# Patient Record
Sex: FEMALE | Race: WHITE | NOT HISPANIC OR LATINO
[De-identification: names, ages, dates, MRNs, and addresses within clinical notes are randomized per-mention and may not be internally consistent; named-entity substitution may affect disease eponyms.]

---

## 2017-01-04 ENCOUNTER — FORM ENCOUNTER (OUTPATIENT)
Age: 82
End: 2017-01-04

## 2017-01-05 ENCOUNTER — OUTPATIENT (OUTPATIENT)
Dept: OUTPATIENT SERVICES | Facility: HOSPITAL | Age: 82
LOS: 1 days | End: 2017-01-05
Payer: MEDICARE

## 2017-01-05 ENCOUNTER — APPOINTMENT (OUTPATIENT)
Dept: CT IMAGING | Facility: IMAGING CENTER | Age: 82
End: 2017-01-05

## 2017-01-05 DIAGNOSIS — Z00.8 ENCOUNTER FOR OTHER GENERAL EXAMINATION: ICD-10-CM

## 2017-01-05 PROCEDURE — 74177 CT ABD & PELVIS W/CONTRAST: CPT

## 2017-01-05 PROCEDURE — 71260 CT THORAX DX C+: CPT

## 2017-02-08 ENCOUNTER — RX RENEWAL (OUTPATIENT)
Age: 82
End: 2017-02-08

## 2017-03-13 ENCOUNTER — RX RENEWAL (OUTPATIENT)
Age: 82
End: 2017-03-13

## 2017-03-18 ENCOUNTER — RX RENEWAL (OUTPATIENT)
Age: 82
End: 2017-03-18

## 2017-04-10 ENCOUNTER — APPOINTMENT (OUTPATIENT)
Dept: INTERNAL MEDICINE | Facility: CLINIC | Age: 82
End: 2017-04-10

## 2017-04-10 VITALS
DIASTOLIC BLOOD PRESSURE: 74 MMHG | TEMPERATURE: 97.3 F | SYSTOLIC BLOOD PRESSURE: 132 MMHG | HEART RATE: 74 BPM | RESPIRATION RATE: 14 BRPM

## 2017-04-10 VITALS — BODY MASS INDEX: 28.28 KG/M2 | WEIGHT: 157.63 LBS

## 2017-04-11 LAB
25(OH)D3 SERPL-MCNC: 42.5 NG/ML
ALBUMIN SERPL ELPH-MCNC: 4.1 G/DL
ALP BLD-CCNC: 72 U/L
ALT SERPL-CCNC: 7 U/L
ANION GAP SERPL CALC-SCNC: 16 MMOL/L
AST SERPL-CCNC: 20 U/L
BASOPHILS # BLD AUTO: 0.02 K/UL
BASOPHILS NFR BLD AUTO: 0.3 %
BILIRUB SERPL-MCNC: 0.3 MG/DL
BUN SERPL-MCNC: 16 MG/DL
CALCIUM SERPL-MCNC: 9.4 MG/DL
CHLORIDE SERPL-SCNC: 99 MMOL/L
CHOLEST SERPL-MCNC: 195 MG/DL
CHOLEST/HDLC SERPL: 3.2 RATIO
CO2 SERPL-SCNC: 27 MMOL/L
CREAT SERPL-MCNC: 0.73 MG/DL
EOSINOPHIL # BLD AUTO: 0.1 K/UL
EOSINOPHIL NFR BLD AUTO: 1.6 %
FERRITIN SERPL-MCNC: 9 NG/ML
GLUCOSE SERPL-MCNC: 82 MG/DL
HCT VFR BLD CALC: 37.7 %
HDLC SERPL-MCNC: 61 MG/DL
HGB BLD-MCNC: 10.7 G/DL
IMM GRANULOCYTES NFR BLD AUTO: 0.2 %
IRON SATN MFR SERPL: 9 %
IRON SERPL-MCNC: 34 UG/DL
LDLC SERPL CALC-MCNC: 117 MG/DL
LYMPHOCYTES # BLD AUTO: 1.42 K/UL
LYMPHOCYTES NFR BLD AUTO: 22.8 %
MAN DIFF?: NORMAL
MCHC RBC-ENTMCNC: 24.8 PG
MCHC RBC-ENTMCNC: 28.4 GM/DL
MCV RBC AUTO: 87.3 FL
MONOCYTES # BLD AUTO: 0.49 K/UL
MONOCYTES NFR BLD AUTO: 7.9 %
NEUTROPHILS # BLD AUTO: 4.2 K/UL
NEUTROPHILS NFR BLD AUTO: 67.2 %
PLATELET # BLD AUTO: 310 K/UL
POTASSIUM SERPL-SCNC: 3.6 MMOL/L
PROT SERPL-MCNC: 7.2 G/DL
RBC # BLD: 4.32 M/UL
RBC # FLD: 16.1 %
SODIUM SERPL-SCNC: 142 MMOL/L
TIBC SERPL-MCNC: 385 UG/DL
TRIGL SERPL-MCNC: 83 MG/DL
UIBC SERPL-MCNC: 351 UG/DL
WBC # FLD AUTO: 6.24 K/UL

## 2017-04-26 ENCOUNTER — APPOINTMENT (OUTPATIENT)
Dept: INTERNAL MEDICINE | Facility: CLINIC | Age: 82
End: 2017-04-26

## 2017-04-27 ENCOUNTER — RX RENEWAL (OUTPATIENT)
Age: 82
End: 2017-04-27

## 2017-04-28 LAB — BACTERIA UR CULT: NORMAL

## 2017-06-02 ENCOUNTER — RX RENEWAL (OUTPATIENT)
Age: 82
End: 2017-06-02

## 2017-06-27 ENCOUNTER — RX RENEWAL (OUTPATIENT)
Age: 82
End: 2017-06-27

## 2017-07-27 RX ORDER — CLONAZEPAM 0.5 MG/1
0.5 TABLET ORAL
Qty: 90 | Refills: 0 | Status: DISCONTINUED | COMMUNITY
Start: 2016-12-14

## 2017-07-27 RX ORDER — POLYETHYLENE GLYCOL 3350, SODIUM CHLORIDE, SODIUM BICARBONATE AND POTASSIUM CHLORIDE WITH LEMON FLAVOR 420; 11.2; 5.72; 1.48 G/4L; G/4L; G/4L; G/4L
420 POWDER, FOR SOLUTION ORAL
Qty: 4000 | Refills: 0 | Status: ACTIVE | COMMUNITY
Start: 2016-12-02

## 2017-07-27 RX ORDER — LATANOPROST/PF 0.005 %
0.01 DROPS OPHTHALMIC (EYE)
Qty: 2 | Refills: 0 | Status: ACTIVE | COMMUNITY
Start: 2017-03-06 | End: 1900-01-01

## 2017-08-29 ENCOUNTER — RX RENEWAL (OUTPATIENT)
Age: 82
End: 2017-08-29

## 2017-09-05 ENCOUNTER — RX RENEWAL (OUTPATIENT)
Age: 82
End: 2017-09-05

## 2017-09-06 ENCOUNTER — RX RENEWAL (OUTPATIENT)
Age: 82
End: 2017-09-06

## 2017-09-20 ENCOUNTER — NON-APPOINTMENT (OUTPATIENT)
Age: 82
End: 2017-09-20

## 2017-09-20 ENCOUNTER — APPOINTMENT (OUTPATIENT)
Dept: INTERNAL MEDICINE | Facility: CLINIC | Age: 82
End: 2017-09-20
Payer: MEDICARE

## 2017-09-20 VITALS
DIASTOLIC BLOOD PRESSURE: 76 MMHG | TEMPERATURE: 96.3 F | RESPIRATION RATE: 16 BRPM | SYSTOLIC BLOOD PRESSURE: 140 MMHG | HEART RATE: 72 BPM

## 2017-09-20 VITALS — BODY MASS INDEX: 27.99 KG/M2 | WEIGHT: 156 LBS

## 2017-09-20 PROCEDURE — 93000 ELECTROCARDIOGRAM COMPLETE: CPT

## 2017-09-20 PROCEDURE — 99215 OFFICE O/P EST HI 40 MIN: CPT | Mod: 25

## 2017-09-20 PROCEDURE — 90662 IIV NO PRSV INCREASED AG IM: CPT

## 2017-09-20 PROCEDURE — G0008: CPT

## 2017-09-20 PROCEDURE — 36415 COLL VENOUS BLD VENIPUNCTURE: CPT

## 2017-09-22 LAB
25(OH)D3 SERPL-MCNC: 38.7 NG/ML
ALBUMIN SERPL ELPH-MCNC: 4.3 G/DL
ALP BLD-CCNC: 68 U/L
ALT SERPL-CCNC: 11 U/L
ANION GAP SERPL CALC-SCNC: 16 MMOL/L
AST SERPL-CCNC: 18 U/L
BASOPHILS # BLD AUTO: 0.02 K/UL
BASOPHILS NFR BLD AUTO: 0.3 %
BILIRUB SERPL-MCNC: 0.3 MG/DL
BUN SERPL-MCNC: 19 MG/DL
CALCIUM SERPL-MCNC: 9.6 MG/DL
CHLORIDE SERPL-SCNC: 99 MMOL/L
CHOLEST SERPL-MCNC: 202 MG/DL
CHOLEST/HDLC SERPL: 3.6 RATIO
CO2 SERPL-SCNC: 25 MMOL/L
CREAT SERPL-MCNC: 0.99 MG/DL
EOSINOPHIL # BLD AUTO: 0.07 K/UL
EOSINOPHIL NFR BLD AUTO: 1.1 %
FERRITIN SERPL-MCNC: 18 NG/ML
GLUCOSE SERPL-MCNC: 91 MG/DL
HCT VFR BLD CALC: 40.2 %
HDLC SERPL-MCNC: 56 MG/DL
HGB BLD-MCNC: 12.4 G/DL
IMM GRANULOCYTES NFR BLD AUTO: 0.5 %
IRON SATN MFR SERPL: 14 %
IRON SERPL-MCNC: 46 UG/DL
LDLC SERPL CALC-MCNC: 128 MG/DL
LYMPHOCYTES # BLD AUTO: 1.38 K/UL
LYMPHOCYTES NFR BLD AUTO: 22.3 %
MAN DIFF?: NORMAL
MCHC RBC-ENTMCNC: 27.9 PG
MCHC RBC-ENTMCNC: 30.8 GM/DL
MCV RBC AUTO: 90.3 FL
MONOCYTES # BLD AUTO: 0.64 K/UL
MONOCYTES NFR BLD AUTO: 10.4 %
NEUTROPHILS # BLD AUTO: 4.04 K/UL
NEUTROPHILS NFR BLD AUTO: 65.4 %
PLATELET # BLD AUTO: 248 K/UL
POTASSIUM SERPL-SCNC: 3.5 MMOL/L
PROT SERPL-MCNC: 7.1 G/DL
RBC # BLD: 4.45 M/UL
RBC # FLD: 16.3 %
SODIUM SERPL-SCNC: 140 MMOL/L
TIBC SERPL-MCNC: 328 UG/DL
TRANSFERRIN SERPL-MCNC: 296 MG/DL
TRIGL SERPL-MCNC: 91 MG/DL
UIBC SERPL-MCNC: 282 UG/DL
WBC # FLD AUTO: 6.18 K/UL

## 2017-10-03 ENCOUNTER — RX RENEWAL (OUTPATIENT)
Age: 82
End: 2017-10-03

## 2017-10-21 ENCOUNTER — RX RENEWAL (OUTPATIENT)
Age: 82
End: 2017-10-21

## 2017-10-26 ENCOUNTER — RX RENEWAL (OUTPATIENT)
Age: 82
End: 2017-10-26

## 2017-12-13 ENCOUNTER — APPOINTMENT (OUTPATIENT)
Dept: INTERNAL MEDICINE | Facility: CLINIC | Age: 82
End: 2017-12-13
Payer: MEDICARE

## 2017-12-13 VITALS
RESPIRATION RATE: 16 BRPM | TEMPERATURE: 98.3 F | SYSTOLIC BLOOD PRESSURE: 134 MMHG | DIASTOLIC BLOOD PRESSURE: 72 MMHG | HEART RATE: 70 BPM

## 2017-12-13 VITALS — WEIGHT: 154.32 LBS | BODY MASS INDEX: 27.69 KG/M2

## 2017-12-13 DIAGNOSIS — M17.10 UNILATERAL PRIMARY OSTEOARTHRITIS, UNSPECIFIED KNEE: ICD-10-CM

## 2017-12-13 LAB
BILIRUB UR QL STRIP: NEGATIVE
GLUCOSE UR-MCNC: NEGATIVE
HCG UR QL: 0.2 EU/DL
HGB UR QL STRIP.AUTO: NEGATIVE
KETONES UR-MCNC: NEGATIVE
LEUKOCYTE ESTERASE UR QL STRIP: NORMAL
NITRITE UR QL STRIP: NEGATIVE
PH UR STRIP: 6
PROT UR STRIP-MCNC: NEGATIVE
SP GR UR STRIP: 1.01

## 2017-12-13 PROCEDURE — 81003 URINALYSIS AUTO W/O SCOPE: CPT | Mod: QW

## 2017-12-13 PROCEDURE — G0439: CPT

## 2017-12-13 PROCEDURE — 36415 COLL VENOUS BLD VENIPUNCTURE: CPT

## 2017-12-13 PROCEDURE — 99215 OFFICE O/P EST HI 40 MIN: CPT | Mod: 25

## 2017-12-14 LAB
25(OH)D3 SERPL-MCNC: 38.6 NG/ML
ALBUMIN SERPL ELPH-MCNC: 4.2 G/DL
ALP BLD-CCNC: 74 U/L
ALT SERPL-CCNC: 7 U/L
ANION GAP SERPL CALC-SCNC: 19 MMOL/L
AST SERPL-CCNC: 16 U/L
BASOPHILS # BLD AUTO: 0.02 K/UL
BASOPHILS NFR BLD AUTO: 0.3 %
BILIRUB SERPL-MCNC: 0.3 MG/DL
BUN SERPL-MCNC: 13 MG/DL
CALCIUM SERPL-MCNC: 9.6 MG/DL
CHLORIDE SERPL-SCNC: 97 MMOL/L
CHOLEST SERPL-MCNC: 193 MG/DL
CHOLEST/HDLC SERPL: 3.4 RATIO
CO2 SERPL-SCNC: 26 MMOL/L
CREAT SERPL-MCNC: 0.87 MG/DL
EOSINOPHIL # BLD AUTO: 0.08 K/UL
EOSINOPHIL NFR BLD AUTO: 1.1 %
GLUCOSE SERPL-MCNC: 87 MG/DL
HCT VFR BLD CALC: 41.7 %
HDLC SERPL-MCNC: 56 MG/DL
HGB BLD-MCNC: 12.8 G/DL
IMM GRANULOCYTES NFR BLD AUTO: 0.3 %
LDLC SERPL CALC-MCNC: 117 MG/DL
LYMPHOCYTES # BLD AUTO: 1.58 K/UL
LYMPHOCYTES NFR BLD AUTO: 21.9 %
MAN DIFF?: NORMAL
MCHC RBC-ENTMCNC: 28.6 PG
MCHC RBC-ENTMCNC: 30.7 GM/DL
MCV RBC AUTO: 93.3 FL
MONOCYTES # BLD AUTO: 0.7 K/UL
MONOCYTES NFR BLD AUTO: 9.7 %
NEUTROPHILS # BLD AUTO: 4.81 K/UL
NEUTROPHILS NFR BLD AUTO: 66.7 %
PLATELET # BLD AUTO: 267 K/UL
POTASSIUM SERPL-SCNC: 3.5 MMOL/L
PROT SERPL-MCNC: 7.3 G/DL
RBC # BLD: 4.47 M/UL
RBC # FLD: 14.3 %
SODIUM SERPL-SCNC: 142 MMOL/L
TRIGL SERPL-MCNC: 101 MG/DL
WBC # FLD AUTO: 7.21 K/UL

## 2017-12-15 ENCOUNTER — RX RENEWAL (OUTPATIENT)
Age: 82
End: 2017-12-15

## 2018-01-26 ENCOUNTER — APPOINTMENT (OUTPATIENT)
Dept: INTERNAL MEDICINE | Facility: CLINIC | Age: 83
End: 2018-01-26
Payer: MEDICARE

## 2018-01-26 VITALS
SYSTOLIC BLOOD PRESSURE: 120 MMHG | RESPIRATION RATE: 16 BRPM | HEART RATE: 74 BPM | TEMPERATURE: 97.3 F | DIASTOLIC BLOOD PRESSURE: 73 MMHG

## 2018-01-26 VITALS — BODY MASS INDEX: 27.81 KG/M2 | WEIGHT: 155 LBS

## 2018-01-26 DIAGNOSIS — H81.10 BENIGN PAROXYSMAL VERTIGO, UNSPECIFIED EAR: ICD-10-CM

## 2018-01-26 DIAGNOSIS — F51.04 PSYCHOPHYSIOLOGIC INSOMNIA: ICD-10-CM

## 2018-01-26 PROCEDURE — 99214 OFFICE O/P EST MOD 30 MIN: CPT

## 2018-03-12 ENCOUNTER — RX RENEWAL (OUTPATIENT)
Age: 83
End: 2018-03-12

## 2018-04-10 ENCOUNTER — INPATIENT (INPATIENT)
Facility: HOSPITAL | Age: 83
LOS: 8 days | Discharge: HOME | DRG: 308 | End: 2018-04-19
Attending: INTERNAL MEDICINE | Admitting: HOSPITALIST
Payer: MEDICARE

## 2018-04-10 ENCOUNTER — APPOINTMENT (OUTPATIENT)
Dept: INTERNAL MEDICINE | Facility: CLINIC | Age: 83
End: 2018-04-10
Payer: MEDICARE

## 2018-04-10 ENCOUNTER — NON-APPOINTMENT (OUTPATIENT)
Age: 83
End: 2018-04-10

## 2018-04-10 VITALS
SYSTOLIC BLOOD PRESSURE: 122 MMHG | DIASTOLIC BLOOD PRESSURE: 74 MMHG | HEART RATE: 68 BPM | TEMPERATURE: 96.3 F | RESPIRATION RATE: 14 BRPM

## 2018-04-10 VITALS
DIASTOLIC BLOOD PRESSURE: 77 MMHG | RESPIRATION RATE: 20 BRPM | HEART RATE: 134 BPM | OXYGEN SATURATION: 96 % | SYSTOLIC BLOOD PRESSURE: 116 MMHG | WEIGHT: 145.06 LBS | TEMPERATURE: 98 F

## 2018-04-10 VITALS — BODY MASS INDEX: 28.28 KG/M2 | WEIGHT: 157.63 LBS

## 2018-04-10 DIAGNOSIS — R06.02 SHORTNESS OF BREATH: ICD-10-CM

## 2018-04-10 DIAGNOSIS — F41.9 ANXIETY DISORDER, UNSPECIFIED: ICD-10-CM

## 2018-04-10 DIAGNOSIS — E55.9 VITAMIN D DEFICIENCY, UNSPECIFIED: ICD-10-CM

## 2018-04-10 DIAGNOSIS — I50.9 HEART FAILURE, UNSPECIFIED: ICD-10-CM

## 2018-04-10 DIAGNOSIS — H40.9 UNSPECIFIED GLAUCOMA: ICD-10-CM

## 2018-04-10 DIAGNOSIS — R19.8 OTHER SPECIFIED SYMPTOMS AND SIGNS INVOLVING THE DIGESTIVE SYSTEM AND ABDOMEN: Chronic | ICD-10-CM

## 2018-04-10 DIAGNOSIS — E78.5 HYPERLIPIDEMIA, UNSPECIFIED: ICD-10-CM

## 2018-04-10 DIAGNOSIS — I48.91 UNSPECIFIED ATRIAL FIBRILLATION: ICD-10-CM

## 2018-04-10 DIAGNOSIS — R93.3 ABNORMAL FINDINGS ON DIAGNOSTIC IMAGING OF OTHER PARTS OF DIGESTIVE TRACT: Chronic | ICD-10-CM

## 2018-04-10 DIAGNOSIS — Z29.9 ENCOUNTER FOR PROPHYLACTIC MEASURES, UNSPECIFIED: ICD-10-CM

## 2018-04-10 DIAGNOSIS — I10 ESSENTIAL (PRIMARY) HYPERTENSION: ICD-10-CM

## 2018-04-10 DIAGNOSIS — K21.9 GASTRO-ESOPHAGEAL REFLUX DISEASE WITHOUT ESOPHAGITIS: ICD-10-CM

## 2018-04-10 LAB
ALBUMIN SERPL ELPH-MCNC: 4 G/DL — SIGNIFICANT CHANGE UP (ref 3.3–5)
ALP SERPL-CCNC: 68 U/L — SIGNIFICANT CHANGE UP (ref 40–120)
ALT FLD-CCNC: 12 U/L RC — SIGNIFICANT CHANGE UP (ref 10–45)
ANION GAP SERPL CALC-SCNC: 12 MMOL/L — SIGNIFICANT CHANGE UP (ref 5–17)
APTT BLD: 29.2 SEC — SIGNIFICANT CHANGE UP (ref 27.5–37.4)
AST SERPL-CCNC: 14 U/L — SIGNIFICANT CHANGE UP (ref 10–40)
BASOPHILS # BLD AUTO: 0 K/UL — SIGNIFICANT CHANGE UP (ref 0–0.2)
BASOPHILS NFR BLD AUTO: 0.4 % — SIGNIFICANT CHANGE UP (ref 0–2)
BILIRUB SERPL-MCNC: 0.3 MG/DL — SIGNIFICANT CHANGE UP (ref 0.2–1.2)
BUN SERPL-MCNC: 14 MG/DL — SIGNIFICANT CHANGE UP (ref 7–23)
CALCIUM SERPL-MCNC: 9.5 MG/DL — SIGNIFICANT CHANGE UP (ref 8.4–10.5)
CHLORIDE SERPL-SCNC: 101 MMOL/L — SIGNIFICANT CHANGE UP (ref 96–108)
CK MB CFR SERPL CALC: 1.3 NG/ML — SIGNIFICANT CHANGE UP (ref 0–3.8)
CK SERPL-CCNC: 36 U/L — SIGNIFICANT CHANGE UP (ref 25–170)
CO2 SERPL-SCNC: 27 MMOL/L — SIGNIFICANT CHANGE UP (ref 22–31)
CREAT SERPL-MCNC: 0.86 MG/DL — SIGNIFICANT CHANGE UP (ref 0.5–1.3)
EOSINOPHIL # BLD AUTO: 0.1 K/UL — SIGNIFICANT CHANGE UP (ref 0–0.5)
EOSINOPHIL NFR BLD AUTO: 1.1 % — SIGNIFICANT CHANGE UP (ref 0–6)
GLUCOSE SERPL-MCNC: 124 MG/DL — HIGH (ref 70–99)
HCT VFR BLD CALC: 39.2 % — SIGNIFICANT CHANGE UP (ref 34.5–45)
HGB BLD-MCNC: 13 G/DL — SIGNIFICANT CHANGE UP (ref 11.5–15.5)
INR BLD: 1.13 RATIO — SIGNIFICANT CHANGE UP (ref 0.88–1.16)
LYMPHOCYTES # BLD AUTO: 1.3 K/UL — SIGNIFICANT CHANGE UP (ref 1–3.3)
LYMPHOCYTES # BLD AUTO: 18.5 % — SIGNIFICANT CHANGE UP (ref 13–44)
MCHC RBC-ENTMCNC: 30.4 PG — SIGNIFICANT CHANGE UP (ref 27–34)
MCHC RBC-ENTMCNC: 33.2 GM/DL — SIGNIFICANT CHANGE UP (ref 32–36)
MCV RBC AUTO: 91.6 FL — SIGNIFICANT CHANGE UP (ref 80–100)
MONOCYTES # BLD AUTO: 0.8 K/UL — SIGNIFICANT CHANGE UP (ref 0–0.9)
MONOCYTES NFR BLD AUTO: 11.8 % — SIGNIFICANT CHANGE UP (ref 2–14)
NEUTROPHILS # BLD AUTO: 4.7 K/UL — SIGNIFICANT CHANGE UP (ref 1.8–7.4)
NEUTROPHILS NFR BLD AUTO: 68.2 % — SIGNIFICANT CHANGE UP (ref 43–77)
NT-PROBNP SERPL-SCNC: 1150 PG/ML — HIGH (ref 0–300)
PLATELET # BLD AUTO: 216 K/UL — SIGNIFICANT CHANGE UP (ref 150–400)
POTASSIUM SERPL-MCNC: 3.6 MMOL/L — SIGNIFICANT CHANGE UP (ref 3.5–5.3)
POTASSIUM SERPL-SCNC: 3.6 MMOL/L — SIGNIFICANT CHANGE UP (ref 3.5–5.3)
PROT SERPL-MCNC: 7.5 G/DL — SIGNIFICANT CHANGE UP (ref 6–8.3)
PROTHROM AB SERPL-ACNC: 12.4 SEC — SIGNIFICANT CHANGE UP (ref 9.8–12.7)
RBC # BLD: 4.28 M/UL — SIGNIFICANT CHANGE UP (ref 3.8–5.2)
RBC # FLD: 12.4 % — SIGNIFICANT CHANGE UP (ref 10.3–14.5)
SODIUM SERPL-SCNC: 140 MMOL/L — SIGNIFICANT CHANGE UP (ref 135–145)
TROPONIN T SERPL-MCNC: <0.01 NG/ML — SIGNIFICANT CHANGE UP (ref 0–0.06)
TSH SERPL-MCNC: 0.81 UIU/ML — SIGNIFICANT CHANGE UP (ref 0.27–4.2)
WBC # BLD: 6.8 K/UL — SIGNIFICANT CHANGE UP (ref 3.8–10.5)
WBC # FLD AUTO: 6.8 K/UL — SIGNIFICANT CHANGE UP (ref 3.8–10.5)

## 2018-04-10 PROCEDURE — 71045 X-RAY EXAM CHEST 1 VIEW: CPT | Mod: 26

## 2018-04-10 PROCEDURE — 99291 CRITICAL CARE FIRST HOUR: CPT

## 2018-04-10 PROCEDURE — 36415 COLL VENOUS BLD VENIPUNCTURE: CPT

## 2018-04-10 PROCEDURE — 99215 OFFICE O/P EST HI 40 MIN: CPT | Mod: 25

## 2018-04-10 PROCEDURE — 71275 CT ANGIOGRAPHY CHEST: CPT | Mod: 26

## 2018-04-10 PROCEDURE — 99223 1ST HOSP IP/OBS HIGH 75: CPT | Mod: GC

## 2018-04-10 PROCEDURE — 93010 ELECTROCARDIOGRAM REPORT: CPT

## 2018-04-10 PROCEDURE — 93000 ELECTROCARDIOGRAM COMPLETE: CPT

## 2018-04-10 RX ORDER — DORZOLAMIDE HYDROCHLORIDE TIMOLOL MALEATE 20; 5 MG/ML; MG/ML
1 SOLUTION/ DROPS OPHTHALMIC
Qty: 0 | Refills: 0 | Status: DISCONTINUED | OUTPATIENT
Start: 2018-04-10 | End: 2018-04-19

## 2018-04-10 RX ORDER — LATANOPROST 0.05 MG/ML
1 SOLUTION/ DROPS OPHTHALMIC; TOPICAL AT BEDTIME
Qty: 0 | Refills: 0 | Status: DISCONTINUED | OUTPATIENT
Start: 2018-04-10 | End: 2018-04-19

## 2018-04-10 RX ORDER — FUROSEMIDE 40 MG
40 TABLET ORAL ONCE
Qty: 0 | Refills: 0 | Status: COMPLETED | OUTPATIENT
Start: 2018-04-10 | End: 2018-04-10

## 2018-04-10 RX ORDER — ENOXAPARIN SODIUM 100 MG/ML
70 INJECTION SUBCUTANEOUS ONCE
Qty: 0 | Refills: 0 | Status: COMPLETED | OUTPATIENT
Start: 2018-04-10 | End: 2018-04-10

## 2018-04-10 RX ORDER — APIXABAN 2.5 MG/1
5 TABLET, FILM COATED ORAL EVERY 12 HOURS
Qty: 0 | Refills: 0 | Status: DISCONTINUED | OUTPATIENT
Start: 2018-04-11 | End: 2018-04-19

## 2018-04-10 RX ORDER — SODIUM CHLORIDE 9 MG/ML
3 INJECTION INTRAMUSCULAR; INTRAVENOUS; SUBCUTANEOUS ONCE
Qty: 0 | Refills: 0 | Status: COMPLETED | OUTPATIENT
Start: 2018-04-10 | End: 2018-04-10

## 2018-04-10 RX ORDER — FUROSEMIDE 40 MG
40 TABLET ORAL DAILY
Qty: 0 | Refills: 0 | Status: DISCONTINUED | OUTPATIENT
Start: 2018-04-11 | End: 2018-04-16

## 2018-04-10 RX ORDER — ACETAMINOPHEN 500 MG
650 TABLET ORAL EVERY 6 HOURS
Qty: 0 | Refills: 0 | Status: DISCONTINUED | OUTPATIENT
Start: 2018-04-10 | End: 2018-04-19

## 2018-04-10 RX ORDER — ASPIRIN/CALCIUM CARB/MAGNESIUM 324 MG
324 TABLET ORAL ONCE
Qty: 0 | Refills: 0 | Status: COMPLETED | OUTPATIENT
Start: 2018-04-10 | End: 2018-04-10

## 2018-04-10 RX ORDER — PANTOPRAZOLE SODIUM 20 MG/1
40 TABLET, DELAYED RELEASE ORAL
Qty: 0 | Refills: 0 | Status: DISCONTINUED | OUTPATIENT
Start: 2018-04-10 | End: 2018-04-19

## 2018-04-10 RX ADMIN — LATANOPROST 1 DROP(S): 0.05 SOLUTION/ DROPS OPHTHALMIC; TOPICAL at 23:17

## 2018-04-10 RX ADMIN — ENOXAPARIN SODIUM 70 MILLIGRAM(S): 100 INJECTION SUBCUTANEOUS at 14:55

## 2018-04-10 RX ADMIN — Medication 324 MILLIGRAM(S): at 14:56

## 2018-04-10 RX ADMIN — SODIUM CHLORIDE 3 MILLILITER(S): 9 INJECTION INTRAMUSCULAR; INTRAVENOUS; SUBCUTANEOUS at 14:52

## 2018-04-10 RX ADMIN — Medication 40 MILLIGRAM(S): at 16:51

## 2018-04-10 NOTE — H&P ADULT - PROBLEM SELECTOR PLAN 3
Pt denies being on anti-hypertensive medications at this time. She was most recently prescribed triamterine-HCTZ and amlodipine, though pt does not report being on either of these. Contacting pharmacy.  -Pharmacy called to confirm prescribed medications last filled in March or April.  -hold home amlodipine at this time given 's.  -BP well-controlled at this time after diltiazem po and IVP in ED.

## 2018-04-10 NOTE — ED PROVIDER NOTE - OBJECTIVE STATEMENT
85yo F Greenlandic speaking (son at bedside, patient requests son as ) with pmhx of HTN, glacoma, Sciatica, presents to ER c/o of SOB since this morning.  Patient went to her PCP Dr Shelley who did EKG and told her she had an arrythmia and to come to ER. Recent flight to [place], came home a few days ago.  Patient denies fever, chills, cough, abdominal pain, N/V, falls, chest pain, palpitations, hx of A.fib.  Not on anticoags 85yo F Turkmen speaking (son at bedside, patient requests son as ) with pmhx of HTN, glaucoma, Sciatica, presents to ER c/o of SOB since this morning.  Patient went to her PCP Dr Shelley who did EKG and told her she had an arrythmia and to come to ER. Recent flight to [place], came home a few days ago.  Patient denies fever, chills, cough, abdominal pain, N/V, falls, chest pain, palpitations, hx of A.fib.  Not on anticoags

## 2018-04-10 NOTE — ED PROVIDER NOTE - ATTENDING CONTRIBUTION TO CARE
palpitations associated w sob. basilar rales. +jvd. irr irr. tachy. concern for underlying pe vs atrial fibrillation w rvr primary. pt also appears fluid overload. no hx of chf. labs, xr chest vs cta. ekg. cardiac enzymes. will admit for w/u and continued treatment.

## 2018-04-10 NOTE — ED ADULT NURSE NOTE - OBJECTIVE STATEMENT
Pt is an 85 yo F, Luxembourgish speaking, son nacho, who came to the Ed c/o diff breathing this morning. Son states pt lives alone, pt called him this morning stating  she was having difficulty. When son arrived at pt's house, he noted she had labored breathing. Son took pt to see Dr Shelley, who evaluated pt and d/c her home. On the way home pt's son was called by Dr Shelley advising him to take pt to the ED, she may have "an arrythmia".Pt had a  recent flight to [place] and came home a few days ago.  Patient denies fever, chills, cough, abdominal pain, N/V, falls, chest pain, palpitations, no hx of A.fib.  A/O x3, resp reg and unlabored,  lungs lily,r color good, skin warm, dry, +pulses, no edema.

## 2018-04-10 NOTE — H&P ADULT - PMH
Colonic polyp    GERD (gastroesophageal reflux disease)    Glaucoma    Hiatal hernia    Hyperlipidemia    Hypertension    Hypovitaminosis D    Leg pain, bilateral

## 2018-04-10 NOTE — H&P ADULT - ASSESSMENT
87yo F with PMH of HTN, degenerative arthritis of the knee, chronic bilateral lower extremity pain, low Vit D, hypokalemia, urinary frequency, gastritis, anxiety attacks, HLD, dyspnea, BPPV presenting with two days of intermittent shortness of breath, admitted for new atrial fibrillation with RVR.

## 2018-04-10 NOTE — H&P ADULT - NSHPLABSRESULTS_GEN_ALL_CORE
LABS:                        13.0   6.8   )-----------( 216      ( 10 Apr 2018 13:22 )             39.2     10 Apr 2018 13:22    140    |  101    |  14     ----------------------------<  124    3.6     |  27     |  0.86     Ca    9.5        10 Apr 2018 13:22    TPro  7.5    /  Alb  4.0    /  TBili  0.3    /  DBili  x      /  AST  14     /  ALT  12     /  AlkPhos  68     10 Apr 2018 13:22    PT/INR - ( 10 Apr 2018 13:22 )   PT: 12.4 sec;   INR: 1.13 ratio      Serum Pro-Brain Natriuretic Peptide (04.10.18 @ 13:22)    Serum Pro-Brain Natriuretic Peptide: 1150 pg/mL       PTT - ( 10 Apr 2018 13:22 )  PTT:29.2 sec  CAPILLARY BLOOD GLUCOSE        BLOOD CULTURE    RADIOLOGY & ADDITIONAL TESTS:    Imaging Personally Reviewed:  [x ] YES     < from: Xray Chest 1 View AP/PA (04.10.18 @ 14:36) >  INTERPRETATION:  AP chest x-ray at 1433 hours on 10 April.  Clinical information: Shortness of breath, abnormal EKG.  Comparison: Chest CT dated 5 January 2017.  Findings: The central pulmonary arteries are prominent. The thoracic   aorta is calcified. Pulmonary venous congestion is present. No   consolidation is noted. There is bilateral pleural effusions.  Impression: CHF.  < end of copied text >    < from: CT Angio Chest w/ IV Cont (04.10.18 @ 15:19) >  IMPRESSION: No pulmonary embolus.  Mild pulmonary edema with small pleural effusions.  < end of copied text >

## 2018-04-10 NOTE — PATIENT PROFILE ADULT. - LANGUAGE ASSISTANCE NEEDED
No-Patient/Caregiver offered and refused free interpretation services./Son present and prefers to communicate, patient is

## 2018-04-10 NOTE — H&P ADULT - PROBLEM SELECTOR PLAN 4
Pt has not been prescribed statin in [employer]. Will follow up with PMD in AM.  and HDL 56 on late 2017 outpatient labs.  -further management with PMD as outpatient.  -hold on starting statin at this time.

## 2018-04-10 NOTE — H&P ADULT - NSHPSOCIALHISTORY_GEN_ALL_CORE
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

## 2018-04-10 NOTE — H&P ADULT - PROBLEM SELECTOR PLAN 2
Pt endorses 2 days of shortness of breath with single episode about one month ago, intermittent, likely related to new onset afib with RVR and pulmonary edema. However CXR and CT also showing cardiomegaly with LA enlargement. Will obtain TTE to assess for congestive heart failure.  -TTE  -s/p Lasix 40mg IVP x 1.  -breathing comfortably at this time. Will reevaluate need for additional Lasix in AM. Mildly volume overloaded with 1+ LE edema and few basilar crackles.  -wean NC to room air as tolerated. Pt endorses 2 days of shortness of breath with single episode about one month ago, intermittent, likely related to new onset afib with RVR and pulmonary edema. However CXR and CT also showing cardiomegaly with LA enlargement. Will obtain TTE to assess for congestive heart failure.  -TTE  -s/p Lasix 40mg IVP x 1.  -breathing comfortably at this time. Will reevaluate need for additional Lasix in AM. Mildly volume overloaded with 1+ LE edema and few basilar crackles.  -wean NC to room air as tolerated.  -start Lasix 40 IVP daily for now.  -will likely need eventual stress test.

## 2018-04-10 NOTE — H&P ADULT - PROBLEM SELECTOR PLAN 8
DVT ppx: on Lovenox now, will transition to NOAC apixiban.    Cj Sims  Medicine night admit, PGY-2  Pager [phone] / [phone] No recent visual changes.   -resume home Cosopt BID OU and latanoprost qhs OU.

## 2018-04-10 NOTE — H&P ADULT - HISTORY OF PRESENT ILLNESS
Pt is an 85yo F with PMH of HTN, degenerative arthritis of the knee, chronic bilateral lower extremity pain, low Vit D, hypokalemia, urinary frequency, gastritis, anxiety attacks, HLD, dyspnea, BPPV presenting with two days of intermittent shortness of breath. She was evaluated by her PMD Dr. Shelley earlier today with EKG concerning for atrial fibrillation, and she was sent to the ED. The patient reports that the shortness of breath episodes are intermittent with no clear exacerbating or relieving factors, though they seem to occur most often in the mornings. The patient denies ever having had chest, jaw, or shoulder pain associated with shortness of breath. Denies fevers, chills, cough, abdominal pain, change in bowel habits, n/v/d/c, diaphoresis, new weakness, open wounds, sick contacts. She did recently travel to [place] by plane but has not noticed new LE swelling or pain.    At baseline the patient ambulates with a cane, lives alone with full ADL's, no HHA. Her son visits her frequently but lives with his family nearby on . Of note, patient has had colonoscopy and endoscopy in the past for screening and for GERD with a reportedly benign lesion found and removed. Reviewed colonoscopy and endoscopy in [employer]: multiple sessile polyps biopsied (3) in colonoscopy largest 2.5 cm, biopsies negative for malignant lesions per [employer]. Endoscopy also showed erythematous polyp within hiatal hernia which was biopsied. Colonoscopy and endoscopy were in 12/2016.     ED course:  Vitals: Tmax 98.8degF, ->100, /61, RR 18, SpO2 95% on NC 2L, 96% on RA initially.  Labs: WBC 6.8, Hgb 13.0, Plts 216, INR 1.13, Na 140, K 3.6, Cl 101, SCr 0.86, glucose 124, trop <0.01, BNP 1150.  Imaging: CT angio chest: no pulmonary embolus, mild pulmonary edema with small pleural effusions.   CXR: pulmonary vascular congestion with bilateral pleural effusions and no consolidations, impression: CHF.  Medications:  x 1, diltiazem 60 po x 1, diltiazem 10 IVPx2, diltiazem 20 IVP x 1, Lovenox 70mg x 1 subq, furosemide 40 IVP x 1, Tylenol 650 x1. Pt is an 87yo F with PMH of HTN, degenerative arthritis of the knee, chronic bilateral lower extremity pain, low Vit D, hypokalemia, urinary frequency, gastritis, anxiety attacks, HLD, dyspnea, BPPV presenting with two days of intermittent shortness of breath. She was evaluated by her PMD Dr. Shelley earlier today with EKG concerning for atrial fibrillation, and she was sent to the ED. The patient reports that the shortness of breath episodes are intermittent with no clear exacerbating or relieving factors, though they seem to occur most often in the mornings. The patient denies ever having had chest, jaw, or shoulder pain associated with shortness of breath. Denies fevers, chills, cough, abdominal pain, change in bowel habits, n/v/d/c, diaphoresis, new weakness, open wounds, sick contacts. She did recently travel to [place] by plane but has not noticed new LE swelling or pain. She does recall feeling similarly short of breath transiently about one month ago without recurrence until two days ago. She has chronic urinary frequency x 1 year without dysuria.     At baseline the patient ambulates with a cane, lives alone with full ADL's, no HHA. Her son visits her frequently but lives with his family nearby on . Of note, patient has had colonoscopy and endoscopy in the past for screening and for GERD with a reportedly benign lesion found and removed. Reviewed colonoscopy and endoscopy in [facility]: multiple sessile polyps biopsied (3) in colonoscopy largest 2.5 cm, biopsies negative for malignant lesions per [facility]. Endoscopy also showed erythematous polyp within hiatal hernia which was biopsied. Colonoscopy and endoscopy were in 12/2016.     ED course:  Vitals: Tmax 98.8degF, ->100, /61, RR 18, SpO2 95% on NC 2L, 96% on RA initially.  Labs: WBC 6.8, Hgb 13.0, Plts 216, INR 1.13, Na 140, K 3.6, Cl 101, SCr 0.86, glucose 124, trop <0.01, BNP 1150.  Imaging: CT angio chest: no pulmonary embolus, mild pulmonary edema with small pleural effusions.   CXR: pulmonary vascular congestion with bilateral pleural effusions and no consolidations, impression: CHF.  Medications:  x 1, diltiazem 60 po x 1, diltiazem 10 IVPx2, diltiazem 20 IVP x 1, Lovenox 70mg x 1 subq, furosemide 40 IVP x 1, Tylenol 650 x1. Pt is an 87yo F with PMH of HTN, degenerative arthritis of the knee, chronic bilateral lower extremity pain, low Vit D, hypokalemia, urinary frequency, gastritis, anxiety attacks, HLD, dyspnea, BPPV presenting with two days of intermittent shortness of breath. She was evaluated by her PMD Dr. Shelley earlier today with EKG concerning for atrial fibrillation, and she was sent to the ED. The patient reports that the shortness of breath episodes are intermittent with no clear exacerbating or relieving factors, though they seem to occur most often in the mornings. The patient denies ever having had chest, jaw, or shoulder pain associated with shortness of breath. Denies fevers, chills, cough, abdominal pain, change in bowel habits, n/v/d/c, diaphoresis, new weakness, open wounds, sick contacts. She did recently travel to [place] by plane but has not noticed new LE swelling or pain. She does recall feeling similarly short of breath transiently about one month ago without recurrence until two days ago. She has chronic urinary frequency x 1 year without dysuria.     At baseline the patient ambulates with a cane, lives alone with full ADL's, no HHA. Her son visits her frequently but lives with his family nearby on . Of note, patient has had colonoscopy and endoscopy in the past for screening and for GERD with a reportedly benign lesion found and removed. Reviewed colonoscopy and endoscopy in [facility]: multiple sessile polyps biopsied (3) in colonoscopy largest 2.5 cm, biopsies negative for malignant lesions per [facility]. Endoscopy also showed erythematous polyp within hiatal hernia which was biopsied. Colonoscopy and endoscopy were in 12/2016.     ED course:  Vitals: Tmax 98.8degF, ->100, /61, RR 18, SpO2 95% on NC 2L, 96% on RA initially.  Labs: WBC 6.8, Hgb 13.0, Plts 216, INR 1.13, Na 140, K 3.6, Cl 101, SCr 0.86, glucose 124, trop <0.01, BNP 1150.  Imaging: CT angio chest: no pulmonary embolus, mild pulmonary edema with small pleural effusions.   CXR: pulmonary vascular congestion with bilateral pleural effusions and no consolidations, impression: CHF.  Medications:  x 1, diltiazem 60 po x 1, diltiazem 10 IVPx2, diltiazem 20 IVP x 1, Lovenox 70mg x 1 subq, furosemide 40 IVP x 1, Tylenol 650 x1.  EKG: rate 130, afib RVR, GA na, QRS 78, QTc 476

## 2018-04-10 NOTE — H&P ADULT - NSHPREVIEWOFSYSTEMS_GEN_ALL_CORE
CONSTITUTIONAL: No fever, weight loss, or fatigue  EYES: No eye pain, visual disturbances, or discharge  ENMT:  No difficulty hearing, tinnitus, vertigo; No sinus or throat pain  RESPIRATORY: No cough, wheezing, chills or hemoptysis; No shortness of breath  CARDIOVASCULAR: No chest pain, palpitations, dizziness, or leg swelling  GASTROINTESTINAL: No abdominal or epigastric pain. No nausea, vomiting, or hematemesis; No diarrhea or constipation. No melena or hematochezia.  GENITOURINARY: +urinary frequency; o/w no dysuria, frequency, hematuria, or incontinence  NEUROLOGICAL: No headaches, loss of strength, numbness, or tremors  SKIN: No itching, burning, rashes, or lesions   LYMPH NODES: No enlarged glands  ENDOCRINE: No heat or cold intolerance; No polydipsia or polyuria  MUSCULOSKELETAL: +chronic LE pain worse at night; No joint pain or swelling;   PSYCHIATRIC: Denies depression, anxiety  HEME/LYMPH: No easy bruising, or bleeding gums  ALLERGY AND IMMUNOLOGIC: No hives or eczema

## 2018-04-10 NOTE — H&P ADULT - PROBLEM SELECTOR PLAN 5
Pt reports taking Prevacid for heartburn as outpatient. 12/2016 endoscopy showed hiatal hernia with biopsied lesion, reportedly benign.  -c/w Prevacid Pt reports taking Prevacid for heartburn as outpatient. 12/2016 endoscopy showed hiatal hernia with biopsied lesion, reportedly benign.  -start protonix 40 qam.

## 2018-04-10 NOTE — H&P ADULT - PROBLEM SELECTOR PLAN 7
No recent visual changes.   -resume home Cosopt BID OU and latanoprost qhs OU. No active mood symptoms. Will hold home clonazepam 1mg TID prn for now.

## 2018-04-10 NOTE — ED ADULT NURSE NOTE - CHPI ED SYMPTOMS NEG
no vomiting/no tingling/no nausea/no numbness/no chills/no decreased eating/drinking/no pain/no dizziness/no weakness/no fever

## 2018-04-10 NOTE — H&P ADULT - PROBLEM SELECTOR PLAN 6
Pt does not appear to have been prescribed Vit D supplementation in [facility]. Reports taking multivitamin as outpatient.   -c/w MVI.

## 2018-04-10 NOTE — ED PROVIDER NOTE - PHYSICAL EXAMINATION
Gen: well appearing, of stated age, no acute distress; Head: NC, AT; ENT: MMM, no uvular deviation; Neck: supple with full ROM; Chest: basilar rales. no retractions, rate mild tachypnea, appears to breath comfortable; Heart: tachy irr S1S2 +JVD No peripheral edema b/l pulses 2+ in arms and legs; Abd: Soft non-tender, no rebound or guarding, no masses, no saab sign, no mcburney tenderness, no CVAT; Back: No spinal deformity; Ext: Moving all 4 limbs without obvious impairment to ROM, no obvious weakness; Neuro: fluid speech, no focal deficits, oriented to person, place, situation; Psych: No anxiety, depression or pressured speech noted; Skin: no utricaria, no diffuse rash. -ncohen

## 2018-04-10 NOTE — H&P ADULT - PROBLEM SELECTOR PLAN 1
Pt presented with afib RVR on EKG to 150's on telemetry, improved after po and IV diltiazem to 100's, 80's on exam currently. Only infectious symptom endorsed is chronic urinary frequency x 1 year. No history of thyroid problems, heart problems, or arrhythmia. No concern for ACS on labs, EKG, or from history.  -CE's negative x 1   -tele monitoring.  -c/w diltiazem 30mg po q6h for rate control.  -CHADS-Vasc 3, will need long-term a/c. No history of falls. Briefly discussed risks/benefits with patient and son and they agree to proceed with a/c for stroke prevention, understanding risks of brain hemorrhage/easy bleeding in the event of trauma.  -c/w Lovenox 70mg subq daily for a/c.  -cardiology c/s in AM for discharge planning and follow-up care. Pt presented with afib RVR on EKG to 150's on telemetry, improved after po and IV diltiazem to 100's, 80's on exam currently. Only infectious symptom endorsed is chronic urinary frequency x 1 year. No history of thyroid problems, heart problems, or arrhythmia. No concern for ACS on labs, EKG, or from history.  -CE's negative x 1   -tele monitoring.  -c/w diltiazem 30mg po q6h for rate control.  -CHADS-Vasc 3, will need long-term a/c. No history of falls. Briefly discussed risks/benefits with patient and son and they agree to proceed with a/c for stroke prevention, understanding risks of brain hemorrhage/easy bleeding in the event of trauma.  -start NOAC, apixaban 5mg BID for non-valvular afib >60kg.  -cardiology c/s in AM for discharge planning and follow-up care.  -HgA1C in AM.

## 2018-04-10 NOTE — ED ADULT NURSE REASSESSMENT NOTE - NS ED NURSE REASSESS COMMENT FT1
Pt assisted to bathroom via W/C sans problems. Medicated as ordered. Awaiting disposition. Son at bedside.

## 2018-04-10 NOTE — H&P ADULT - NSHPPHYSICALEXAM_GEN_ALL_CORE
Vital Signs Last 24 Hrs  T(C): 37.1 (10 Apr 2018 18:20), Max: 37.1 (10 Apr 2018 18:20)  T(F): 98.8 (10 Apr 2018 18:20), Max: 98.8 (10 Apr 2018 18:20)  HR: 100 (10 Apr 2018 18:20) (100 - 152)  BP: 100/61 (10 Apr 2018 18:20) (100/61 - 118/81)  BP(mean): --  RR: 18 (10 Apr 2018 18:20) (18 - 20)  SpO2: 95% (10 Apr 2018 18:20) (95% - 96%)    PHYSICAL EXAM:  GENERAL: lying in bed speaking in full sentences, breathing comfortably, well-groomed and -developed, NAD, head of bed 20-30 degrees, pleasant  HEAD:  Atraumatic, Normocephalic  EYES: EOMI, PERRLA, conjunctiva and sclera clear  ENMT: MMM no nasal discharge, no gum swelling, good dentition  NECK: Supple, non-enlarged thyroid  NERVOUS SYSTEM: AOX3, moving all extremities, intact sensation light touch bilateral lower extremities  PSYCHIATRIC: Appropriate affect and mood  CHEST/LUNG: few basilar crackles b/l, good air movement throughout;   HEART: irregular rhythm, regular rate, no MRG, +S1/2; 1+ LE edema to mid-calf bilaterally   ABDOMEN: Soft, Nontender, Nondistended; Bowel sounds present  EXTREMITIES:  2+ Peripheral Pulses, No clubbing, cyanosis  SKIN: slightly hyperpigmented skin distal LE's bilaterally, o/w no rash/lesions and warm/dry

## 2018-04-10 NOTE — ED PROVIDER NOTE - CARE PLAN
Principal Discharge DX:	Other congestive heart failure  Secondary Diagnosis:	Atrial fibrillation, unspecified type

## 2018-04-10 NOTE — ED PROVIDER NOTE - CRITICAL CARE PROVIDED
documentation/consult w/ pt's family directly relating to pts condition/additional history taking/direct patient care (not related to procedure)/interpretation of diagnostic studies

## 2018-04-11 LAB
25(OH)D3 SERPL-MCNC: 38.6 NG/ML
ALBUMIN SERPL ELPH-MCNC: 3.8 G/DL
ALP BLD-CCNC: 66 U/L
ALT SERPL-CCNC: 9 U/L
ANION GAP SERPL CALC-SCNC: 15 MMOL/L — SIGNIFICANT CHANGE UP (ref 5–17)
ANION GAP SERPL CALC-SCNC: 16 MMOL/L
APPEARANCE UR: CLEAR — SIGNIFICANT CHANGE UP
AST SERPL-CCNC: 15 U/L
BASOPHILS # BLD AUTO: 0.02 K/UL
BASOPHILS NFR BLD AUTO: 0.3 %
BILIRUB SERPL-MCNC: 0.3 MG/DL
BILIRUB UR-MCNC: NEGATIVE — SIGNIFICANT CHANGE UP
BUN SERPL-MCNC: 12 MG/DL — SIGNIFICANT CHANGE UP (ref 7–23)
BUN SERPL-MCNC: 14 MG/DL
CALCIUM SERPL-MCNC: 10 MG/DL — SIGNIFICANT CHANGE UP (ref 8.4–10.5)
CALCIUM SERPL-MCNC: 9 MG/DL
CHLORIDE SERPL-SCNC: 95 MMOL/L — LOW (ref 96–108)
CHLORIDE SERPL-SCNC: 99 MMOL/L
CHOLEST SERPL-MCNC: 154 MG/DL
CHOLEST/HDLC SERPL: 3.1 RATIO
CO2 SERPL-SCNC: 25 MMOL/L
CO2 SERPL-SCNC: 30 MMOL/L — SIGNIFICANT CHANGE UP (ref 22–31)
COLOR SPEC: YELLOW — SIGNIFICANT CHANGE UP
CREAT SERPL-MCNC: 0.86 MG/DL — SIGNIFICANT CHANGE UP (ref 0.5–1.3)
CREAT SERPL-MCNC: 0.9 MG/DL
DIFF PNL FLD: NEGATIVE — SIGNIFICANT CHANGE UP
EOSINOPHIL # BLD AUTO: 0.07 K/UL
EOSINOPHIL NFR BLD AUTO: 1.1 %
FOLATE SERPL-MCNC: >20 NG/ML
GLUCOSE SERPL-MCNC: 129 MG/DL — HIGH (ref 70–99)
GLUCOSE SERPL-MCNC: 142 MG/DL
GLUCOSE UR QL: NEGATIVE MG/DL — SIGNIFICANT CHANGE UP
HBA1C BLD-MCNC: 5.9 % — HIGH (ref 4–5.6)
HCT VFR BLD CALC: 39.3 %
HCT VFR BLD CALC: 44.3 % — SIGNIFICANT CHANGE UP (ref 34.5–45)
HDLC SERPL-MCNC: 50 MG/DL
HGB BLD-MCNC: 12.4 G/DL
HGB BLD-MCNC: 14.2 G/DL — SIGNIFICANT CHANGE UP (ref 11.5–15.5)
IMM GRANULOCYTES NFR BLD AUTO: 0 %
KETONES UR-MCNC: NEGATIVE — SIGNIFICANT CHANGE UP
LDLC SERPL CALC-MCNC: 82 MG/DL
LEUKOCYTE ESTERASE UR-ACNC: NEGATIVE — SIGNIFICANT CHANGE UP
LYMPHOCYTES # BLD AUTO: 1.12 K/UL
LYMPHOCYTES NFR BLD AUTO: 16.9 %
MAGNESIUM SERPL-MCNC: 1.9 MG/DL — SIGNIFICANT CHANGE UP (ref 1.6–2.6)
MAN DIFF?: NORMAL
MCHC RBC-ENTMCNC: 28.9 PG
MCHC RBC-ENTMCNC: 29.1 PG — SIGNIFICANT CHANGE UP (ref 27–34)
MCHC RBC-ENTMCNC: 31.6 GM/DL
MCHC RBC-ENTMCNC: 32.1 GM/DL — SIGNIFICANT CHANGE UP (ref 32–36)
MCV RBC AUTO: 90.8 FL — SIGNIFICANT CHANGE UP (ref 80–100)
MCV RBC AUTO: 91.6 FL
MONOCYTES # BLD AUTO: 0.71 K/UL
MONOCYTES NFR BLD AUTO: 10.7 %
NEUTROPHILS # BLD AUTO: 4.69 K/UL
NEUTROPHILS NFR BLD AUTO: 71 %
NITRITE UR-MCNC: NEGATIVE — SIGNIFICANT CHANGE UP
PH UR: 6 — SIGNIFICANT CHANGE UP (ref 5–8)
PHOSPHATE SERPL-MCNC: 4 MG/DL — SIGNIFICANT CHANGE UP (ref 2.5–4.5)
PLATELET # BLD AUTO: 252 K/UL — SIGNIFICANT CHANGE UP (ref 150–400)
PLATELET # BLD AUTO: 257 K/UL
POTASSIUM SERPL-MCNC: 2.7 MMOL/L — CRITICAL LOW (ref 3.5–5.3)
POTASSIUM SERPL-SCNC: 2.7 MMOL/L — CRITICAL LOW (ref 3.5–5.3)
POTASSIUM SERPL-SCNC: 3.9 MMOL/L
PROT SERPL-MCNC: 6.8 G/DL
PROT UR-MCNC: NEGATIVE MG/DL — SIGNIFICANT CHANGE UP
RBC # BLD: 4.29 M/UL
RBC # BLD: 4.88 M/UL — SIGNIFICANT CHANGE UP (ref 3.8–5.2)
RBC # FLD: 13.8 % — SIGNIFICANT CHANGE UP (ref 10.3–14.5)
RBC # FLD: 14.5 %
SODIUM SERPL-SCNC: 140 MMOL/L
SODIUM SERPL-SCNC: 140 MMOL/L — SIGNIFICANT CHANGE UP (ref 135–145)
SP GR SPEC: 1.02 — SIGNIFICANT CHANGE UP (ref 1.01–1.02)
T PALLIDUM AB SER QL IA: NEGATIVE
T4 FREE SERPL-MCNC: 1.6 NG/DL
TRIGL SERPL-MCNC: 109 MG/DL
TROPONIN T SERPL-MCNC: <0.01 NG/ML — SIGNIFICANT CHANGE UP (ref 0–0.06)
TSH SERPL-ACNC: 0.91 UIU/ML
UROBILINOGEN FLD QL: NEGATIVE MG/DL — SIGNIFICANT CHANGE UP
VIT B12 SERPL-MCNC: 1165 PG/ML
WBC # BLD: 15.11 K/UL — HIGH (ref 3.8–10.5)
WBC # FLD AUTO: 15.11 K/UL — HIGH (ref 3.8–10.5)
WBC # FLD AUTO: 6.61 K/UL

## 2018-04-11 PROCEDURE — 99233 SBSQ HOSP IP/OBS HIGH 50: CPT | Mod: GC

## 2018-04-11 RX ORDER — SENNA PLUS 8.6 MG/1
2 TABLET ORAL AT BEDTIME
Qty: 0 | Refills: 0 | Status: DISCONTINUED | OUTPATIENT
Start: 2018-04-11 | End: 2018-04-19

## 2018-04-11 RX ORDER — POTASSIUM CHLORIDE 20 MEQ
40 PACKET (EA) ORAL EVERY 4 HOURS
Qty: 0 | Refills: 0 | Status: COMPLETED | OUTPATIENT
Start: 2018-04-11 | End: 2018-04-11

## 2018-04-11 RX ORDER — DILTIAZEM HCL 120 MG
5 CAPSULE, EXT RELEASE 24 HR ORAL
Qty: 125 | Refills: 0 | Status: DISCONTINUED | OUTPATIENT
Start: 2018-04-11 | End: 2018-04-11

## 2018-04-11 RX ORDER — ONDANSETRON 8 MG/1
4 TABLET, FILM COATED ORAL ONCE
Qty: 0 | Refills: 0 | Status: COMPLETED | OUTPATIENT
Start: 2018-04-11 | End: 2018-04-11

## 2018-04-11 RX ORDER — POLYETHYLENE GLYCOL 3350 17 G/17G
17 POWDER, FOR SOLUTION ORAL DAILY
Qty: 0 | Refills: 0 | Status: DISCONTINUED | OUTPATIENT
Start: 2018-04-11 | End: 2018-04-19

## 2018-04-11 RX ORDER — METOPROLOL TARTRATE 50 MG
5 TABLET ORAL EVERY 6 HOURS
Qty: 0 | Refills: 0 | Status: DISCONTINUED | OUTPATIENT
Start: 2018-04-11 | End: 2018-04-12

## 2018-04-11 RX ORDER — DOCUSATE SODIUM 100 MG
100 CAPSULE ORAL THREE TIMES A DAY
Qty: 0 | Refills: 0 | Status: DISCONTINUED | OUTPATIENT
Start: 2018-04-11 | End: 2018-04-19

## 2018-04-11 RX ORDER — DILTIAZEM HCL 120 MG
5 CAPSULE, EXT RELEASE 24 HR ORAL
Qty: 125 | Refills: 0 | Status: DISCONTINUED | OUTPATIENT
Start: 2018-04-11 | End: 2018-04-13

## 2018-04-11 RX ORDER — METOPROLOL TARTRATE 50 MG
5 TABLET ORAL ONCE
Qty: 0 | Refills: 0 | Status: COMPLETED | OUTPATIENT
Start: 2018-04-11 | End: 2018-04-11

## 2018-04-11 RX ADMIN — PANTOPRAZOLE SODIUM 40 MILLIGRAM(S): 20 TABLET, DELAYED RELEASE ORAL at 05:22

## 2018-04-11 RX ADMIN — Medication 650 MILLIGRAM(S): at 16:57

## 2018-04-11 RX ADMIN — Medication 650 MILLIGRAM(S): at 08:53

## 2018-04-11 RX ADMIN — DORZOLAMIDE HYDROCHLORIDE TIMOLOL MALEATE 1 DROP(S): 20; 5 SOLUTION/ DROPS OPHTHALMIC at 05:22

## 2018-04-11 RX ADMIN — DORZOLAMIDE HYDROCHLORIDE TIMOLOL MALEATE 1 DROP(S): 20; 5 SOLUTION/ DROPS OPHTHALMIC at 16:57

## 2018-04-11 RX ADMIN — Medication 5 MILLIGRAM(S): at 17:52

## 2018-04-11 RX ADMIN — Medication 40 MILLIEQUIVALENT(S): at 12:48

## 2018-04-11 RX ADMIN — Medication 40 MILLIGRAM(S): at 05:26

## 2018-04-11 RX ADMIN — LATANOPROST 1 DROP(S): 0.05 SOLUTION/ DROPS OPHTHALMIC; TOPICAL at 23:11

## 2018-04-11 RX ADMIN — Medication 650 MILLIGRAM(S): at 17:30

## 2018-04-11 RX ADMIN — Medication 650 MILLIGRAM(S): at 09:30

## 2018-04-11 RX ADMIN — ONDANSETRON 4 MILLIGRAM(S): 8 TABLET, FILM COATED ORAL at 03:26

## 2018-04-11 RX ADMIN — APIXABAN 5 MILLIGRAM(S): 2.5 TABLET, FILM COATED ORAL at 08:07

## 2018-04-11 RX ADMIN — APIXABAN 5 MILLIGRAM(S): 2.5 TABLET, FILM COATED ORAL at 20:32

## 2018-04-11 RX ADMIN — Medication 1 TABLET(S): at 12:48

## 2018-04-11 RX ADMIN — Medication 5 MILLIGRAM(S): at 23:12

## 2018-04-11 RX ADMIN — Medication 40 MILLIEQUIVALENT(S): at 08:54

## 2018-04-11 NOTE — PROGRESS NOTE ADULT - PROBLEM SELECTOR PLAN 2
Likely related to new onset afib with RVR and pulmonary edema. However CXR and CT also showing cardiomegaly with LA enlargement. Will obtain TTE to assess for congestive heart failure.  -TTE  -Cont w Lasix QD 40 mg for now for congestive symptoms   -Currently on 3L NC, wean as tolerated   -start Lasix 40 IVP daily for now.  -will likely need eventual stress test.

## 2018-04-11 NOTE — PROGRESS NOTE ADULT - ATTENDING COMMENTS
I saw and evaluated the patient along with the medical housestaff at the bedside.  Utilized language line for [place] interpretation and then spoke with the patient later with her son present and translating. Agree with the findings and plan documented by the resident.  Ms. Iglesias lives independently with good exercise tolerance.  Developed acute onset SOB, likely secondary to new onset atrial fibrillation with rapid ventric response. Agree with plan for ECHO, rate control, and anticoagulation.  Check TSH.  Replete potassium.

## 2018-04-11 NOTE — PROGRESS NOTE ADULT - PROBLEM SELECTOR PLAN 1
New onset afib RVR on EKG to 150's on telemetry unclear etiology   Low suspicion for infection. No history of thyroid, pulmonary, previous heart procedure or arrythmia.  -ACS ruled out. TSH wnl  -Cont w telemetry monitoring   -Will evaluate for structural heart dz w ECHO  -HR currently not well controlled started on diltiazem gtt, uptitrated to 10 mg/hr  -CHADS-Vasc 3, will need long-term a/c. No history of falls.   -AC w apixaban 5mg BID for non-valvular afib >60kg.  -cardiology c/s in AM for discharge planning and follow-up care.

## 2018-04-11 NOTE — PROGRESS NOTE ADULT - ASSESSMENT
85yo F with PMH of HTN, degenerative arthritis of the knee, chronic bilateral lower extremity pain, low Vit D, hypokalemia, urinary frequency, gastritis, anxiety attacks, HLD, dyspnea, BPPV presenting with two days of intermittent shortness of breath, admitted for new atrial fibrillation with RVR.

## 2018-04-11 NOTE — CHART NOTE - NSCHARTNOTEFT_GEN_A_CORE
MEDICINE CHART NOTE    Pt seen and examined for rising average HR on telemetry 120's-140's at this time. The patient on examination is complaining of mild nausea which she attributes to cookies she ate for dinner without other food. She denies shortness of breath at this time. Afebrile, /83, , 93% on RA, RR 18 at this time. She received IV diltiazem 10 x 2 and 20 x 1 in ED in addition to po 60 and po 30mg since admission. Will administer diltiazem 15mg IVP x 1 now and reassess. Due for next po diltiazem 30mg at 5am.     Vital Signs Last 24 Hrs  T(C): 37 (11 Apr 2018 03:46), Max: 37.1 (10 Apr 2018 18:20)  T(F): 98.6 (11 Apr 2018 03:46), Max: 98.8 (10 Apr 2018 18:20)  HR: 136 (11 Apr 2018 03:46) (84 - 152)  BP: 122/83 (11 Apr 2018 03:46) (100/61 - 122/83)  BP(mean): --  RR: 18 (11 Apr 2018 03:46) (18 - 20)  SpO2: 93% (11 Apr 2018 03:46) (93% - 98%)    Cj Sims  Medicine night admit, PGY-2  Pager [phone] / [phone] MEDICINE CHART NOTE    Pt seen and examined for rising average HR on telemetry 120's-140's at this time. The patient on examination is complaining of mild nausea which she attributes to cookies she ate for dinner without other food. Had one episode of vomiting w/o blood, nonbilious. Zofran administered. She denies shortness of breath at this time. Afebrile, /83, , 93% on RA, RR 18 at this time. She received IV diltiazem 10 x 2 and 20 x 1 in ED in addition to po 60 and po 30mg since admission. Will administer diltiazem 15mg IVP x 1 now and reassess. Due for next po diltiazem 30mg at 5am.     Plan:  diltiazem 15mg IVP  x 1now.    Vital Signs Last 24 Hrs  T(C): 37 (11 Apr 2018 03:46), Max: 37.1 (10 Apr 2018 18:20)  T(F): 98.6 (11 Apr 2018 03:46), Max: 98.8 (10 Apr 2018 18:20)  HR: 136 (11 Apr 2018 03:46) (84 - 152)  BP: 122/83 (11 Apr 2018 03:46) (100/61 - 122/83)  BP(mean): --  RR: 18 (11 Apr 2018 03:46) (18 - 20)  SpO2: 93% (11 Apr 2018 03:46) (93% - 98%)    Cj Sims  Medicine night admit, PGY-2  Pager [phone] / [phone] MEDICINE CHART NOTE    Pt seen and examined for rising average HR on telemetry 120's-140's at this time. The patient on examination is complaining of mild nausea which she attributes to cookies she ate for dinner without other food. Had one episode of vomiting w/o blood, nonbilious. Zofran administered. She denies chest pain, shortness of breath, jaw/shoulder pain at this time. Afebrile, /83, , 93% on RA, RR 18 at this time. She received IV diltiazem 10 x 2 and 20 x 1 in ED in addition to po 60 and po 30mg since admission. Will administer diltiazem 15mg IVP x 1 now and reassess. Due for next po diltiazem 30mg at 5am.     Plan:  diltiazem 15mg IVP  x 1now.    Vital Signs Last 24 Hrs  T(C): 37 (11 Apr 2018 03:46), Max: 37.1 (10 Apr 2018 18:20)  T(F): 98.6 (11 Apr 2018 03:46), Max: 98.8 (10 Apr 2018 18:20)  HR: 136 (11 Apr 2018 03:46) (84 - 152)  BP: 122/83 (11 Apr 2018 03:46) (100/61 - 122/83)  BP(mean): --  RR: 18 (11 Apr 2018 03:46) (18 - 20)  SpO2: 93% (11 Apr 2018 03:46) (93% - 98%)    Cj Sims  Medicine night admit, PGY-2  Pager [phone] / [phone]

## 2018-04-11 NOTE — PROGRESS NOTE ADULT - SUBJECTIVE AND OBJECTIVE BOX
Patient is a 86y old  Female who presents with a chief complaint of new atrial fibrillation with RVR, shortness of breath (10 Apr 2018 19:39)      SUBJECTIVE / OVERNIGHT EVENTS: Pt received to medicine service overnight. Briefly this is a 87 y/o F pertinent PMH HTN, HLD, thyroid dysfunction s/p surgery? (surgical scar present), presenting w intermittent SOB for approx 4 weeks. Pt found to have Afib on EKG pt PMD , sent to ED at which point she was found to have Afib and chest imaging concerning for new cardiomyopathy. Pt admitted to telemetry, anticoagulated w Eliquis, attempted rate control w Diltiazem, which proved to be refractory now started on Diltiazem drip this AM.    Currently, pt feels well, comfortable, remains on 3 L NC, denies chest discomfort dizziness, HA. On tele, 120-150 afib, 'S.    MEDICATIONS  (STANDING):  apixaban 5 milliGRAM(s) Oral every 12 hours  diltiazem Infusion 5 mG/Hr (5 mL/Hr) IV Continuous <Continuous>  dorzolamide 2%/timolol 0.5% Ophthalmic Solution 1 Drop(s) Both EYES two times a day  furosemide   Injectable 40 milliGRAM(s) IV Push daily  latanoprost 0.005% Ophthalmic Solution 1 Drop(s) Both EYES at bedtime  multivitamin 1 Tablet(s) Oral daily  pantoprazole    Tablet 40 milliGRAM(s) Oral before breakfast  potassium chloride    Tablet ER 40 milliEquivalent(s) Oral every 4 hours    MEDICATIONS  (PRN):  acetaminophen   Tablet. 650 milliGRAM(s) Oral every 6 hours PRN mild and moderate pain      T(C): 37.1 (18 @ 05:54), Max: 37.1 (04-10-18 @ 18:20)  HR: 118 (18 @ 07:52) (84 - 152)  BP: 111/69 (18 @ 07:52) (100/61 - 122/83)  RR: 18 (18 @ 05:54) (18 - 20)  SpO2: 96% (18 @ 05:54) (93% - 98%)  CAPILLARY BLOOD GLUCOSE        I&O's Summary    10 Apr 2018 07:01  -  2018 07:00  --------------------------------------------------------  IN: 340 mL / OUT: 0 mL / NET: 340 mL        PHYSICAL EXAM:  GENERAL: NAD, well-developed elderly woman  HEAD:  Atraumatic, Normocephalic  EYES: EOMI, PERRLA, conjunctiva and sclera clear  NECK: Supple, No JVD  CHEST/LUNG: Clear to auscultation bilaterally; Mild crackles at lung bases L> r  HEART: Irregular rate and rhythm; No murmurs, rubs, or gallops  ABDOMEN: Soft, Nontender, Nondistended; Bowel sounds present  EXTREMITIES:  2+ Peripheral Pulses, No clubbing, cyanosis. Trace edema   PSYCH: AAOx3  NEUROLOGY: non-focal  SKIN: No rashes or lesions    LABS:  ( @ 07:28)                        14.2  15.11 )-----------( 252                 44.3    Neutrophils = -- (--%)  Lymphocytes = -- (--%)  Eosinophils = -- (--%)  Basophils = -- (--%)  Monocytes = -- (--%)  Bands = --%    WBC Trend: 15.11<--, 6.8<--  Hb Trend: 14.2<--, 13.0<--  Plt Trend: 252<--, 216<--  04-11    140  |  95<L>  |  12  ----------------------------<  129<H>  2.7<LL>   |  30  |  0.86    Ca    10.0      2018 06:52  Phos  4.0     -  Mg     1.9         TPro  7.5  /  Alb  4.0  /  TBili  0.3  /  DBili  x   /  AST  14  /  ALT  12  /  AlkPhos  68  04-10    Creatinine Trend: 0.86<--, 0.86<--  ( 10 Apr 2018 13:22 )   PT: 12.4 sec;   INR: 1.13 ratio;       PTT:29.2 sec  CARDIAC MARKERS ( 2018 06:52 )  Trop <0.01 ng/mL / CK x     / CKMB x       CARDIAC MARKERS ( 10 Apr 2018 13:22 )  Trop <0.01 ng/mL / CK 36 U/L / CKMB x           Urinalysis Basic - ( 2018 05:29 )    Color: Yellow / Appearance: Clear / S.023 / pH: x  Gluc: x / Ketone: Negative  / Bili: Negative / Urobili: Negative mg/dL   Blood: x / Protein: Negative mg/dL / Nitrite: Negative   Leuk Esterase: Negative / RBC: x / WBC x   Sq Epi: x / Non Sq Epi: x / Bacteria: x        Microbiology:  Urine Cx:  Blood Cx:    RADIOLOGY & ADDITIONAL TESTS:  X- Ray:  CT:  Ultrasound:  [ ] imaging personally reviewed and interpreted by me    Consultant(s) Notes Reviewed:      Care Discussed with Consultants/Other Providers:

## 2018-04-11 NOTE — PROVIDER CONTACT NOTE (OTHER) - ASSESSMENT
Pt alert and oriented x 4. C/O N&V. Denies CP/ Palpitation/SOB.  Continues on A-Fib with HR ranging form 120's to 130's. Pt alert and oriented x 4. C/O N&V. Denies CP/ Palpitation/SOB.  Continues on A-Fib with HR ranging form 120's to 130's.  /83, , 93% on RA, RR 18.

## 2018-04-12 LAB
ALBUMIN SERPL ELPH-MCNC: 3.7 G/DL — SIGNIFICANT CHANGE UP (ref 3.3–5)
ALP SERPL-CCNC: 64 U/L — SIGNIFICANT CHANGE UP (ref 40–120)
ALT FLD-CCNC: 9 U/L RC — LOW (ref 10–45)
ANION GAP SERPL CALC-SCNC: 14 MMOL/L — SIGNIFICANT CHANGE UP (ref 5–17)
AST SERPL-CCNC: 14 U/L — SIGNIFICANT CHANGE UP (ref 10–40)
BILIRUB SERPL-MCNC: 0.7 MG/DL — SIGNIFICANT CHANGE UP (ref 0.2–1.2)
BUN SERPL-MCNC: 22 MG/DL — SIGNIFICANT CHANGE UP (ref 7–23)
CALCIUM SERPL-MCNC: 9.3 MG/DL — SIGNIFICANT CHANGE UP (ref 8.4–10.5)
CHLORIDE SERPL-SCNC: 93 MMOL/L — LOW (ref 96–108)
CO2 SERPL-SCNC: 28 MMOL/L — SIGNIFICANT CHANGE UP (ref 22–31)
CREAT SERPL-MCNC: 1.12 MG/DL — SIGNIFICANT CHANGE UP (ref 0.5–1.3)
GLUCOSE SERPL-MCNC: 124 MG/DL — HIGH (ref 70–99)
HCT VFR BLD CALC: 40.1 % — SIGNIFICANT CHANGE UP (ref 34.5–45)
HGB BLD-MCNC: 12.6 G/DL — SIGNIFICANT CHANGE UP (ref 11.5–15.5)
MAGNESIUM SERPL-MCNC: 2.7 MG/DL — HIGH (ref 1.6–2.6)
MCHC RBC-ENTMCNC: 28.8 PG — SIGNIFICANT CHANGE UP (ref 27–34)
MCHC RBC-ENTMCNC: 31.4 GM/DL — LOW (ref 32–36)
MCV RBC AUTO: 91.8 FL — SIGNIFICANT CHANGE UP (ref 80–100)
PHOSPHATE SERPL-MCNC: 2.9 MG/DL — SIGNIFICANT CHANGE UP (ref 2.5–4.5)
PLATELET # BLD AUTO: 234 K/UL — SIGNIFICANT CHANGE UP (ref 150–400)
POTASSIUM SERPL-MCNC: 3.2 MMOL/L — LOW (ref 3.5–5.3)
POTASSIUM SERPL-SCNC: 3.2 MMOL/L — LOW (ref 3.5–5.3)
PROT SERPL-MCNC: 7.8 G/DL — SIGNIFICANT CHANGE UP (ref 6–8.3)
RBC # BLD: 4.37 M/UL — SIGNIFICANT CHANGE UP (ref 3.8–5.2)
RBC # FLD: 13.8 % — SIGNIFICANT CHANGE UP (ref 10.3–14.5)
SODIUM SERPL-SCNC: 135 MMOL/L — SIGNIFICANT CHANGE UP (ref 135–145)
WBC # BLD: 14.57 K/UL — HIGH (ref 3.8–10.5)
WBC # FLD AUTO: 14.57 K/UL — HIGH (ref 3.8–10.5)

## 2018-04-12 PROCEDURE — 99233 SBSQ HOSP IP/OBS HIGH 50: CPT | Mod: GC

## 2018-04-12 RX ORDER — METOPROLOL TARTRATE 50 MG
50 TABLET ORAL
Qty: 0 | Refills: 0 | Status: DISCONTINUED | OUTPATIENT
Start: 2018-04-12 | End: 2018-04-14

## 2018-04-12 RX ORDER — MAGNESIUM SULFATE 500 MG/ML
2 VIAL (ML) INJECTION ONCE
Qty: 0 | Refills: 0 | Status: COMPLETED | OUTPATIENT
Start: 2018-04-12 | End: 2018-04-12

## 2018-04-12 RX ORDER — METOPROLOL TARTRATE 50 MG
25 TABLET ORAL
Qty: 0 | Refills: 0 | Status: DISCONTINUED | OUTPATIENT
Start: 2018-04-12 | End: 2018-04-12

## 2018-04-12 RX ORDER — POTASSIUM CHLORIDE 20 MEQ
20 PACKET (EA) ORAL
Qty: 0 | Refills: 0 | Status: COMPLETED | OUTPATIENT
Start: 2018-04-12 | End: 2018-04-12

## 2018-04-12 RX ORDER — METOPROLOL TARTRATE 50 MG
25 TABLET ORAL EVERY 12 HOURS
Qty: 0 | Refills: 0 | Status: DISCONTINUED | OUTPATIENT
Start: 2018-04-12 | End: 2018-04-12

## 2018-04-12 RX ORDER — METOPROLOL TARTRATE 50 MG
5 TABLET ORAL EVERY 6 HOURS
Qty: 0 | Refills: 0 | Status: DISCONTINUED | OUTPATIENT
Start: 2018-04-12 | End: 2018-04-19

## 2018-04-12 RX ADMIN — Medication 1 TABLET(S): at 12:07

## 2018-04-12 RX ADMIN — DORZOLAMIDE HYDROCHLORIDE TIMOLOL MALEATE 1 DROP(S): 20; 5 SOLUTION/ DROPS OPHTHALMIC at 18:43

## 2018-04-12 RX ADMIN — Medication 50 MILLIGRAM(S): at 18:43

## 2018-04-12 RX ADMIN — Medication 20 MILLIEQUIVALENT(S): at 18:42

## 2018-04-12 RX ADMIN — Medication 20 MILLIEQUIVALENT(S): at 12:07

## 2018-04-12 RX ADMIN — LATANOPROST 1 DROP(S): 0.05 SOLUTION/ DROPS OPHTHALMIC; TOPICAL at 19:58

## 2018-04-12 RX ADMIN — PANTOPRAZOLE SODIUM 40 MILLIGRAM(S): 20 TABLET, DELAYED RELEASE ORAL at 06:04

## 2018-04-12 RX ADMIN — SENNA PLUS 2 TABLET(S): 8.6 TABLET ORAL at 19:57

## 2018-04-12 RX ADMIN — POLYETHYLENE GLYCOL 3350 17 GRAM(S): 17 POWDER, FOR SOLUTION ORAL at 06:04

## 2018-04-12 RX ADMIN — Medication 5 MG/HR: at 18:44

## 2018-04-12 RX ADMIN — DORZOLAMIDE HYDROCHLORIDE TIMOLOL MALEATE 1 DROP(S): 20; 5 SOLUTION/ DROPS OPHTHALMIC at 06:08

## 2018-04-12 RX ADMIN — POLYETHYLENE GLYCOL 3350 17 GRAM(S): 17 POWDER, FOR SOLUTION ORAL at 14:40

## 2018-04-12 RX ADMIN — Medication 50 GRAM(S): at 03:52

## 2018-04-12 RX ADMIN — APIXABAN 5 MILLIGRAM(S): 2.5 TABLET, FILM COATED ORAL at 08:54

## 2018-04-12 RX ADMIN — Medication 100 MILLIGRAM(S): at 14:00

## 2018-04-12 RX ADMIN — Medication 25 MILLIGRAM(S): at 12:07

## 2018-04-12 RX ADMIN — Medication 100 MILLIGRAM(S): at 19:57

## 2018-04-12 RX ADMIN — Medication 5 MILLIGRAM(S): at 06:06

## 2018-04-12 RX ADMIN — Medication 20 MILLIEQUIVALENT(S): at 14:40

## 2018-04-12 RX ADMIN — Medication 100 MILLIGRAM(S): at 06:04

## 2018-04-12 RX ADMIN — APIXABAN 5 MILLIGRAM(S): 2.5 TABLET, FILM COATED ORAL at 19:56

## 2018-04-12 RX ADMIN — Medication 40 MILLIGRAM(S): at 06:05

## 2018-04-12 NOTE — PROGRESS NOTE ADULT - ATTENDING COMMENTS
I saw and evaluated the patient along with the medical housestaff at the bedside.  Chart reviewed.  Case discussed in detail.  Ms. Iglesias reports feeling a little better. Had some palpitations overnight and is constipated.  Heart rate fluctuating low 100s.  Mild bibasilar crackles. Has new onset atrial fibrillation, unclear etiology.  Continue rate control, anticoagulation, diuresis.  Out of bed to chair or ambulating.  Consider cathartics/enema.

## 2018-04-12 NOTE — PROGRESS NOTE ADULT - PROBLEM SELECTOR PLAN 2
Likely related to new onset afib with RVR and pulmonary edema. However CXR and CT also showing cardiomegaly with LA enlargement.   - obtain TTE to assess for congestive heart failure.  -TTE  -Currently on 3L NC, wean as tolerated   -c/w Lasix 40 IVP daily due to ongoing fluid overload detected on physical exam.   -will likely need eventual stress test. Likely related to new onset afib with RVR and pulmonary edema. However CXR and CT also showing cardiomegaly with LA enlargement.   - obtain TTE to assess for congestive heart failure.  -Currently on 3L NC, wean as tolerated   -c/w Lasix 40 IVP daily due to ongoing fluid overload detected on physical exam.   -will likely need eventual stress test.

## 2018-04-12 NOTE — PROGRESS NOTE ADULT - PROBLEM SELECTOR PLAN 4
Pt has not been prescribed statin in [employer]. Will follow up with PMD.  and HDL 56 on late 2017 outpatient labs.  -further management with PMD as outpatient.  -hold off on starting statin at this time.

## 2018-04-12 NOTE — PROGRESS NOTE ADULT - PROBLEM SELECTOR PLAN 1
A. fib ongoing, but HR improved to 100's-120's today, on diltiazem drip 10 cc/hr and metoprolol IV 5 mg Q6h.   - metoprolol switched to PO lopressor 25 mg BID, with IV metoprolol PRN for HR>120.   - Plan to wean diltiazem drip after PO metoprolol initiated, plan to switch to PO rate controlling medications today.   Low suspicion for infection. No history of thyroid, pulmonary, previous heart procedure or arrythmia.  -ACS ruled out. TSH wnl  -Cont w telemetry monitoring   -Will evaluate for structural heart dz w ECHO  -continue AC w apixaban 5mg BID for non-valvular afib (CHADS-VASc score 3) A. fib ongoing, but HR improved to 100's-120's today, on diltiazem drip 10 cc/hr and metoprolol IV 5 mg Q6h.   - metoprolol switched to PO lopressor 25 mg BID, with IV metoprolol PRN for HR>120.   - Plan to wean diltiazem drip after PO metoprolol initiated, plan to switch to PO rate controlling medications today.   - Low suspicion for infection. No history of thyroid, pulmonary, previous heart procedure or arrythmia. ACS ruled out. TSH wnl  -Cont w telemetry monitoring   -Will evaluate for structural heart dz w ECHO  -Continue AC w apixaban 5mg BID for non-valvular afib (CHADS-VASc score 3)

## 2018-04-12 NOTE — PROGRESS NOTE ADULT - ASSESSMENT
85yo F with PMH of HTN, degenerative arthritis of the knee, chronic bilateral lower extremity pain, low Vit D, hypokalemia, urinary frequency, gastritis, anxiety attacks, HLD, dyspnea, BPPV presenting with two days of intermittent shortness of breath, admitted for new atrial fibrillation with RVR, currently on diltiazem drip with lopressor.

## 2018-04-12 NOTE — PROGRESS NOTE ADULT - SUBJECTIVE AND OBJECTIVE BOX
Patient is a 86y old  Female who presents with a chief complaint of new atrial fibrillation with RVR, shortness of breath (10 Apr 2018 19:39)       INTERVAL HPI/OVERNIGHT EVENTS: Overnight telemetry showed persistent A. fib with improved HR in 100's-120's. Pt has continued weakness, SOB somewhat improved.     MEDICATIONS  (STANDING):  apixaban 5 milliGRAM(s) Oral every 12 hours  diltiazem Infusion 10 mG/Hr (10 mL/Hr) IV Continuous <Continuous>  docusate sodium 100 milliGRAM(s) Oral three times a day  dorzolamide 2%/timolol 0.5% Ophthalmic Solution 1 Drop(s) Both EYES two times a day  furosemide   Injectable 40 milliGRAM(s) IV Push daily  latanoprost 0.005% Ophthalmic Solution 1 Drop(s) Both EYES at bedtime  metoprolol tartrate Injectable 5 milliGRAM(s) IV Push every 6 hours  multivitamin 1 Tablet(s) Oral daily  pantoprazole    Tablet 40 milliGRAM(s) Oral before breakfast  polyethylene glycol 3350 17 Gram(s) Oral daily  potassium chloride    Tablet ER 20 milliEquivalent(s) Oral every 2 hours  senna 2 Tablet(s) Oral at bedtime    MEDICATIONS  (PRN):  acetaminophen   Tablet. 650 milliGRAM(s) Oral every 6 hours PRN mild and moderate pain      Allergies    No Known Allergies    Intolerances        REVIEW OF SYSTEMS:  CONSTITUTIONAL: No fever, weight loss, or fatigue  EYES: No eye pain, visual disturbances, or discharge  ENMT:  No difficulty hearing, tinnitus, vertigo; No sinus or throat pain  RESPIRATORY: No cough, wheezing, chills or hemoptysis; No shortness of breath  CARDIOVASCULAR: No chest pain, palpitations, dizziness, or leg swelling  GASTROINTESTINAL: No abdominal or epigastric pain. No nausea, vomiting, or hematemesis; No diarrhea or constipation. No melena or hematochezia.  GENITOURINARY: No dysuria, frequency, hematuria, or incontinence  NEUROLOGICAL: No headaches, loss of strength, numbness, or tremors  SKIN: No itching, burning, rashes, or lesions   LYMPH NODES: No enlarged glands  ENDOCRINE: No heat or cold intolerance; No polydipsia or polyuria  MUSCULOSKELETAL: No joint pain or swelling;   PSYCHIATRIC: Denies depression, anxiety  HEME/LYMPH: No easy bruising, or bleeding gums  ALLERGY AND IMMUNOLOGIC: No hives or eczema    Vital Signs Last 24 Hrs  T(C): 36.8 (12 Apr 2018 04:58), Max: 36.9 (11 Apr 2018 11:54)  T(F): 98.3 (12 Apr 2018 04:58), Max: 98.4 (11 Apr 2018 11:54)  HR: 112 (12 Apr 2018 05:59) (96 - 140)  BP: 107/74 (12 Apr 2018 05:59) (86/58 - 107/74)  BP(mean): --  RR: 18 (12 Apr 2018 04:58) (18 - 18)  SpO2: 93% (12 Apr 2018 04:58) (85% - 97%)    PHYSICAL EXAM:  GENERAL: NAD, well-groomed, well-developed  HEAD:  Atraumatic, Normocephalic  EYES: EOMI, PERRLA, conjunctiva and sclera clear  ENMT: No tonsillar erythema, exudates, or enlargement; Moist mucous membranes, Good dentition  NECK: Supple, No JVD  NERVOUS SYSTEM: AOX3, motor and sensation grossly intact in b/l UE and b/l LE  PSYCHIATRIC: Appropriate affect and mood  CHEST/LUNG: Clear to auscultation bilaterally; No rales, rhonchi, wheezing, or rubs  HEART: Regular rate and rhythm; No murmurs, rubs, or gallops. No LE edema  ABDOMEN: Soft, Nontender, Nondistended; Bowel sounds present  EXTREMITIES:  2+ Peripheral Pulses, No clubbing, cyanosis  SKIN: No rashes or lesions    LABS:                        12.6   14.57 )-----------( 234      ( 12 Apr 2018 09:30 )             40.1     12 Apr 2018 06:58    135    |  93     |  22     ----------------------------<  124    3.2     |  28     |  1.12     Ca    9.3        12 Apr 2018 06:58  Phos  2.9       12 Apr 2018 06:58  Mg     2.7       12 Apr 2018 06:58    TPro  7.8    /  Alb  3.7    /  TBili  0.7    /  DBili  x      /  AST  14     /  ALT  9      /  AlkPhos  64     12 Apr 2018 06:58    PT/INR - ( 10 Apr 2018 13:22 )   PT: 12.4 sec;   INR: 1.13 ratio         PTT - ( 10 Apr 2018 13:22 )  PTT:29.2 sec  CAPILLARY BLOOD GLUCOSE        BLOOD CULTURE    RADIOLOGY & ADDITIONAL TESTS:    Imaging Personally Reviewed:  [ ] YES     Consultant(s) Notes Reviewed:      Care Discussed with Consultants/Other Providers: Patient is a 86y old  Female who presents with a chief complaint of new atrial fibrillation with RVR, shortness of breath (10 Apr 2018 19:39)       INTERVAL HPI/OVERNIGHT EVENTS: Overnight telemetry showed persistent A. fib with improved HR in 100's-120's. Pt has continued weakness, SOB somewhat improved.     MEDICATIONS  (STANDING):  apixaban 5 milliGRAM(s) Oral every 12 hours  diltiazem Infusion 10 mG/Hr (10 mL/Hr) IV Continuous <Continuous>  docusate sodium 100 milliGRAM(s) Oral three times a day  dorzolamide 2%/timolol 0.5% Ophthalmic Solution 1 Drop(s) Both EYES two times a day  furosemide   Injectable 40 milliGRAM(s) IV Push daily  latanoprost 0.005% Ophthalmic Solution 1 Drop(s) Both EYES at bedtime  metoprolol tartrate Injectable 5 milliGRAM(s) IV Push every 6 hours  multivitamin 1 Tablet(s) Oral daily  pantoprazole    Tablet 40 milliGRAM(s) Oral before breakfast  polyethylene glycol 3350 17 Gram(s) Oral daily  potassium chloride    Tablet ER 20 milliEquivalent(s) Oral every 2 hours  senna 2 Tablet(s) Oral at bedtime    MEDICATIONS  (PRN):  acetaminophen   Tablet. 650 milliGRAM(s) Oral every 6 hours PRN mild and moderate pain      Allergies    No Known Allergies    Intolerances        REVIEW OF SYSTEMS:  CONSTITUTIONAL: No fever, weight loss, or fatigue  EYES: No eye pain, visual disturbances, or discharge  ENMT:  No difficulty hearing, tinnitus, vertigo; No sinus or throat pain  RESPIRATORY: No cough, wheezing, chills or hemoptysis; No shortness of breath  CARDIOVASCULAR: No chest pain, palpitations, dizziness, or leg swelling  GASTROINTESTINAL: No abdominal or epigastric pain. No nausea, vomiting, or hematemesis; No diarrhea or constipation. No melena or hematochezia.  GENITOURINARY: No dysuria, frequency, hematuria, or incontinence  NEUROLOGICAL: No headaches, loss of strength, numbness, or tremors  SKIN: No itching, burning, rashes, or lesions   LYMPH NODES: No enlarged glands  ENDOCRINE: No heat or cold intolerance; No polydipsia or polyuria  MUSCULOSKELETAL: No joint pain or swelling;   PSYCHIATRIC: Denies depression, anxiety  HEME/LYMPH: No easy bruising, or bleeding gums  ALLERGY AND IMMUNOLOGIC: No hives or eczema    Vital Signs Last 24 Hrs  T(C): 36.8 (12 Apr 2018 04:58), Max: 36.9 (11 Apr 2018 11:54)  T(F): 98.3 (12 Apr 2018 04:58), Max: 98.4 (11 Apr 2018 11:54)  HR: 112 (12 Apr 2018 05:59) (96 - 140)  BP: 107/74 (12 Apr 2018 05:59) (86/58 - 107/74)  BP(mean): --  RR: 18 (12 Apr 2018 04:58) (18 - 18)  SpO2: 93% (12 Apr 2018 04:58) (85% - 97%)    PHYSICAL EXAM:  GENERAL: NAD, well-groomed, well-developed  HEAD:  Atraumatic, Normocephalic  EYES: EOMI, PERRLA, conjunctiva and sclera clear  ENMT: No tonsillar erythema, exudates, or enlargement; Moist mucous membranes  NECK: Supple, No JVD  NERVOUS SYSTEM: AOX3  PSYCHIATRIC: Appropriate affect and mood  CHEST/LUNG: Inspiratory crackles at bilateral lung bases and posteriorly; No wheezing.   HEART: Tachycardic, irregular rhythm; No murmurs, rubs, or gallops. Trace b/l LE edema  ABDOMEN: Soft, Nontender, Nondistended; Bowel sounds present  EXTREMITIES:  2+ Peripheral Pulses, No clubbing, cyanosis  SKIN: No rashes or lesions    LABS:                        12.6   14.57 )-----------( 234      ( 12 Apr 2018 09:30 )             40.1     12 Apr 2018 06:58    135    |  93     |  22     ----------------------------<  124    3.2     |  28     |  1.12     Ca    9.3        12 Apr 2018 06:58  Phos  2.9       12 Apr 2018 06:58  Mg     2.7       12 Apr 2018 06:58    TPro  7.8    /  Alb  3.7    /  TBili  0.7    /  DBili  x      /  AST  14     /  ALT  9      /  AlkPhos  64     12 Apr 2018 06:58    PT/INR - ( 10 Apr 2018 13:22 )   PT: 12.4 sec;   INR: 1.13 ratio         PTT - ( 10 Apr 2018 13:22 )  PTT:29.2 sec  CAPILLARY BLOOD GLUCOSE        BLOOD CULTURE    RADIOLOGY & ADDITIONAL TESTS:    Imaging Personally Reviewed:  [X ] YES     Consultant(s) Notes Reviewed:      Care Discussed with Consultants/Other Providers: Patient is a 86y old  Female who presents with a chief complaint of new atrial fibrillation with RVR, shortness of breath (10 Apr 2018 19:39)       INTERVAL HPI/OVERNIGHT EVENTS: Overnight telemetry showed persistent A. fib with improved HR in 100's-120's. Pt has continued weakness, SOB somewhat improved.     MEDICATIONS  (STANDING):  apixaban 5 milliGRAM(s) Oral every 12 hours  diltiazem Infusion 10 mG/Hr (10 mL/Hr) IV Continuous <Continuous>  docusate sodium 100 milliGRAM(s) Oral three times a day  dorzolamide 2%/timolol 0.5% Ophthalmic Solution 1 Drop(s) Both EYES two times a day  furosemide   Injectable 40 milliGRAM(s) IV Push daily  latanoprost 0.005% Ophthalmic Solution 1 Drop(s) Both EYES at bedtime  metoprolol tartrate Injectable 5 milliGRAM(s) IV Push every 6 hours  multivitamin 1 Tablet(s) Oral daily  pantoprazole    Tablet 40 milliGRAM(s) Oral before breakfast  polyethylene glycol 3350 17 Gram(s) Oral daily  potassium chloride    Tablet ER 20 milliEquivalent(s) Oral every 2 hours  senna 2 Tablet(s) Oral at bedtime    MEDICATIONS  (PRN):  acetaminophen   Tablet. 650 milliGRAM(s) Oral every 6 hours PRN mild and moderate pain      Allergies    No Known Allergies    Intolerances            Vital Signs Last 24 Hrs  T(C): 36.8 (12 Apr 2018 04:58), Max: 36.9 (11 Apr 2018 11:54)  T(F): 98.3 (12 Apr 2018 04:58), Max: 98.4 (11 Apr 2018 11:54)  HR: 112 (12 Apr 2018 05:59) (96 - 140)  BP: 107/74 (12 Apr 2018 05:59) (86/58 - 107/74)  BP(mean): --  RR: 18 (12 Apr 2018 04:58) (18 - 18)  SpO2: 93% (12 Apr 2018 04:58) (85% - 97%)    PHYSICAL EXAM:  GENERAL: NAD, well-groomed, well-developed  HEAD:  Atraumatic, Normocephalic  EYES: EOMI, PERRLA, conjunctiva and sclera clear  ENMT: No tonsillar erythema, exudates, or enlargement; Moist mucous membranes  NECK: Supple, No JVD  NERVOUS SYSTEM: AOX3  PSYCHIATRIC: Appropriate affect and mood  CHEST/LUNG: Inspiratory crackles at bilateral lung bases and posteriorly; No wheezing.   HEART: Tachycardic, irregular rhythm; No murmurs, rubs, or gallops. Trace b/l LE edema  ABDOMEN: Soft, Nontender, Nondistended; Bowel sounds present  EXTREMITIES:  2+ Peripheral Pulses, No clubbing, cyanosis  SKIN: No rashes or lesions    LABS:                        12.6   14.57 )-----------( 234      ( 12 Apr 2018 09:30 )             40.1     12 Apr 2018 06:58    135    |  93     |  22     ----------------------------<  124    3.2     |  28     |  1.12     Ca    9.3        12 Apr 2018 06:58  Phos  2.9       12 Apr 2018 06:58  Mg     2.7       12 Apr 2018 06:58    TPro  7.8    /  Alb  3.7    /  TBili  0.7    /  DBili  x      /  AST  14     /  ALT  9      /  AlkPhos  64     12 Apr 2018 06:58    PT/INR - ( 10 Apr 2018 13:22 )   PT: 12.4 sec;   INR: 1.13 ratio         PTT - ( 10 Apr 2018 13:22 )  PTT:29.2 sec  CAPILLARY BLOOD GLUCOSE        BLOOD CULTURE    RADIOLOGY & ADDITIONAL TESTS:    Imaging Personally Reviewed:  [X ] YES     Consultant(s) Notes Reviewed:      Care Discussed with Consultants/Other Providers:

## 2018-04-13 LAB
ANION GAP SERPL CALC-SCNC: 12 MMOL/L — SIGNIFICANT CHANGE UP (ref 5–17)
BUN SERPL-MCNC: 25 MG/DL — HIGH (ref 7–23)
CALCIUM SERPL-MCNC: 9.4 MG/DL — SIGNIFICANT CHANGE UP (ref 8.4–10.5)
CHLORIDE SERPL-SCNC: 96 MMOL/L — SIGNIFICANT CHANGE UP (ref 96–108)
CO2 SERPL-SCNC: 28 MMOL/L — SIGNIFICANT CHANGE UP (ref 22–31)
CREAT SERPL-MCNC: 1.05 MG/DL — SIGNIFICANT CHANGE UP (ref 0.5–1.3)
GLUCOSE SERPL-MCNC: 120 MG/DL — HIGH (ref 70–99)
HCT VFR BLD CALC: 37.9 % — SIGNIFICANT CHANGE UP (ref 34.5–45)
HGB BLD-MCNC: 12.1 G/DL — SIGNIFICANT CHANGE UP (ref 11.5–15.5)
MAGNESIUM SERPL-MCNC: 2.3 MG/DL — SIGNIFICANT CHANGE UP (ref 1.6–2.6)
MCHC RBC-ENTMCNC: 28.4 PG — SIGNIFICANT CHANGE UP (ref 27–34)
MCHC RBC-ENTMCNC: 31.9 GM/DL — LOW (ref 32–36)
MCV RBC AUTO: 89 FL — SIGNIFICANT CHANGE UP (ref 80–100)
PHOSPHATE SERPL-MCNC: 2.5 MG/DL — SIGNIFICANT CHANGE UP (ref 2.5–4.5)
PLATELET # BLD AUTO: 214 K/UL — SIGNIFICANT CHANGE UP (ref 150–400)
POTASSIUM SERPL-MCNC: 3.4 MMOL/L — LOW (ref 3.5–5.3)
POTASSIUM SERPL-SCNC: 3.4 MMOL/L — LOW (ref 3.5–5.3)
RBC # BLD: 4.26 M/UL — SIGNIFICANT CHANGE UP (ref 3.8–5.2)
RBC # FLD: 14 % — SIGNIFICANT CHANGE UP (ref 10.3–14.5)
SODIUM SERPL-SCNC: 136 MMOL/L — SIGNIFICANT CHANGE UP (ref 135–145)
WBC # BLD: 10.25 K/UL — SIGNIFICANT CHANGE UP (ref 3.8–10.5)
WBC # FLD AUTO: 10.25 K/UL — SIGNIFICANT CHANGE UP (ref 3.8–10.5)

## 2018-04-13 PROCEDURE — 99233 SBSQ HOSP IP/OBS HIGH 50: CPT | Mod: GC

## 2018-04-13 PROCEDURE — 93306 TTE W/DOPPLER COMPLETE: CPT | Mod: 26

## 2018-04-13 PROCEDURE — 93010 ELECTROCARDIOGRAM REPORT: CPT

## 2018-04-13 RX ORDER — POTASSIUM CHLORIDE 20 MEQ
20 PACKET (EA) ORAL
Qty: 0 | Refills: 0 | Status: DISCONTINUED | OUTPATIENT
Start: 2018-04-13 | End: 2018-04-13

## 2018-04-13 RX ORDER — POTASSIUM CHLORIDE 20 MEQ
40 PACKET (EA) ORAL ONCE
Qty: 0 | Refills: 0 | Status: COMPLETED | OUTPATIENT
Start: 2018-04-13 | End: 2018-04-13

## 2018-04-13 RX ORDER — DIGOXIN 250 MCG
0.12 TABLET ORAL DAILY
Qty: 0 | Refills: 0 | Status: DISCONTINUED | OUTPATIENT
Start: 2018-04-14 | End: 2018-04-19

## 2018-04-13 RX ORDER — DIGOXIN 250 MCG
0.25 TABLET ORAL EVERY 8 HOURS
Qty: 0 | Refills: 0 | Status: COMPLETED | OUTPATIENT
Start: 2018-04-13 | End: 2018-04-13

## 2018-04-13 RX ADMIN — Medication 650 MILLIGRAM(S): at 21:20

## 2018-04-13 RX ADMIN — Medication 650 MILLIGRAM(S): at 20:47

## 2018-04-13 RX ADMIN — APIXABAN 5 MILLIGRAM(S): 2.5 TABLET, FILM COATED ORAL at 20:47

## 2018-04-13 RX ADMIN — PANTOPRAZOLE SODIUM 40 MILLIGRAM(S): 20 TABLET, DELAYED RELEASE ORAL at 05:02

## 2018-04-13 RX ADMIN — DORZOLAMIDE HYDROCHLORIDE TIMOLOL MALEATE 1 DROP(S): 20; 5 SOLUTION/ DROPS OPHTHALMIC at 17:07

## 2018-04-13 RX ADMIN — Medication 1 TABLET(S): at 11:28

## 2018-04-13 RX ADMIN — POLYETHYLENE GLYCOL 3350 17 GRAM(S): 17 POWDER, FOR SOLUTION ORAL at 11:27

## 2018-04-13 RX ADMIN — Medication 40 MILLIGRAM(S): at 05:06

## 2018-04-13 RX ADMIN — Medication 50 MILLIGRAM(S): at 05:02

## 2018-04-13 RX ADMIN — Medication 0.25 MILLIGRAM(S): at 22:24

## 2018-04-13 RX ADMIN — Medication 50 MILLIGRAM(S): at 17:05

## 2018-04-13 RX ADMIN — APIXABAN 5 MILLIGRAM(S): 2.5 TABLET, FILM COATED ORAL at 08:14

## 2018-04-13 RX ADMIN — Medication 100 MILLIGRAM(S): at 05:01

## 2018-04-13 RX ADMIN — LATANOPROST 1 DROP(S): 0.05 SOLUTION/ DROPS OPHTHALMIC; TOPICAL at 22:24

## 2018-04-13 RX ADMIN — Medication 0.25 MILLIGRAM(S): at 11:28

## 2018-04-13 RX ADMIN — DORZOLAMIDE HYDROCHLORIDE TIMOLOL MALEATE 1 DROP(S): 20; 5 SOLUTION/ DROPS OPHTHALMIC at 05:02

## 2018-04-13 RX ADMIN — Medication 40 MILLIEQUIVALENT(S): at 08:14

## 2018-04-13 RX ADMIN — Medication 5 MG/HR: at 11:28

## 2018-04-13 NOTE — PROGRESS NOTE ADULT - ASSESSMENT
87yo F with PMH of HTN, degenerative arthritis of the knee, chronic bilateral lower extremity pain, low Vit D, hypokalemia, urinary frequency, gastritis, anxiety attacks, HLD, dyspnea, BPPV presenting with two days of intermittent shortness of breath, admitted for new atrial fibrillation with RVR, currently on diltiazem drip with lopressor.

## 2018-04-13 NOTE — PROGRESS NOTE ADULT - ATTENDING COMMENTS
I saw and evaluated the patient along with the medical housestaff and son Yonathan at the bedside.  Chart reviewed.  Case discussed in detail.  I agree with the findings and plan of Dr. Moreno. Ms. Iglesias reported not sleeping well last night.  Had a decent bowel movement, but still feels a little constipated.  Remains in Afib, HR in low 100s.  Lungs have mild bibasilar crackles.  3/6 KATIE.  ECHO shows dilated left atrium, EF WNL. Agree with plan documented. Taper diltiazem drip.  Continue oral beta blocker or CCM. Continue anticoagulation.  Rest of plan as documented.

## 2018-04-13 NOTE — PROGRESS NOTE ADULT - PROBLEM SELECTOR PLAN 2
Improved. Likely related to new onset afib with RVR and pulmonary edema. However CXR and CT also showing cardiomegaly with LA enlargement.   - obtain TTE to assess for congestive heart failure.  - c/w Lasix 40 IVP daily due to ongoing fluid overload detected on physical exam.   - will likely need eventual stress test.

## 2018-04-13 NOTE — PROGRESS NOTE ADULT - PROBLEM SELECTOR PLAN 1
A. fib ongoing with 's-130's, still requiring diltiazem drip 5 cc/hr and metoprolol IV 5 mg Q6h. Goal is to stop diltiazem as HR allows.   - metoprolol switched to PO lopressor 50 mg BID, with IV metoprolol PRN for HR>120.   - Plan to wean diltiazem drip after PO metoprolol initiated, plan to switch to PO rate controlling medications today.   - Low suspicion for infection. No history of thyroid, pulmonary, previous heart procedure or arrythmia. ACS ruled out. TSH wnl  -Cont w telemetry monitoring   -Will evaluate for structural heart dz w ECHO  -Continue AC w apixaban 5mg BID for non-valvular afib (CHADS-VASc score 3) A. fib ongoing with 's-130's, still requiring diltiazem drip 5 cc/hr and metoprolol IV 5 mg Q6h. Goal is to stop diltiazem as HR allows.   - Will consider adding digoxin for further HR control.   - metoprolol switched to PO lopressor 50 mg BID, with IV metoprolol PRN for HR>120.   - Plan to wean diltiazem drip after PO metoprolol initiated, plan to switch to PO rate controlling medications today.   - Low suspicion for infection. No history of thyroid, pulmonary, previous heart procedure or arrythmia. ACS ruled out. TSH wnl  -Cont w telemetry monitoring   -Will evaluate for structural heart dz w ECHO  -Continue AC w apixaban 5mg BID for non-valvular afib (CHADS-VASc score 3)

## 2018-04-14 LAB
ANION GAP SERPL CALC-SCNC: 14 MMOL/L — SIGNIFICANT CHANGE UP (ref 5–17)
BUN SERPL-MCNC: 21 MG/DL — SIGNIFICANT CHANGE UP (ref 7–23)
CALCIUM SERPL-MCNC: 9.4 MG/DL — SIGNIFICANT CHANGE UP (ref 8.4–10.5)
CHLORIDE SERPL-SCNC: 97 MMOL/L — SIGNIFICANT CHANGE UP (ref 96–108)
CO2 SERPL-SCNC: 27 MMOL/L — SIGNIFICANT CHANGE UP (ref 22–31)
CREAT SERPL-MCNC: 0.81 MG/DL — SIGNIFICANT CHANGE UP (ref 0.5–1.3)
DIGOXIN SERPL-MCNC: 1.7 NG/ML — SIGNIFICANT CHANGE UP (ref 0.8–2)
GLUCOSE SERPL-MCNC: 165 MG/DL — HIGH (ref 70–99)
HCT VFR BLD CALC: 42.6 % — SIGNIFICANT CHANGE UP (ref 34.5–45)
HGB BLD-MCNC: 13.9 G/DL — SIGNIFICANT CHANGE UP (ref 11.5–15.5)
MAGNESIUM SERPL-MCNC: 2.2 MG/DL — SIGNIFICANT CHANGE UP (ref 1.6–2.6)
MCHC RBC-ENTMCNC: 29.1 PG — SIGNIFICANT CHANGE UP (ref 27–34)
MCHC RBC-ENTMCNC: 32.6 GM/DL — SIGNIFICANT CHANGE UP (ref 32–36)
MCV RBC AUTO: 89.1 FL — SIGNIFICANT CHANGE UP (ref 80–100)
PHOSPHATE SERPL-MCNC: 2.5 MG/DL — SIGNIFICANT CHANGE UP (ref 2.5–4.5)
PLATELET # BLD AUTO: 281 K/UL — SIGNIFICANT CHANGE UP (ref 150–400)
POTASSIUM SERPL-MCNC: 3.3 MMOL/L — LOW (ref 3.5–5.3)
POTASSIUM SERPL-SCNC: 3.3 MMOL/L — LOW (ref 3.5–5.3)
RBC # BLD: 4.78 M/UL — SIGNIFICANT CHANGE UP (ref 3.8–5.2)
RBC # FLD: 13.5 % — SIGNIFICANT CHANGE UP (ref 10.3–14.5)
SODIUM SERPL-SCNC: 138 MMOL/L — SIGNIFICANT CHANGE UP (ref 135–145)
WBC # BLD: 8.9 K/UL — SIGNIFICANT CHANGE UP (ref 3.8–10.5)
WBC # FLD AUTO: 8.9 K/UL — SIGNIFICANT CHANGE UP (ref 3.8–10.5)

## 2018-04-14 PROCEDURE — 99233 SBSQ HOSP IP/OBS HIGH 50: CPT | Mod: GC

## 2018-04-14 RX ORDER — METOPROLOL TARTRATE 50 MG
75 TABLET ORAL
Qty: 0 | Refills: 0 | Status: DISCONTINUED | OUTPATIENT
Start: 2018-04-14 | End: 2018-04-15

## 2018-04-14 RX ORDER — POTASSIUM CHLORIDE 20 MEQ
40 PACKET (EA) ORAL EVERY 4 HOURS
Qty: 0 | Refills: 0 | Status: COMPLETED | OUTPATIENT
Start: 2018-04-14 | End: 2018-04-14

## 2018-04-14 RX ORDER — METOPROLOL TARTRATE 50 MG
25 TABLET ORAL ONCE
Qty: 0 | Refills: 0 | Status: COMPLETED | OUTPATIENT
Start: 2018-04-14 | End: 2018-04-14

## 2018-04-14 RX ORDER — METOPROLOL TARTRATE 50 MG
5 TABLET ORAL ONCE
Qty: 0 | Refills: 0 | Status: COMPLETED | OUTPATIENT
Start: 2018-04-14 | End: 2018-04-14

## 2018-04-14 RX ORDER — LANOLIN ALCOHOL/MO/W.PET/CERES
3 CREAM (GRAM) TOPICAL AT BEDTIME
Qty: 0 | Refills: 0 | Status: COMPLETED | OUTPATIENT
Start: 2018-04-14 | End: 2018-04-14

## 2018-04-14 RX ADMIN — Medication 25 MILLIGRAM(S): at 12:44

## 2018-04-14 RX ADMIN — Medication 5 MILLIGRAM(S): at 04:36

## 2018-04-14 RX ADMIN — Medication 50 MILLIGRAM(S): at 05:08

## 2018-04-14 RX ADMIN — Medication 40 MILLIEQUIVALENT(S): at 12:39

## 2018-04-14 RX ADMIN — Medication 40 MILLIGRAM(S): at 05:08

## 2018-04-14 RX ADMIN — PANTOPRAZOLE SODIUM 40 MILLIGRAM(S): 20 TABLET, DELAYED RELEASE ORAL at 05:08

## 2018-04-14 RX ADMIN — Medication 1 TABLET(S): at 12:40

## 2018-04-14 RX ADMIN — APIXABAN 5 MILLIGRAM(S): 2.5 TABLET, FILM COATED ORAL at 21:06

## 2018-04-14 RX ADMIN — Medication 40 MILLIEQUIVALENT(S): at 16:13

## 2018-04-14 RX ADMIN — POLYETHYLENE GLYCOL 3350 17 GRAM(S): 17 POWDER, FOR SOLUTION ORAL at 12:40

## 2018-04-14 RX ADMIN — LATANOPROST 1 DROP(S): 0.05 SOLUTION/ DROPS OPHTHALMIC; TOPICAL at 21:05

## 2018-04-14 RX ADMIN — DORZOLAMIDE HYDROCHLORIDE TIMOLOL MALEATE 1 DROP(S): 20; 5 SOLUTION/ DROPS OPHTHALMIC at 18:39

## 2018-04-14 RX ADMIN — Medication 5 MILLIGRAM(S): at 16:12

## 2018-04-14 RX ADMIN — SENNA PLUS 2 TABLET(S): 8.6 TABLET ORAL at 21:05

## 2018-04-14 RX ADMIN — Medication 5 MILLIGRAM(S): at 06:15

## 2018-04-14 RX ADMIN — APIXABAN 5 MILLIGRAM(S): 2.5 TABLET, FILM COATED ORAL at 08:58

## 2018-04-14 RX ADMIN — Medication 75 MILLIGRAM(S): at 18:39

## 2018-04-14 RX ADMIN — Medication 100 MILLIGRAM(S): at 21:05

## 2018-04-14 RX ADMIN — Medication 0.12 MILLIGRAM(S): at 05:08

## 2018-04-14 RX ADMIN — Medication 3 MILLIGRAM(S): at 21:05

## 2018-04-14 RX ADMIN — DORZOLAMIDE HYDROCHLORIDE TIMOLOL MALEATE 1 DROP(S): 20; 5 SOLUTION/ DROPS OPHTHALMIC at 05:08

## 2018-04-14 NOTE — PHYSICAL THERAPY INITIAL EVALUATION ADULT - RANGE OF MOTION EXAMINATION, REHAB EVAL
Right UE ROM was WFL (within functional limits)/Right shoulder 30% of normal ROM. Elbow and hand WFL./Left LE ROM was WFL (within functional limits)/Right LE ROM was WFL (within functional limits)

## 2018-04-14 NOTE — PROGRESS NOTE ADULT - SUBJECTIVE AND OBJECTIVE BOX
Kati Giraldo MD, R2  Pager: [phone] / [phone]    Patient is a 86y old  Female who presents with a chief complaint of new atrial fibrillation with RVR, shortness of breath (10 Apr 2018 19:39)      SUBJECTIVE / OVERNIGHT EVENTS: Patient noted to be tachycardic to 100s-120s overnight, given Lopressor IVP 5mg x 2 with short response. Patients symptomatic with palpitations during these episodes. Denies chest pain, nausea, vomiting, fever, chills, abdominal pain, dysuria or diarrhea.    MEDICATIONS  (STANDING):  apixaban 5 milliGRAM(s) Oral every 12 hours  digoxin     Tablet 0.125 milliGRAM(s) Oral daily  docusate sodium 100 milliGRAM(s) Oral three times a day  dorzolamide 2%/timolol 0.5% Ophthalmic Solution 1 Drop(s) Both EYES two times a day  furosemide   Injectable 40 milliGRAM(s) IV Push daily  latanoprost 0.005% Ophthalmic Solution 1 Drop(s) Both EYES at bedtime  metoprolol tartrate 50 milliGRAM(s) Oral two times a day  multivitamin 1 Tablet(s) Oral daily  pantoprazole    Tablet 40 milliGRAM(s) Oral before breakfast  polyethylene glycol 3350 17 Gram(s) Oral daily  potassium chloride    Tablet ER 40 milliEquivalent(s) Oral every 4 hours  senna 2 Tablet(s) Oral at bedtime    MEDICATIONS  (PRN):  acetaminophen   Tablet. 650 milliGRAM(s) Oral every 6 hours PRN mild and moderate pain  metoprolol tartrate Injectable 5 milliGRAM(s) IV Push every 6 hours PRN A. fib with RVR with HR>120    I&O's Summary    13 Apr 2018 07:01  -  14 Apr 2018 07:00  --------------------------------------------------------  IN: 660 mL / OUT: 1650 mL / NET: -990 mL    PHYSICAL EXAM:  Vital Signs Last 24 Hrs  T(F): 97.5 (14 Apr 2018 04:13), Max: 100.3 (13 Apr 2018 16:41)  HR: 127 (14 Apr 2018 04:13) (92 - 127)  BP: 134/75 (14 Apr 2018 04:13) (105/70 - 134/75)  RR: 18 (14 Apr 2018 04:13) (17 - 18)  SpO2: 94% (14 Apr 2018 04:13) (94% - 96%)    GENERAL: NAD, well-developed  HEAD:  Atraumatic, Normocephalic  EYES: EOMI, PERRLA, conjunctiva and sclera clear  NECK: Supple, No JVD  CHEST/LUNG: BS diminished at the bases  HEART: Regular rate and rhythm; No murmurs, rubs, or gallops  ABDOMEN: Soft, Nontender, Nondistended; Bowel sounds present  EXTREMITIES: 2+ Peripheral Pulses, No clubbing, cyanosis, or edema  PSYCH: AAOx3  NEUROLOGY: non-focal  SKIN: No rashes or lesions    LABS:                        13.9   8.90  )-----------( 281      ( 14 Apr 2018 08:20 )             42.6     04-14    138  |  97  |  21  ----------------------------<  165<H>  3.3<L>   |  27  |  0.81    Ca    9.4      14 Apr 2018 06:22  Phos  2.5     04-14  Mg     2.2     04-14      RADIOLOGY & ADDITIONAL TESTS:    Imaging Personally Reviewed:    Consultant(s) Notes Reviewed:      Care Discussed with Consultants/Other Providers:

## 2018-04-14 NOTE — PROGRESS NOTE ADULT - ATTENDING COMMENTS
Pt seen with son at bedside. Evaluated by PT today- home PT recommended.    HR still uncontrolled. Lopressor increased- monitor.

## 2018-04-14 NOTE — PROGRESS NOTE ADULT - PROBLEM SELECTOR PLAN 4
Pt has not been prescribed statin in [employer]. Will follow up with PMD.  and HDL 56 on late 2017 outpatient labs.  - further management with PMD as outpatient  -hold off on starting statin at this time. Pt has not been prescribed statin in [employer]. Will follow up with PMD.  and HDL 56 on late 2017 outpatient labs.  - further management with PMD as outpatient  - hold off on starting statin at this time

## 2018-04-14 NOTE — PHYSICAL THERAPY INITIAL EVALUATION ADULT - PERTINENT HX OF CURRENT PROBLEM, REHAB EVAL
Pt is an 86 F with a PMH of HTN, OA of b/l knees, chronic b/l LE pain, hypokalemia, anxiety attacks, dyspnea, BPPV that presented with SOB. CT Chest: (-) PE, mild pulmonary edema with small pleural effusions. Chest Xray: CHF.

## 2018-04-14 NOTE — PROGRESS NOTE ADULT - PROBLEM SELECTOR PLAN 2
Improved. Likely related to new onset afib with RVR and pulmonary edema. However CXR and CT also showing cardiomegaly with LA enlargement.   - TTE as above, EF not estimated  - c/w Lasix 40 IVP daily due to ongoing fluid overload detected on physical exam  - will likely need eventual stress test

## 2018-04-14 NOTE — PHYSICAL THERAPY INITIAL EVALUATION ADULT - GENERAL OBSERVATIONS, REHAB EVAL
Pt received semi supine in bed. Tele. Pt is primarily [place] speaking. Pt refused  phone. Son present in room and provided translation.

## 2018-04-14 NOTE — PROGRESS NOTE ADULT - PROBLEM SELECTOR PLAN 8
No recent visual changes.   -c/w home Cosopt BID OU and latanoprost qhs OU. No recent visual changes   - c/w home Cosopt BID OU and latanoprost qhs OU.

## 2018-04-14 NOTE — PHYSICAL THERAPY INITIAL EVALUATION ADULT - ADDITIONAL COMMENTS
Pt had pt's son provide social and functional history. Pt's son stated that pt lives in a private house alone. 1 step to enter. 1 flight of stairs to bedroom with a stair lift. Pt is independent in all functional activities, ADL's, and ambulation with a cane.

## 2018-04-14 NOTE — PROGRESS NOTE ADULT - PROBLEM SELECTOR PLAN 1
Afib ongoing with 's-120's, requiring PRN metoprolol IV 5 mg Q6h  - Digoxin load initiated yesterday  - metoprolol switched to PO lopressor 50 mg BID yesterday, with IV metoprolol PRN for HR>120  - Low suspicion for infection. No history of thyroid, pulmonary, previous heart procedure or arrythmia. ACS ruled out. TSH wnl  - Cont w telemetry monitoring   - TTE demonstrated with moderate aortic stenosis, severely dilated LA, left-sided pleural effusion and a possible liver cyst  - continue AC w apixaban 5mg BID for non-valvular afib (CHADS-VASc score 3) Afib ongoing with 's-120's, requiring PRN metoprolol IV 5 mg Q6h  - Digoxin load initiated yesterday  - metoprolol switched to PO lopressor 50 mg BID yesterday, with IV metoprolol PRN for HR>120  - given rates still elevated, and BPs stable, will increase Metoprolol to 75 BID  - Low suspicion for infection. No history of thyroid, pulmonary, previous heart procedure or arrythmia. ACS ruled out. TSH wnl  - Cont w telemetry monitoring   - TTE demonstrated with moderate aortic stenosis, severely dilated LA, left-sided pleural effusion and a possible liver cyst  - continue AC w apixaban 5mg BID for non-valvular afib (CHADS-VASc score 3)

## 2018-04-15 LAB
ANION GAP SERPL CALC-SCNC: 14 MMOL/L — SIGNIFICANT CHANGE UP (ref 5–17)
BUN SERPL-MCNC: 24 MG/DL — HIGH (ref 7–23)
CALCIUM SERPL-MCNC: 9.5 MG/DL — SIGNIFICANT CHANGE UP (ref 8.4–10.5)
CHLORIDE SERPL-SCNC: 98 MMOL/L — SIGNIFICANT CHANGE UP (ref 96–108)
CO2 SERPL-SCNC: 23 MMOL/L — SIGNIFICANT CHANGE UP (ref 22–31)
CREAT SERPL-MCNC: 0.8 MG/DL — SIGNIFICANT CHANGE UP (ref 0.5–1.3)
DIGOXIN SERPL-MCNC: 1.1 NG/ML — SIGNIFICANT CHANGE UP (ref 0.8–2)
GLUCOSE SERPL-MCNC: 141 MG/DL — HIGH (ref 70–99)
HCT VFR BLD CALC: 42.1 % — SIGNIFICANT CHANGE UP (ref 34.5–45)
HGB BLD-MCNC: 13.8 G/DL — SIGNIFICANT CHANGE UP (ref 11.5–15.5)
MAGNESIUM SERPL-MCNC: 2.2 MG/DL — SIGNIFICANT CHANGE UP (ref 1.6–2.6)
MCHC RBC-ENTMCNC: 29.1 PG — SIGNIFICANT CHANGE UP (ref 27–34)
MCHC RBC-ENTMCNC: 32.8 GM/DL — SIGNIFICANT CHANGE UP (ref 32–36)
MCV RBC AUTO: 88.6 FL — SIGNIFICANT CHANGE UP (ref 80–100)
PHOSPHATE SERPL-MCNC: 2.6 MG/DL — SIGNIFICANT CHANGE UP (ref 2.5–4.5)
PLATELET # BLD AUTO: 305 K/UL — SIGNIFICANT CHANGE UP (ref 150–400)
POTASSIUM SERPL-MCNC: 4.2 MMOL/L — SIGNIFICANT CHANGE UP (ref 3.5–5.3)
POTASSIUM SERPL-SCNC: 4.2 MMOL/L — SIGNIFICANT CHANGE UP (ref 3.5–5.3)
RBC # BLD: 4.75 M/UL — SIGNIFICANT CHANGE UP (ref 3.8–5.2)
RBC # FLD: 13.5 % — SIGNIFICANT CHANGE UP (ref 10.3–14.5)
SODIUM SERPL-SCNC: 135 MMOL/L — SIGNIFICANT CHANGE UP (ref 135–145)
WBC # BLD: 8.75 K/UL — SIGNIFICANT CHANGE UP (ref 3.8–10.5)
WBC # FLD AUTO: 8.75 K/UL — SIGNIFICANT CHANGE UP (ref 3.8–10.5)

## 2018-04-15 PROCEDURE — 99233 SBSQ HOSP IP/OBS HIGH 50: CPT | Mod: GC

## 2018-04-15 PROCEDURE — 99223 1ST HOSP IP/OBS HIGH 75: CPT | Mod: GC

## 2018-04-15 RX ORDER — METOPROLOL TARTRATE 50 MG
100 TABLET ORAL
Qty: 0 | Refills: 0 | Status: DISCONTINUED | OUTPATIENT
Start: 2018-04-15 | End: 2018-04-19

## 2018-04-15 RX ORDER — ACETAMINOPHEN 500 MG
650 TABLET ORAL EVERY 6 HOURS
Qty: 0 | Refills: 0 | Status: DISCONTINUED | OUTPATIENT
Start: 2018-04-15 | End: 2018-04-19

## 2018-04-15 RX ORDER — LANOLIN ALCOHOL/MO/W.PET/CERES
3 CREAM (GRAM) TOPICAL AT BEDTIME
Qty: 0 | Refills: 0 | Status: DISCONTINUED | OUTPATIENT
Start: 2018-04-15 | End: 2018-04-19

## 2018-04-15 RX ADMIN — SENNA PLUS 2 TABLET(S): 8.6 TABLET ORAL at 21:02

## 2018-04-15 RX ADMIN — LATANOPROST 1 DROP(S): 0.05 SOLUTION/ DROPS OPHTHALMIC; TOPICAL at 21:03

## 2018-04-15 RX ADMIN — DORZOLAMIDE HYDROCHLORIDE TIMOLOL MALEATE 1 DROP(S): 20; 5 SOLUTION/ DROPS OPHTHALMIC at 05:11

## 2018-04-15 RX ADMIN — Medication 100 MILLIGRAM(S): at 17:43

## 2018-04-15 RX ADMIN — Medication 100 MILLIGRAM(S): at 21:03

## 2018-04-15 RX ADMIN — Medication 3 MILLIGRAM(S): at 21:02

## 2018-04-15 RX ADMIN — APIXABAN 5 MILLIGRAM(S): 2.5 TABLET, FILM COATED ORAL at 17:43

## 2018-04-15 RX ADMIN — Medication 40 MILLIGRAM(S): at 05:10

## 2018-04-15 RX ADMIN — Medication 100 MILLIGRAM(S): at 05:10

## 2018-04-15 RX ADMIN — Medication 5 MILLIGRAM(S): at 17:05

## 2018-04-15 RX ADMIN — Medication 0.12 MILLIGRAM(S): at 05:09

## 2018-04-15 RX ADMIN — Medication 100 MILLIGRAM(S): at 11:34

## 2018-04-15 RX ADMIN — DORZOLAMIDE HYDROCHLORIDE TIMOLOL MALEATE 1 DROP(S): 20; 5 SOLUTION/ DROPS OPHTHALMIC at 17:43

## 2018-04-15 RX ADMIN — PANTOPRAZOLE SODIUM 40 MILLIGRAM(S): 20 TABLET, DELAYED RELEASE ORAL at 05:11

## 2018-04-15 RX ADMIN — Medication 5 MILLIGRAM(S): at 04:20

## 2018-04-15 RX ADMIN — Medication 1 TABLET(S): at 11:34

## 2018-04-15 RX ADMIN — Medication 75 MILLIGRAM(S): at 05:09

## 2018-04-15 RX ADMIN — APIXABAN 5 MILLIGRAM(S): 2.5 TABLET, FILM COATED ORAL at 07:30

## 2018-04-15 NOTE — PROGRESS NOTE ADULT - SUBJECTIVE AND OBJECTIVE BOX
Patient is a 86y old  Female who presents with a chief complaint of new atrial fibrillation with RVR, shortness of breath (10 Apr 2018 19:39)       INTERVAL HPI/OVERNIGHT EVENTS: No acute events overnight. Telemetry shows 's-130's overnight, occasionally going up to 140's this AM. Lopressor dose was increased to 75 mg BID yesterday, continued on digoxin also. Pt states she had difficulty sleeping and generally feels "unwell", stating lack of appetite and occasional dyspnea (although appears comfortably breathing on room air). No chest pain. No other pains. She is having good urine output and also had bowel movements in the past day.     MEDICATIONS  (STANDING):  apixaban 5 milliGRAM(s) Oral every 12 hours  digoxin     Tablet 0.125 milliGRAM(s) Oral daily  docusate sodium 100 milliGRAM(s) Oral three times a day  dorzolamide 2%/timolol 0.5% Ophthalmic Solution 1 Drop(s) Both EYES two times a day  furosemide   Injectable 40 milliGRAM(s) IV Push daily  latanoprost 0.005% Ophthalmic Solution 1 Drop(s) Both EYES at bedtime  metoprolol tartrate 75 milliGRAM(s) Oral two times a day  multivitamin 1 Tablet(s) Oral daily  pantoprazole    Tablet 40 milliGRAM(s) Oral before breakfast  polyethylene glycol 3350 17 Gram(s) Oral daily  senna 2 Tablet(s) Oral at bedtime    MEDICATIONS  (PRN):  acetaminophen   Tablet. 650 milliGRAM(s) Oral every 6 hours PRN mild and moderate pain  metoprolol tartrate Injectable 5 milliGRAM(s) IV Push every 6 hours PRN A. fib with RVR with HR>120      Allergies    No Known Allergies    Intolerances            Vital Signs Last 24 Hrs  T(C): 36.8 (15 Apr 2018 04:56), Max: 37.1 (14 Apr 2018 14:08)  T(F): 98.2 (15 Apr 2018 04:56), Max: 98.7 (14 Apr 2018 14:08)  HR: 99 (15 Apr 2018 04:56) (67 - 121)  BP: 120/71 (15 Apr 2018 04:56) (105/69 - 158/96)  BP(mean): --  RR: 18 (15 Apr 2018 04:56) (18 - 19)  SpO2: 97% (15 Apr 2018 04:56) (94% - 97%)    PHYSICAL EXAM:    GENERAL: NAD, well-developed  HEAD:  Atraumatic, Normocephalic  EYES: EOMI, PERRLA, conjunctiva and sclera clear  NECK: Supple, No JVD  CHEST/LUNG: BS diminished at the bases  HEART: Regular rate and rhythm; No murmurs, rubs, or gallops  ABDOMEN: Soft, Nontender, Nondistended; Bowel sounds present  EXTREMITIES: 2+ Peripheral Pulses, No clubbing, cyanosis, or edema  PSYCH: AAOx3  NEUROLOGY: non-focal  SKIN: No rashes or lesions    LABS:                        13.8   8.75  )-----------( 305      ( 15 Apr 2018 08:26 )             42.1     15 Apr 2018 06:30    135    |  98     |  24     ----------------------------<  141    4.2     |  23     |  0.80     Ca    9.5        15 Apr 2018 06:30  Phos  2.6       15 Apr 2018 06:30  Mg     2.2       15 Apr 2018 06:30        CAPILLARY BLOOD GLUCOSE        BLOOD CULTURE    RADIOLOGY & ADDITIONAL TESTS:    Imaging Personally Reviewed:  [X ] YES     Consultant(s) Notes Reviewed:      Care Discussed with Consultants/Other Providers:

## 2018-04-15 NOTE — PROGRESS NOTE ADULT - SUBJECTIVE AND OBJECTIVE BOX
Patient is a 86y old  Female who presents with a chief complaint of new atrial fibrillation with RVR, shortness of breath (10 Apr 2018 19:39)      HPI:  Pt is an 85yo F with PMH of HTN, degenerative arthritis of the knee, chronic bilateral lower extremity pain, low Vit D, hypokalemia, urinary frequency, gastritis, anxiety attacks, HLD, dyspnea, BPPV presenting with two days of intermittent shortness of breath. She was evaluated by her PMD Dr. Shelley earlier today with EKG concerning for atrial fibrillation, and she was sent to the ED. The patient reports that the shortness of breath episodes are intermittent with no clear exacerbating or relieving factors, though they seem to occur most often in the mornings. The patient denies ever having had chest, jaw, or shoulder pain associated with shortness of breath. Denies fevers, chills, cough, abdominal pain, change in bowel habits, n/v/d/c, diaphoresis, new weakness, open wounds, sick contacts. She did recently travel to [place] by plane but has not noticed new LE swelling or pain. She does recall feeling similarly short of breath transiently about one month ago without recurrence until two days ago. She has chronic urinary frequency x 1 year without dysuria.     At baseline the patient ambulates with a cane, lives alone with full ADL's, no HHA. Her son visits her frequently but lives with his family nearby on . Of note, patient has had colonoscopy and endoscopy in the past for screening and for GERD with a reportedly benign lesion found and removed. Reviewed colonoscopy and endoscopy in [employer]: multiple sessile polyps biopsied (3) in colonoscopy largest 2.5 cm, biopsies negative for malignant lesions per [employer]. Endoscopy also showed erythematous polyp within hiatal hernia which was biopsied. Colonoscopy and endoscopy were in 12/2016.     ED course:  Vitals: Tmax 98.8degF, ->100, /61, RR 18, SpO2 95% on NC 2L, 96% on RA initially.  Labs: WBC 6.8, Hgb 13.0, Plts 216, INR 1.13, Na 140, K 3.6, Cl 101, SCr 0.86, glucose 124, trop <0.01, BNP 1150.  Imaging: CT angio chest: no pulmonary embolus, mild pulmonary edema with small pleural effusions.   CXR: pulmonary vascular congestion with bilateral pleural effusions and no consolidations, impression: CHF.  Medications:  x 1, diltiazem 60 po x 1, diltiazem 10 IVPx2, diltiazem 20 IVP x 1, Lovenox 70mg x 1 subq, furosemide 40 IVP x 1, Tylenol 650 x1.  EKG: rate 130, afib RVR, [place] na, QRS 78, QTc 476 (10 Apr 2018 19:39)      PAST MEDICAL & SURGICAL HISTORY:  GERD (gastroesophageal reflux disease)  Hiatal hernia  Colonic polyp  Hypovitaminosis D  Hyperlipidemia  Leg pain, bilateral  Glaucoma  Hypertension  Abnormal endoscopy finding  Abnormal colonoscopy      PREVIOUS DIAGNOSTIC TESTING:      ECHO  FINDINGS:< from: Transthoracic Echocardiogram (04.13.18 @ 13:18) >  Conclusions:  1. Calcified trileaflet aortic valve with decreased  opening. Peak transaortic valve gradient equals 20 mm Hg,  mean transaortic valve gradient equals 11 mm Hg, estimated  aortic valve area equals 1.1 sqcm (by continuity equation),  aortic valve velocity time integral equals 34 cm,  consistent with moderate aortic stenosis.  2. Severely dilated left atrium.  LA volume index = 56  cc/m2.  3. Left pleural effusion.  4. Echo-free space is noted in the liver consisitent with  cyst, however, dedicated imaging recommended for further  evaluation if clinically indicated.  *** No previous Echo exam.  Echocardiogram performed in the setting of atrial  fibrillation with rapid ventricular response.    < end of copied text >        MEDICATIONS  (STANDING):  apixaban 5 milliGRAM(s) Oral every 12 hours  digoxin     Tablet 0.125 milliGRAM(s) Oral daily  docusate sodium 100 milliGRAM(s) Oral three times a day  dorzolamide 2%/timolol 0.5% Ophthalmic Solution 1 Drop(s) Both EYES two times a day  furosemide   Injectable 40 milliGRAM(s) IV Push daily  latanoprost 0.005% Ophthalmic Solution 1 Drop(s) Both EYES at bedtime  melatonin 3 milliGRAM(s) Oral at bedtime  metoprolol tartrate 100 milliGRAM(s) Oral two times a day  multivitamin 1 Tablet(s) Oral daily  pantoprazole    Tablet 40 milliGRAM(s) Oral before breakfast  polyethylene glycol 3350 17 Gram(s) Oral daily  senna 2 Tablet(s) Oral at bedtime    MEDICATIONS  (PRN):  acetaminophen   Tablet. 650 milliGRAM(s) Oral every 6 hours PRN mild and moderate pain  metoprolol tartrate Injectable 5 milliGRAM(s) IV Push every 6 hours PRN A. fib with RVR with HR>120      FAMILY HISTORY:  Family history of diabetes mellitus (Child)      SOCIAL HISTORY:    CIGARETTES:    ALCOHOL:    REVIEW OF SYSTEMS:  CONSTITUTIONAL: No weakness, fevers or chills  Eyes/ENT: No visual changes: No vertigo or throat pain  NECK: No pain or stiffness  Resp: No cough, wheezing, hemoptysis; No shortness of breath  Cardiovascular: No chest pain or palpitations  GI: No abdominal or epigastric pain. NO nausea, vomiting, or hematemesis: No diarrhea or constipation. No melena or hematochezia.    : No dysuria, frequent or hematuria.  Neuro: No numbness or weakness  Skin: No itching or rashes	          PHYSICAL EXAM:  Vital Signs Last 24 Hrs  T(C): 36.8 (15 Apr 2018 04:56), Max: 37.1 (14 Apr 2018 14:08)  T(F): 98.2 (15 Apr 2018 04:56), Max: 98.7 (14 Apr 2018 14:08)  HR: 99 (15 Apr 2018 04:56) (67 - 121)  BP: 120/71 (15 Apr 2018 04:56) (105/69 - 158/96)  BP(mean): --  RR: 18 (15 Apr 2018 04:56) (18 - 19)  SpO2: 97% (15 Apr 2018 04:56) (94% - 97%)  I&O's Summary    14 Apr 2018 07:01  -  15 Apr 2018 07:00  --------------------------------------------------------  IN: 720 mL / OUT: 1100 mL / NET: -380 mL        Appearance: Normal	  HEENT:   Normal oral mucosa, PERRL, EOMI	  Lymphatic: No lymphadenopathy  Cardiovascular: Normal S1 S2, No JVD, No murmurs, No edema  Respiratory: Lungs clear to auscultation	  Psychiatry: A & O x 3, Mood & affect appropriate  Gastrointestinal:  Soft, Non-tender, + BS	  Skin: No rashes, No ecchymoses, No cyanosis	  Neurologic: Non-focal  Extremities: Normal range of motion, No clubbing, cyanosis or edema  Vascular: Peripheral pulses palpable 2+ bilaterally      	  Aubrey Alejandra  Contact #	              INTERPRETATION OF TELEMETRY:    ECG:    I&O's Detail    14 Apr 2018 07:01  -  15 Apr 2018 07:00  --------------------------------------------------------  IN:    Oral Fluid: 720 mL  Total IN: 720 mL    OUT:    Voided: 1100 mL  Total OUT: 1100 mL    Total NET: -380 mL          LABS:                        13.8   8.75  )-----------( 305      ( 15 Apr 2018 08:26 )             42.1     04-15    135  |  98  |  24<H>  ----------------------------<  141<H>  4.2   |  23  |  0.80    Ca    9.5      15 Apr 2018 06:30  Phos  2.6     04-15  Mg     2.2     04-15              I&O's Summary    14 Apr 2018 07:01  -  15 Apr 2018 07:00  --------------------------------------------------------  IN: 720 mL / OUT: 1100 mL / NET: -380 mL      BNP  RADIOLOGY & ADDITIONAL STUDIES: Patient is a 86y old  Female who presents with a chief complaint of new atrial fibrillation with RVR, shortness of breath (10 Apr 2018 19:39)      HPI:  Pt is an 87yo F with PMH of HTN, degenerative arthritis of the knee, chronic bilateral lower extremity pain, low Vit D, hypokalemia, urinary frequency, gastritis, anxiety attacks, HLD, dyspnea, BPPV presenting with two days of intermittent shortness of breath. She was evaluated by her PMD Dr. Shelley earlier today with EKG concerning for atrial fibrillation, and she was sent to the ED. The patient reports that the shortness of breath episodes are intermittent with no clear exacerbating or relieving factors, though they seem to occur most often in the mornings. The patient denies ever having had chest, jaw, or shoulder pain associated with shortness of breath. Denies fevers, chills, cough, abdominal pain, change in bowel habits, n/v/d/c, diaphoresis, new weakness, open wounds, sick contacts. She did recently travel to [place] by plane but has not noticed new LE swelling or pain. She does recall feeling similarly short of breath transiently about one month ago without recurrence until two days ago. She has chronic urinary frequency x 1 year without dysuria.     At baseline the patient ambulates with a cane, lives alone with full ADL's, no HHA. Her son visits her frequently but lives with his family nearby on . Of note, patient has had colonoscopy and endoscopy in the past for screening and for GERD with a reportedly benign lesion found and removed. Reviewed colonoscopy and endoscopy in [employer]: multiple sessile polyps biopsied (3) in colonoscopy largest 2.5 cm, biopsies negative for malignant lesions per [employer]. Endoscopy also showed erythematous polyp within hiatal hernia which was biopsied. Colonoscopy and endoscopy were in 12/2016.     ED course:  Vitals: Tmax 98.8degF, ->100, /61, RR 18, SpO2 95% on NC 2L, 96% on RA initially.  Labs: WBC 6.8, Hgb 13.0, Plts 216, INR 1.13, Na 140, K 3.6, Cl 101, SCr 0.86, glucose 124, trop <0.01, BNP 1150.  Imaging: CT angio chest: no pulmonary embolus, mild pulmonary edema with small pleural effusions.   CXR: pulmonary vascular congestion with bilateral pleural effusions and no consolidations, impression: CHF.  Medications:  x 1, diltiazem 60 po x 1, diltiazem 10 IVPx2, diltiazem 20 IVP x 1, Lovenox 70mg x 1 subq, furosemide 40 IVP x 1, Tylenol 650 x1.  EKG: rate 130, afib RVR, [place] na, QRS 78, QTc 476 (10 Apr 2018 19:39)      PAST MEDICAL & SURGICAL HISTORY:  GERD (gastroesophageal reflux disease)  Hiatal hernia  Colonic polyp  Hypovitaminosis D  Hyperlipidemia  Leg pain, bilateral  Glaucoma  Hypertension  Abnormal endoscopy finding  Abnormal colonoscopy      PREVIOUS DIAGNOSTIC TESTING:      ECHO  FINDINGS:< from: Transthoracic Echocardiogram (04.13.18 @ 13:18) >  Conclusions:  1. Calcified trileaflet aortic valve with decreased  opening. Peak transaortic valve gradient equals 20 mm Hg,  mean transaortic valve gradient equals 11 mm Hg, estimated  aortic valve area equals 1.1 sqcm (by continuity equation),  aortic valve velocity time integral equals 34 cm,  consistent with moderate aortic stenosis.  2. Severely dilated left atrium.  LA volume index = 56  cc/m2.  3. Left pleural effusion.  4. Echo-free space is noted in the liver consisitent with  cyst, however, dedicated imaging recommended for further  evaluation if clinically indicated.  *** No previous Echo exam.  Echocardiogram performed in the setting of atrial  fibrillation with rapid ventricular response.    < end of copied text >        MEDICATIONS  (STANDING):  apixaban 5 milliGRAM(s) Oral every 12 hours  digoxin     Tablet 0.125 milliGRAM(s) Oral daily  docusate sodium 100 milliGRAM(s) Oral three times a day  dorzolamide 2%/timolol 0.5% Ophthalmic Solution 1 Drop(s) Both EYES two times a day  furosemide   Injectable 40 milliGRAM(s) IV Push daily  latanoprost 0.005% Ophthalmic Solution 1 Drop(s) Both EYES at bedtime  melatonin 3 milliGRAM(s) Oral at bedtime  metoprolol tartrate 100 milliGRAM(s) Oral two times a day  multivitamin 1 Tablet(s) Oral daily  pantoprazole    Tablet 40 milliGRAM(s) Oral before breakfast  polyethylene glycol 3350 17 Gram(s) Oral daily  senna 2 Tablet(s) Oral at bedtime    MEDICATIONS  (PRN):  acetaminophen   Tablet. 650 milliGRAM(s) Oral every 6 hours PRN mild and moderate pain  metoprolol tartrate Injectable 5 milliGRAM(s) IV Push every 6 hours PRN A. fib with RVR with HR>120      FAMILY HISTORY:  Family history of diabetes mellitus (Child)      SOCIAL HISTORY:    CIGARETTES:    ALCOHOL:    REVIEW OF SYSTEMS:  CONSTITUTIONAL: No weakness, fevers or chills  Eyes/ENT: No visual changes: No vertigo or throat pain  NECK: No pain or stiffness  Resp: No cough, wheezing, hemoptysis; No shortness of breath  Cardiovascular: No chest pain or palpitations  GI: No abdominal or epigastric pain. NO nausea, vomiting, or hematemesis: No diarrhea or constipation. No melena or hematochezia.    : No dysuria, frequent or hematuria.  Neuro: No numbness or weakness  Skin: No itching or rashes	          PHYSICAL EXAM:  Vital Signs Last 24 Hrs  T(C): 36.8 (15 Apr 2018 04:56), Max: 37.1 (14 Apr 2018 14:08)  T(F): 98.2 (15 Apr 2018 04:56), Max: 98.7 (14 Apr 2018 14:08)  HR: 99 (15 Apr 2018 04:56) (67 - 121)  BP: 120/71 (15 Apr 2018 04:56) (105/69 - 158/96)  BP(mean): --  RR: 18 (15 Apr 2018 04:56) (18 - 19)  SpO2: 97% (15 Apr 2018 04:56) (94% - 97%)  I&O's Summary    14 Apr 2018 07:01  -  15 Apr 2018 07:00  --------------------------------------------------------  IN: 720 mL / OUT: 1100 mL / NET: -380 mL        Appearance: Normal	  HEENT:   Normal oral mucosa, PERRL, EOMI	  Lymphatic: No lymphadenopathy  Cardiovascular: Normal S1 S2, No JVD, II/IV systolic murmur, No edema  Respiratory: Lungs clear to auscultation	  Psychiatry: A & O x 3, Mood & affect appropriate  Gastrointestinal:  Soft, Non-tender, + BS	  Skin: No rashes, No ecchymoses, No cyanosis	  Neurologic: Non-focal  Extremities: Normal range of motion, No clubbing, cyanosis or edema  Vascular: Peripheral pulses palpable 2+ bilaterally      	  Aubrey Alejandra  Contact #	              INTERPRETATION OF TELEMETRY: Afib     ECG: AFib    I&O's Detail    14 Apr 2018 07:01  -  15 Apr 2018 07:00  --------------------------------------------------------  IN:    Oral Fluid: 720 mL  Total IN: 720 mL    OUT:    Voided: 1100 mL  Total OUT: 1100 mL    Total NET: -380 mL          LABS:                        13.8   8.75  )-----------( 305      ( 15 Apr 2018 08:26 )             42.1     04-15    135  |  98  |  24<H>  ----------------------------<  141<H>  4.2   |  23  |  0.80    Ca    9.5      15 Apr 2018 06:30  Phos  2.6     04-15  Mg     2.2     04-15              I&O's Summary    14 Apr 2018 07:01  -  15 Apr 2018 07:00  --------------------------------------------------------  IN: 720 mL / OUT: 1100 mL / NET: -380 mL      BNP  RADIOLOGY & ADDITIONAL STUDIES:

## 2018-04-15 NOTE — PROGRESS NOTE ADULT - PROBLEM SELECTOR PLAN 1
Afib ongoing with 's-140's, requiring PRN metoprolol IV 5 mg Q6h  - c/w digoxin daily  - c/w lopressor BID, given HR still elevated, will increase to 100 mg BID today, with IV metoprolol PRN for HR>120  - Low suspicion for infection. No history of thyroid, pulmonary, previous heart procedure or arrythmia. ACS ruled out. TSH wnl  - Cont w telemetry monitoring   - TTE demonstrated with moderate aortic stenosis, severely dilated LA, left-sided pleural effusion and a possible liver cyst  - continue AC w apixaban 5mg BID for non-valvular afib (CHADS-VASc score 3) Afib ongoing with 's-140's, requiring PRN metoprolol IV 5 mg Q6h  - c/w digoxin daily  - c/w lopressor BID, given HR still elevated, will increase to 100 mg BID today, with IV metoprolol PRN for HR>120  - Low suspicion for infection. No history of thyroid, pulmonary, previous heart procedure or arrythmia. ACS ruled out. TSH wnl  - Cont w telemetry monitoring   - TTE demonstrated with moderate aortic stenosis, severely dilated LA, left-sided pleural effusion and a possible liver cyst  - continue AC w apixaban 5mg BID for non-valvular afib (CHADS-VASc score 3)  - cardiology consult today.

## 2018-04-15 NOTE — PROGRESS NOTE ADULT - ASSESSMENT
87yo F with PMH of HTN, degenerative arthritis of the knee, chronic bilateral lower extremity pain, low Vit D, hypokalemia, urinary frequency, gastritis, anxiety attacks, HLD, dyspnea, BPPV presenting with two days of intermittent shortness of breath, admitted for new atrial fibrillation with RVR, previously on diltiazem gtt, now on lopressor BID with digoxin for HR control.

## 2018-04-15 NOTE — PROGRESS NOTE ADULT - ASSESSMENT
Pt is an 85yo F with PMH of HTN, degenerative arthritis of the knee, chronic bilateral lower extremity pain, low Vit D, hypokalemia, urinary frequency, gastritis, anxiety attacks, HLD, dyspnea, BPPV presenting with two days of intermittent shortness of breath. She was evaluated by her PMD Dr. Shelley earlier today with EKG concerning for atrial fibrillation, and she was sent to the ED. The patient reports that the shortness of breath episodes are intermittent with no clear exacerbating or relieving factors, though they seem to occur most often in the mornings. The patient denies ever having had chest, jaw, or shoulder pain associated with shortness of breath. Denies fevers, chills, cough, abdominal pain, change in bowel habits, n/v/d/c, diaphoresis, new weakness, open wounds, sick contacts. She did recently travel to [place] by plane but has not noticed new LE swelling or pain. She does recall feeling similarly short of breath transiently about one month ago without recurrence until two days ago. She has chronic urinary frequency x 1 year without dysuria.  Now Pt is an 87yo F with PMH of HTN, degenerative arthritis of the knee, chronic bilateral lower extremity pain, low Vit D, hypokalemia, urinary frequency, gastritis, anxiety attacks, HLD, dyspnea, BPPV presenting with two days of intermittent shortness of breath. she was sent to the ED by PMD for AFIB. The patient reports that the sob episodes are intermittent with no clear exacerbating or relieving factors, though they seem to occur most often in the mornings. The patient denies ever having had chest, jaw, or shoulder pain associated with shortness of breath. She does recall feeling similarly short of breath transiently about one month ago without recurrence until two days ago. Now s/p cardizem gtt to PO metoprolol on AC, found to have moderate AS on echo      1) AFib w/ RVR  while being examined Pt  with activity at bedside, while at rest HR 90  continue uptitration of metoprolol Pt is an 87yo F with PMH of HTN, degenerative arthritis of the knee, chronic bilateral lower extremity pain, low Vit D, hypokalemia, urinary frequency, gastritis, anxiety attacks, HLD, dyspnea, BPPV presenting with two days of intermittent shortness of breath. she was sent to the ED by PMD for AFIB. The patient reports that the sob episodes are intermittent with no clear exacerbating or relieving factors, though they seem to occur most often in the mornings. The patient denies ever having had chest, jaw, or shoulder pain associated with shortness of breath. She does recall feeling similarly short of breath transiently about one month ago without recurrence until two days ago. Now s/p cardizem gtt to PO metoprolol on AC, found to have moderate AS on echo      1) AFib w/ RVR  while being examined Pt  with activity at bedside, while at rest HR 90  continue uptitration of metoprolol  continue apixaban

## 2018-04-15 NOTE — PROGRESS NOTE ADULT - PROBLEM SELECTOR PLAN 4
Pt has not been prescribed statin in [employer]. Will follow up with PMD.  and HDL 56 on late 2017 outpatient labs.  - further management with PMD as outpatient  - hold off on starting statin at this time

## 2018-04-15 NOTE — PROGRESS NOTE ADULT - PROBLEM SELECTOR PLAN 2
Improved. Likely related to new onset afib with RVR and pulmonary edema. However CXR and CT also showing cardiomegaly with LA enlargement.   - TTE as above, EF not estimated  - c/w Lasix 40 IVP daily   - will likely need eventual stress test

## 2018-04-15 NOTE — PROGRESS NOTE ADULT - ATTENDING COMMENTS
86 year old woman with history of hypertension, otherwise no cardiac symptoms or findings recently complain of feeling short of breath. Saw internist and recognized new onset atrial fibrillation, referred to ER. Atrial fibrillation is persistent, likely for few days. Need rate control. Should start anticoagulation with Heparin. Obtain cardiac echo. 86 year old woman with history of hypertension, otherwise no cardiac symptoms or findings recently complain of feeling short of breath. Saw internist and recognized new onset atrial fibrillation, referred to ER. Atrial fibrillation is persistent, likely for few days prior to admission and remains difficult to control rate. Just increased metoprolol dose. Already started on anticoagulation with DOAC. Cardiac echo with moderate aortic stenosis which at this time does not affect management.    Observe on increased dose of metoprolol along with digoxin. Will consider merits of transesophageal guided cardioversion.

## 2018-04-16 LAB
ANION GAP SERPL CALC-SCNC: 14 MMOL/L — SIGNIFICANT CHANGE UP (ref 5–17)
BUN SERPL-MCNC: 22 MG/DL — SIGNIFICANT CHANGE UP (ref 7–23)
CALCIUM SERPL-MCNC: 9.4 MG/DL — SIGNIFICANT CHANGE UP (ref 8.4–10.5)
CHLORIDE SERPL-SCNC: 97 MMOL/L — SIGNIFICANT CHANGE UP (ref 96–108)
CO2 SERPL-SCNC: 26 MMOL/L — SIGNIFICANT CHANGE UP (ref 22–31)
CREAT SERPL-MCNC: 0.79 MG/DL — SIGNIFICANT CHANGE UP (ref 0.5–1.3)
GLUCOSE SERPL-MCNC: 136 MG/DL — HIGH (ref 70–99)
HCT VFR BLD CALC: 42.3 % — SIGNIFICANT CHANGE UP (ref 34.5–45)
HGB BLD-MCNC: 14.1 G/DL — SIGNIFICANT CHANGE UP (ref 11.5–15.5)
MAGNESIUM SERPL-MCNC: 2.1 MG/DL — SIGNIFICANT CHANGE UP (ref 1.6–2.6)
MCHC RBC-ENTMCNC: 29 PG — SIGNIFICANT CHANGE UP (ref 27–34)
MCHC RBC-ENTMCNC: 33.3 GM/DL — SIGNIFICANT CHANGE UP (ref 32–36)
MCV RBC AUTO: 86.9 FL — SIGNIFICANT CHANGE UP (ref 80–100)
PHOSPHATE SERPL-MCNC: 3.4 MG/DL — SIGNIFICANT CHANGE UP (ref 2.5–4.5)
PLATELET # BLD AUTO: 344 K/UL — SIGNIFICANT CHANGE UP (ref 150–400)
POTASSIUM SERPL-MCNC: 3.4 MMOL/L — LOW (ref 3.5–5.3)
POTASSIUM SERPL-SCNC: 3.4 MMOL/L — LOW (ref 3.5–5.3)
RBC # BLD: 4.87 M/UL — SIGNIFICANT CHANGE UP (ref 3.8–5.2)
RBC # FLD: 13.6 % — SIGNIFICANT CHANGE UP (ref 10.3–14.5)
SODIUM SERPL-SCNC: 137 MMOL/L — SIGNIFICANT CHANGE UP (ref 135–145)
WBC # BLD: 7.84 K/UL — SIGNIFICANT CHANGE UP (ref 3.8–10.5)
WBC # FLD AUTO: 7.84 K/UL — SIGNIFICANT CHANGE UP (ref 3.8–10.5)

## 2018-04-16 PROCEDURE — 99233 SBSQ HOSP IP/OBS HIGH 50: CPT | Mod: GC

## 2018-04-16 RX ORDER — FUROSEMIDE 40 MG
40 TABLET ORAL DAILY
Qty: 0 | Refills: 0 | Status: DISCONTINUED | OUTPATIENT
Start: 2018-04-16 | End: 2018-04-19

## 2018-04-16 RX ORDER — POTASSIUM CHLORIDE 20 MEQ
20 PACKET (EA) ORAL
Qty: 0 | Refills: 0 | Status: COMPLETED | OUTPATIENT
Start: 2018-04-16 | End: 2018-04-16

## 2018-04-16 RX ADMIN — Medication 0.12 MILLIGRAM(S): at 05:09

## 2018-04-16 RX ADMIN — DORZOLAMIDE HYDROCHLORIDE TIMOLOL MALEATE 1 DROP(S): 20; 5 SOLUTION/ DROPS OPHTHALMIC at 16:54

## 2018-04-16 RX ADMIN — DORZOLAMIDE HYDROCHLORIDE TIMOLOL MALEATE 1 DROP(S): 20; 5 SOLUTION/ DROPS OPHTHALMIC at 05:10

## 2018-04-16 RX ADMIN — SENNA PLUS 2 TABLET(S): 8.6 TABLET ORAL at 21:12

## 2018-04-16 RX ADMIN — Medication 5 MILLIGRAM(S): at 05:09

## 2018-04-16 RX ADMIN — Medication 20 MILLIEQUIVALENT(S): at 07:20

## 2018-04-16 RX ADMIN — Medication 100 MILLIGRAM(S): at 05:09

## 2018-04-16 RX ADMIN — Medication 100 MILLIGRAM(S): at 21:11

## 2018-04-16 RX ADMIN — Medication 20 MILLIEQUIVALENT(S): at 12:32

## 2018-04-16 RX ADMIN — Medication 1 TABLET(S): at 12:32

## 2018-04-16 RX ADMIN — APIXABAN 5 MILLIGRAM(S): 2.5 TABLET, FILM COATED ORAL at 05:09

## 2018-04-16 RX ADMIN — Medication 100 MILLIGRAM(S): at 16:54

## 2018-04-16 RX ADMIN — Medication 40 MILLIGRAM(S): at 05:09

## 2018-04-16 RX ADMIN — LATANOPROST 1 DROP(S): 0.05 SOLUTION/ DROPS OPHTHALMIC; TOPICAL at 21:11

## 2018-04-16 RX ADMIN — PANTOPRAZOLE SODIUM 40 MILLIGRAM(S): 20 TABLET, DELAYED RELEASE ORAL at 05:10

## 2018-04-16 RX ADMIN — APIXABAN 5 MILLIGRAM(S): 2.5 TABLET, FILM COATED ORAL at 16:54

## 2018-04-16 RX ADMIN — Medication 100 MILLIGRAM(S): at 12:32

## 2018-04-16 RX ADMIN — Medication 3 MILLIGRAM(S): at 21:12

## 2018-04-16 NOTE — DIETITIAN INITIAL EVALUATION ADULT. - ENERGY NEEDS
Ht: 62“, Wt: 147.7lbs-standing wt 4/12, BMI: 27.0kg/m2, IBW: 110 lbs (+/-10%)  Pertinent Information: Pt presents with SOB, found to be in Afib with RVR. plan to consider SERENA/DCCV as early as tomorrow, will be NPO after midnight.   1+ real. leg edema, no pressure injury

## 2018-04-16 NOTE — PROGRESS NOTE ADULT - ATTENDING COMMENTS
Patient seen and examined, labs, vitals reviewed.   Patient with dyspnea from A.fib with rvr and acute pulmonary edema, continue with Digoxin, Metoprolol, Lasix- monitor HR on telemetry. May need TTE and cardioversion if remains tachycardia. Continue AC with Apixaban. Cardiol note appreciated.

## 2018-04-16 NOTE — DIETITIAN INITIAL EVALUATION ADULT. - PROBLEM SELECTOR PLAN 1
Pt presented with afib RVR on EKG to 150's on telemetry, improved after po and IV diltiazem to 100's, 80's on exam currently. Only infectious symptom endorsed is chronic urinary frequency x 1 year. No history of thyroid problems, heart problems, or arrhythmia. No concern for ACS on labs, EKG, or from history.  -CE's negative x 1   -tele monitoring.  -c/w diltiazem 30mg po q6h for rate control.  -CHADS-Vasc 3, will need long-term a/c. No history of falls. Briefly discussed risks/benefits with patient and son and they agree to proceed with a/c for stroke prevention, understanding risks of brain hemorrhage/easy bleeding in the event of trauma.  -start NOAC, apixaban 5mg BID for non-valvular afib >60kg.  -cardiology c/s in AM for discharge planning and follow-up care.  -HgA1C in AM.

## 2018-04-16 NOTE — DIETITIAN INITIAL EVALUATION ADULT. - ORAL INTAKE PTA
good/Typical intake: cereal (cheerios/cornflake) with milk or eggs for breakfast; tuna sandwich with coffee and something sweet such as coffee cake for lunch; chicken, potatoes and boiled vegetables such as broccoli or chicory for dinner. Pt reports taking Centrum silver.

## 2018-04-16 NOTE — PROGRESS NOTE ADULT - PROBLEM SELECTOR PLAN 2
Resolved. Likely related to new onset afib with RVR and pulmonary edema. However CXR and CT also showing cardiomegaly with LA enlargement.   - TTE as above, EF not estimated  - can d/c lasix today given no heart failure, not clinically fluid overloaded  - will likely need eventual stress test Resolved. Likely related to new onset afib with RVR and pulmonary edema. However CXR and CT also showing cardiomegaly with LA enlargement.   - TTE as above, EF not estimated  - can change lasix to PO today given no heart failure, not clinically fluid overloaded  - will likely need eventual stress test

## 2018-04-16 NOTE — DIETITIAN INITIAL EVALUATION ADULT. - NS AS NUTRI INTERV MEALS SNACK
Continue DASH/TLC diet. Will provide health shakes with meals. Monitor po intake, GI tolerance, weight and lab values. RD to remain available for further nutritional interventions as indicated.

## 2018-04-16 NOTE — DIETITIAN INITIAL EVALUATION ADULT. - PROBLEM SELECTOR PLAN 5
Pt reports taking Prevacid for heartburn as outpatient. 12/2016 endoscopy showed hiatal hernia with biopsied lesion, reportedly benign.  -start protonix 40 qam.

## 2018-04-16 NOTE — DIETITIAN INITIAL EVALUATION ADULT. - SOURCE
patient/Pt's preferred language is Pashto, pt declined translation services and requested that her son translate, Comprehensive review of medical records/family/significant other

## 2018-04-16 NOTE — PROGRESS NOTE ADULT - PROBLEM SELECTOR PLAN 1
Afib ongoing with 's-140's, requiring PRN metoprolol IV 5 mg Q6h  - c/w digoxin daily  - c/w lopressor 100 mg BID, given HR still elevated, with IV metoprolol PRN for HR>120  - as HR refractory to rate controlling medications, plan to consult EP today to evaluate for other management options such as ablation.   - Low suspicion for infection. No history of thyroid, pulmonary, previous heart procedure or arrythmia. ACS ruled out. TSH wnl  - Cont w telemetry monitoring   - TTE demonstrated with moderate aortic stenosis, severely dilated LA, left-sided pleural effusion and a possible liver cyst  - continue AC w apixaban 5mg BID for non-valvular afib (CHADS-VASc score 3) Afib ongoing with 's-140's, requiring PRN metoprolol IV 5 mg Q6h  - c/w digoxin daily  - c/w lopressor 100 mg BID, given HR still elevated, with IV metoprolol PRN for HR>120  - as HR refractory to rate controlling medications, plan to consult EP today to evaluate for other management options such as ablation.   - Low suspicion for infection. No history of thyroid, pulmonary, previous heart procedure or arrythmia. ACS ruled out. TSH wnl  - Cont w telemetry monitoring   - TTE demonstrated with moderate aortic stenosis, severely dilated LA, left-sided pleural effusion and a possible liver cyst  - continue AC w apixaban 5mg BID for non-valvular afib (CHADS-VASc score 3)  - cardiology reccs appreciated, provided pt afebrile and continues to have uncontrolled A fib with RVR, tentative plan for SERENA with cardioversion as early as tomorrow. Will keep NPO after midnight Afib ongoing with 's-150's, requiring PRN metoprolol IV 5 mg Q6h  - c/w digoxin daily  - c/w lopressor 100 mg BID, given HR still elevated, with IV metoprolol PRN for HR>120  - Low suspicion for infection. No history of thyroid, pulmonary, previous heart procedure or arrythmia. ACS ruled out. TSH wnl  - TTE demonstrated with moderate aortic stenosis, severely dilated LA, left-sided pleural effusion and a possible liver cyst  - continue AC w apixaban 5mg BID for non-valvular afib (CHADS-VASc score 3)  - cardiology reccs appreciated, provided pt afebrile and continues to have uncontrolled A fib with RVR, tentative plan for SERENA with cardioversion as early as tomorrow. Will keep NPO after midnight

## 2018-04-16 NOTE — DIETITIAN INITIAL EVALUATION ADULT. - ADHERENCE
Pt consumes a pretty healthy diet, prepares all meals and only uses a little salt. Pt's son reports pt consumes a pretty healthy diet, prepares all meals and only uses a little salt.

## 2018-04-16 NOTE — PROGRESS NOTE ADULT - ATTENDING COMMENTS
86 year old woman with history of hypertension, otherwise no cardiac symptoms or findings recently complain of feeling short of breath. Saw internist and recognized new onset atrial fibrillation, referred to ER. Atrial fibrillation is persistent, likely for few days prior to admission and remains difficult to control rate. Just increased metoprolol dose. Already started on anticoagulation with DOAC. Cardiac echo with moderate aortic stenosis which at this time does not affect management.    Observing on increased dose of metoprolol along with digoxin. Yesterday febrile and question if fever and its cause driving rapid heart rates and reason for difficult rate control. Once this issue resolved will consider merits of transesophageal guided cardioversion.

## 2018-04-16 NOTE — DIETITIAN INITIAL EVALUATION ADULT. - PROBLEM SELECTOR PLAN 2
Pt endorses 2 days of shortness of breath with single episode about one month ago, intermittent, likely related to new onset afib with RVR and pulmonary edema. However CXR and CT also showing cardiomegaly with LA enlargement. Will obtain TTE to assess for congestive heart failure.  -TTE  -s/p Lasix 40mg IVP x 1.  -breathing comfortably at this time. Will reevaluate need for additional Lasix in AM. Mildly volume overloaded with 1+ LE edema and few basilar crackles.  -wean NC to room air as tolerated.  -start Lasix 40 IVP daily for now.  -will likely need eventual stress test.

## 2018-04-16 NOTE — DIETITIAN INITIAL EVALUATION ADULT. - OTHER INFO
Nutrition assessment for length of stay. Pt's son reports pt's weight has been stable around 154 pounds PTA, noted admission wt of 154 pounds and pt is receiving Lasix with lowest standing wt of 147.7 pounds, fluid shifts likely involved. Pt's son reports fair po intake, noted % meal consumption per flow sheets. Pt's son reports pt consumes food in house and food from outside of hospital. Pt willing to try health shakes for meals when po intake is suboptimal. Pt's son reports making menu selections for pt. RD reviewed alternate menu options and menu ordering procedure. No N+V at this time. Last BM was today. Nutrition assessment for length of stay. Pt's son reports pt's weight has been stable around 154 pounds PTA, noted admission wt of 154 pounds and pt is receiving Lasix with lowest standing wt of 147.7 pounds, fluid shifts likely involved. Pt's son reports pt's po intake is fair-good, noted % meal consumption per flow sheets. Pt's son reports pt consumes food in house and food from outside of hospital. Pt willing to try health shakes for meals when po intake is suboptimal. Pt's son reports making menu selections for pt. RD reviewed alternate menu options and menu ordering procedure. No N+V at this time. Last BM was today. Pt with poor dentition, as per son- tolerating current diet texture and deny the need for softer texture as they select softer foods and make sure foods are cut up well. No swallowing difficulty reported. No known food allergies.

## 2018-04-16 NOTE — PROGRESS NOTE ADULT - ASSESSMENT
86F HTN, HLD, OA, gastritis, anxiety, BPPV, moderate AS with several days of SOB and found to be in Afib with RVR. Her rates remains significantly elevated despite being on metoprolol and digoxin. She had an elevated temperature of unclear etiology.    -cont apixaban 5mg BID  -cont metoprolol tartrate 100mg BID  -cont digoxin for now  -if her HR remains significantly elevated, we will consider SERENA/DCCV as early as tomorrow. tentatively keep her NPO at midnight. monitor fever curve

## 2018-04-16 NOTE — DIETITIAN INITIAL EVALUATION ADULT. - PROBLEM SELECTOR PLAN 9
DVT ppx: on Lovenox now, will transition to NOAC apixiban.    Cj Sims  Medicine night admit, PGY-2  Pager [phone] / [phone]

## 2018-04-16 NOTE — PROGRESS NOTE ADULT - SUBJECTIVE AND OBJECTIVE BOX
Patient is a 86y old  Female who presents with a chief complaint of new atrial fibrillation with RVR, shortness of breath (10 Apr 2018 19:39)       INTERVAL HPI/OVERNIGHT EVENTS: Patient remains in A. fib with RVR with HR ranging from 100's-150's overnight, with occasional bursts to 170s. Pt asymptomatic- no chest pain, no shortness of breath, no palpitations. Pt just complaining of not getting enough sleep. She is making urine frequently and had a BM overnight.     MEDICATIONS  (STANDING):  apixaban 5 milliGRAM(s) Oral every 12 hours  digoxin     Tablet 0.125 milliGRAM(s) Oral daily  docusate sodium 100 milliGRAM(s) Oral three times a day  dorzolamide 2%/timolol 0.5% Ophthalmic Solution 1 Drop(s) Both EYES two times a day  furosemide   Injectable 40 milliGRAM(s) IV Push daily  latanoprost 0.005% Ophthalmic Solution 1 Drop(s) Both EYES at bedtime  melatonin 3 milliGRAM(s) Oral at bedtime  metoprolol tartrate 100 milliGRAM(s) Oral two times a day  multivitamin 1 Tablet(s) Oral daily  pantoprazole    Tablet 40 milliGRAM(s) Oral before breakfast  polyethylene glycol 3350 17 Gram(s) Oral daily  potassium chloride    Tablet ER 20 milliEquivalent(s) Oral every 2 hours  senna 2 Tablet(s) Oral at bedtime    MEDICATIONS  (PRN):  acetaminophen   Tablet 650 milliGRAM(s) Oral every 6 hours PRN For Temp greater than 38 C (100.4 F)  acetaminophen   Tablet. 650 milliGRAM(s) Oral every 6 hours PRN mild and moderate pain  metoprolol tartrate Injectable 5 milliGRAM(s) IV Push every 6 hours PRN A. fib with RVR with HR>120      Allergies    No Known Allergies    Intolerances            Vital Signs Last 24 Hrs  T(C): 36.8 (16 Apr 2018 04:48), Max: 37.9 (15 Apr 2018 17:04)  T(F): 98.2 (16 Apr 2018 04:48), Max: 100.3 (15 Apr 2018 17:04)  HR: 95 (16 Apr 2018 04:48) (74 - 157)  BP: 144/85 (16 Apr 2018 04:48) (106/68 - 144/85)  BP(mean): --  RR: 18 (16 Apr 2018 04:48) (18 - 18)  SpO2: 99% (16 Apr 2018 04:48) (95% - 99%)    PHYSICAL EXAM:  GENERAL: NAD, well-developed  HEAD:  Atraumatic, Normocephalic  EYES: EOMI, PERRLA, conjunctiva and sclera clear  NECK: Supple, No JVD  CHEST/LUNG: faint crackles at lung bases.   HEART: Regular rate and rhythm; No murmurs, rubs, or gallops  ABDOMEN: Soft, Nontender, Nondistended; Bowel sounds present  EXTREMITIES: 2+ Peripheral Pulses, No clubbing, cyanosis, or edema  PSYCH: AAOx3  NEUROLOGY: non-focal  SKIN: No rashes or lesions    LABS:                        14.1   7.84  )-----------( 344      ( 16 Apr 2018 07:33 )             42.3     16 Apr 2018 06:25    137    |  97     |  22     ----------------------------<  136    3.4     |  26     |  0.79     Ca    9.4        16 Apr 2018 06:25  Phos  3.4       16 Apr 2018 06:25  Mg     2.1       16 Apr 2018 06:25        CAPILLARY BLOOD GLUCOSE        BLOOD CULTURE    RADIOLOGY & ADDITIONAL TESTS:    Imaging Personally Reviewed:  [X ] YES     Consultant(s) Notes Reviewed:  Cardiology    Care Discussed with Consultants/Other Providers:

## 2018-04-16 NOTE — PROGRESS NOTE ADULT - SUBJECTIVE AND OBJECTIVE BOX
Cardiology Progress Note  my pager [phone]    Chief Complaint:  Afib with RVR    Interval Events:  HR remains elevated.  elevated temperature to 100.3 yesterday afternoon  No palpitations, chest pain, or SOB at this time. No orthopnea.    MEDICATIONS:  apixaban 5 milliGRAM(s) Oral every 12 hours  digoxin     Tablet 0.125 milliGRAM(s) Oral daily  furosemide   Injectable 40 milliGRAM(s) IV Push daily  metoprolol tartrate 100 milliGRAM(s) Oral two times a day  metoprolol tartrate Injectable 5 milliGRAM(s) IV Push every 6 hours PRN  acetaminophen   Tablet 650 milliGRAM(s) Oral every 6 hours PRN  acetaminophen   Tablet. 650 milliGRAM(s) Oral every 6 hours PRN  melatonin 3 milliGRAM(s) Oral at bedtime  docusate sodium 100 milliGRAM(s) Oral three times a day  pantoprazole    Tablet 40 milliGRAM(s) Oral before breakfast  polyethylene glycol 3350 17 Gram(s) Oral daily  senna 2 Tablet(s) Oral at bedtime    dorzolamide 2%/timolol 0.5% Ophthalmic Solution 1 Drop(s) Both EYES two times a day  latanoprost 0.005% Ophthalmic Solution 1 Drop(s) Both EYES at bedtime  multivitamin 1 Tablet(s) Oral daily  potassium chloride    Tablet ER 20 milliEquivalent(s) Oral every 2 hours    PHYSICAL EXAM:  T(C): 36.8 (04-16-18 @ 04:48), Max: 37.9 (04-15-18 @ 17:04)  HR: 95 (04-16-18 @ 04:48) (74 - 157)  BP: 144/85 (04-16-18 @ 04:48) (106/68 - 144/85)  RR: 18 (04-16-18 @ 04:48) (18 - 18)  SpO2: 99% (04-16-18 @ 04:48) (95% - 99%)  Wt(kg): --  I&O's Summary    15 Apr 2018 07:01  -  16 Apr 2018 07:00  --------------------------------------------------------  IN: 570 mL / OUT: 1000 mL / NET: -430 mL  Daily     Appearance: No distress	  HEENT:   mmm	  Cardiovascular: Normal S1 S2, irr irr, No JVD,No edema  Respiratory: Lungs clear to auscultation  Psychiatry: A & O x 3, Mood & affect appropriate  Gastrointestinal:  soft nt nd  Skin:  No cyanosis	  Neurologic: grossly nonfocal  Extremities:  No clubbing, cyanosis  Vascular: Peripheral pulses palpable bilaterally    LABS:	 	  CBC Full  -  ( 16 Apr 2018 07:33 )  WBC Count : 7.84 K/uL  Hemoglobin : 14.1 g/dL  Hematocrit : 42.3 %  Platelet Count - Automated : 344 K/uL    04-16  137  |  97  |  22  ----------------------------<  136<H>  3.4<L>   |  26  |  0.79    04-15  135  |  98  |  24<H>  ----------------------------<  141<H>  4.2   |  23  |  0.80    Ca    9.4      16 Apr 2018 06:25  Ca    9.5      15 Apr 2018 06:30  Phos  3.4     04-16  Phos  2.6     04-15  Mg     2.1     04-16  Mg     2.2     04-15    HgA1c: Hemoglobin A1C, Whole Blood: 5.9 % (04-11 @ 07:28)    TELEMETRY: 	afib 90-150s, typically in the 110s-120s

## 2018-04-17 LAB
ANION GAP SERPL CALC-SCNC: 17 MMOL/L — SIGNIFICANT CHANGE UP (ref 5–17)
APTT BLD: 31.1 SEC — SIGNIFICANT CHANGE UP (ref 27.5–37.4)
BUN SERPL-MCNC: 24 MG/DL — HIGH (ref 7–23)
CALCIUM SERPL-MCNC: 9.3 MG/DL — SIGNIFICANT CHANGE UP (ref 8.4–10.5)
CHLORIDE SERPL-SCNC: 96 MMOL/L — SIGNIFICANT CHANGE UP (ref 96–108)
CO2 SERPL-SCNC: 24 MMOL/L — SIGNIFICANT CHANGE UP (ref 22–31)
CREAT SERPL-MCNC: 0.84 MG/DL — SIGNIFICANT CHANGE UP (ref 0.5–1.3)
GLUCOSE SERPL-MCNC: 119 MG/DL — HIGH (ref 70–99)
HCT VFR BLD CALC: 43.1 % — SIGNIFICANT CHANGE UP (ref 34.5–45)
HGB BLD-MCNC: 13.7 G/DL — SIGNIFICANT CHANGE UP (ref 11.5–15.5)
INR BLD: 1.29 RATIO — HIGH (ref 0.88–1.16)
MAGNESIUM SERPL-MCNC: 2.2 MG/DL — SIGNIFICANT CHANGE UP (ref 1.6–2.6)
MCHC RBC-ENTMCNC: 28.8 PG — SIGNIFICANT CHANGE UP (ref 27–34)
MCHC RBC-ENTMCNC: 31.8 GM/DL — LOW (ref 32–36)
MCV RBC AUTO: 90.7 FL — SIGNIFICANT CHANGE UP (ref 80–100)
PHOSPHATE SERPL-MCNC: 3.2 MG/DL — SIGNIFICANT CHANGE UP (ref 2.5–4.5)
PLATELET # BLD AUTO: 315 K/UL — SIGNIFICANT CHANGE UP (ref 150–400)
POTASSIUM SERPL-MCNC: 3.6 MMOL/L — SIGNIFICANT CHANGE UP (ref 3.5–5.3)
POTASSIUM SERPL-SCNC: 3.6 MMOL/L — SIGNIFICANT CHANGE UP (ref 3.5–5.3)
PROTHROM AB SERPL-ACNC: 14.7 SEC — HIGH (ref 10–13.1)
RBC # BLD: 4.75 M/UL — SIGNIFICANT CHANGE UP (ref 3.8–5.2)
RBC # FLD: 13.4 % — SIGNIFICANT CHANGE UP (ref 10.3–14.5)
SODIUM SERPL-SCNC: 137 MMOL/L — SIGNIFICANT CHANGE UP (ref 135–145)
WBC # BLD: 8.07 K/UL — SIGNIFICANT CHANGE UP (ref 3.8–10.5)
WBC # FLD AUTO: 8.07 K/UL — SIGNIFICANT CHANGE UP (ref 3.8–10.5)

## 2018-04-17 PROCEDURE — 93010 ELECTROCARDIOGRAM REPORT: CPT

## 2018-04-17 PROCEDURE — 93325 DOPPLER ECHO COLOR FLOW MAPG: CPT | Mod: 26

## 2018-04-17 PROCEDURE — 93320 DOPPLER ECHO COMPLETE: CPT | Mod: 26

## 2018-04-17 PROCEDURE — 92960 CARDIOVERSION ELECTRIC EXT: CPT

## 2018-04-17 PROCEDURE — 99233 SBSQ HOSP IP/OBS HIGH 50: CPT | Mod: GC

## 2018-04-17 PROCEDURE — 76376 3D RENDER W/INTRP POSTPROCES: CPT | Mod: 26

## 2018-04-17 PROCEDURE — 93312 ECHO TRANSESOPHAGEAL: CPT | Mod: 26

## 2018-04-17 RX ADMIN — Medication 0.12 MILLIGRAM(S): at 05:45

## 2018-04-17 RX ADMIN — APIXABAN 5 MILLIGRAM(S): 2.5 TABLET, FILM COATED ORAL at 17:24

## 2018-04-17 RX ADMIN — Medication 100 MILLIGRAM(S): at 05:45

## 2018-04-17 RX ADMIN — Medication 40 MILLIGRAM(S): at 05:45

## 2018-04-17 RX ADMIN — Medication 100 MILLIGRAM(S): at 18:38

## 2018-04-17 RX ADMIN — DORZOLAMIDE HYDROCHLORIDE TIMOLOL MALEATE 1 DROP(S): 20; 5 SOLUTION/ DROPS OPHTHALMIC at 17:24

## 2018-04-17 RX ADMIN — Medication 650 MILLIGRAM(S): at 16:28

## 2018-04-17 RX ADMIN — Medication 650 MILLIGRAM(S): at 17:28

## 2018-04-17 RX ADMIN — Medication 100 MILLIGRAM(S): at 21:16

## 2018-04-17 RX ADMIN — PANTOPRAZOLE SODIUM 40 MILLIGRAM(S): 20 TABLET, DELAYED RELEASE ORAL at 05:45

## 2018-04-17 RX ADMIN — Medication 3 MILLIGRAM(S): at 21:15

## 2018-04-17 RX ADMIN — APIXABAN 5 MILLIGRAM(S): 2.5 TABLET, FILM COATED ORAL at 05:45

## 2018-04-17 RX ADMIN — LATANOPROST 1 DROP(S): 0.05 SOLUTION/ DROPS OPHTHALMIC; TOPICAL at 21:16

## 2018-04-17 RX ADMIN — SENNA PLUS 2 TABLET(S): 8.6 TABLET ORAL at 21:16

## 2018-04-17 RX ADMIN — DORZOLAMIDE HYDROCHLORIDE TIMOLOL MALEATE 1 DROP(S): 20; 5 SOLUTION/ DROPS OPHTHALMIC at 05:45

## 2018-04-17 NOTE — PROGRESS NOTE ADULT - PROBLEM SELECTOR PLAN 1
Afib ongoing but improved overnight with HR 80's-120's, requiring PRN metoprolol IV 5 mg Q6h  - c/w digoxin daily  - c/w lopressor 100 mg BID, given HR still elevated, with IV metoprolol PRN for HR>120  - Low suspicion for infection. No history of thyroid, pulmonary, previous heart procedure or arrythmia. ACS ruled out. TSH wnl  - TTE demonstrated with moderate aortic stenosis, severely dilated LA, left-sided pleural effusion and a possible liver cyst  - continue AC w apixaban 5mg BID for non-valvular afib (CHADS-VASc score 3)  - cardiology reccs appreciated, has been NPO for tentative plan for SERENA with cardioversion. Will follow up.

## 2018-04-17 NOTE — PROGRESS NOTE ADULT - PROBLEM SELECTOR PLAN 2
Resolved. Likely related to new onset afib with RVR and pulmonary edema.  - TTE as above, EF not estimated  - c/w PO lasix  - will likely need eventual stress test

## 2018-04-17 NOTE — PROVIDER CONTACT NOTE (OTHER) - ACTION/TREATMENT ORDERED:
MD Beauchamp aware, will continue to monitor pt on cardiac monitor.
MD Moreno aware, pt at SERENA. Will continue to monitor pt ans maintain safety.
Orders received to administer Zofran 4mg IVP x 1 dose.  Administer Dilitiazem 15mg IVP x 1 dose and continue PO Dilitazem (may be given 30min prior to the scheduled time). Orders followed as given.

## 2018-04-17 NOTE — PROVIDER CONTACT NOTE (OTHER) - ASSESSMENT
Pt is A&Ox4 denies chest pain, palpitations, dizziness, and SOB. -130s. HR briefly went up to 150s did not sustain pt asymptomatic.

## 2018-04-17 NOTE — PROVIDER CONTACT NOTE (OTHER) - SITUATION
A-fib with HR in 120's-160's
Pt has 4 beats of WCT before heading to SERENA. Pt asymptomatic VSS
pt had run of -170 and MEWS score of 7
Pt is s/p cardioversion when pt admitted to unit s/p cardioversion pt was in normal sinus rhythm. Pt has now converted back to a fib -130s

## 2018-04-17 NOTE — PROGRESS NOTE ADULT - ATTENDING COMMENTS
Patient seen and examined with the resident and team at bedside. , labs and  vitals reviewed. tele monitor strip personally reviewed.   Patient with dyspnea from A.fib with rvr and pulmonary edema, - HR is slightly better control   continue with Digoxin, Metoprolol - continue to monitor HR on telemetry.   Plan for TTE and cardioversion today, patient is NPO   Continue AC with Apixaban. Cardiology  note appreciated.

## 2018-04-17 NOTE — PROVIDER CONTACT NOTE (OTHER) - REASON
4 beats WCT
A-fib with HR in 120's-160's
pt had run of -170 and MEWS score of 7
Pt converted back to a fib s/p SREENA with cardioversion

## 2018-04-17 NOTE — PROGRESS NOTE ADULT - ASSESSMENT
85yo F with PMH of HTN, degenerative arthritis of the knee, chronic bilateral lower extremity pain, low Vit D, hypokalemia, urinary frequency, gastritis, anxiety attacks, HLD, dyspnea, BPPV presenting with two days of intermittent shortness of breath, admitted for new atrial fibrillation with RVR, previously on diltiazem gtt, now on lopressor BID with digoxin for HR control.

## 2018-04-17 NOTE — PROGRESS NOTE ADULT - SUBJECTIVE AND OBJECTIVE BOX
Patient is a 86y old  Female who presents with a chief complaint of new atrial fibrillation with RVR, shortness of breath (10 Apr 2018 19:39)       INTERVAL HPI/OVERNIGHT EVENTS: Overnight patient's HR was 80's-120's on telemetry, A. fib. Pt this morning states she has not gotten good sleep but otherwise denies chest pain, palpitations, or shortness of breath. She has some GERD symptoms but no nausea, vomiting, and she has had regular bowel movements.     MEDICATIONS  (STANDING):  apixaban 5 milliGRAM(s) Oral every 12 hours  digoxin     Tablet 0.125 milliGRAM(s) Oral daily  docusate sodium 100 milliGRAM(s) Oral three times a day  dorzolamide 2%/timolol 0.5% Ophthalmic Solution 1 Drop(s) Both EYES two times a day  furosemide    Tablet 40 milliGRAM(s) Oral daily  latanoprost 0.005% Ophthalmic Solution 1 Drop(s) Both EYES at bedtime  melatonin 3 milliGRAM(s) Oral at bedtime  metoprolol tartrate 100 milliGRAM(s) Oral two times a day  multivitamin 1 Tablet(s) Oral daily  pantoprazole    Tablet 40 milliGRAM(s) Oral before breakfast  polyethylene glycol 3350 17 Gram(s) Oral daily  senna 2 Tablet(s) Oral at bedtime    MEDICATIONS  (PRN):  acetaminophen   Tablet 650 milliGRAM(s) Oral every 6 hours PRN For Temp greater than 38 C (100.4 F)  acetaminophen   Tablet. 650 milliGRAM(s) Oral every 6 hours PRN mild and moderate pain  metoprolol tartrate Injectable 5 milliGRAM(s) IV Push every 6 hours PRN A. fib with RVR with HR>120      Allergies    No Known Allergies    Intolerances        Vital Signs Last 24 Hrs  T(C): 36.8 (17 Apr 2018 04:37), Max: 37.3 (16 Apr 2018 20:23)  T(F): 98.2 (17 Apr 2018 04:37), Max: 99.1 (16 Apr 2018 20:23)  HR: 106 (17 Apr 2018 04:37) (78 - 111)  BP: 135/68 (17 Apr 2018 04:37) (96/66 - 135/68)  BP(mean): --  RR: 18 (17 Apr 2018 04:37) (18 - 18)  SpO2: 97% (17 Apr 2018 04:37) (97% - 98%)    PHYSICAL EXAM:  GENERAL: NAD, well-developed  HEAD:  Atraumatic, Normocephalic  EYES: EOMI, PERRLA, conjunctiva and sclera clear  NECK: Supple, No JVD  CHEST/LUNG: clear to auscultation bilaterally   HEART: Regular rate and rhythm; No murmurs, rubs, or gallops  ABDOMEN: Soft, Nontender, Nondistended; Bowel sounds present  EXTREMITIES: 2+ Peripheral Pulses, No clubbing, cyanosis, or edema  PSYCH: AAOx3  NEUROLOGY: non-focal  SKIN: No rashes or lesions    LABS:                        13.7   8.07  )-----------( 315      ( 17 Apr 2018 07:42 )             43.1     17 Apr 2018 06:14    137    |  96     |  24     ----------------------------<  119    3.6     |  24     |  0.84     Ca    9.3        17 Apr 2018 06:14  Phos  3.2       17 Apr 2018 06:14  Mg     2.2       17 Apr 2018 06:14      PT/INR - ( 17 Apr 2018 07:41 )   PT: 14.7 sec;   INR: 1.29 ratio         PTT - ( 17 Apr 2018 07:41 )  PTT:31.1 sec  CAPILLARY BLOOD GLUCOSE        BLOOD CULTURE    RADIOLOGY & ADDITIONAL TESTS:    Imaging Personally Reviewed:  [ ] YES     Consultant(s) Notes Reviewed:  Cardiology 4/16    Care Discussed with Consultants/Other Providers:

## 2018-04-17 NOTE — PROGRESS NOTE ADULT - ASSESSMENT
86F HTN, HLD, OA, gastritis, anxiety, BPPV, moderate AS with several days of SOB and found to be in Afib with RVR. Her rates remains significantly elevated despite being on metoprolol and digoxin. She had an elevated temperature of unclear etiology, which has since resolved and there are no focal findings to suggest an infection.  -cont apixaban 5mg BID  -cont metoprolol tartrate 100mg BID  -cont digoxin for now  -plan for SERENA/DCCV today. maintain NPO status

## 2018-04-17 NOTE — PROGRESS NOTE ADULT - SUBJECTIVE AND OBJECTIVE BOX
Cardiology Progress Note  my pager [phone]    Chief Complaint:  ongoing atrial fibrillation    Interval Events:  c/o trouble sleeping and restlessness  otherwise no CP, SOB. not ambulating much    MEDICATIONS:  apixaban 5 milliGRAM(s) Oral every 12 hours  digoxin     Tablet 0.125 milliGRAM(s) Oral daily  furosemide    Tablet 40 milliGRAM(s) Oral daily  metoprolol tartrate 100 milliGRAM(s) Oral two times a day  metoprolol tartrate Injectable 5 milliGRAM(s) IV Push every 6 hours PRN  acetaminophen   Tablet 650 milliGRAM(s) Oral every 6 hours PRN  acetaminophen   Tablet. 650 milliGRAM(s) Oral every 6 hours PRN  melatonin 3 milliGRAM(s) Oral at bedtime  docusate sodium 100 milliGRAM(s) Oral three times a day  pantoprazole    Tablet 40 milliGRAM(s) Oral before breakfast  polyethylene glycol 3350 17 Gram(s) Oral daily  senna 2 Tablet(s) Oral at bedtime  dorzolamide 2%/timolol 0.5% Ophthalmic Solution 1 Drop(s) Both EYES two times a day  latanoprost 0.005% Ophthalmic Solution 1 Drop(s) Both EYES at bedtime  multivitamin 1 Tablet(s) Oral daily    PHYSICAL EXAM:  T(C): 36.8 (18 @ 04:37), Max: 37.3 (18 @ 20:23)  HR: 106 (18 @ 04:37) (78 - 111)  BP: 135/68 (18 @ 04:37) (96/66 - 135/68)  RR: 18 (18 @ 04:37) (18 - 18)  SpO2: 97% (18 @ 04:37) (97% - 98%)  Wt(kg): --  I&O's Summary    2018 07:01  -  2018 07:00  --------------------------------------------------------  IN: 820 mL / OUT: 1250 mL / NET: -430 mL    Daily Weight in k.9 (2018 15:28)    Appearance: No distress	  HEENT:   mmm	  Cardiovascular: Normal S1 S2, irr irr, 3/6 systolic murmur at the RUSB, No JVD, No edema  Respiratory: Lungs clear to auscultation  Psychiatry: A & O x 3, Mood & affect appropriate  Gastrointestinal:  soft nt nd  Skin:  No cyanosis	  Neurologic: grossly nonfocal  Extremities:  No clubbing, cyanosis  Vascular: Peripheral pulses palpable bilaterally    LABS:	 	  CBC Full  -  ( 2018 07:42 )  WBC Count : 8.07 K/uL  Hemoglobin : 13.7 g/dL  Hematocrit : 43.1 %  Platelet Count - Automated : 315 K/uL      137  |  96  |  24<H>  ----------------------------<  119<H>  3.6   |  24  |  0.84    -16  137  |  97  |  22  ----------------------------<  136<H>  3.4<L>   |  26  |  0.79    Ca    9.3      2018 06:14  Ca    9.4      2018 06:25  Phos  3.2     04-17  Phos  3.4     04-16  Mg     2.2     04-17  Mg     2.1     04-16    proBNP:   Lipid Profile:   HgA1c: Hemoglobin A1C, Whole Blood: 5.9 % ( @ 07:28)    TSH:     CARDIAC MARKERS:    TELEMETRY: 	afib 80-130s  	    PREVIOUS DIAGNOSTIC TESTING:    [ ] Echocardiogram:  [ ] Catheterization:  [ ] Stress Test: Cardiology Progress Note  my pager [phone]    Chief Complaint:  ongoing atrial fibrillation    Interval Events:  c/o trouble sleeping and restlessness  otherwise no CP, SOB. not ambulating much    ROS:  CONSTITUTIONAL: No weakness, fevers or chills  Eyes/ENT: No visual changes: No vertigo or throat pain  NECK: No pain or stiffness  Resp: No cough, wheezing, hemoptysis; No shortness of breath  Cardiovascular: No chest pain or palpitations  GI: No abdominal or epigastric pain. NO nausea, vomiting, or hematemesis: No diarrhea or constipation. No melena or hematochezia.    : No dysuria, frequent or hematuria.  Neuro: No numbness or weakness  Skin: No itching or rashes	    MEDICATIONS:  apixaban 5 milliGRAM(s) Oral every 12 hours  digoxin     Tablet 0.125 milliGRAM(s) Oral daily  furosemide    Tablet 40 milliGRAM(s) Oral daily  metoprolol tartrate 100 milliGRAM(s) Oral two times a day  metoprolol tartrate Injectable 5 milliGRAM(s) IV Push every 6 hours PRN  acetaminophen   Tablet 650 milliGRAM(s) Oral every 6 hours PRN  acetaminophen   Tablet. 650 milliGRAM(s) Oral every 6 hours PRN  melatonin 3 milliGRAM(s) Oral at bedtime  docusate sodium 100 milliGRAM(s) Oral three times a day  pantoprazole    Tablet 40 milliGRAM(s) Oral before breakfast  polyethylene glycol 3350 17 Gram(s) Oral daily  senna 2 Tablet(s) Oral at bedtime  dorzolamide 2%/timolol 0.5% Ophthalmic Solution 1 Drop(s) Both EYES two times a day  latanoprost 0.005% Ophthalmic Solution 1 Drop(s) Both EYES at bedtime  multivitamin 1 Tablet(s) Oral daily    PHYSICAL EXAM:  T(C): 36.8 (18 @ 04:37), Max: 37.3 (18 @ 20:23)  HR: 106 (18 @ 04:37) (78 - 111)  BP: 135/68 (18 @ 04:37) (96/66 - 135/68)  RR: 18 (18 @ 04:37) (18 - 18)  SpO2: 97% (18 @ 04:37) (97% - 98%)  Wt(kg): --  I&O's Summary    2018 07:01  -  2018 07:00  --------------------------------------------------------  IN: 820 mL / OUT: 1250 mL / NET: -430 mL    Daily Weight in k.9 (2018 15:28)    Appearance: No distress	  HEENT:   mmm	  Cardiovascular: Normal S1 S2, irr irr, 3/6 systolic murmur at the RUSB, No JVD, No edema  Respiratory: Lungs clear to auscultation  Psychiatry: A & O x 3, Mood & affect appropriate  Gastrointestinal:  soft nt nd  Skin:  No cyanosis	  Neurologic: grossly nonfocal  Extremities:  No clubbing, cyanosis  Vascular: Peripheral pulses palpable bilaterally    LABS:	 	  CBC Full  -  ( 2018 07:42 )  WBC Count : 8.07 K/uL  Hemoglobin : 13.7 g/dL  Hematocrit : 43.1 %  Platelet Count - Automated : 315 K/uL      137  |  96  |  24<H>  ----------------------------<  119<H>  3.6   |  24  |  0.84      137  |  97  |  22  ----------------------------<  136<H>  3.4<L>   |  26  |  0.79    Ca    9.3      2018 06:14  Ca    9.4      2018 06:25  Phos  3.2     04-17  Phos  3.4     04-16  Mg     2.2     04-17  Mg     2.1     04-16    proBNP:   Lipid Profile:   HgA1c: Hemoglobin A1C, Whole Blood: 5.9 % ( @ 07:28)    TSH:     CARDIAC MARKERS:    TELEMETRY: 	afib 80-130s  	    PREVIOUS DIAGNOSTIC TESTING:    [ ] Echocardiogram:  [ ] Catheterization:  [ ] Stress Test:

## 2018-04-17 NOTE — PROVIDER CONTACT NOTE (OTHER) - BACKGROUND
Admitting dx: A-Fib on eliquis, CHF- IV lasix.
Pt admitted for heart failure and new a fib.
dx: a fib, chf
Pt admitted for heart failure and new a fib.

## 2018-04-17 NOTE — PROGRESS NOTE ADULT - ATTENDING COMMENTS
86 year old woman with history of hypertension, otherwise no cardiac symptoms or findings recently complain of feeling short of breath. Saw internist and recognized new onset atrial fibrillation, referred to ER. Atrial fibrillation is persistent, likely for few days prior to admission and remains difficult to control rate. Just increased metoprolol dose. started on anticoagulation with DOAC. Cardiac echo with moderate aortic stenosis which at this time does not affect management.    Observing on increased dose of metoprolol along with digoxin and rates improved. Plan for transesophageal guided cardioversion today.

## 2018-04-18 ENCOUNTER — TRANSCRIPTION ENCOUNTER (OUTPATIENT)
Age: 83
End: 2018-04-18

## 2018-04-18 LAB
ANION GAP SERPL CALC-SCNC: 16 MMOL/L — SIGNIFICANT CHANGE UP (ref 5–17)
BUN SERPL-MCNC: 24 MG/DL — HIGH (ref 7–23)
CALCIUM SERPL-MCNC: 9.8 MG/DL — SIGNIFICANT CHANGE UP (ref 8.4–10.5)
CHLORIDE SERPL-SCNC: 96 MMOL/L — SIGNIFICANT CHANGE UP (ref 96–108)
CO2 SERPL-SCNC: 27 MMOL/L — SIGNIFICANT CHANGE UP (ref 22–31)
CREAT SERPL-MCNC: 0.87 MG/DL — SIGNIFICANT CHANGE UP (ref 0.5–1.3)
GLUCOSE SERPL-MCNC: 119 MG/DL — HIGH (ref 70–99)
MAGNESIUM SERPL-MCNC: 2 MG/DL — SIGNIFICANT CHANGE UP (ref 1.6–2.6)
POTASSIUM SERPL-MCNC: 3.2 MMOL/L — LOW (ref 3.5–5.3)
POTASSIUM SERPL-SCNC: 3.2 MMOL/L — LOW (ref 3.5–5.3)
SODIUM SERPL-SCNC: 139 MMOL/L — SIGNIFICANT CHANGE UP (ref 135–145)

## 2018-04-18 PROCEDURE — 99233 SBSQ HOSP IP/OBS HIGH 50: CPT | Mod: GC

## 2018-04-18 RX ORDER — POTASSIUM CHLORIDE 20 MEQ
1 PACKET (EA) ORAL
Qty: 0 | Refills: 0 | COMMUNITY

## 2018-04-18 RX ORDER — APIXABAN 2.5 MG/1
1 TABLET, FILM COATED ORAL
Qty: 60 | Refills: 0 | OUTPATIENT
Start: 2018-04-18 | End: 2018-05-17

## 2018-04-18 RX ORDER — DIGOXIN 250 MCG
1 TABLET ORAL
Qty: 30 | Refills: 0 | OUTPATIENT
Start: 2018-04-18 | End: 2018-05-17

## 2018-04-18 RX ORDER — DOCUSATE SODIUM 100 MG
1 CAPSULE ORAL
Qty: 90 | Refills: 0 | OUTPATIENT
Start: 2018-04-18 | End: 2018-05-17

## 2018-04-18 RX ORDER — POLYETHYLENE GLYCOL 3350 17 G/17G
17 POWDER, FOR SOLUTION ORAL
Qty: 119 | Refills: 0 | OUTPATIENT
Start: 2018-04-18 | End: 2018-04-24

## 2018-04-18 RX ORDER — AMIODARONE HYDROCHLORIDE 400 MG/1
400 TABLET ORAL
Qty: 0 | Refills: 0 | Status: DISCONTINUED | OUTPATIENT
Start: 2018-04-18 | End: 2018-04-19

## 2018-04-18 RX ORDER — CLONAZEPAM 1 MG
1 TABLET ORAL AT BEDTIME
Qty: 0 | Refills: 0 | Status: DISCONTINUED | OUTPATIENT
Start: 2018-04-18 | End: 2018-04-19

## 2018-04-18 RX ORDER — AMLODIPINE BESYLATE 2.5 MG/1
1 TABLET ORAL
Qty: 0 | Refills: 0 | COMMUNITY

## 2018-04-18 RX ORDER — SENNA PLUS 8.6 MG/1
2 TABLET ORAL
Qty: 60 | Refills: 0 | OUTPATIENT
Start: 2018-04-18 | End: 2018-05-17

## 2018-04-18 RX ORDER — PANTOPRAZOLE SODIUM 20 MG/1
1 TABLET, DELAYED RELEASE ORAL
Qty: 30 | Refills: 0 | OUTPATIENT
Start: 2018-04-18 | End: 2018-05-17

## 2018-04-18 RX ORDER — LANSOPRAZOLE 15 MG/1
1 CAPSULE, DELAYED RELEASE ORAL
Qty: 0 | Refills: 0 | COMMUNITY

## 2018-04-18 RX ORDER — POTASSIUM CHLORIDE 20 MEQ
40 PACKET (EA) ORAL EVERY 4 HOURS
Qty: 0 | Refills: 0 | Status: COMPLETED | OUTPATIENT
Start: 2018-04-18 | End: 2018-04-18

## 2018-04-18 RX ADMIN — DORZOLAMIDE HYDROCHLORIDE TIMOLOL MALEATE 1 DROP(S): 20; 5 SOLUTION/ DROPS OPHTHALMIC at 17:06

## 2018-04-18 RX ADMIN — AMIODARONE HYDROCHLORIDE 400 MILLIGRAM(S): 400 TABLET ORAL at 12:17

## 2018-04-18 RX ADMIN — Medication 0.12 MILLIGRAM(S): at 05:33

## 2018-04-18 RX ADMIN — Medication 100 MILLIGRAM(S): at 21:36

## 2018-04-18 RX ADMIN — APIXABAN 5 MILLIGRAM(S): 2.5 TABLET, FILM COATED ORAL at 17:06

## 2018-04-18 RX ADMIN — Medication 40 MILLIGRAM(S): at 05:33

## 2018-04-18 RX ADMIN — DORZOLAMIDE HYDROCHLORIDE TIMOLOL MALEATE 1 DROP(S): 20; 5 SOLUTION/ DROPS OPHTHALMIC at 05:33

## 2018-04-18 RX ADMIN — Medication 1 TABLET(S): at 12:17

## 2018-04-18 RX ADMIN — PANTOPRAZOLE SODIUM 40 MILLIGRAM(S): 20 TABLET, DELAYED RELEASE ORAL at 05:33

## 2018-04-18 RX ADMIN — Medication 100 MILLIGRAM(S): at 05:33

## 2018-04-18 RX ADMIN — Medication 40 MILLIEQUIVALENT(S): at 07:49

## 2018-04-18 RX ADMIN — AMIODARONE HYDROCHLORIDE 400 MILLIGRAM(S): 400 TABLET ORAL at 17:06

## 2018-04-18 RX ADMIN — Medication 40 MILLIEQUIVALENT(S): at 12:17

## 2018-04-18 RX ADMIN — LATANOPROST 1 DROP(S): 0.05 SOLUTION/ DROPS OPHTHALMIC; TOPICAL at 21:36

## 2018-04-18 RX ADMIN — Medication 100 MILLIGRAM(S): at 17:06

## 2018-04-18 RX ADMIN — APIXABAN 5 MILLIGRAM(S): 2.5 TABLET, FILM COATED ORAL at 05:33

## 2018-04-18 NOTE — DISCHARGE NOTE ADULT - ADDITIONAL INSTRUCTIONS
1) Please follow up with cardiology 1-2 weeks after discharge. Call [phone] to make an appointment with Dr. Pinedo or you can see another outpatient cardiologist. It is important that you see a heart doctor to review your Atrial fibrillation medications and discuss the next steps, in case another cardioversion is needed.  2) Please follow up with your outpatient PMD 1 week after discharge. Call to make an appointment. 1) Please follow up with cardiology 1 week after discharge (Dr. Willie Shook- [phone]. The cardiology office will call you to set up an appointment. It is important that you see a heart doctor to review your Atrial fibrillation medications and discuss the next steps, in case another cardioversion is needed.  2) Please follow up with your outpatient PMD 1 week after discharge. Call to make an appointment.

## 2018-04-18 NOTE — PROGRESS NOTE ADULT - SUBJECTIVE AND OBJECTIVE BOX
Patient is a 86y old  Female who presents with a chief complaint of new atrial fibrillation with RVR, shortness of breath (10 Apr 2018 19:39)       INTERVAL HPI/OVERNIGHT EVENTS: Patient had SERENA cardioversion yesterday afternoon. No acute events overnight. Telemetry shows ongoing A. fib rhythm with HR ranging fro 80's-120's overnight, occasionally spiking to 160's (around 4 am). Patient states she does not feel well. States she had some dyspnea last night and ongoing GERD symptoms. No vomiting.     MEDICATIONS  (STANDING):  apixaban 5 milliGRAM(s) Oral every 12 hours  digoxin     Tablet 0.125 milliGRAM(s) Oral daily  docusate sodium 100 milliGRAM(s) Oral three times a day  dorzolamide 2%/timolol 0.5% Ophthalmic Solution 1 Drop(s) Both EYES two times a day  furosemide    Tablet 40 milliGRAM(s) Oral daily  latanoprost 0.005% Ophthalmic Solution 1 Drop(s) Both EYES at bedtime  melatonin 3 milliGRAM(s) Oral at bedtime  metoprolol tartrate 100 milliGRAM(s) Oral two times a day  multivitamin 1 Tablet(s) Oral daily  pantoprazole    Tablet 40 milliGRAM(s) Oral before breakfast  polyethylene glycol 3350 17 Gram(s) Oral daily  potassium chloride    Tablet ER 40 milliEquivalent(s) Oral every 4 hours  senna 2 Tablet(s) Oral at bedtime    MEDICATIONS  (PRN):  acetaminophen   Tablet 650 milliGRAM(s) Oral every 6 hours PRN For Temp greater than 38 C (100.4 F)  acetaminophen   Tablet. 650 milliGRAM(s) Oral every 6 hours PRN mild and moderate pain  metoprolol tartrate Injectable 5 milliGRAM(s) IV Push every 6 hours PRN A. fib with RVR with HR>120      Allergies    No Known Allergies    Intolerances          Vital Signs Last 24 Hrs  T(C): 37 (18 Apr 2018 08:09), Max: 37 (17 Apr 2018 11:52)  T(F): 98.6 (18 Apr 2018 08:09), Max: 98.6 (17 Apr 2018 11:52)  HR: 74 (18 Apr 2018 08:09) (63 - 110)  BP: 110/68 (18 Apr 2018 08:09) (93/69 - 120/70)  BP(mean): --  RR: 18 (18 Apr 2018 08:09) (17 - 18)  SpO2: 96% (18 Apr 2018 08:09) (94% - 98%)    PHYSICAL EXAM:  GENERAL: NAD, well-developed  HEAD:  Atraumatic, Normocephalic  EYES: EOMI, PERRLA, conjunctiva and sclera clear  NECK: Supple, No JVD  CHEST/LUNG: clear to auscultation bilaterally   HEART: Regular rate and rhythm; No murmurs, rubs, or gallops  ABDOMEN: Soft, Nontender, Nondistended; Bowel sounds present  EXTREMITIES: 2+ Peripheral Pulses, No clubbing, cyanosis, or edema  PSYCH: AAOx3  NEUROLOGY: non-focal  SKIN: No rashes or lesions      LABS:    18 Apr 2018 06:40    139    |  96     |  24     ----------------------------<  119    3.2     |  27     |  0.87     Ca    9.8        18 Apr 2018 06:40  Mg     2.0       18 Apr 2018 06:40      PT/INR - ( 17 Apr 2018 07:41 )   PT: 14.7 sec;   INR: 1.29 ratio         PTT - ( 17 Apr 2018 07:41 )  PTT:31.1 sec  CAPILLARY BLOOD GLUCOSE        BLOOD CULTURE    RADIOLOGY & ADDITIONAL TESTS:    Imaging Personally Reviewed:  [X ] YES     Consultant(s) Notes Reviewed:  Cardiology 4/17    Care Discussed with Consultants/Other Providers:

## 2018-04-18 NOTE — PROGRESS NOTE ADULT - PROBLEM SELECTOR PLAN 1
- s/p SERENA with cardioversion on 4/16  - Afib ongoing with HR 80's-120's with occasional spike to 160's this AM  - c/w digoxin daily  - c/w lopressor 100 mg BID, given HR still elevated, with IV metoprolol PRN for HR>120  - Low suspicion for infection. No history of thyroid, pulmonary, previous heart procedure or arrythmia. ACS ruled out. TSH wnl  - TTE demonstrated with moderate aortic stenosis, severely dilated LA, left-sided pleural effusion and a possible liver cyst  - continue AC w apixaban 5mg BID for non-valvular afib (CHADS-VASc score 3)  - cardiology reccs appreciated, will follow up.

## 2018-04-18 NOTE — PROGRESS NOTE ADULT - ATTENDING COMMENTS
Patient seen and examined with the resident and team at bedside. , labs and  vitals reviewed. tele monitor strip personally reviewed, back in afib.   s/p SERENA and cardioversion--sinus then afib , plan to start with  amiodarone  load and monitor on tele.   continue with Digoxin, Metoprolol - continue to monitor HR on telemetry.   Continue AC with Apixaban.   Hypokalemia- supplement K    Cardiology  note appreciated.  d/w patient's son at bedside

## 2018-04-18 NOTE — DISCHARGE NOTE ADULT - PLAN OF CARE
Control heart rate and rhythm with metoprolol, digoxin, and amiodarone. You came to the hospital due to worsening shortness of breath, and you were found to have Atrial Fibrillation by your primary medical doctor. Atrial fibrillation is when your heart rhythm is irregular and your heart rate is very fast.  You had a SERENA (transesophageal echocardiogram, or an imaging study, to look at your heart and perform a cardioversion to restore normal rate and rhythm of your heart. Your heart is still in Atrial fibrillation and is beating fast, so you will need to continue all of your current medications that were started in the hospital. You are also being started on a new rhythm controlling medication called amiodarone.    Please take: metoprolol 100 mg twice daily. Take digoxin 0.125 mg daily. Take amiodarone 400 mg twice daily for the next 5 days. After that you will take amiodarone 200 mg ONCE daily. Continue to take pantoprazole tablet once daily at home. This is a proton pump inhibitor which protects your stomach lining. Follow up with your primary doctor if your gastric reflux/abdominal symptoms persist. You came to the hospital with shortness of breath, and your lungs were fluid overloaded when you initially came in. This was likely provoked by your Atrial fibrillation (your abnormal fast heart rate caused fluid buildup). You were treated with IV lasix (furosemide) which eliminates water through the kidneys and urine.     You should continue to take lasix 40 mg daily after discharge. We did not give your home blood pressure medication amlodipine 10 mg while you were in the hospital, because your blood pressure has been well controlled on its own.    Do not take amlodipine until you follow up with your primary care doctor for a repeat blood pressure check. Continue to take your eye drops for glaucoma, no changes have been made to these medications. You should take clonazepam, your home medication, only AS NEEDED at bedtime. This is a benzodiazepine medication which has risks in elderly patients, including dependence on this medication, as well as falls and altered mental status. You came to the hospital due to worsening shortness of breath, and you were found to have Atrial Fibrillation by your primary medical doctor. Atrial fibrillation is when your heart rhythm is irregular and your heart rate is very fast.  You had a SERENA (transesophageal echocardiogram, or an imaging study, to look at your heart and perform a cardioversion to restore normal rate and rhythm of your heart. Your heart is still in Atrial fibrillation and is beating fast, so you will need to continue all of your current medications that were started in the hospital. You are also being started on a new rhythm controlling medication called amiodarone.    Please take: metoprolol 100 mg twice daily. Take digoxin 0.125 mg daily. Take amiodarone 400 mg twice daily for the next 5 days. After that you will take amiodarone 200 mg ONCE daily.    Please see Dr. Willie Shook (cardiologist) for followup. His office will call you to set up a date and time for appointment. You should see a cardiologist within 1 week of discharge.

## 2018-04-18 NOTE — DISCHARGE NOTE ADULT - CARE PROVIDER_API CALL
Sonido Pinedo (MD), Cardiovascular Disease; Internal Medicine; Nuclear Cardiology  [address] [address]  Phone: [phone]  Fax: [phone] Willie Shook), Cardiovascular Disease; Nuclear Cardiology  [id number] [place] [address]  [address]  [place] [address]  Phone: [phone]  Fax: [phone]

## 2018-04-18 NOTE — DISCHARGE NOTE ADULT - PATIENT PORTAL LINK FT
You can access the [facility] Patient Portal, offered by [facility], by registering with the following website: [email or web address]

## 2018-04-18 NOTE — DISCHARGE NOTE ADULT - HOSPITAL COURSE
Pt is an 85yo F with PMH of HTN, degenerative arthritis of the knee, chronic bilateral lower extremity pain, low Vit D, hypokalemia, urinary frequency, gastritis, anxiety attacks, HLD, dyspnea, BPPV presenting with two days of intermittent shortness of breath. She was sent to the ED by PMD for new onset AFIB. Atrial fibrillation with RVR initially required cardizem drip, which was then weaned off, and pt was on lopressor BID, dose increased to 100 mg. As that was insufficient to control heart rate, digoxin load was given and maintenance dose added. Patient had moderate AS and severe dilation of the left atrium seen on echocardiogram, with EF not estimated. She had SERENA with cardioversion on 4/17 by cardiology, which treated rhythm only briefly, but patient went back into A. fib with tachycardia. On 4/18, amiodarone was initiated for better rhythm control.     Patient and son advised to follow up with cardiology, and her PMD, after discharge. Pt is an 87yo F with PMH of HTN, degenerative arthritis of the knee, chronic bilateral lower extremity pain, low Vit D, hypokalemia, urinary frequency, gastritis, anxiety attacks, HLD, dyspnea, BPPV presenting with two days of intermittent shortness of breath. She was sent to the ED by PMD for new onset AFIB. Atrial fibrillation with RVR initially required cardizem drip, which was then weaned off, and pt was on lopressor BID, dose increased to 100 mg. As that was insufficient to control heart rate, digoxin load was given and maintenance dose added. Patient had moderate AS and severe dilation of the left atrium seen on echocardiogram, with EF not estimated. She had SERENA with cardioversion on 4/17 by cardiology, which treated rhythm only briefly, but patient went back into A. fib with tachycardia. On 4/18, amiodarone load 400 mg BID (for goal total load of 4-5g was initiated for better rhythm control.     Patient and son advised to follow up with cardiology, and her PMD, after discharge.

## 2018-04-18 NOTE — DISCHARGE NOTE ADULT - CARE PLAN
Principal Discharge DX:	Atrial fibrillation with RVR  Goal:	Control heart rate and rhythm with metoprolol, digoxin, and amiodarone.  Assessment and plan of treatment:	You came to the hospital due to worsening shortness of breath, and you were found to have Atrial Fibrillation by your primary medical doctor. Atrial fibrillation is when your heart rhythm is irregular and your heart rate is very fast.  You had a SERENA (transesophageal echocardiogram, or an imaging study, to look at your heart and perform a cardioversion to restore normal rate and rhythm of your heart. Your heart is still in Atrial fibrillation and is beating fast, so you will need to continue all of your current medications that were started in the hospital. You are also being started on a new rhythm controlling medication called amiodarone.    Please take: metoprolol 100 mg twice daily. Take digoxin 0.125 mg daily. Take amiodarone 400 mg twice daily for the next 5 days. After that you will take amiodarone 200 mg ONCE daily.  Secondary Diagnosis:	GERD (gastroesophageal reflux disease)  Assessment and plan of treatment:	Continue to take pantoprazole tablet once daily at home. This is a proton pump inhibitor which protects your stomach lining. Follow up with your primary doctor if your gastric reflux/abdominal symptoms persist.  Secondary Diagnosis:	Shortness of breath  Assessment and plan of treatment:	You came to the hospital with shortness of breath, and your lungs were fluid overloaded when you initially came in. This was likely provoked by your Atrial fibrillation (your abnormal fast heart rate caused fluid buildup). You were treated with IV lasix (furosemide) which eliminates water through the kidneys and urine.     You should continue to take lasix 40 mg daily after discharge.  Secondary Diagnosis:	Hypertension  Assessment and plan of treatment:	We did not give your home blood pressure medication amlodipine 10 mg while you were in the hospital, because your blood pressure has been well controlled on its own.    Do not take amlodipine until you follow up with your primary care doctor for a repeat blood pressure check.  Secondary Diagnosis:	Glaucoma  Assessment and plan of treatment:	Continue to take your eye drops for glaucoma, no changes have been made to these medications.  Secondary Diagnosis:	Anxiety  Assessment and plan of treatment:	You should take clonazepam, your home medication, only AS NEEDED at bedtime. This is a benzodiazepine medication which has risks in elderly patients, including dependence on this medication, as well as falls and altered mental status. Principal Discharge DX:	Atrial fibrillation with RVR  Goal:	Control heart rate and rhythm with metoprolol, digoxin, and amiodarone.  Assessment and plan of treatment:	You came to the hospital due to worsening shortness of breath, and you were found to have Atrial Fibrillation by your primary medical doctor. Atrial fibrillation is when your heart rhythm is irregular and your heart rate is very fast.  You had a SERENA (transesophageal echocardiogram, or an imaging study, to look at your heart and perform a cardioversion to restore normal rate and rhythm of your heart. Your heart is still in Atrial fibrillation and is beating fast, so you will need to continue all of your current medications that were started in the hospital. You are also being started on a new rhythm controlling medication called amiodarone.    Please take: metoprolol 100 mg twice daily. Take digoxin 0.125 mg daily. Take amiodarone 400 mg twice daily for the next 5 days. After that you will take amiodarone 200 mg ONCE daily.    Please see Dr. Willie Shook (cardiologist) for followup. His office will call you to set up a date and time for appointment. You should see a cardiologist within 1 week of discharge.  Secondary Diagnosis:	GERD (gastroesophageal reflux disease)  Assessment and plan of treatment:	Continue to take pantoprazole tablet once daily at home. This is a proton pump inhibitor which protects your stomach lining. Follow up with your primary doctor if your gastric reflux/abdominal symptoms persist.  Secondary Diagnosis:	Shortness of breath  Assessment and plan of treatment:	You came to the hospital with shortness of breath, and your lungs were fluid overloaded when you initially came in. This was likely provoked by your Atrial fibrillation (your abnormal fast heart rate caused fluid buildup). You were treated with IV lasix (furosemide) which eliminates water through the kidneys and urine.     You should continue to take lasix 40 mg daily after discharge.  Secondary Diagnosis:	Hypertension  Assessment and plan of treatment:	We did not give your home blood pressure medication amlodipine 10 mg while you were in the hospital, because your blood pressure has been well controlled on its own.    Do not take amlodipine until you follow up with your primary care doctor for a repeat blood pressure check.  Secondary Diagnosis:	Glaucoma  Assessment and plan of treatment:	Continue to take your eye drops for glaucoma, no changes have been made to these medications.  Secondary Diagnosis:	Anxiety  Assessment and plan of treatment:	You should take clonazepam, your home medication, only AS NEEDED at bedtime. This is a benzodiazepine medication which has risks in elderly patients, including dependence on this medication, as well as falls and altered mental status.

## 2018-04-18 NOTE — DISCHARGE NOTE ADULT - MEDICATION SUMMARY - MEDICATIONS TO TAKE
I will START or STAY ON the medications listed below when I get home from the hospital:    digoxin 125 mcg (0.125 mg) oral tablet  -- 1 tab(s) by mouth once a day  -- Indication: For Atrial fibrillation with RVR    amiodarone 200 mg oral tablet  -- 2 tab(s) by mouth 2 times a day   -- Indication: For Atrial fibrillation with RVR (take until 4/24)    amiodarone 200 mg oral tablet  -- 1 tab(s) by mouth once a day   -- Avoid grapefruit and grapefruit juice while taking this medication.  Avoid prolonged or excessive exposure to direct and/or artificial sunlight while taking this medication.  Do not take this drug if you are pregnant.  It is very important that you take or use this exactly as directed.  Do not skip doses or discontinue unless directed by your doctor.  May cause drowsiness or dizziness.    -- Indication: For Atrial fibrillation with RVR (take after 4/24)    apixaban 5 mg oral tablet  -- 1 tab(s) by mouth every 12 hours  -- Indication: For Atrial fibrillation with RVR    clonazePAM 1 mg oral tablet  -- 1 tab(s) by mouth 3 times a day, As Needed  -- Indication: For Anxiety (only as needed)    metoprolol tartrate 100 mg oral tablet  -- 1 tab(s) by mouth 2 times a day  -- Indication: For Atrial fibrillation with RVR    furosemide 40 mg oral tablet  -- 1 tab(s) by mouth once a day  -- Indication: For Heart failure    senna oral tablet  -- 2 tab(s) by mouth once a day (at bedtime)  -- Indication: For Constipation    docusate sodium 100 mg oral capsule  -- 1 cap(s) by mouth 3 times a day  -- Indication: For Constipation    polyethylene glycol 3350 oral powder for reconstitution  -- 17 gram(s) by mouth once a day  -- Indication: For Constipation    Cosopt 2.23%-0.68% ophthalmic solution  -- 1 drop(s) to each affected eye 2 times a day  -- Indication: For Glaucoma eye drops    latanoprost 0.005% ophthalmic solution  -- 1 drop(s) to each affected eye once a day (in the evening)  -- Indication: For Glaucoma eye drops     pantoprazole 40 mg oral delayed release tablet  -- 1 tab(s) by mouth once a day (before a meal)  -- Indication: For GERD (gastroesophageal reflux disease)    Multiple Vitamins oral tablet  -- 1 tab(s) by mouth once a day  -- Indication: For vitamin supplement

## 2018-04-18 NOTE — PROGRESS NOTE ADULT - ASSESSMENT
86F w/ PMHx of HTN, HLD, OA, moderate AS who was admitted on 4/10 w/ SOB 2/2 AF w/ RVR. She had a SERENA/DCCV on 4/17 - converted back to AF overnight. Rates 80s -120s. The patient's symptoms have improved.     Plan:    1. new onset AF  -Given CHADS2-VASC:4, recent DCCV and SEC on SERENA needs to c/w a/c - make sure that patient's insurance covers Eliquis 5 bid (age 86, weight:69kg, Cr:0.87)  -Start Amiodarone 400 bid (load to 4-5g) - I discussed s/e with patient and son - will need Ophtho f/u, TFT, PFT f/u as outpatient  -c/w Metoprolol tartrate 100mg BID  -c/w Digoxin  -Monitor on Tele    General Cardiology will follow    Juan C (p): [phone]

## 2018-04-18 NOTE — DISCHARGE NOTE ADULT - CARE PROVIDERS DIRECT ADDRESSES
[email or web address]

## 2018-04-18 NOTE — PROGRESS NOTE ADULT - SUBJECTIVE AND OBJECTIVE BOX
Patient seen and examined at bedside.    Overnight Events:     REVIEW OF SYSTEMS:    CONSTITUTIONAL: No weakness, fevers or chills  EYES/ENT: No visual changes;  No dysphagia  NECK: No pain or stiffness  RESPIRATORY: No cough, wheezing, hemoptysis; No shortness of breath  CARDIOVASCULAR: No chest pain or palpitations; No lower extremity edema  GASTROINTESTINAL: No abdominal or epigastric pain. No nausea, vomiting, or hematemesis; No diarrhea or constipation. No melena or hematochezia.  BACK: No back pain  GENITOURINARY: No dysuria, frequency or hematuria  NEUROLOGICAL: No numbness or weakness  SKIN: No itching, burning, rashes, or lesions   All other review of systems is negative unless indicated above.            Current Meds:  acetaminophen   Tablet 650 milliGRAM(s) Oral every 6 hours PRN  acetaminophen   Tablet. 650 milliGRAM(s) Oral every 6 hours PRN  apixaban 5 milliGRAM(s) Oral every 12 hours  digoxin     Tablet 0.125 milliGRAM(s) Oral daily  docusate sodium 100 milliGRAM(s) Oral three times a day  dorzolamide 2%/timolol 0.5% Ophthalmic Solution 1 Drop(s) Both EYES two times a day  furosemide    Tablet 40 milliGRAM(s) Oral daily  latanoprost 0.005% Ophthalmic Solution 1 Drop(s) Both EYES at bedtime  melatonin 3 milliGRAM(s) Oral at bedtime  metoprolol tartrate 100 milliGRAM(s) Oral two times a day  metoprolol tartrate Injectable 5 milliGRAM(s) IV Push every 6 hours PRN  multivitamin 1 Tablet(s) Oral daily  pantoprazole    Tablet 40 milliGRAM(s) Oral before breakfast  polyethylene glycol 3350 17 Gram(s) Oral daily  potassium chloride    Tablet ER 40 milliEquivalent(s) Oral every 4 hours  senna 2 Tablet(s) Oral at bedtime      Vitals:  T(F): 98.6 (04-18), Max: 98.6 (04-17)  HR: 74 (04-18) (63 - 110)  BP: 110/68 (04-18) (93/69 - 120/70)  RR: 18 (04-18)  SpO2: 96% (04-18)  I&O's Summary    17 Apr 2018 07:01  -  18 Apr 2018 07:00  --------------------------------------------------------  IN: 320 mL / OUT: 900 mL / NET: -580 mL    18 Apr 2018 07:01  -  18 Apr 2018 10:46  --------------------------------------------------------  IN: 0 mL / OUT: 300 mL / NET: -300 mL        Physical Exam:  Appearance: No acute distress; well appearing  Eyes: PERRL, EOMI, pink conjunctiva  HENT: Normal oral mucosa  Cardiovascular: RRR, S1, S2, no murmurs, rubs, or gallops; no edema; no JVD  Respiratory: Clear to auscultation bilaterally  Gastrointestinal: soft, non-tender, non-distended with normal bowel sounds  Musculoskeletal: No clubbing; no joint deformity   Neurologic: Non-focal  Lymphatic: No lymphadenopathy  Psychiatry: AAOx3, mood & affect appropriate  Skin: No rashes, ecchymoses, or cyanosis                          13.7   8.07  )-----------( 315      ( 17 Apr 2018 07:42 )             43.1     04-18    139  |  96  |  24<H>  ----------------------------<  119<H>  3.2<L>   |  27  |  0.87    Ca    9.8      18 Apr 2018 06:40  Phos  3.2     04-17  Mg     2.0     04-18      PT/INR - ( 17 Apr 2018 07:41 )   PT: 14.7 sec;   INR: 1.29 ratio         PTT - ( 17 Apr 2018 07:41 )  PTT:31.1 sec              New ECG(s): Personally reviewed    Echo:    Stress Testing:     Cath:    New Imaging:    Interpretation of Telemetry: Patient seen and examined at bedside on 6 [place] with [place]divian-speaking son at bedside translating per patient request    Overnight Events: The patient went back into AF    REVIEW OF SYSTEMS:  CONSTITUTIONAL: No weakness, fevers or chills, she reports that she is not sleeping well and that she feels tired  EYES/ENT: No visual changes;  No dysphagia  NECK: No pain or stiffness  RESPIRATORY: No cough, wheezing, hemoptysis; No shortness of breath  CARDIOVASCULAR: No chest pain or palpitations; No lower extremity edema  GASTROINTESTINAL: No abdominal or epigastric pain. No nausea, vomiting, or hematemesis; No diarrhea or constipation. No melena or hematochezia.  BACK: No back pain  GENITOURINARY: No dysuria, frequency or hematuria  NEUROLOGICAL: No numbness or weakness  SKIN: No itching, burning, rashes, or lesions   All other review of systems is negative unless indicated above.            Current Meds:  acetaminophen   Tablet 650 milliGRAM(s) Oral every 6 hours PRN  acetaminophen   Tablet. 650 milliGRAM(s) Oral every 6 hours PRN  apixaban 5 milliGRAM(s) Oral every 12 hours  digoxin     Tablet 0.125 milliGRAM(s) Oral daily  docusate sodium 100 milliGRAM(s) Oral three times a day  dorzolamide 2%/timolol 0.5% Ophthalmic Solution 1 Drop(s) Both EYES two times a day  furosemide    Tablet 40 milliGRAM(s) Oral daily  latanoprost 0.005% Ophthalmic Solution 1 Drop(s) Both EYES at bedtime  melatonin 3 milliGRAM(s) Oral at bedtime  metoprolol tartrate 100 milliGRAM(s) Oral two times a day  metoprolol tartrate Injectable 5 milliGRAM(s) IV Push every 6 hours PRN  multivitamin 1 Tablet(s) Oral daily  pantoprazole    Tablet 40 milliGRAM(s) Oral before breakfast  polyethylene glycol 3350 17 Gram(s) Oral daily  potassium chloride    Tablet ER 40 milliEquivalent(s) Oral every 4 hours  senna 2 Tablet(s) Oral at bedtime    Vitals:  T(F): 98.6 (04-18), Max: 98.6 (04-17)  HR: 74 (04-18) (63 - 110)  BP: 110/68 (04-18) (93/69 - 120/70)  RR: 18 (04-18)  SpO2: 96% on RA    I&O's Summary    17 Apr 2018 07:01  -  18 Apr 2018 07:00  --------------------------------------------------------  IN: 320 mL / OUT: 900 mL / NET: -580 mL    18 Apr 2018 07:01  -  18 Apr 2018 10:46  --------------------------------------------------------  IN: 0 mL / OUT: 300 mL / NET: -300 mL    Physical Exam:  Appearance: No acute distress; well appearing, sitting up, breathing comfortably on RA  Eyes: PERRL, EOMI, pink conjunctiva  HENT: Normal oral mucosa  Cardiovascular: ireg, ireg, low grade tachy, 2/6 AS murmur, no rubs, or gallops; no edema; no JVD  Respiratory: Clear to auscultation bilaterally  Gastrointestinal: soft, non-tender, non-distended with normal bowel sounds  Musculoskeletal: No clubbing; no joint deformity   Neurologic: Non-focal  Lymphatic: No lymphadenopathy  Psychiatry: AAOx3, mood & affect appropriate  Skin: No rashes, ecchymoses, or cyanosis      04-18    139  |  96  |  24<H>  ----------------------------<  119<H>  3.2<L>   |  27  |  0.87 - Cr stable    Ca    9.8      18 Apr 2018 06:40  Phos  3.2     04-17  Mg     2.0     04-18     Thyroid Stimulating Hormone, Serum: 0.81 uIU/mL    Echo:  < from: Transthoracic Echocardiogram (04.13.18 @ 13:18) > Left Ventricle: Normal left ventricular systolic function. Normal left ventricular internal dimensions and wall thicknesses. Normal diastolic function Right Heart: Normal right atrium. Normal right ventricular size and function. Normal tricuspid valve. Mild tricuspid regurgitation. Normal pulmonic valve.  Conclusions: 1. Calcified trileaflet aortic valve with decreased opening. Peak transaortic valve gradient equals 20 mm Hg, mean transaortic valve gradient equals 11 mm Hg, estimated aortic valve area equals 1.1 sqcm (by continuity equation), aortic valve velocity time integral equals 34 cm, consistent with moderate aortic stenosis. 2. Severely dilated left atrium.  LA volume index = 56 cc/m2. 3. Left pleural effusion. 4. Echo-free space is noted in the liver consistent with cyst, however, dedicated imaging recommended for further evaluation if clinically indicated. *** No previous Echo exam. Echocardiogram performed in the setting of atrial fibrillation with rapid ventricular response.  < end of copied text >    < from: SERENA w/o TTE (w/3D Echo) (04.17.18 @ 16:05) > Conclusions: 1. Severe left atrial enlargement.  Spontaneous echo contrast seen with decreased YUDITH velocities (velocity= (34) cm/s).  No left atrial or left atrial appendage thrombus. Lipomatous hypertrophy of interatrial septum. 2. Agitated saline injection and colorflow Doppler demonstrates no evidence of a patent foramen ovale.  < end of copied text >    Interpretation of Telemetry: AF 80s - 120s

## 2018-04-18 NOTE — PROGRESS NOTE ADULT - ATTENDING COMMENTS
86 year old woman with history of hypertension, otherwise no cardiac symptoms or findings recently complain of feeling short of breath. Saw internist and recognized new onset atrial fibrillation, referred to ER. Atrial fibrillation is persistent, likely for few days prior to admission. Started on anticoagulation with DOAC. Cardiac echo with moderate aortic stenosis which at this time does not affect management.    Observing on increased dose of metoprolol along with digoxin and rates improved. Had transesophageal guided cardioversion yesterday and restored sinus rhythm, but few hours later reverted to atrial fibrillation. Thus will now load amiodarone. If rates are controlled can probably be discharged tomorrow for outpatient cardiology follow up and consideration for repeat cardioversion after completion of amiodarone load.

## 2018-04-18 NOTE — PROGRESS NOTE ADULT - ASSESSMENT
85yo F with PMH of HTN, degenerative arthritis of the knee, chronic bilateral lower extremity pain, low Vit D, hypokalemia, urinary frequency, gastritis, anxiety attacks, HLD, dyspnea, BPPV presenting with two days of intermittent shortness of breath, admitted for new atrial fibrillation with RVR, on lopressor BID with digoxin for HR control, s/p SERENA with cardioversion on 4/17, with ongoing tachycardia.

## 2018-04-18 NOTE — PROGRESS NOTE ADULT - PROBLEM SELECTOR PLAN 2
Likely related to new onset afib with RVR.  - TTE as above, EF not estimated  - c/w PO lasix  - will likely need eventual stress test

## 2018-04-19 VITALS
DIASTOLIC BLOOD PRESSURE: 62 MMHG | TEMPERATURE: 99 F | OXYGEN SATURATION: 97 % | SYSTOLIC BLOOD PRESSURE: 101 MMHG | HEART RATE: 87 BPM | RESPIRATION RATE: 18 BRPM

## 2018-04-19 PROCEDURE — 99239 HOSP IP/OBS DSCHRG MGMT >30: CPT

## 2018-04-19 PROCEDURE — 93005 ELECTROCARDIOGRAM TRACING: CPT

## 2018-04-19 PROCEDURE — 96374 THER/PROPH/DIAG INJ IV PUSH: CPT

## 2018-04-19 PROCEDURE — 97116 GAIT TRAINING THERAPY: CPT

## 2018-04-19 PROCEDURE — 83735 ASSAY OF MAGNESIUM: CPT

## 2018-04-19 PROCEDURE — 99291 CRITICAL CARE FIRST HOUR: CPT | Mod: 25

## 2018-04-19 PROCEDURE — 71275 CT ANGIOGRAPHY CHEST: CPT

## 2018-04-19 PROCEDURE — 93306 TTE W/DOPPLER COMPLETE: CPT

## 2018-04-19 PROCEDURE — 81003 URINALYSIS AUTO W/O SCOPE: CPT

## 2018-04-19 PROCEDURE — 76376 3D RENDER W/INTRP POSTPROCES: CPT

## 2018-04-19 PROCEDURE — 84443 ASSAY THYROID STIM HORMONE: CPT

## 2018-04-19 PROCEDURE — 84100 ASSAY OF PHOSPHORUS: CPT

## 2018-04-19 PROCEDURE — 93320 DOPPLER ECHO COMPLETE: CPT

## 2018-04-19 PROCEDURE — 99233 SBSQ HOSP IP/OBS HIGH 50: CPT | Mod: GC

## 2018-04-19 PROCEDURE — 97161 PT EVAL LOW COMPLEX 20 MIN: CPT

## 2018-04-19 PROCEDURE — 85730 THROMBOPLASTIN TIME PARTIAL: CPT

## 2018-04-19 PROCEDURE — 96372 THER/PROPH/DIAG INJ SC/IM: CPT | Mod: XU

## 2018-04-19 PROCEDURE — 80053 COMPREHEN METABOLIC PANEL: CPT

## 2018-04-19 PROCEDURE — 96376 TX/PRO/DX INJ SAME DRUG ADON: CPT

## 2018-04-19 PROCEDURE — 84484 ASSAY OF TROPONIN QUANT: CPT

## 2018-04-19 PROCEDURE — 82550 ASSAY OF CK (CPK): CPT

## 2018-04-19 PROCEDURE — 71045 X-RAY EXAM CHEST 1 VIEW: CPT

## 2018-04-19 PROCEDURE — 82553 CREATINE MB FRACTION: CPT

## 2018-04-19 PROCEDURE — 97530 THERAPEUTIC ACTIVITIES: CPT

## 2018-04-19 PROCEDURE — 85610 PROTHROMBIN TIME: CPT

## 2018-04-19 PROCEDURE — 96375 TX/PRO/DX INJ NEW DRUG ADDON: CPT

## 2018-04-19 PROCEDURE — 80162 ASSAY OF DIGOXIN TOTAL: CPT

## 2018-04-19 PROCEDURE — 80048 BASIC METABOLIC PNL TOTAL CA: CPT

## 2018-04-19 PROCEDURE — 83036 HEMOGLOBIN GLYCOSYLATED A1C: CPT

## 2018-04-19 PROCEDURE — 85027 COMPLETE CBC AUTOMATED: CPT

## 2018-04-19 PROCEDURE — 93312 ECHO TRANSESOPHAGEAL: CPT

## 2018-04-19 PROCEDURE — 93325 DOPPLER ECHO COLOR FLOW MAPG: CPT

## 2018-04-19 PROCEDURE — 83880 ASSAY OF NATRIURETIC PEPTIDE: CPT

## 2018-04-19 RX ORDER — AMIODARONE HYDROCHLORIDE 400 MG/1
2 TABLET ORAL
Qty: 20 | Refills: 0 | OUTPATIENT
Start: 2018-04-19 | End: 2018-04-23

## 2018-04-19 RX ORDER — FUROSEMIDE 40 MG
1 TABLET ORAL
Qty: 30 | Refills: 0 | OUTPATIENT
Start: 2018-04-19 | End: 2018-05-18

## 2018-04-19 RX ORDER — AMIODARONE HYDROCHLORIDE 400 MG/1
1 TABLET ORAL
Qty: 14 | Refills: 0 | OUTPATIENT
Start: 2018-04-19 | End: 2018-05-02

## 2018-04-19 RX ORDER — METOPROLOL TARTRATE 50 MG
1 TABLET ORAL
Qty: 60 | Refills: 0 | OUTPATIENT
Start: 2018-04-19 | End: 2018-05-18

## 2018-04-19 RX ADMIN — POLYETHYLENE GLYCOL 3350 17 GRAM(S): 17 POWDER, FOR SOLUTION ORAL at 12:42

## 2018-04-19 RX ADMIN — Medication 100 MILLIGRAM(S): at 12:42

## 2018-04-19 RX ADMIN — Medication 100 MILLIGRAM(S): at 05:34

## 2018-04-19 RX ADMIN — Medication 0.12 MILLIGRAM(S): at 05:34

## 2018-04-19 RX ADMIN — Medication 1 TABLET(S): at 12:42

## 2018-04-19 RX ADMIN — PANTOPRAZOLE SODIUM 40 MILLIGRAM(S): 20 TABLET, DELAYED RELEASE ORAL at 05:34

## 2018-04-19 RX ADMIN — Medication 100 MILLIGRAM(S): at 17:09

## 2018-04-19 RX ADMIN — APIXABAN 5 MILLIGRAM(S): 2.5 TABLET, FILM COATED ORAL at 05:34

## 2018-04-19 RX ADMIN — AMIODARONE HYDROCHLORIDE 400 MILLIGRAM(S): 400 TABLET ORAL at 05:34

## 2018-04-19 RX ADMIN — DORZOLAMIDE HYDROCHLORIDE TIMOLOL MALEATE 1 DROP(S): 20; 5 SOLUTION/ DROPS OPHTHALMIC at 17:09

## 2018-04-19 RX ADMIN — Medication 40 MILLIGRAM(S): at 05:34

## 2018-04-19 RX ADMIN — DORZOLAMIDE HYDROCHLORIDE TIMOLOL MALEATE 1 DROP(S): 20; 5 SOLUTION/ DROPS OPHTHALMIC at 05:34

## 2018-04-19 RX ADMIN — APIXABAN 5 MILLIGRAM(S): 2.5 TABLET, FILM COATED ORAL at 17:09

## 2018-04-19 RX ADMIN — AMIODARONE HYDROCHLORIDE 400 MILLIGRAM(S): 400 TABLET ORAL at 17:09

## 2018-04-19 NOTE — PROGRESS NOTE ADULT - PROBLEM SELECTOR PROBLEM 2
Shortness of breath

## 2018-04-19 NOTE — PROGRESS NOTE ADULT - ATTENDING COMMENTS
Patient seen and examined by me withe resident and team. Agree with the resident findings assessment and plan as documented above.   She denied any chest pain or sob. lungs are clear. tele with afib. acute diastolic heart failure appears to be compensated. She is getting amiodarone load. After she finishes the course as an out patient she would follow up with card. continue Apixaban.   d/c planning.  discharge time 34 minutes.

## 2018-04-19 NOTE — PROGRESS NOTE ADULT - PROVIDER SPECIALTY LIST ADULT
Cardiology
Internal Medicine
Cardiology
Cardiology
Internal Medicine

## 2018-04-19 NOTE — PROGRESS NOTE ADULT - PROBLEM SELECTOR PLAN 5
Symptoms improved. Cont w Protonix QD  Continue with bowel regimen which may improve symptoms of abdominal discomfort as well, although pt reports regular BMs
Pt with belching and epigastric discomfort, cont w Protonix QD  Continue with bowel regimen which may improve symptoms of abdominal discomfort as well, although pt reports regular BMs
Pt with belching this AM, cont w Protonix QD  Continue with bowel regimen which may improve symptoms of abdominal discomfort as well.
Symptoms improved. Cont w Protonix QD  Continue with bowel regimen which may improve symptoms of abdominal discomfort as well, although pt reports regular BMs
No symptoms currently, cont w Protonix QD
Pt with belching this AM, cont w Protonix QD  Continue with bowel regimen which may improve symptoms of abdominal discomfort as well
No symptoms currently, cont w Protonix QD

## 2018-04-19 NOTE — PROGRESS NOTE ADULT - PROBLEM SELECTOR PROBLEM 6
Hypovitaminosis D

## 2018-04-19 NOTE — PROGRESS NOTE ADULT - PROBLEM SELECTOR PLAN 2
Resolved. Likely related to new onset afib with RVR.  - TTE as above, EF not estimated  - c/w PO lasix  - will likely need eventual stress test

## 2018-04-19 NOTE — PROGRESS NOTE ADULT - PROBLEM SELECTOR PROBLEM 3
Hypertension

## 2018-04-19 NOTE — PROGRESS NOTE ADULT - PROBLEM SELECTOR PLAN 7
No active mood symptoms. Will hold home clonazepam 1mg TID prn for now.
No active mood symptoms. Will hold home clonazepam 1mg TID prn for now.
No active mood symptoms. Due to pt difficulty sleeping for 1 week, clonazepam at bedtime PRN added.
No active mood symptoms. Will hold home clonazepam 1mg TID prn for now.

## 2018-04-19 NOTE — PROGRESS NOTE ADULT - SUBJECTIVE AND OBJECTIVE BOX
Patient is a 86y old  Female who presents with a chief complaint of new atrial fibrillation with RVR, shortness of breath (18 Apr 2018 14:58)       INTERVAL HPI/OVERNIGHT EVENTS: Telemetry overnight showed HR mostly 80's-100's, with brief spikes to 120's max HR. Patient feeling better overall, although continues to complain of poor sleep. Denies chest pain, palpitations, or shortness of breath. Started on amiodarone load 400 mg BID yesterday.     MEDICATIONS  (STANDING):  amiodarone    Tablet 400 milliGRAM(s) Oral two times a day  apixaban 5 milliGRAM(s) Oral every 12 hours  digoxin     Tablet 0.125 milliGRAM(s) Oral daily  docusate sodium 100 milliGRAM(s) Oral three times a day  dorzolamide 2%/timolol 0.5% Ophthalmic Solution 1 Drop(s) Both EYES two times a day  furosemide    Tablet 40 milliGRAM(s) Oral daily  latanoprost 0.005% Ophthalmic Solution 1 Drop(s) Both EYES at bedtime  melatonin 3 milliGRAM(s) Oral at bedtime  metoprolol tartrate 100 milliGRAM(s) Oral two times a day  multivitamin 1 Tablet(s) Oral daily  pantoprazole    Tablet 40 milliGRAM(s) Oral before breakfast  polyethylene glycol 3350 17 Gram(s) Oral daily  senna 2 Tablet(s) Oral at bedtime    MEDICATIONS  (PRN):  acetaminophen   Tablet 650 milliGRAM(s) Oral every 6 hours PRN For Temp greater than 38 C (100.4 F)  acetaminophen   Tablet. 650 milliGRAM(s) Oral every 6 hours PRN mild and moderate pain  clonazePAM Tablet 1 milliGRAM(s) Oral at bedtime PRN anxiety  metoprolol tartrate Injectable 5 milliGRAM(s) IV Push every 6 hours PRN A. fib with RVR with HR>120      Allergies    No Known Allergies    Intolerances          Vital Signs Last 24 Hrs  T(C): 36.7 (19 Apr 2018 04:32), Max: 37 (18 Apr 2018 08:09)  T(F): 98.1 (19 Apr 2018 04:32), Max: 98.6 (18 Apr 2018 08:09)  HR: 96 (19 Apr 2018 04:32) (63 - 103)  BP: 123/83 (19 Apr 2018 04:32) (104/70 - 123/83)  BP(mean): --  RR: 18 (19 Apr 2018 04:32) (18 - 18)  SpO2: 95% (19 Apr 2018 04:32) (95% - 96%)    PHYSICAL EXAM:  GENERAL: NAD, well-developed  HEAD:  Atraumatic, Normocephalic  EYES: EOMI, PERRLA, conjunctiva and sclera clear  NECK: Supple, No JVD  CHEST/LUNG: clear to auscultation bilaterally; no crackles.   HEART: Regular rate and rhythm; No murmurs, rubs, or gallops  ABDOMEN: Soft, Nontender, Nondistended; Bowel sounds present  EXTREMITIES: 2+ Peripheral Pulses, No clubbing, cyanosis, or edema  PSYCH: AAOx3  NEUROLOGY: non-focal  SKIN: No rashes or lesions    LABS:      Ca    9.8        18 Apr 2018 06:40      PT/INR - ( 17 Apr 2018 07:41 )   PT: 14.7 sec;   INR: 1.29 ratio         PTT - ( 17 Apr 2018 07:41 )  PTT:31.1 sec  CAPILLARY BLOOD GLUCOSE        BLOOD CULTURE    RADIOLOGY & ADDITIONAL TESTS:    Imaging Personally Reviewed:  [X ] YES     Consultant(s) Notes Reviewed:  Cardiology 4/18    Care Discussed with Consultants/Other Providers: Cardiology 4/18

## 2018-04-19 NOTE — PROGRESS NOTE ADULT - PROBLEM SELECTOR PLAN 6
Pt does not appear to have been prescribed Vit D supplementation in [employer]. Reports taking multivitamin as outpatient.   -c/w MVI.
Pt does not appear to have been prescribed Vit D supplementation in [employer]. Reports taking multivitamin as outpatient.   - c/w MVI
Pt does not appear to have been prescribed Vit D supplementation in [employer]. Reports taking multivitamin as outpatient.   -c/w MVI.
Pt does not appear to have been prescribed Vit D supplementation in [employer]. Reports taking multivitamin as outpatient.   -c/w MVI.
Pt does not appear to have been prescribed Vit D supplementation in [employer]. Reports taking multivitamin as outpatient.   - c/w MVI

## 2018-04-19 NOTE — PROGRESS NOTE ADULT - SUBJECTIVE AND OBJECTIVE BOX
Patient seen and examined at bedside on 6 [place], no family at bedside    Overnight Events: No events overnight    REVIEW OF SYSTEMS:    CONSTITUTIONAL: No weakness, fevers or chills  EYES/ENT: No visual changes;  No dysphagia  NECK: No pain or stiffness  RESPIRATORY: No cough, wheezing, hemoptysis; No shortness of breath  CARDIOVASCULAR: No chest pain or palpitations; No lower extremity edema  GASTROINTESTINAL: + constipation  BACK: No back pain  GENITOURINARY: No dysuria, frequency or hematuria  NEUROLOGICAL: No numbness or weakness  SKIN: No itching, burning, rashes, or lesions   All other review of systems is negative unless indicated above.            Current Meds:  acetaminophen   Tablet 650 milliGRAM(s) Oral every 6 hours PRN  acetaminophen   Tablet. 650 milliGRAM(s) Oral every 6 hours PRN  amiodarone    Tablet 400 milliGRAM(s) Oral two times a day  apixaban 5 milliGRAM(s) Oral every 12 hours  clonazePAM Tablet 1 milliGRAM(s) Oral at bedtime PRN  digoxin     Tablet 0.125 milliGRAM(s) Oral daily  docusate sodium 100 milliGRAM(s) Oral three times a day  dorzolamide 2%/timolol 0.5% Ophthalmic Solution 1 Drop(s) Both EYES two times a day  furosemide    Tablet 40 milliGRAM(s) Oral daily  latanoprost 0.005% Ophthalmic Solution 1 Drop(s) Both EYES at bedtime  melatonin 3 milliGRAM(s) Oral at bedtime  metoprolol tartrate 100 milliGRAM(s) Oral two times a day  metoprolol tartrate Injectable 5 milliGRAM(s) IV Push every 6 hours PRN  multivitamin 1 Tablet(s) Oral daily  pantoprazole    Tablet 40 milliGRAM(s) Oral before breakfast  polyethylene glycol 3350 17 Gram(s) Oral daily  senna 2 Tablet(s) Oral at bedtime    Vitals:  T(F): 98.1 (04-19), Max: 98.6 (04-18)  HR: 96 (04-19) (63 - 103)  BP: 123/83 (04-19) (104/70 - 123/83)  RR: 18 (04-19)  SpO2: 95% on RA    I&O's Summary    18 Apr 2018 07:01  -  19 Apr 2018 07:00  --------------------------------------------------------  IN: 840 mL / OUT: 700 mL / NET: 140 mL    Physical Exam:  Appearance: No acute distress; well appearing, able to stand with one person assist to walk to the bathroom (with cane)  Eyes: PERRL, EOMI, pink conjunctiva  HENT: Normal oral mucosa  Cardiovascular: ireg, ireg, not tachy on my exam, S1, S2, 2/6 AS murmur, no rubs, or gallops; no edema; no JVD  Respiratory: Clear to auscultation bilaterally  Gastrointestinal: soft, non-tender, non-distended with normal bowel sounds  Musculoskeletal: No clubbing; no joint deformity   Neurologic: Non-focal  Lymphatic: No lymphadenopathy  Psychiatry: AAOx3, mood & affect appropriate  Skin: No rashes, ecchymoses, or cyanosis    NO AM LABS ORDERED OR PENDING    Thyroid Stimulating Hormone, Serum: 0.81 uIU/mL    Echo: Echo:  < from: Transthoracic Echocardiogram (04.13.18 @ 13:18) > Left Ventricle: Normal left ventricular systolic function. Normal left ventricular internal dimensions and wall thicknesses. Normal diastolic function Right Heart: Normal right atrium. Normal right ventricular size and function. Normal tricuspid valve. Mild tricuspid regurgitation. Normal pulmonic valve.  Conclusions: 1. Calcified trileaflet aortic valve with decreased opening. Peak transaortic valve gradient equals 20 mm Hg, mean transaortic valve gradient equals 11 mm Hg, estimated aortic valve area equals 1.1 sqcm (by continuity equation), aortic valve velocity time integral equals 34 cm, consistent with moderate aortic stenosis. 2. Severely dilated left atrium.  LA volume index = 56 cc/m2. 3. Left pleural effusion. 4. Echo-free space is noted in the liver consistent with cyst, however, dedicated imaging recommended for further evaluation if clinically indicated. *** No previous Echo exam. Echocardiogram performed in the setting of atrial fibrillation with rapid ventricular response.  < end of copied text >    Interpretation of Telemetry: AF 80s - 120s (with the 120s being the outlier)

## 2018-04-19 NOTE — PROGRESS NOTE ADULT - NSHPATTENDINGPLANDISCUSS_GEN_ALL_CORE
Janie Can and Alessio
To reach Cardiology Attending call during weekdays Spectra [phone] or Fellow [phone].
To reach Cardiology Attending call during weekdays Spectra [phone] or Fellow [phone].
To reach Cardiology Attending call during weekdays Spectra [phone] or Fellow [phone].
To reach Cardiology Attending call during weekdays Spectra [phone] or Fellow [phone].
To reach Cardiology Attending call during weekdays Spectra [phone] or Fellow [phone].
Janie Moreon and Alessio.
Janie Moreno and Alessio

## 2018-04-19 NOTE — PROGRESS NOTE ADULT - ASSESSMENT
87yo F with PMH of HTN, degenerative arthritis of the knee, chronic bilateral lower extremity pain, low Vit D, hypokalemia, urinary frequency, gastritis, anxiety attacks, HLD, dyspnea, BPPV presenting with two days of intermittent shortness of breath, admitted for new atrial fibrillation with RVR, on lopressor BID with digoxin for HR control, s/p SERENA with cardioversion on 4/17, with ongoing tachycardia.

## 2018-04-19 NOTE — PROGRESS NOTE ADULT - PROBLEM SELECTOR PROBLEM 1
Atrial fibrillation with RVR

## 2018-04-19 NOTE — PROGRESS NOTE ADULT - PROBLEM SELECTOR PLAN 3
- continue to hold home amlodipine  - BP well-controlled at this time
-hold home amlodipine at this time given 's.  -BP well-controlled at this time w diltiazem gtt
-hold home amlodipine at this time given 's.  -BP well-controlled at this time w diltiazem gtt
- hold home amlodipine at this time given 's  - BP well-controlled at this time
-hold home amlodipine at this time given 's.  -BP well-controlled at this time w diltiazem gtt

## 2018-04-19 NOTE — PROGRESS NOTE ADULT - PROBLEM SELECTOR PLAN 1
- s/p SERENA with cardioversion on 4/16 and initiation of amiodarone load 4/18  - Afib with RVR improved, HR 80's-100's mostly overnight with some spikes to 120's (brief)  - c/w digoxin daily  - c/w lopressor 100 mg BID  - continue amiodarone load  - Low suspicion for infection. No history of thyroid, pulmonary, previous heart procedure or arrythmia. ACS ruled out. TSH wnl  - TTE demonstrated with moderate aortic stenosis, severely dilated LA, left-sided pleural effusion and a possible liver cyst  - continue AC w apixaban 5mg BID for non-valvular afib (CHADS-VASc score 3)  - cardiology reccs appreciated. - s/p SERENA with cardioversion on 4/16 and initiation of amiodarone load 4/18  - Afib with RVR improved, HR 80's-100's mostly overnight with some spikes to 120's (brief)  - c/w digoxin daily  - c/w lopressor 100 mg BID  - continue amiodarone load  - pt can follow up with cardiology as an outpatient.   - continue AC w apixaban 5mg BID for non-valvular afib (CHADS-VASc score 3)  - cardiology reccs appreciated.

## 2018-04-19 NOTE — PROGRESS NOTE ADULT - ASSESSMENT
86F w/ PMHx of HTN, HLD, OA, moderate AS who was admitted on 4/10 w/ SOB 2/2 AF w/ RVR. She had a SERENA/DCCV on 4/17 - converted back to AF on 4/18. Rates 80s -120s overnight - the majority of the time she had rates < 110. The patient's symptoms have improved.     Plan:    1. new onset AF  -Given CHADS2-VASC:4, recent DCCV and SEC on SERENA needs to c/w a/c - make sure that patient's insurance covers Eliquis 5 bid (age 86, weight:69kg, Cr:0.87)  -c/w Amiodarone 400 bid (, has received 3 doses thus far, load to 4-5g) - I discussed s/e with patient and son - will need Ophtho f/u, TFT, PFT f/u as outpatient - the patient can continue on the current dose until she follows up as an outpatient with Dr. Willie Shook  -c/w Metoprolol tartrate 100mg BID  -c/w Digoxin  -Monitor on Tele, given that rates are improved not further Cardiac w/u as an inpatient    General Cardiology will follow    Juan C (p): [phone]

## 2018-04-19 NOTE — PROGRESS NOTE ADULT - ATTENDING COMMENTS
86 year old woman with history of hypertension, otherwise no cardiac symptoms or findings recently complained of feeling short of breath. Saw internist and recognized new onset atrial fibrillation, referred to ER. Atrial fibrillation proved persistent, likely for few days prior to admission. Started on anticoagulation with DOAC. Cardiac echo with moderate aortic stenosis which at this time does not affect management.    Observed on increased dose of metoprolol along with digoxin and rates improved. Had transesophageal guided cardioversion and restored sinus rhythm, but few hours later reverted to atrial fibrillation. Thus began amiodarone load. Rates are controlled and can be discharged for outpatient cardiology follow up and consideration for repeat cardioversion after completion of amiodarone load.

## 2018-04-23 ENCOUNTER — RX RENEWAL (OUTPATIENT)
Age: 83
End: 2018-04-23

## 2018-04-24 PROBLEM — I10 ESSENTIAL (PRIMARY) HYPERTENSION: Chronic | Status: ACTIVE | Noted: 2018-04-10

## 2018-04-25 ENCOUNTER — APPOINTMENT (OUTPATIENT)
Dept: CARDIOLOGY | Facility: CLINIC | Age: 83
End: 2018-04-25
Payer: MEDICARE

## 2018-04-25 ENCOUNTER — NON-APPOINTMENT (OUTPATIENT)
Age: 83
End: 2018-04-25

## 2018-04-25 ENCOUNTER — APPOINTMENT (OUTPATIENT)
Dept: INTERNAL MEDICINE | Facility: CLINIC | Age: 83
End: 2018-04-25

## 2018-04-25 VITALS
TEMPERATURE: 98.4 F | RESPIRATION RATE: 12 BRPM | SYSTOLIC BLOOD PRESSURE: 113 MMHG | HEIGHT: 62 IN | WEIGHT: 150 LBS | HEART RATE: 84 BPM | BODY MASS INDEX: 27.6 KG/M2 | OXYGEN SATURATION: 98 % | DIASTOLIC BLOOD PRESSURE: 74 MMHG

## 2018-04-25 PROCEDURE — 93000 ELECTROCARDIOGRAM COMPLETE: CPT

## 2018-04-25 PROCEDURE — 99215 OFFICE O/P EST HI 40 MIN: CPT | Mod: 25

## 2018-04-25 RX ORDER — TRIAMTERENE AND HYDROCHLOROTHIAZIDE 25; 37.5 MG/1; MG/1
37.5-25 TABLET ORAL
Qty: 90 | Refills: 3 | Status: DISCONTINUED | COMMUNITY
Start: 2017-10-03 | End: 2018-04-25

## 2018-05-04 ENCOUNTER — OUTPATIENT (OUTPATIENT)
Dept: OUTPATIENT SERVICES | Facility: HOSPITAL | Age: 83
LOS: 1 days | End: 2018-05-04
Payer: MEDICARE

## 2018-05-04 VITALS
HEIGHT: 62 IN | DIASTOLIC BLOOD PRESSURE: 67 MMHG | WEIGHT: 149.91 LBS | SYSTOLIC BLOOD PRESSURE: 145 MMHG | TEMPERATURE: 98 F | OXYGEN SATURATION: 94 % | RESPIRATION RATE: 16 BRPM | HEART RATE: 79 BPM

## 2018-05-04 DIAGNOSIS — Z98.890 OTHER SPECIFIED POSTPROCEDURAL STATES: Chronic | ICD-10-CM

## 2018-05-04 DIAGNOSIS — R93.3 ABNORMAL FINDINGS ON DIAGNOSTIC IMAGING OF OTHER PARTS OF DIGESTIVE TRACT: Chronic | ICD-10-CM

## 2018-05-04 DIAGNOSIS — R19.8 OTHER SPECIFIED SYMPTOMS AND SIGNS INVOLVING THE DIGESTIVE SYSTEM AND ABDOMEN: Chronic | ICD-10-CM

## 2018-05-04 DIAGNOSIS — I48.91 UNSPECIFIED ATRIAL FIBRILLATION: ICD-10-CM

## 2018-05-04 LAB
ALBUMIN SERPL ELPH-MCNC: 3.8 G/DL — SIGNIFICANT CHANGE UP (ref 3.3–5)
ALP SERPL-CCNC: 74 U/L — SIGNIFICANT CHANGE UP (ref 40–120)
ALT FLD-CCNC: 21 U/L — SIGNIFICANT CHANGE UP (ref 10–45)
ANION GAP SERPL CALC-SCNC: 12 MMOL/L — SIGNIFICANT CHANGE UP (ref 5–17)
APTT BLD: 30.2 SEC — SIGNIFICANT CHANGE UP (ref 27.5–37.4)
AST SERPL-CCNC: 36 U/L — SIGNIFICANT CHANGE UP (ref 10–40)
BILIRUB SERPL-MCNC: 0.4 MG/DL — SIGNIFICANT CHANGE UP (ref 0.2–1.2)
BUN SERPL-MCNC: 10 MG/DL — SIGNIFICANT CHANGE UP (ref 7–23)
CALCIUM SERPL-MCNC: 9.5 MG/DL — SIGNIFICANT CHANGE UP (ref 8.4–10.5)
CHLORIDE SERPL-SCNC: 102 MMOL/L — SIGNIFICANT CHANGE UP (ref 96–108)
CO2 SERPL-SCNC: 28 MMOL/L — SIGNIFICANT CHANGE UP (ref 22–31)
CREAT SERPL-MCNC: 0.91 MG/DL — SIGNIFICANT CHANGE UP (ref 0.5–1.3)
GLUCOSE SERPL-MCNC: 90 MG/DL — SIGNIFICANT CHANGE UP (ref 70–99)
HCT VFR BLD CALC: 36 % — SIGNIFICANT CHANGE UP (ref 34.5–45)
HGB BLD-MCNC: 11.4 G/DL — LOW (ref 11.5–15.5)
INR BLD: 1.46 RATIO — HIGH (ref 0.88–1.16)
MCHC RBC-ENTMCNC: 29.4 PG — SIGNIFICANT CHANGE UP (ref 27–34)
MCHC RBC-ENTMCNC: 31.7 GM/DL — LOW (ref 32–36)
MCV RBC AUTO: 92.6 FL — SIGNIFICANT CHANGE UP (ref 80–100)
PLATELET # BLD AUTO: 216 K/UL — SIGNIFICANT CHANGE UP (ref 150–400)
POTASSIUM SERPL-MCNC: 4.3 MMOL/L — SIGNIFICANT CHANGE UP (ref 3.5–5.3)
POTASSIUM SERPL-SCNC: 4.3 MMOL/L — SIGNIFICANT CHANGE UP (ref 3.5–5.3)
PROT SERPL-MCNC: 7 G/DL — SIGNIFICANT CHANGE UP (ref 6–8.3)
PROTHROM AB SERPL-ACNC: 16 SEC — HIGH (ref 9.8–12.7)
RBC # BLD: 3.89 M/UL — SIGNIFICANT CHANGE UP (ref 3.8–5.2)
RBC # FLD: 13.3 % — SIGNIFICANT CHANGE UP (ref 10.3–14.5)
SODIUM SERPL-SCNC: 142 MMOL/L — SIGNIFICANT CHANGE UP (ref 135–145)
WBC # BLD: 7.3 K/UL — SIGNIFICANT CHANGE UP (ref 3.8–10.5)
WBC # FLD AUTO: 7.3 K/UL — SIGNIFICANT CHANGE UP (ref 3.8–10.5)

## 2018-05-04 PROCEDURE — 92960 CARDIOVERSION ELECTRIC EXT: CPT

## 2018-05-04 PROCEDURE — 93010 ELECTROCARDIOGRAM REPORT: CPT

## 2018-05-04 PROCEDURE — 93005 ELECTROCARDIOGRAM TRACING: CPT

## 2018-05-04 PROCEDURE — 85730 THROMBOPLASTIN TIME PARTIAL: CPT

## 2018-05-04 PROCEDURE — 85610 PROTHROMBIN TIME: CPT

## 2018-05-04 PROCEDURE — 85027 COMPLETE CBC AUTOMATED: CPT

## 2018-05-04 PROCEDURE — 80053 COMPREHEN METABOLIC PANEL: CPT

## 2018-05-04 NOTE — H&P CARDIOLOGY - PSH
Abnormal colonoscopy    Abnormal endoscopy finding Abnormal colonoscopy    Abnormal endoscopy finding    History of cardioversion

## 2018-05-04 NOTE — H&P CARDIOLOGY - HISTORY OF PRESENT ILLNESS
85 y/o [place] female PMH of HTN, degenerative arthritis of the knee, chronic bilateral lower extremity pain, low Vit D, hypokalemia, urinary frequency, gastritis, anxiety attacks, HLD, admitted 4/10/18 for Afib with RVR, 87 y/o [place] female PMH of HTN, GERD, anxiety attacks, HLD, c/o dyspnea and admitted 4/10/18 for Afib with RVR (CXR revealing PVC consistent with CHF), s/p SERENA/CV with recurrent Afib during that hospitalization, discharged home on Eliquis, Amiodarone, Digoxin, Furosemide, presents today for cardioversion.    < from: Transthoracic Echocardiogram (04.13.18 @ 13:18) >  Conclusions:  1. Calcified trileaflet aortic valve with decreased  opening. Peak transaortic valve gradient equals 20 mm Hg,  mean transaortic valve gradient equals 11 mm Hg, estimated  aortic valve area equals 1.1 sqcm (by continuity equation),  aortic valve velocity time integral equals 34 cm,  consistent with moderate aortic stenosis.  2. Severely dilated left atrium.  LA volume index = 56  cc/m2.  3. Left pleural effusion.  4. Echo-free space is noted in the liver consisitent with  cyst, however, dedicated imaging recommended for further  evaluation if clinically indicated.    < end of copied text >    SERENA Conclusions (4/17/18):  1. Severe left atrial enlargement.  Spontaneous echo  contrast seen with decreased YUDITH velocities (velocity= (34)  cm/s).  No left atrial or left atrial appendage thrombus.  Lipomatous hypertrophy of interatrial septum.  2. Agitated saline injection and color flow Doppler  demonstrates no evidence of a patent foramen ovale. 85 y/o Yi female PMH of HTN, GERD, anxiety attacks, HLD, AS, c/o dyspnea and admitted 4/10/18 for Afib with RVR (CXR revealing PVC consistent with CHF), s/p SERENA/CV with recurrent Afib during that hospitalization, discharged home on Eliquis, Amiodarone, Digoxin, Furosemide, presents today for cardioversion.    < from: Transthoracic Echocardiogram (04.13.18 @ 13:18) >  Conclusions:  1. Calcified trileaflet aortic valve with decreased  opening. Peak transaortic valve gradient equals 20 mm Hg,  mean transaortic valve gradient equals 11 mm Hg, estimated  aortic valve area equals 1.1 sqcm (by continuity equation),  aortic valve velocity time integral equals 34 cm,  consistent with moderate aortic stenosis.  2. Severely dilated left atrium.  LA volume index = 56  cc/m2.  3. Left pleural effusion.  4. Echo-free space is noted in the liver consisitent with  cyst, however, dedicated imaging recommended for further  evaluation if clinically indicated.    < end of copied text >    SERENA Conclusions (4/17/18):  1. Severe left atrial enlargement.  Spontaneous echo  contrast seen with decreased YUDITH velocities (velocity= (34)  cm/s).  No left atrial or left atrial appendage thrombus.  Lipomatous hypertrophy of interatrial septum.  2. Agitated saline injection and color flow Doppler  demonstrates no evidence of a patent foramen ovale.

## 2018-05-04 NOTE — H&P CARDIOLOGY - PMH
Colonic polyp    GERD (gastroesophageal reflux disease)    Glaucoma    Hiatal hernia    Hyperlipidemia    Hypertension    Hypovitaminosis D    Leg pain, bilateral Atrial fibrillation, unspecified type    Colonic polyp    GERD (gastroesophageal reflux disease)    Glaucoma    Hiatal hernia    Hyperlipidemia    Hypertension    Hypovitaminosis D    Leg pain, bilateral Aortic valve stenosis, etiology of cardiac valve disease unspecified    Atrial fibrillation, unspecified type    Colonic polyp    GERD (gastroesophageal reflux disease)    Glaucoma    Hiatal hernia    Hyperlipidemia    Hypertension    Hypovitaminosis D    Leg pain, bilateral

## 2018-05-05 ENCOUNTER — INPATIENT (INPATIENT)
Facility: HOSPITAL | Age: 83
LOS: 3 days | Discharge: HOME | DRG: 291 | End: 2018-05-09
Attending: HOSPITALIST | Admitting: HOSPITALIST
Payer: MEDICARE

## 2018-05-05 VITALS
HEIGHT: 62 IN | SYSTOLIC BLOOD PRESSURE: 145 MMHG | OXYGEN SATURATION: 100 % | DIASTOLIC BLOOD PRESSURE: 87 MMHG | TEMPERATURE: 98 F | HEART RATE: 74 BPM | RESPIRATION RATE: 27 BRPM | WEIGHT: 149.91 LBS

## 2018-05-05 DIAGNOSIS — I80.00 PHLEBITIS AND THROMBOPHLEBITIS OF SUPERFICIAL VESSELS OF UNSPECIFIED LOWER EXTREMITY: ICD-10-CM

## 2018-05-05 DIAGNOSIS — R06.02 SHORTNESS OF BREATH: ICD-10-CM

## 2018-05-05 DIAGNOSIS — Z29.9 ENCOUNTER FOR PROPHYLACTIC MEASURES, UNSPECIFIED: ICD-10-CM

## 2018-05-05 DIAGNOSIS — I48.91 UNSPECIFIED ATRIAL FIBRILLATION: ICD-10-CM

## 2018-05-05 DIAGNOSIS — F41.9 ANXIETY DISORDER, UNSPECIFIED: ICD-10-CM

## 2018-05-05 DIAGNOSIS — R19.8 OTHER SPECIFIED SYMPTOMS AND SIGNS INVOLVING THE DIGESTIVE SYSTEM AND ABDOMEN: Chronic | ICD-10-CM

## 2018-05-05 DIAGNOSIS — I50.9 HEART FAILURE, UNSPECIFIED: ICD-10-CM

## 2018-05-05 DIAGNOSIS — R74.8 ABNORMAL LEVELS OF OTHER SERUM ENZYMES: ICD-10-CM

## 2018-05-05 DIAGNOSIS — D72.829 ELEVATED WHITE BLOOD CELL COUNT, UNSPECIFIED: ICD-10-CM

## 2018-05-05 DIAGNOSIS — M25.521 PAIN IN RIGHT ELBOW: ICD-10-CM

## 2018-05-05 DIAGNOSIS — E78.5 HYPERLIPIDEMIA, UNSPECIFIED: ICD-10-CM

## 2018-05-05 DIAGNOSIS — R14.0 ABDOMINAL DISTENSION (GASEOUS): ICD-10-CM

## 2018-05-05 DIAGNOSIS — J96.00 ACUTE RESPIRATORY FAILURE, UNSPECIFIED WHETHER WITH HYPOXIA OR HYPERCAPNIA: ICD-10-CM

## 2018-05-05 DIAGNOSIS — I10 ESSENTIAL (PRIMARY) HYPERTENSION: ICD-10-CM

## 2018-05-05 DIAGNOSIS — R93.3 ABNORMAL FINDINGS ON DIAGNOSTIC IMAGING OF OTHER PARTS OF DIGESTIVE TRACT: Chronic | ICD-10-CM

## 2018-05-05 DIAGNOSIS — Z98.890 OTHER SPECIFIED POSTPROCEDURAL STATES: Chronic | ICD-10-CM

## 2018-05-05 LAB
ALBUMIN SERPL ELPH-MCNC: 3.3 G/DL — SIGNIFICANT CHANGE UP (ref 3.3–5)
ALP SERPL-CCNC: 89 U/L — SIGNIFICANT CHANGE UP (ref 40–120)
ALT FLD-CCNC: 33 U/L — SIGNIFICANT CHANGE UP (ref 12–78)
ANION GAP SERPL CALC-SCNC: 6 MMOL/L — SIGNIFICANT CHANGE UP (ref 5–17)
AST SERPL-CCNC: 39 U/L — HIGH (ref 15–37)
BASE EXCESS BLDA CALC-SCNC: 0.7 MMOL/L — SIGNIFICANT CHANGE UP (ref -2–2)
BILIRUB SERPL-MCNC: 0.5 MG/DL — SIGNIFICANT CHANGE UP (ref 0.2–1.2)
BLOOD GAS COMMENTS ARTERIAL: SIGNIFICANT CHANGE UP
BLOOD GAS COMMENTS ARTERIAL: SIGNIFICANT CHANGE UP
BUN SERPL-MCNC: 14 MG/DL — SIGNIFICANT CHANGE UP (ref 7–23)
CALCIUM SERPL-MCNC: 8.7 MG/DL — SIGNIFICANT CHANGE UP (ref 8.5–10.1)
CHLORIDE SERPL-SCNC: 108 MMOL/L — SIGNIFICANT CHANGE UP (ref 96–108)
CK SERPL-CCNC: 33 U/L — SIGNIFICANT CHANGE UP (ref 26–192)
CK SERPL-CCNC: 49 U/L — SIGNIFICANT CHANGE UP (ref 26–192)
CO2 SERPL-SCNC: 27 MMOL/L — SIGNIFICANT CHANGE UP (ref 22–31)
CREAT SERPL-MCNC: 0.91 MG/DL — SIGNIFICANT CHANGE UP (ref 0.5–1.3)
GLUCOSE SERPL-MCNC: 161 MG/DL — HIGH (ref 70–99)
HCO3 BLDA-SCNC: 25 MMOL/L — SIGNIFICANT CHANGE UP (ref 23–27)
HCT VFR BLD CALC: 34.6 % — SIGNIFICANT CHANGE UP (ref 34.5–45)
HGB BLD-MCNC: 11.2 G/DL — LOW (ref 11.5–15.5)
HOROWITZ INDEX BLDA+IHG-RTO: 50 — SIGNIFICANT CHANGE UP
INR BLD: 1.39 RATIO — HIGH (ref 0.88–1.16)
MCHC RBC-ENTMCNC: 28.8 PG — SIGNIFICANT CHANGE UP (ref 27–34)
MCHC RBC-ENTMCNC: 32.2 GM/DL — SIGNIFICANT CHANGE UP (ref 32–36)
MCV RBC AUTO: 89.3 FL — SIGNIFICANT CHANGE UP (ref 80–100)
NT-PROBNP SERPL-SCNC: 1755 PG/ML — HIGH (ref 0–450)
PCO2 BLDA: 42 MMHG — SIGNIFICANT CHANGE UP (ref 32–46)
PH BLDA: 7.4 — SIGNIFICANT CHANGE UP (ref 7.35–7.45)
PLATELET # BLD AUTO: 209 K/UL — SIGNIFICANT CHANGE UP (ref 150–400)
PO2 BLDA: 140 MMHG — HIGH (ref 74–108)
POTASSIUM SERPL-MCNC: 4.5 MMOL/L — SIGNIFICANT CHANGE UP (ref 3.5–5.3)
POTASSIUM SERPL-SCNC: 4.5 MMOL/L — SIGNIFICANT CHANGE UP (ref 3.5–5.3)
PROT SERPL-MCNC: 7.5 G/DL — SIGNIFICANT CHANGE UP (ref 6–8.3)
PROTHROM AB SERPL-ACNC: 15.3 SEC — HIGH (ref 9.8–12.7)
RBC # BLD: 3.87 M/UL — SIGNIFICANT CHANGE UP (ref 3.8–5.2)
RBC # FLD: 13.5 % — SIGNIFICANT CHANGE UP (ref 10.3–14.5)
SAO2 % BLDA: 100 % — HIGH (ref 92–96)
SODIUM SERPL-SCNC: 141 MMOL/L — SIGNIFICANT CHANGE UP (ref 135–145)
TROPONIN I SERPL-MCNC: 0.06 NG/ML — HIGH (ref 0.01–0.04)
TROPONIN I SERPL-MCNC: 0.31 NG/ML — HIGH (ref 0.01–0.04)
TROPONIN I SERPL-MCNC: 0.57 NG/ML — HIGH (ref 0.01–0.04)
TROPONIN I SERPL-MCNC: 0.68 NG/ML — HIGH (ref 0.01–0.04)
WBC # BLD: 14 K/UL — HIGH (ref 3.8–10.5)
WBC # FLD AUTO: 14 K/UL — HIGH (ref 3.8–10.5)

## 2018-05-05 PROCEDURE — 74019 RADEX ABDOMEN 2 VIEWS: CPT | Mod: 26

## 2018-05-05 PROCEDURE — 99223 1ST HOSP IP/OBS HIGH 75: CPT | Mod: AI,GC

## 2018-05-05 PROCEDURE — 73070 X-RAY EXAM OF ELBOW: CPT | Mod: 26,RT

## 2018-05-05 PROCEDURE — 93970 EXTREMITY STUDY: CPT | Mod: 26

## 2018-05-05 PROCEDURE — 93010 ELECTROCARDIOGRAM REPORT: CPT

## 2018-05-05 PROCEDURE — 71045 X-RAY EXAM CHEST 1 VIEW: CPT | Mod: 26

## 2018-05-05 PROCEDURE — 73030 X-RAY EXAM OF SHOULDER: CPT | Mod: 26,RT

## 2018-05-05 PROCEDURE — 99223 1ST HOSP IP/OBS HIGH 75: CPT

## 2018-05-05 PROCEDURE — 99285 EMERGENCY DEPT VISIT HI MDM: CPT

## 2018-05-05 RX ORDER — CLONAZEPAM 1 MG
1 TABLET ORAL
Qty: 0 | Refills: 0 | Status: DISCONTINUED | OUTPATIENT
Start: 2018-05-05 | End: 2018-05-07

## 2018-05-05 RX ORDER — SENNA PLUS 8.6 MG/1
2 TABLET ORAL AT BEDTIME
Qty: 0 | Refills: 0 | Status: DISCONTINUED | OUTPATIENT
Start: 2018-05-05 | End: 2018-05-09

## 2018-05-05 RX ORDER — GABAPENTIN 400 MG/1
100 CAPSULE ORAL AT BEDTIME
Qty: 0 | Refills: 0 | Status: DISCONTINUED | OUTPATIENT
Start: 2018-05-05 | End: 2018-05-09

## 2018-05-05 RX ORDER — DORZOLAMIDE HYDROCHLORIDE TIMOLOL MALEATE 20; 5 MG/ML; MG/ML
1 SOLUTION/ DROPS OPHTHALMIC
Qty: 0 | Refills: 0 | Status: DISCONTINUED | OUTPATIENT
Start: 2018-05-05 | End: 2018-05-09

## 2018-05-05 RX ORDER — FUROSEMIDE 40 MG
40 TABLET ORAL DAILY
Qty: 0 | Refills: 0 | Status: DISCONTINUED | OUTPATIENT
Start: 2018-05-06 | End: 2018-05-08

## 2018-05-05 RX ORDER — POTASSIUM CHLORIDE 20 MEQ
20 PACKET (EA) ORAL DAILY
Qty: 0 | Refills: 0 | Status: DISCONTINUED | OUTPATIENT
Start: 2018-05-05 | End: 2018-05-06

## 2018-05-05 RX ORDER — PANTOPRAZOLE SODIUM 20 MG/1
40 TABLET, DELAYED RELEASE ORAL
Qty: 0 | Refills: 0 | Status: DISCONTINUED | OUTPATIENT
Start: 2018-05-05 | End: 2018-05-09

## 2018-05-05 RX ORDER — FUROSEMIDE 40 MG
40 TABLET ORAL ONCE
Qty: 0 | Refills: 0 | Status: COMPLETED | OUTPATIENT
Start: 2018-05-05 | End: 2018-05-05

## 2018-05-05 RX ORDER — METOPROLOL TARTRATE 50 MG
100 TABLET ORAL
Qty: 0 | Refills: 0 | Status: DISCONTINUED | OUTPATIENT
Start: 2018-05-05 | End: 2018-05-09

## 2018-05-05 RX ORDER — DOCUSATE SODIUM 100 MG
100 CAPSULE ORAL THREE TIMES A DAY
Qty: 0 | Refills: 0 | Status: DISCONTINUED | OUTPATIENT
Start: 2018-05-05 | End: 2018-05-09

## 2018-05-05 RX ORDER — AMIODARONE HYDROCHLORIDE 400 MG/1
200 TABLET ORAL DAILY
Qty: 0 | Refills: 0 | Status: DISCONTINUED | OUTPATIENT
Start: 2018-05-05 | End: 2018-05-09

## 2018-05-05 RX ORDER — CLONAZEPAM 1 MG
1 TABLET ORAL
Qty: 0 | Refills: 0 | Status: DISCONTINUED | OUTPATIENT
Start: 2018-05-05 | End: 2018-05-05

## 2018-05-05 RX ORDER — APIXABAN 2.5 MG/1
5 TABLET, FILM COATED ORAL EVERY 12 HOURS
Qty: 0 | Refills: 0 | Status: DISCONTINUED | OUTPATIENT
Start: 2018-05-05 | End: 2018-05-09

## 2018-05-05 RX ORDER — ASPIRIN/CALCIUM CARB/MAGNESIUM 324 MG
325 TABLET ORAL ONCE
Qty: 0 | Refills: 0 | Status: COMPLETED | OUTPATIENT
Start: 2018-05-05 | End: 2018-05-05

## 2018-05-05 RX ORDER — LATANOPROST 0.05 MG/ML
1 SOLUTION/ DROPS OPHTHALMIC; TOPICAL AT BEDTIME
Qty: 0 | Refills: 0 | Status: DISCONTINUED | OUTPATIENT
Start: 2018-05-05 | End: 2018-05-09

## 2018-05-05 RX ORDER — DIGOXIN 250 MCG
0.12 TABLET ORAL DAILY
Qty: 0 | Refills: 0 | Status: DISCONTINUED | OUTPATIENT
Start: 2018-05-05 | End: 2018-05-05

## 2018-05-05 RX ADMIN — Medication 40 MILLIGRAM(S): at 05:18

## 2018-05-05 RX ADMIN — Medication 100 MILLIGRAM(S): at 17:12

## 2018-05-05 RX ADMIN — Medication 100 MILLIGRAM(S): at 21:11

## 2018-05-05 RX ADMIN — Medication 20 MILLIEQUIVALENT(S): at 12:10

## 2018-05-05 RX ADMIN — Medication 100 MILLIGRAM(S): at 12:10

## 2018-05-05 RX ADMIN — DORZOLAMIDE HYDROCHLORIDE TIMOLOL MALEATE 1 DROP(S): 20; 5 SOLUTION/ DROPS OPHTHALMIC at 17:12

## 2018-05-05 RX ADMIN — Medication 325 MILLIGRAM(S): at 06:27

## 2018-05-05 RX ADMIN — SENNA PLUS 2 TABLET(S): 8.6 TABLET ORAL at 21:10

## 2018-05-05 RX ADMIN — APIXABAN 5 MILLIGRAM(S): 2.5 TABLET, FILM COATED ORAL at 17:12

## 2018-05-05 RX ADMIN — LATANOPROST 1 DROP(S): 0.05 SOLUTION/ DROPS OPHTHALMIC; TOPICAL at 21:15

## 2018-05-05 RX ADMIN — GABAPENTIN 100 MILLIGRAM(S): 400 CAPSULE ORAL at 21:10

## 2018-05-05 RX ADMIN — Medication 1 MILLIGRAM(S): at 17:12

## 2018-05-05 NOTE — ED PROVIDER NOTE - CARE PLAN
Principal Discharge DX:	SOB (shortness of breath) Principal Discharge DX:	SOB (shortness of breath)  Secondary Diagnosis:	CHF (congestive heart failure)

## 2018-05-05 NOTE — H&P ADULT - NSHPREVIEWOFSYSTEMS_GEN_ALL_CORE
Constitutional: denies fever, chills, diaphoresis   HEENT: denies blurry vision, difficulty hearing  Respiratory: denies SOB, PRASAD, cough, sputum production, wheezing, hemoptysis  Cardiovascular: denies CP, palpitations, edema  Gastrointestinal: denies nausea, vomiting, diarrhea, constipation, abdominal pain, melena, hematochezia   Genitourinary: denies dysuria, frequency, urgency, hematuria   Skin/Breast: denies rash, itching  Musculoskeletal: denies myalgias, joint swelling, muscle weakness  Neurologic: denies headache, weakness, dizziness, paresthesias, numbness/tingling  Psychiatric: denies feeling anxious, depressed, suicidal, homicidal thoughts  Hematology/Oncology: denies bruising, tender or enlarged lymph nodes   ROS negative except as noted above Constitutional: admits to diaphoresis, denies fever, chills,   HEENT: denies blurry vision, difficulty hearing  Respiratory: admits to SOB, PRASAD, denies cough, sputum production, wheezing, hemoptysis  Cardiovascular: admits to b/l LE edema, denies CP, palpitations  Gastrointestinal: admits to abdominal distention, denies abdominal pain, denies nausea, vomiting, diarrhea, constipation, melena, hematochezia   Genitourinary: denies dysuria, frequency, urgency, hematuria   Skin/Breast: denies rash, itching  Musculoskeletal: denies myalgias, joint swelling, muscle weakness  Neurologic: denies headache, weakness, dizziness, paresthesias, numbness/tingling  Psychiatric: admits to feeling anxious, denies feeling depressed, suicidal, homicidal thoughts  Hematology/Oncology: denies bruising, tender or enlarged lymph nodes   ROS negative except as noted above

## 2018-05-05 NOTE — H&P ADULT - ASSESSMENT
85 y/o F pmh a fib, HTN, AS, HLD, anxiety, glaucoma, C/O SOB since 4am, diaphoretic  and abdominal bloating, increased leg swelling . 87 y/o F pmh a fib on eliquis, HTN,  glaucoma, C/O SOB since 4am, diaphoretic  and abdominal bloating, increased leg swelling. Admit to tele for acute respiratrory failure 2/2 to CHF exacerbation, elevated troponin abdominal distention and b/l LE edema. 85 y/o F pmh a fib on eliquis, AS, GERD, HTN,  glaucoma, C/O SOB since 4am, diaphoretic  and abdominal bloating, increased leg swelling. Admit to tele for acute respiratrory failure 2/2 to CHF exacerbation, elevated troponin abdominal distention and b/l LE edema. 85 y/o F pmh a fib on eliquis, AS, GERD, CHF, HTN,  glaucoma, C/O SOB since 4am, diaphoretic  and abdominal bloating, increased leg swelling. Admit to tele for acute respiratrory failure 2/2 to CHF exacerbation, elevated troponin abdominal distention and b/l LE edema. 85 y/o F pmh a fib on eliquis, AS, GERD, HTN,  glaucoma, neuropathy presents with C/O SOB diaphoretic  and abdominal bloating, increased leg swelling since morning of admission. Admit to tele for acute respiratory failure 2/2 to CHF exacerbation, elevated troponin abdominal distention and b/l LE edema. 85 y/o F pmh a fib on eliquis, AS, GERD, HTN,  glaucoma, neuropathy presents with C/O SOB diaphoretic  and abdominal bloating, increased leg swelling since morning of admission. Admit to tele for acute respiratory failure 2/2 to acute CHF exacerbation, elevated troponin , ? abdominal distention

## 2018-05-05 NOTE — H&P ADULT - PROBLEM SELECTOR PLAN 5
Likely reactive process.  Patient is afebrile. Continue to monitor CBC Likely reactive process.  F/u procal  Patient is afebrile. Continue to monitor CBC Likely reactive process.  F/u procalcitonin  Patient is afebrile. Continue to monitor CBC

## 2018-05-05 NOTE — ED ADULT NURSE REASSESSMENT NOTE - NS ED NURSE REASSESS COMMENT FT1
pt aox3, " sob since last night" denies chest pain, mo n/v, lungs diminish breath sounds on bases, BIPOP on place, tolerating well, pulse 100%, pedal edema, abd soft, + sounds, C M RSR, awaiting for TEL Bed

## 2018-05-05 NOTE — CONSULT NOTE ADULT - SUBJECTIVE AND OBJECTIVE BOX
[facility] Consultation    CHIEF COMPLAINT: Patient is a 86y old  Female who presents with a chief complaint of shortness of breath, abdominal bloating, increased leg swelling (05 May 2018 07:05)      HPI:  85 y/o F pmh a fib on eliquis, AS, GERD, HTN,  glaucoma, neuropathy presents with C/O SOB diaphoretic  and abdominal bloating, increased leg swelling since 4am morning of admission.  She was cardioverted successfully from AF to NSR yesterday at [facility]. Son is at bedside and provides history. States that patient has been living at home with him, ambulating with cane, no home O2 use, due to patient being in hospital two times this year. States that she was cardioverted once in April 2018 and once yesterday. States that patient was doing well after cardioversion however complaining of right elbow and shoulder pain. At 4am morning of admission, patient was suddenly SOB, diaphoretic and appeared anxious and ambulance was called.  Paramedics arrived and the patient was assisted with Cpap. Sat was 100Percent and she was downgraded BiPap in the ED. Reports b/l LE swelling that has been increasing for the past 3 days. No Chest Pain, No nausea, no vomiting, no fever, no chills.     In the ED, VS temp 98.2, HR 74, /87, RR 27, SpO2 100 on supplemental O2. WBC 14.0, Hg 11.2, Hct 34.6, platelet 209, PT 15.3, INR 1.39, D-dimer 222, Na 141, K 4.5, Cl 108, Co2 27, BUN 14, Cr 0.91, glucose 161, creatinine kinase 49, troponin 1 .0.59, proBNP 1755. ABG pH 7.40, pCo2 42, po2 140, hco3 25, oxygen sat 100, bipap 12/6.50,     Received  mg, Lasix 40m g IV x 1    Imaging:  Doppler b/l LE 1. No evidence of bilateral lower extremity deep venous thrombosis. 2. Superficial thrombophlebitis in the right upper calf.  CXR: Lungs are clear. No pleural effusions. Mild to moderate central pulmonary vascular congestion. Cardiomediastinal silhouette is indeterminate in   size due to technique. Bones are unremarkable  EKG: NSR at 68 bpm, inverted p waves notes (05 May 2018 07:05)      PAST MEDICAL & SURGICAL HISTORY:  Neuropathy  GERD (gastroesophageal reflux disease)  Anxiety  Glaucoma  Hypertension  Atrial fibrillation  No significant past surgical history      SOCIAL HISTORY: no tob/etoh    FAMILY HISTORY: no CAD  No pertinent family history in first degree relatives      MEDICATIONS  (STANDING):  amiodarone    Tablet 200 milliGRAM(s) Oral daily  apixaban 5 milliGRAM(s) Oral every 12 hours  clonazePAM Tablet 1 milliGRAM(s) Oral two times a day  digoxin     Tablet 0.125 milliGRAM(s) Oral daily  docusate sodium 100 milliGRAM(s) Oral three times a day  dorzolamide 2%/timolol 0.5% Ophthalmic Solution 1 Drop(s) Both EYES two times a day  gabapentin 100 milliGRAM(s) Oral at bedtime  latanoprost 0.005% Ophthalmic Solution 1 Drop(s) Both EYES at bedtime  metoprolol tartrate 100 milliGRAM(s) Oral two times a day  pantoprazole    Tablet 40 milliGRAM(s) Oral before breakfast  potassium chloride    Tablet ER 20 milliEquivalent(s) Oral daily  senna 2 Tablet(s) Oral at bedtime      Allergies    No Known Allergies    REVIEW OF SYSTEMS:    CONSTITUTIONAL: No weakness, fevers or chills  EYES: No visual changes, No diplopia  ENMT: No throat pain , No exudate  NECK: No pain or stiffness  RESPIRATORY: No cough, wheezing, hemoptysis; pos shortness of breath  CARDIOVASCULAR: No chest pain or palpitations  GASTROINTESTINAL: No abdominal pain. No nausea, vomiting, or hematemesis; No diarrhea or constipation. No melena or hematochezia.  GENITOURINARY: No dysuria, frequency or hematuria  NEUROLOGICAL: No numbness or weakness  SKIN: No itching or rash  All other review of systems is negative unless indicated above    VITAL SIGNS:   Vital Signs Last 24 Hrs  T(C): 36.9 (05 May 2018 12:14), Max: 36.9 (05 May 2018 09:47)  T(F): 98.5 (05 May 2018 12:14), Max: 98.5 (05 May 2018 12:14)  HR: 70 (05 May 2018 12:14) (70 - 74)  BP: 130/78 (05 May 2018 12:14) (106/58 - 145/87)  BP(mean): --  RR: 17 (05 May 2018 12:14) (17 - 27)  SpO2: 98% (05 May 2018 12:14) (98% - 100%)    PHYSICAL EXAM:    Constitutional: NAD, awake and alert, well-developed  Eyes:  Pupils round, no lesions  ENMT: no exudate or erythema  Pulmonary: scattered rhonchi  Cardiovascular: PMI not palpable  Regular S1 and S2, no murmurs, rubs, gallops or clicks  Gastrointestinal: Bowel Sounds present, soft, nontender.   Lymph: No peripheral edema. No cervical lymphadenopathy.  Neurological: Alert, no focal deficits  Skin: No rashes. Changes of chronic venous stasis. No cyanosis.  Psych:  Mood & affect appropriate    LABS: All Labs Reviewed:                        11.2   14.0  )-----------( 209      ( 05 May 2018 05:03 )             34.6     05 May 2018 05:03    141    |  108    |  14     ----------------------------<  161    4.5     |  27     |  0.91     Ca    8.7        05 May 2018 05:03    TPro  7.5    /  Alb  3.3    /  TBili  0.5    /  DBili  x      /  AST  39     /  ALT  33     /  AlkPhos  89     05 May 2018 05:03    PT/INR - ( 05 May 2018 05:03 )   PT: 15.3 sec;   INR: 1.39 ratio           CARDIAC MARKERS ( 05 May 2018 11:15 )  .678 ng/mL / x     / 33 U/L / x     / x      CARDIAC MARKERS ( 05 May 2018 05:03 )  .059 ng/mL / x     / 49 U/L / x     / x            05-05 @ 05:03  Pro Bnp 1755    EKG reveals sinus rhythm with nonspecific T-wave abnormalities      < from: Xray Chest 1 View-PORTABLE IMMEDIATE (05.05.18 @ 05:05) >    EXAM:  XR CHEST PORTABLE IMMED 1V                            PROCEDURE DATE:  05/05/2018          INTERPRETATION:  Clinical Indication: Shortness of breath    Technique: Single Frontal view of the chest    Comparison: There are no prior studies available for comparison.      Findings/  IMPRESSION:      Lungs are clear. No pleural effusions. Mild to moderate central pulmonary   vascular congestion. Cardiomediastinal silhouette is indeterminate in   size due to technique. Bones are unremarkable.                    JONA MAYFIELD M.D., ATTENDING RADIOLOGIST  This document has been electronically signed. May  5 2018  9:21AM                < end of copied text >

## 2018-05-05 NOTE — H&P ADULT - PROBLEM SELECTOR PLAN 10
DVT ppx: continue eliquis     IMPROVE VTE Individual Risk Assessment          RISK                                                          Points    [  ] Previous VTE                                                3  [  ] Thrombophilia                                             2  [  ] Lower limb paralysis                                   2        (unable to hold up >15 seconds)    [  ] Current Cancer                                            2         (within 6 months)  [  ] Immobilization > 24 hrs                              1  [  ] ICU/CCU stay > 24 hours                            1  [ x ] Age > 60                                                    1    IMPROVE VTE Score ____1_____ DVT ppx: continue eliquis     IMPROVE VTE Individual Risk Assessment          RISK                                                          Points    [  ] Previous VTE                                                3  [  ] Thrombophilia                                             2  [  ] Lower limb paralysis                                   2        (unable to hold up >15 seconds)    [  ] Current Cancer                                            2         (within 6 months)  [  ] Immobilization > 24 hrs                              1  [  ] ICU/CCU stay > 24 hours                            1  [ x ] Age > 60                                                    1    IMPROVE VTE Score ____1_____    Problem 11: GERD- continue protonix 40 mg daily DVT ppx: continue eliquis     IMPROVE VTE Individual Risk Assessment          RISK                                                          Points    [  ] Previous VTE                                                3  [  ] Thrombophilia                                             2  [  ] Lower limb paralysis                                   2        (unable to hold up >15 seconds)    [  ] Current Cancer                                            2         (within 6 months)  [  ] Immobilization > 24 hrs                              1  [  ] ICU/CCU stay > 24 hours                            1  [ x ] Age > 60                                                    1    IMPROVE VTE Score ____1_____  Problem 11: GERD- continue protonix 40 mg daily  Problem 12: Neuropathy- continue gabapentin 100 mg daily   Problem 13: Glaucoma- continue home dose eye drops DVT ppx: continue eliquis   IMPROVE VTE Individual Risk Assessment          RISK                                                          Points    [  ] Previous VTE                                                3  [  ] Thrombophilia                                             2  [  ] Lower limb paralysis                                   2        (unable to hold up >15 seconds)    [  ] Current Cancer                                            2         (within 6 months)  [  ] Immobilization > 24 hrs                              1  [  ] ICU/CCU stay > 24 hours                            1  [ x ] Age > 60                                                    1    IMPROVE VTE Score ____1_____  Problem 11: GERD- continue protonix 40 mg daily  Problem 12: Neuropathy- continue gabapentin 100 mg daily   Problem 13: Glaucoma- continue home dose eye drops  Problem 14: Superficial thrombophlebitis in the right upper calf- No intervention at this time. Continue eliquis.

## 2018-05-05 NOTE — ED PROVIDER NOTE - SIGNIFICANT NEGATIVE FINDINGS
no headache, no chest pain, no Syncope , no palpitations, no abdominal pain,  no n/v,  no neuro changes.

## 2018-05-05 NOTE — H&P ADULT - PROBLEM SELECTOR PLAN 3
Patient was cardioverted outpatient in [place] yesterday 5/4/18 Likely demand ischemia, however inverted p waves notes on EKG  F/u CE x 2   Cardio Dr. Yang consulted. F/u recs

## 2018-05-05 NOTE — ED ADULT NURSE NOTE - CHIEF COMPLAINT QUOTE
c/o shortness of breath- had cardioversion done yesterday at [facility]. patient on cpap- had oxygen saturation of 89% as per ems

## 2018-05-05 NOTE — H&P ADULT - NSHPPHYSICALEXAM_GEN_ALL_CORE
PHYSICAL EXAM:    GENERAL: NAD, well-groomed, well-developed  HEAD:  Atraumatic, Normocephalic  EYES: EOMI, PERRLA, conjunctiva and sclera clear  ENMT: No tonsillar erythema, exudates, or enlargement; Moist mucous membranes, Good dentition, No lesions  NECK: Supple, No JVD, Normal thyroid  NERVOUS SYSTEM:  Alert & Oriented X3, Good concentration; Motor Strength 5/5 B/L upper and lower extremities; DTRs 2+ intact and symmetric  CHEST/LUNG: Clear to percussion bilaterally; No rales, rhonchi, wheezing, or rubs  HEART: Regular rate and rhythm; No murmurs, rubs, or gallops  ABDOMEN: Soft, Nontender, Nondistended; Bowel sounds present  EXTREMITIES:  2+ Peripheral Pulses, No clubbing, cyanosis, or edema  LYMPH: No lymphadenopathy noted  SKIN: No rashes or lesions Vital Signs Last 24 Hrs  T(C): 36.8 (05 May 2018 07:24), Max: 36.8 (05 May 2018 04:45)  T(F): 98.2 (05 May 2018 07:24), Max: 98.2 (05 May 2018 04:45)  HR: 70 (05 May 2018 07:24) (70 - 74)  BP: 130/68 (05 May 2018 07:24) (130/68 - 145/87)  BP(mean): --  RR: 20 (05 May 2018 07:24) (20 - 27)  SpO2: 100% (05 May 2018 07:24) (99% - 100%)    PHYSICAL EXAM:    GENERAL: NAD, on bipap  HEAD:  Atraumatic, Normocephalic  EYES: EOMI, PERRLA, conjunctiva and sclera clear  ENMT: No tonsillar erythema, exudates, or enlargement; Moist mucous membranes  NECK: Supple, No JVD, Normal thyroid  NERVOUS SYSTEM:  Alert & Oriented X3, decreased ROM in right sided UE, full ROM on b/l LE  CHEST/LUNG: crackles heard in b/l lung fields, No rales, rhonchi, wheezing, or rubs  HEART: +murmur, Regular rate and rhythm; No murmurs, rubs, or gallops  ABDOMEN: distended, hypoactive bowel sounds, Soft, Nontender  EXTREMITIES:  trace b/l nonpitting edema, 2+ Peripheral Pulses, No clubbing, cyanosis  SKIN: No rashes or lesions

## 2018-05-05 NOTE — H&P ADULT - PROBLEM SELECTOR PLAN 4
Hypoactive bowel sounds on exam. Possible 2/2 to gas  F/u abdominal xray flat and upright Had normal bowel movement yesterday per son. Denies abdominal pain, nausea or vomiting. Hypoactive bowel sounds on exam.   F/u abdominal xray flat and upright

## 2018-05-05 NOTE — H&P ADULT - ATTENDING COMMENTS
Plan as above. D/W PGY1 at length. Also d/w son at bedside. F/U Cardio Dr. Yang's recommendations. ABG looks fine, will d/c BIPAP, and monitor on 3L NC for now. Prognosis is guarded. Plan as above. D/W PGY1 at length. Also d/w son at bedside. F/U Cardio Dr. Yang's recommendations. ABG looks fine, will d/c BIPAP, and monitor on 3L NC for now. Prognosis is guarded. Monitor counts. Chest X ray is negative for pneumonia. Afebrile. Doubt infection. F/U procalcitonin. If spikes fever or develops any symptoms concerning for infection consider antibiotics. But for now monitor off antibiotics.

## 2018-05-05 NOTE — H&P ADULT - NSHPSOCIALHISTORY_GEN_ALL_CORE
Lives with son at home.   Ambulates with cane. ADLs with assitance  Occupation: Retired seamstress  Tobacco hx: denies  ETOH hx: denies  Colonoscopy and endoscopy performed 2017 with normal results

## 2018-05-05 NOTE — H&P ADULT - PROBLEM SELECTOR PLAN 1
Likely 2/2 to CHF exacerbation, proBNP elevated  Admit to tele   S/p lasix 40 mg iv x 1  Continue Acute respiratory failure Likely 2/2 to CHF exacerbation  Admit to tele   S/p lasix 40 mg iv x 1. Continue lasix 40 mg Iv daily   Tele monitor and continuos pulse ox  Patient on bipap now. Titrate down as tolerated  F/u procal  F/u repeat probNP tmr am  Cardio Dr. Yang. F/u recs  Pulm Dr. Godfrey consulted. F/u recs Acute respiratory failure Likely 2/2 to CHF exacerbation  Admit to tele   S/p lasix 40 mg iv x 1. Continue lasix 40 mg Iv daily   Tele monitor and continuos pulse ox  Patient on bipap now. Titrate down as tolerated  F/u procal  F/u repeat probNP tmr am  Cardio Dr. Yang. F/u recs  Pulm Dr. Crystal. consulted. F/u recs

## 2018-05-05 NOTE — H&P ADULT - PMH
Atrial fibrillation    Glaucoma    Hypertension Anxiety    Atrial fibrillation    Glaucoma    Hypertension Anxiety    Atrial fibrillation    GERD (gastroesophageal reflux disease)    Glaucoma    Hypertension    Neuropathy

## 2018-05-05 NOTE — CONSULT NOTE ADULT - ASSESSMENT
The etiology of patient's decompensation is unclear but she remains in sinus rhythm with no clear evidence for an acute coronary syndrome or significant dysrhythmia. Her pro BNP is mildly elevated and her elevated troponin is of unclear significance at present.    Recommend    Admit to telemetry  Cardiac enzymes  Echocardiography  Venous Doppler exam  Continue uptakes abandoned  Continue aspirin for now  Cont with intravenous diuretics for now  Continue amiodarone  discontinue digoxin with no clear benefit  We'll obtain old records  Further management can be dependent on her clinical course per

## 2018-05-05 NOTE — ED ADULT TRIAGE NOTE - CHIEF COMPLAINT QUOTE
c/o shortness of breath- had cardioversion done yesterday at [facility]. patient on cpap- had oxygen saturation of 89% as per ems

## 2018-05-05 NOTE — H&P ADULT - PROBLEM SELECTOR PLAN 2
Plan as above Plan as above  ProBNP elevated. F/u repeat tmr am  Trace b/l LE edema   Continue lasix 40 mg IV daily  Cardio Dr. Yang. F/u recs  Pulm Dr. Godfrey consulted. F/u recs Plan as above  ProBNP elevated. F/u repeat tmr am  Trace b/l LE edema   SERENA from April 2018: severe atrial enlargement, AS, normal left ventricular systolic function  Continue lasix 40 mg IV daily  Cardio Dr. Yang. F/u recs  Pulm Dr. Godfrey consulted. F/u recs Plan as above  ProBNP elevated. F/u repeat tomorrow  am  TTE from April 2018: severe atrial enlargement, AS, normal left ventricular systolic function  Continue lasix 40 mg IV daily  Cardio Dr. Yang. F/u recs  Pulm Dr. Godfrey consulted. F/u recs Plan as above  ProBNP elevated. F/u repeat tomorrow  am  TTE from April 2018: severe atrial enlargement, AS, normal left ventricular systolic function  Continue lasix 40 mg IV daily  Cardio Dr. Yang. F/u recs  Pulm Dr. Crystal consulted. F/u recs

## 2018-05-05 NOTE — H&P ADULT - PROBLEM SELECTOR PLAN 6
Patient was cardioverted outpatient in [place] yesterday 5/4/18  Continue home dose amiodarone and digoxin with hold parameters  Continue home dose [place]  Cardio Dr. Yang Patient was cardioverted outpatient in [place] yesterday 5/4/18  Subtherapeutic INR. Continue home dose eliquis  Continue home dose amiodarone and digoxin with hold parameters  Cardio Dr. Yang

## 2018-05-05 NOTE — H&P ADULT - PROBLEM SELECTOR PLAN 7
As per son, patient developed right elbow and shoulder pain s/p cardioversion yesterday. Likely musculoskeletal kenan   F/u right xray elbow, shoulder

## 2018-05-05 NOTE — H&P ADULT - HISTORY OF PRESENT ILLNESS
87 y/o F pmh a fib, HTN,  glaucoma, C/O SOB since 4am, diaphoretic  and abdominal bloating, increased leg swelling . No Chest Pain, No nausea, no vomiting, no fever, no chills. She was cardio[facility] Paramedics arrived and the patient was assisted with Cpap. Sat was 100Percent, lungs were clear . She was down graded BiPap.       In the ED, VS temp 98.2, HR 74, /87, RR 27, SpO2 100 on supplemental O2. WBC 14.0, Hg 11.2, Hct 34.6, platelet 209, PT 15.3, INR 1.39, D-dimer 222, Na 141, K 4.5, Cl 108, Co2 27, BUN 14, Cr 0.91, glucose 161, creatinine kinase 49, troponin 1 .0.59, proBNP 1755. ABG pH 7.40, pCo2 42, po2 140, hco3 25, oxygen sat 100, bipap 12/6.50,     Received  mg, Lasix 40m g IV x 1    Imaging:  Doppler b/l LE 1. No evidence of bilateral lower extremity deep venous thrombosis.  2. Superficial thrombophlebitis in the right upper calf.  CXR:  EKG: 87 y/o F pmh a fib on eliquis, HTN,  glaucoma, C/O SOB since 4am, diaphoretic  and abdominal bloating, increased leg swelling. She was cardioverted successfully from AF to NSR yesterday at [facility]. Son is at bedside and provides history. States that patient has been living at home with him, ambulating with cane, no home O2 use, due to patient being in hospital two times this year. States that she was cardioverted once in April 2018 and once yesterday. States that patient was doing well after cardioversion however complaining of right elbow and shoulder pain. At 4am morning of admission, patient was suddenly SOB, diaphoretic and appeared anxious and ambulance was called.  Paramedics arrived and the patient was assisted with Cpap. Sat was 100Percent and she was downgraded BiPap in the ED. Reports b/l LE swelling that has been increasing for the past 3 days. No Chest Pain, No nausea, no vomiting, no fever, no chills.     In the ED, VS temp 98.2, HR 74, /87, RR 27, SpO2 100 on supplemental O2. WBC 14.0, Hg 11.2, Hct 34.6, platelet 209, PT 15.3, INR 1.39, D-dimer 222, Na 141, K 4.5, Cl 108, Co2 27, BUN 14, Cr 0.91, glucose 161, creatinine kinase 49, troponin 1 .0.59, proBNP 1755. ABG pH 7.40, pCo2 42, po2 140, hco3 25, oxygen sat 100, bipap 12/6.50,     Received  mg, Lasix 40m g IV x 1    Imaging:  Doppler b/l LE 1. No evidence of bilateral lower extremity deep venous thrombosis.  2. Superficial thrombophlebitis in the right upper calf.  CXR: unofficial read: b/l pleural effusions, L>R  EKG: NSR at 68 bpm, inverted p waves notes 85 y/o F pmh a fib on eliquis, AS, GERD, HTN,  glaucoma, C/O SOB since 4am, diaphoretic  and abdominal bloating, increased leg swelling. She was cardioverted successfully from AF to NSR yesterday at [facility]. Son is at bedside and provides history. States that patient has been living at home with him, ambulating with cane, no home O2 use, due to patient being in hospital two times this year. States that she was cardioverted once in April 2018 and once yesterday. States that patient was doing well after cardioversion however complaining of right elbow and shoulder pain. At 4am morning of admission, patient was suddenly SOB, diaphoretic and appeared anxious and ambulance was called.  Paramedics arrived and the patient was assisted with Cpap. Sat was 100Percent and she was downgraded BiPap in the ED. Reports b/l LE swelling that has been increasing for the past 3 days. No Chest Pain, No nausea, no vomiting, no fever, no chills.     In the ED, VS temp 98.2, HR 74, /87, RR 27, SpO2 100 on supplemental O2. WBC 14.0, Hg 11.2, Hct 34.6, platelet 209, PT 15.3, INR 1.39, D-dimer 222, Na 141, K 4.5, Cl 108, Co2 27, BUN 14, Cr 0.91, glucose 161, creatinine kinase 49, troponin 1 .0.59, proBNP 1755. ABG pH 7.40, pCo2 42, po2 140, hco3 25, oxygen sat 100, bipap 12/6.50,     Received  mg, Lasix 40m g IV x 1    Imaging:  Doppler b/l LE 1. No evidence of bilateral lower extremity deep venous thrombosis.  2. Superficial thrombophlebitis in the right upper calf.  CXR: unofficial read: b/l pleural effusions, L>R  EKG: NSR at 68 bpm, inverted p waves notes 87 y/o F pmh a fib on eliquis, AS, GERD, HTN, CHF, glaucoma, C/O SOB since 4am, diaphoretic  and abdominal bloating, increased leg swelling. She was cardioverted successfully from AF to NSR yesterday at [facility]. Son is at bedside and provides history. States that patient has been living at home with him, ambulating with cane, no home O2 use, due to patient being in hospital two times this year. States that she was cardioverted once in April 2018 and once yesterday. States that patient was doing well after cardioversion however complaining of right elbow and shoulder pain. At 4am morning of admission, patient was suddenly SOB, diaphoretic and appeared anxious and ambulance was called.  Paramedics arrived and the patient was assisted with Cpap. Sat was 100Percent and she was downgraded BiPap in the ED. Reports b/l LE swelling that has been increasing for the past 3 days. No Chest Pain, No nausea, no vomiting, no fever, no chills.     In the ED, VS temp 98.2, HR 74, /87, RR 27, SpO2 100 on supplemental O2. WBC 14.0, Hg 11.2, Hct 34.6, platelet 209, PT 15.3, INR 1.39, D-dimer 222, Na 141, K 4.5, Cl 108, Co2 27, BUN 14, Cr 0.91, glucose 161, creatinine kinase 49, troponin 1 .0.59, proBNP 1755. ABG pH 7.40, pCo2 42, po2 140, hco3 25, oxygen sat 100, bipap 12/6.50,     Received  mg, Lasix 40m g IV x 1    Imaging:  Doppler b/l LE 1. No evidence of bilateral lower extremity deep venous thrombosis.  2. Superficial thrombophlebitis in the right upper calf.  CXR: unofficial read: b/l pleural effusions, L>R  EKG: NSR at 68 bpm, inverted p waves notes 85 y/o F pmh a fib on eliquis, AS, GERD, HTN,  glaucoma, neuropathy presents with C/O SOB diaphoretic  and abdominal bloating, increased leg swelling since 4am morning of admission.  She was cardioverted successfully from AF to NSR yesterday at [facility]. Son is at bedside and provides history. States that patient has been living at home with him, ambulating with cane, no home O2 use, due to patient being in hospital two times this year. States that she was cardioverted once in April 2018 and once yesterday. States that patient was doing well after cardioversion however complaining of right elbow and shoulder pain. At 4am morning of admission, patient was suddenly SOB, diaphoretic and appeared anxious and ambulance was called.  Paramedics arrived and the patient was assisted with Cpap. Sat was 100Percent and she was downgraded BiPap in the ED. Reports b/l LE swelling that has been increasing for the past 3 days. No Chest Pain, No nausea, no vomiting, no fever, no chills.     In the ED, VS temp 98.2, HR 74, /87, RR 27, SpO2 100 on supplemental O2. WBC 14.0, Hg 11.2, Hct 34.6, platelet 209, PT 15.3, INR 1.39, D-dimer 222, Na 141, K 4.5, Cl 108, Co2 27, BUN 14, Cr 0.91, glucose 161, creatinine kinase 49, troponin 1 .0.59, proBNP 1755. ABG pH 7.40, pCo2 42, po2 140, hco3 25, oxygen sat 100, bipap 12/6.50,     Received  mg, Lasix 40m g IV x 1    Imaging:  Doppler b/l LE 1. No evidence of bilateral lower extremity deep venous thrombosis.  2. Superficial thrombophlebitis in the right upper calf.  CXR: unofficial read: b/l pleural effusions, L>R  EKG: NSR at 68 bpm, inverted p waves notes 87 y/o F pmh a fib on eliquis, AS, GERD, HTN,  glaucoma, neuropathy presents with C/O SOB diaphoretic  and abdominal bloating, increased leg swelling since 4am morning of admission.  She was cardioverted successfully from AF to NSR yesterday at [facility]. Son is at bedside and provides history. States that patient has been living at home with him, ambulating with cane, no home O2 use, due to patient being in hospital two times this year. States that she was cardioverted once in April 2018 and once yesterday. States that patient was doing well after cardioversion however complaining of right elbow and shoulder pain. At 4am morning of admission, patient was suddenly SOB, diaphoretic and appeared anxious and ambulance was called.  Paramedics arrived and the patient was assisted with Cpap. Sat was 100Percent and she was downgraded BiPap in the ED. Reports b/l LE swelling that has been increasing for the past 3 days. No Chest Pain, No nausea, no vomiting, no fever, no chills.     In the ED, VS temp 98.2, HR 74, /87, RR 27, SpO2 100 on supplemental O2. WBC 14.0, Hg 11.2, Hct 34.6, platelet 209, PT 15.3, INR 1.39, D-dimer 222, Na 141, K 4.5, Cl 108, Co2 27, BUN 14, Cr 0.91, glucose 161, creatinine kinase 49, troponin 1 .0.59, proBNP 1755. ABG pH 7.40, pCo2 42, po2 140, hco3 25, oxygen sat 100, bipap 12/6.50,     Received  mg, Lasix 40m g IV x 1    Imaging:  Doppler b/l LE 1. No evidence of bilateral lower extremity deep venous thrombosis. 2. Superficial thrombophlebitis in the right upper calf.  CXR: Lungs are clear. No pleural effusions. Mild to moderate central pulmonary vascular congestion. Cardiomediastinal silhouette is indeterminate in   size due to technique. Bones are unremarkable  EKG: NSR at 68 bpm, inverted p waves notes

## 2018-05-05 NOTE — ED ADULT NURSE NOTE - OBJECTIVE STATEMENT
patient a/o x 4 with an anxious affect having difficulty breathing starting tonight as per family.  patient brought by EMS on CPAP, placed immediately on BiPap in the ED.  patient breath sounds normal, pending initial lab work results and EKG

## 2018-05-05 NOTE — ED PROVIDER NOTE - OBJECTIVE STATEMENT
85 y/o WF HTN,  AF, C/O SOB since 4am, diaphoretic  and abdominal bloating. No Chest Pain, No nausea, no vomiting, no fever, no chills. She was cardioverted successfully from AF to NSR yesterday at [place]. 85 y/o WF HTN,  AF, C/O SOB since 4am, diaphoretic  and abdominal bloating. No Chest Pain, No nausea, no vomiting, no fever, no chills. She was cardioverted successfully from AF to NSR yesterday at [place]. Paramedics arrived and the patient was assisted with Cpap. Sat was 100Percent, lungs were clear . She was down graded BiPap. ABG and CXR pending 85 y/o WF HTN,  AF, C/O SOB since 4am, diaphoretic  and abdominal bloating, increased leg swelling . No Chest Pain, No nausea, no vomiting, no fever, no chills. She was cardioverted successfully from AF to NSR yesterday at [place]. Paramedics arrived and the patient was assisted with Cpap. Sat was 100Percent, lungs were clear . She was down graded BiPap. ABG and CXR pending

## 2018-05-05 NOTE — CONSULT NOTE ADULT - SUBJECTIVE AND OBJECTIVE BOX
PULMONARY/CRITICAL CARE        Patient is a 86y old  Female who presents with a chief complaint of shortness of breath, abdominal bloating, increased leg swelling (05 May 2018 07:05)  Diuresed with marked improvement. Denies sob now. No cp.    BRIEF HOSPITAL COURSE: ***87 y/o F pmh a fib on eliquis, AS, GERD, HTN,  glaucoma, neuropathy presents with C/O SOB diaphoretic  and abdominal bloating, increased leg swelling since 4am morning of admission.  She was cardioverted successfully from AF to NSR yesterday at [facility]. Son is at bedside and provides history. States that patient has been living at home with him, ambulating with cane, no home O2 use, due to patient being in hospital two times this year. States that she was cardioverted once in April 2018 and once yesterday. States that patient was doing well after cardioversion however complaining of right elbow and shoulder pain. At 4am morning of admission, patient was suddenly SOB, diaphoretic and appeared anxious and ambulance was called.  Paramedics arrived and the patient was assisted with Cpap. Sat was 100Percent and she was downgraded BiPap in the ED. Reports b/l LE swelling that has been increasing for the past 3 days. No Chest Pain, No nausea, no vomiting, no fever, no chills.     In the ED, VS temp 98.2, HR 74, /87, RR 27, SpO2 100 on supplemental O2. WBC 14.0, Hg 11.2, Hct 34.6, platelet 209, PT 15.3, INR 1.39, D-dimer 222, Na 141, K 4.5, Cl 108, Co2 27, BUN 14, Cr 0.91, glucose 161, creatinine kinase 49, troponin 1 .0.59, proBNP 1755. ABG pH 7.40, pCo2 42, po2 140, hco3 25, oxygen sat 100, bipap 12/6.50,     Received  mg, Lasix 40m g IV x 1    Imaging:  Doppler b/l LE 1. No evidence of bilateral lower extremity deep venous thrombosis. 2. Superficial thrombophlebitis in the right upper calf.  CXR: Lungs are clear. No pleural effusions. Mild to moderate central pulmonary vascular congestion. Cardiomediastinal silhouette is indeterminate in   size due to technique. Bones are unremarkable  EKG: NSR at 68 bpm, inverted p waves notes    Events last 24 hours: ***    PAST MEDICAL & SURGICAL HISTORY:  Neuropathy  GERD (gastroesophageal reflux disease)  Anxiety  Glaucoma  Hypertension  Atrial fibrillation  No significant past surgical history    Allergies    No Known Allergies    Intolerances      FAMILY HISTORY:  No pertinent family history in first degree relatives      Review of Systems:  CONSTITUTIONAL: No fever, chills, or fatigue  EYES: No eye pain, visual disturbances, or discharge  ENMT:  No difficulty hearing, tinnitus, vertigo; No sinus or throat pain  NECK: No pain or stiffness  RESPIRATORY: No cough, wheezing, chills or hemoptysis; had shortness of breath  CARDIOVASCULAR: No chest pain, palpitations, dizziness, or leg swelling  GASTROINTESTINAL: No abdominal or epigastric pain. No nausea, vomiting, or hematemesis; No diarrhea or constipation. No melena or hematochezia.  GENITOURINARY: No dysuria, frequency, hematuria, or incontinence  NEUROLOGICAL: No headaches, memory loss, loss of strength, numbness, or tremors  SKIN: No itching, burning, rashes, or lesions   MUSCULOSKELETAL: No joint pain Had leg swelling; No muscle, back, or extremity pain  PSYCHIATRIC: No depression, anxiety, mood swings, or difficulty sleeping      Medications:    amiodarone    Tablet 200 milliGRAM(s) Oral daily  digoxin     Tablet 0.125 milliGRAM(s) Oral daily  metoprolol tartrate 100 milliGRAM(s) Oral two times a day      clonazePAM Tablet 1 milliGRAM(s) Oral two times a day  gabapentin 100 milliGRAM(s) Oral at bedtime      apixaban 5 milliGRAM(s) Oral every 12 hours    docusate sodium 100 milliGRAM(s) Oral three times a day  pantoprazole    Tablet 40 milliGRAM(s) Oral before breakfast  senna 2 Tablet(s) Oral at bedtime        potassium chloride    Tablet ER 20 milliEquivalent(s) Oral daily      dorzolamide 2%/timolol 0.5% Ophthalmic Solution 1 Drop(s) Both EYES two times a day  latanoprost 0.005% Ophthalmic Solution 1 Drop(s) Both EYES at bedtime            ICU Vital Signs Last 24 Hrs  T(C): 36.9 (05 May 2018 09:47), Max: 36.9 (05 May 2018 09:47)  T(F): 98.4 (05 May 2018 09:47), Max: 98.4 (05 May 2018 09:47)  HR: 72 (05 May 2018 10:12) (70 - 74)  BP: 110/60 (05 May 2018 10:12) (106/58 - 145/87)  BP(mean): --  ABP: --  ABP(mean): --  RR: 18 (05 May 2018 10:12) (18 - 27)  SpO2: 99% (05 May 2018 10:12) (99% - 100%)    Vital Signs Last 24 Hrs  T(C): 36.9 (05 May 2018 09:47), Max: 36.9 (05 May 2018 09:47)  T(F): 98.4 (05 May 2018 09:47), Max: 98.4 (05 May 2018 09:47)  HR: 72 (05 May 2018 10:12) (70 - 74)  BP: 110/60 (05 May 2018 10:12) (106/58 - 145/87)  BP(mean): --  RR: 18 (05 May 2018 10:12) (18 - 27)  SpO2: 99% (05 May 2018 10:12) (99% - 100%)    ABG - ( 05 May 2018 05:12 )  pH, Arterial: 7.40  pH, Blood: x     /  pCO2: 42    /  pO2: 140   / HCO3: 25    / Base Excess: 0.7   /  SaO2: 100                 I&O's Detail        LABS:                        11.2   14.0  )-----------( 209      ( 05 May 2018 05:03 )             34.6     05-05    141  |  108  |  14  ----------------------------<  161<H>  4.5   |  27  |  0.91    Ca    8.7      05 May 2018 05:03    TPro  7.5  /  Alb  3.3  /  TBili  0.5  /  DBili  x   /  AST  39<H>  /  ALT  33  /  AlkPhos  89  05-05      CARDIAC MARKERS ( 05 May 2018 11:15 )  .678 ng/mL / x     / x     / x     / x      CARDIAC MARKERS ( 05 May 2018 05:03 )  .059 ng/mL / x     / 49 U/L / x     / x          CAPILLARY BLOOD GLUCOSE        PT/INR - ( 05 May 2018 05:03 )   PT: 15.3 sec;   INR: 1.39 ratio             CULTURES:      Physical Examination:    General: No acute distress.      HEENT: Pupils equal, reactive to light.  Symmetric.    PULM: few bas crackles. Good excursion.    CVS: Regular rate and rhythm, no murmurs, rubs, or gallops    ABD: Soft, nondistended, nontender, normoactive bowel sounds, no masses    EXT: 2 plus pedal edema, nontender    SKIN: Warm and well perfused, no rashes noted.    NEURO: Alert, oriented, interactive, nonfocal    RADIOLOGY: ***  < from: US Duplex Venous Lower Ext Complete, Bilateral (05.05.18 @ 06:28) >  EXAM:  US DPLX LWR EXT VEINS COMPL BI                            PROCEDURE DATE:  05/05/2018          INTERPRETATION:  CLINICAL INFORMATION: Leg swelling for 4 days.    COMPARISON: None available.    TECHNIQUE: Duplex sonography of BILATERAL LOWER extremities with color   and spectral Doppler, with and without compression.      FINDINGS:    There is normal compressibility of bilateral common femoral, femoral and   popliteal veins. No calf vein thrombosis is detected.    Doppler examination shows normal spontaneous and phasic flow.    A thrombosed superficial vein in the upper right calf region is seen   compatible with a superficial thrombophlebitis.    IMPRESSION:     1. No evidence of bilateral lower extremity deep venous thrombosis.  2. Superficial thrombophlebitis in the right upper calf.                ARTI MAHMOOD M.D., ATTENDING RADIOLOGIST  This document has been electronically signed. May  5 2018  6:36AM    < end of copied text >  < from: Xray Chest 1 View-PORTABLE IMMEDIATE (05.05.18 @ 05:05) >  EXAM:  XR CHEST PORTABLE IMMED 1V                            PROCEDURE DATE:  05/05/2018          INTERPRETATION:  Clinical Indication: Shortness of breath    Technique: Single Frontal view of the chest    Comparison: There are no prior studies available for comparison.      Findings/  IMPRESSION:      Lungs are clear. No pleural effusions. Mild to moderate central pulmonary   vascular congestion. Cardiomediastinal silhouette is indeterminate in   size due to technique. Bones are unremarkable.                    JONA MAYFIELD M.D., ATTENDING RADIOLOGIST  This document has been electronically signed. May  5 2018  9:21AM        < end of copied text >    CRITICAL CARE TIME SPENT: ***

## 2018-05-05 NOTE — CONSULT NOTE ADULT - ASSESSMENT
Pt admitted with Acute respiratory failure, chf, which resolved with diuresis.  Superficial phlebitis  PAF

## 2018-05-06 LAB
ANION GAP SERPL CALC-SCNC: 6 MMOL/L — SIGNIFICANT CHANGE UP (ref 5–17)
BUN SERPL-MCNC: 14 MG/DL — SIGNIFICANT CHANGE UP (ref 7–23)
CALCIUM SERPL-MCNC: 8.4 MG/DL — LOW (ref 8.5–10.1)
CHLORIDE SERPL-SCNC: 107 MMOL/L — SIGNIFICANT CHANGE UP (ref 96–108)
CO2 SERPL-SCNC: 31 MMOL/L — SIGNIFICANT CHANGE UP (ref 22–31)
CREAT SERPL-MCNC: 0.75 MG/DL — SIGNIFICANT CHANGE UP (ref 0.5–1.3)
GLUCOSE SERPL-MCNC: 91 MG/DL — SIGNIFICANT CHANGE UP (ref 70–99)
HCT VFR BLD CALC: 30.7 % — LOW (ref 34.5–45)
HGB BLD-MCNC: 9.9 G/DL — LOW (ref 11.5–15.5)
MCHC RBC-ENTMCNC: 28.7 PG — SIGNIFICANT CHANGE UP (ref 27–34)
MCHC RBC-ENTMCNC: 32.2 GM/DL — SIGNIFICANT CHANGE UP (ref 32–36)
MCV RBC AUTO: 89.2 FL — SIGNIFICANT CHANGE UP (ref 80–100)
NT-PROBNP SERPL-SCNC: 5008 PG/ML — HIGH (ref 0–450)
PLATELET # BLD AUTO: 163 K/UL — SIGNIFICANT CHANGE UP (ref 150–400)
POTASSIUM SERPL-MCNC: 3.2 MMOL/L — LOW (ref 3.5–5.3)
POTASSIUM SERPL-SCNC: 3.2 MMOL/L — LOW (ref 3.5–5.3)
RBC # BLD: 3.44 M/UL — LOW (ref 3.8–5.2)
RBC # FLD: 13.8 % — SIGNIFICANT CHANGE UP (ref 10.3–14.5)
SODIUM SERPL-SCNC: 144 MMOL/L — SIGNIFICANT CHANGE UP (ref 135–145)
WBC # BLD: 6.7 K/UL — SIGNIFICANT CHANGE UP (ref 3.8–10.5)
WBC # FLD AUTO: 6.7 K/UL — SIGNIFICANT CHANGE UP (ref 3.8–10.5)

## 2018-05-06 PROCEDURE — 99233 SBSQ HOSP IP/OBS HIGH 50: CPT

## 2018-05-06 PROCEDURE — 93306 TTE W/DOPPLER COMPLETE: CPT | Mod: 26

## 2018-05-06 RX ORDER — CLONAZEPAM 1 MG
1 TABLET ORAL
Qty: 0 | Refills: 0 | COMMUNITY

## 2018-05-06 RX ORDER — FUROSEMIDE 40 MG
1 TABLET ORAL
Qty: 0 | Refills: 0 | COMMUNITY

## 2018-05-06 RX ORDER — POTASSIUM CHLORIDE 20 MEQ
40 PACKET (EA) ORAL
Qty: 0 | Refills: 0 | Status: DISCONTINUED | OUTPATIENT
Start: 2018-05-06 | End: 2018-05-09

## 2018-05-06 RX ADMIN — APIXABAN 5 MILLIGRAM(S): 2.5 TABLET, FILM COATED ORAL at 17:46

## 2018-05-06 RX ADMIN — Medication 100 MILLIGRAM(S): at 17:46

## 2018-05-06 RX ADMIN — SENNA PLUS 2 TABLET(S): 8.6 TABLET ORAL at 21:04

## 2018-05-06 RX ADMIN — Medication 100 MILLIGRAM(S): at 21:04

## 2018-05-06 RX ADMIN — GABAPENTIN 100 MILLIGRAM(S): 400 CAPSULE ORAL at 21:08

## 2018-05-06 RX ADMIN — Medication 100 MILLIGRAM(S): at 11:25

## 2018-05-06 RX ADMIN — LATANOPROST 1 DROP(S): 0.05 SOLUTION/ DROPS OPHTHALMIC; TOPICAL at 21:06

## 2018-05-06 RX ADMIN — DORZOLAMIDE HYDROCHLORIDE TIMOLOL MALEATE 1 DROP(S): 20; 5 SOLUTION/ DROPS OPHTHALMIC at 05:35

## 2018-05-06 RX ADMIN — APIXABAN 5 MILLIGRAM(S): 2.5 TABLET, FILM COATED ORAL at 05:25

## 2018-05-06 RX ADMIN — Medication 40 MILLIEQUIVALENT(S): at 17:46

## 2018-05-06 RX ADMIN — Medication 1 MILLIGRAM(S): at 21:04

## 2018-05-06 RX ADMIN — AMIODARONE HYDROCHLORIDE 200 MILLIGRAM(S): 400 TABLET ORAL at 05:25

## 2018-05-06 RX ADMIN — Medication 100 MILLIGRAM(S): at 05:25

## 2018-05-06 RX ADMIN — PANTOPRAZOLE SODIUM 40 MILLIGRAM(S): 20 TABLET, DELAYED RELEASE ORAL at 05:25

## 2018-05-06 RX ADMIN — Medication 1 MILLIGRAM(S): at 05:25

## 2018-05-06 RX ADMIN — DORZOLAMIDE HYDROCHLORIDE TIMOLOL MALEATE 1 DROP(S): 20; 5 SOLUTION/ DROPS OPHTHALMIC at 17:46

## 2018-05-06 NOTE — PROGRESS NOTE ADULT - ASSESSMENT
87 y/o F pmh a fib on eliquis, AS, GERD, HTN,  glaucoma, neuropathy presents with C/O SOB diaphoretic  and abdominal bloating, increased leg swelling since morning of admission. Admit to tele for acute respiratory failure 2/2 to acute CHF exacerbation, elevated troponin , ? abdominal distention

## 2018-05-06 NOTE — PROGRESS NOTE ADULT - SUBJECTIVE AND OBJECTIVE BOX
Patient is a 86y old  Female who presents with a chief complaint of shortness of breath, abdominal bloating, increased leg swelling (05 May 2018 07:05)      INTERVAL HPI/OVERNIGHT EVENTS: 85 y/o F pmh a fib on eliquis, AS, GERD, HTN,  glaucoma, neuropathy presents with C/O SOB diaphoretic  and abdominal bloating, increased leg swelling since morning of admission. Admit to [facility] for acute respiratory failure 2/2 to acute CHF exacerbation, elevated troponin , ? abdominal distention     MEDICATIONS  (STANDING):  amiodarone    Tablet 200 milliGRAM(s) Oral daily  apixaban 5 milliGRAM(s) Oral every 12 hours  clonazePAM Tablet 1 milliGRAM(s) Oral two times a day  docusate sodium 100 milliGRAM(s) Oral three times a day  dorzolamide 2%/timolol 0.5% Ophthalmic Solution 1 Drop(s) Both EYES two times a day  furosemide   Injectable 40 milliGRAM(s) IV Push daily  gabapentin 100 milliGRAM(s) Oral at bedtime  latanoprost 0.005% Ophthalmic Solution 1 Drop(s) Both EYES at bedtime  metoprolol tartrate 100 milliGRAM(s) Oral two times a day  pantoprazole    Tablet 40 milliGRAM(s) Oral before breakfast  potassium chloride    Tablet ER 40 milliEquivalent(s) Oral two times a day  senna 2 Tablet(s) Oral at bedtime    MEDICATIONS  (PRN):      Allergies    No Known Allergies    Intolerances        REVIEW OF SYSTEMS:  CONSTITUTIONAL: No fever, weight loss, or fatigue  EYES: No eye pain, visual disturbances, or discharge  ENMT:  No difficulty hearing, tinnitus, vertigo; No sinus or throat pain  NECK: No pain or stiffness  BREASTS: No pain, masses, or nipple discharge  RESPIRATORY: No cough, wheezing, chills or hemoptysis; No shortness of breath  CARDIOVASCULAR: No chest pain, palpitations, dizziness, or leg swelling  GASTROINTESTINAL: No abdominal or epigastric pain. No nausea, vomiting, or hematemesis; No diarrhea or constipation. No melena or hematochezia.  GENITOURINARY: No dysuria, frequency, hematuria, or incontinence  NEUROLOGICAL: No headaches, memory loss, loss of strength, numbness, or tremors  SKIN: No itching, burning, rashes, or lesions   LYMPH NODES: No enlarged glands  ENDOCRINE: No heat or cold intolerance; No hair loss; No polydipsia or polyuria  MUSCULOSKELETAL: No joint pain or swelling; No muscle, back, or extremity pain  PSYCHIATRIC: No depression, anxiety, mood swings, or difficulty sleeping  HEME/LYMPH: No easy bruising, or bleeding gums  ALLERGY AND IMMUNOLOGIC: No hives or eczema    Vital Signs Last 24 Hrs  T(C): 37.2 (06 May 2018 12:12), Max: 37.2 (05 May 2018 23:23)  T(F): 98.9 (06 May 2018 12:12), Max: 99 (05 May 2018 23:23)  HR: 60 (06 May 2018 12:12) (60 - 70)  BP: 104/65 (06 May 2018 12:12) (103/64 - 122/68)  BP(mean): --  RR: 15 (06 May 2018 12:12) (15 - 18)  SpO2: 99% (06 May 2018 12:12) (95% - 100%)    PHYSICAL EXAM:  GENERAL: NAD, well-groomed, well-developed  HEAD:  Atraumatic, Normocephalic  EYES: EOMI, PERRLA, conjunctiva and sclera clear  ENMT: No tonsillar erythema, exudates, or enlargement; Moist mucous membranes, Good dentition, No lesions  NECK: Supple, No JVD, Normal thyroid  NERVOUS SYSTEM:  Alert & Oriented X3, Good concentration; Motor Strength 5/5 B/L upper and lower extremities; DTRs 2+ intact and symmetric  CHEST/LUNG: Clear to auscultation bilaterally; No rales, rhonchi, wheezing, or rubs  HEART: Regular rate and rhythm; No murmurs, rubs, or gallops  ABDOMEN: Soft, Nontender, Nondistended; Bowel sounds present  EXTREMITIES:  2+ Peripheral Pulses, No clubbing, cyanosis, or edema  LYMPH: No lymphadenopathy noted  SKIN: No rashes or lesions    LABS:                        9.9    6.7   )-----------( 163      ( 06 May 2018 07:57 )             30.7     06 May 2018 07:57    144    |  107    |  14     ----------------------------<  91     3.2     |  31     |  0.75     Ca    8.4        06 May 2018 07:57      PT/INR - ( 05 May 2018 05:03 )   PT: 15.3 sec;   INR: 1.39 ratio             CAPILLARY BLOOD GLUCOSE          RADIOLOGY & ADDITIONAL TESTS:    Imaging Personally Reviewed: < from: Xray Abdomen 2 Views (05.05.18 @ 12:45) >  There is suggestion of a retrocardiac left lower lobe airspace   consolidation and probable small pleural effusion.    < end of copied text >   [ x] YES  [ ] NO    Consultant(s) Notes Reviewed:  [x ] YES  [ ] NO    Care Discussed with Consultants/Other Providers [ x] YES  [ ] NO

## 2018-05-06 NOTE — PROGRESS NOTE ADULT - SUBJECTIVE AND OBJECTIVE BOX
[facility] -- Abena Alcantara, Manuel Yang, Roddy Alegria Savella  Office # [phone]      Follow Up:  shortness of breath and leg swelling    Subjective/Observations: Pt seen and examined.  Lying in bed with supplemental NC O2, NAD.  Pt states her SOB is improved and denies cp, sob or palpitations.  No signs of orthopnea or PND.        REVIEW OF SYSTEMS: All other review of systems is negative unless indicated above    PAST MEDICAL & SURGICAL HISTORY:  Neuropathy  GERD (gastroesophageal reflux disease)  Anxiety  Glaucoma  Hypertension  Atrial fibrillation  No significant past surgical history      MEDICATIONS  (STANDING):  amiodarone    Tablet 200 milliGRAM(s) Oral daily  apixaban 5 milliGRAM(s) Oral every 12 hours  clonazePAM Tablet 1 milliGRAM(s) Oral two times a day  docusate sodium 100 milliGRAM(s) Oral three times a day  dorzolamide 2%/timolol 0.5% Ophthalmic Solution 1 Drop(s) Both EYES two times a day  furosemide   Injectable 40 milliGRAM(s) IV Push daily  gabapentin 100 milliGRAM(s) Oral at bedtime  latanoprost 0.005% Ophthalmic Solution 1 Drop(s) Both EYES at bedtime  metoprolol tartrate 100 milliGRAM(s) Oral two times a day  pantoprazole    Tablet 40 milliGRAM(s) Oral before breakfast  potassium chloride    Tablet ER 40 milliEquivalent(s) Oral two times a day  senna 2 Tablet(s) Oral at bedtime    MEDICATIONS  (PRN):      Allergies    No Known Allergies    Intolerances            Vital Signs Last 24 Hrs  T(C): 36.8 (06 May 2018 08:12), Max: 37.2 (05 May 2018 23:23)  T(F): 98.2 (06 May 2018 08:12), Max: 99 (05 May 2018 23:23)  HR: 69 (06 May 2018 08:12) (63 - 72)  BP: 122/68 (06 May 2018 08:12) (103/64 - 130/78)  BP(mean): --  RR: 16 (06 May 2018 08:12) (16 - 18)  SpO2: 100% (06 May 2018 08:12) (95% - 100%)    I&O's Summary    05 May 2018 07:01  -  06 May 2018 07:00  --------------------------------------------------------  IN: 0 mL / OUT: 450 mL / NET: -450 mL          PHYSICAL EXAM:  TELE: SR 60s no events  Constitutional: NAD, awake and alert, well-developed, obese, weak, dyspneic with movement  HEENT: Moist Mucous Membranes, Anicteric  Pulmonary:  takes shallow breaths, rales bilateral bases  Cardiovascular: Regular, S1 and S2, 2/6 SM  Gastrointestinal: Bowel Sounds present, soft, nontender. Obese Abdomen  Lymph: lower extremity non-pitting bilateral edema. No lymphadenopathy.  Skin: No visible rashes or ulcers.  Psych:  Mood & affect flat    LABS: All Labs Reviewed:                        9.9    6.7   )-----------( 163      ( 06 May 2018 07:57 )             30.7                         11.2   14.0  )-----------( 209      ( 05 May 2018 05:03 )             34.6     06 May 2018 07:57    144    |  107    |  14     ----------------------------<  91     3.2     |  31     |  0.75   05 May 2018 05:03    141    |  108    |  14     ----------------------------<  161    4.5     |  27     |  0.91     Ca    8.4        06 May 2018 07:57  Ca    8.7        05 May 2018 05:03    TPro  7.5    /  Alb  3.3    /  TBili  0.5    /  DBili  x      /  AST  39     /  ALT  33     /  AlkPhos  89     05 May 2018 05:03    PT/INR - ( 05 May 2018 05:03 )   PT: 15.3 sec;   INR: 1.39 ratio           CARDIAC MARKERS ( 06 May 2018 07:57 )  .154 ng/mL / x     / x     / x     / x      CARDIAC MARKERS ( 05 May 2018 21:19 )  .312 ng/mL / x     / x     / x     / x      CARDIAC MARKERS ( 05 May 2018 14:12 )  .575 ng/mL / x     / x     / x     / x      CARDIAC MARKERS ( 05 May 2018 11:15 )  .678 ng/mL / x     / 33 U/L / x     / x      CARDIAC MARKERS ( 05 May 2018 05:03 )  .059 ng/mL / x     / 49 U/L / x     / x        < from: 12 Lead ECG (05.05.18 @ 06:31) >  Ventricular Rate 68 BPM    Atrial Rate 68 BPM    P-R Interval 158 ms    QRS Duration 98 ms    Q-T Interval 396 ms    QTC Calculation(Bezet) 421 ms    P Axis 17 degrees    R Axis -34 degrees    T Axis -29 degrees    Diagnosis Line Normal sinus rhythm  Left axis deviation  Minimal voltage criteria for LVH, may be normal variant  Septal infarct , age undetermined  Possible Lateral infarct , age undetermined  T wave abnormality, consider anterior ischemia  Abnormal ECG    Confirmed by Aubrey Yang MD ([id number]) on 5/5/2018 2:36:34 PM    < end of copied text >    < from: Xray Abdomen 2 Views (05.05.18 @ 12:45) >  EXAM:  XR ABDOMEN 2V                            PROCEDURE DATE:  05/05/2018          INTERPRETATION:  Clinical information: Abdominal pain and distention.    Supine and upright views.  The lower pelvis is not included on this exam.    There is a nonobstructive appearing bowel gas pattern.    No free intraperitoneal air is noted.    Calcifications are noted within the pelvis.    There is suggestion of a retrocardiac left lower lobe airspace   consolidation and probable small pleural effusion.    Impression:    Findings as discussed above.                STEPHEN DORAN M.D., ATTENDING RADIOLOGIST  This document has been electronically signed. May  5 2018  1:44PM    < end of copied text >  < from: Xray Elbow AP + Lateral, Right (05.05.18 @ 11:46) >  EXAM:  ELBOW 2VIEWS RT                            PROCEDURE DATE:  05/05/2018          INTERPRETATION:  RIGHT ELBOW: AP, lateral, and oblique views    CLINICAL HISTORY: right elbow pain        COMPARISON: None Available    FINDINGS:    No acute fracture or dislocation is noted.    Impression:    Findings as discussed above.                STEPHEN DORAN M.D., ATTENDING RADIOLOGIST  This document has been electronically signed. May  5 2018 11:58AM    < end of copied text >    < from: Xray Shoulder 2 Views, Right (05.05.18 @ 11:46) >    EXAM:  SHOULDER RIGHT (MINIMUM 2 V)                            PROCEDURE DATE:  05/05/2018          INTERPRETATION:  RIGHT SHOULDER: AP, "Y" view    CLINICAL HISTORY: Right shoulder pain.    COMPARISON: None Available    FINDINGS:    Diffuse osteopenia is present.   Small coarse calcification in the soft tissues adjacent to the superior   lateral aspect of the humeral head on the internal oblique view, likely   reflecting calcific tendinitis.    No acute fracture or dislocation is noted.    Prominence of the right hilum is noted.    Impression:    Findings as discussed above.    Impression:    Findings as discussed above.                STEPHEN DORAN M.D., ATTENDING RADIOLOGIST  This document has been electronically signed. May  5 2018 11:56AM    < end of copied text >    < from: US Duplex Venous Lower Ext Complete, Bilateral (05.05.18 @ 06:28) >  EXAM:  US DPLX LWR EXT VEINS COMPL BI                            PROCEDURE DATE:  05/05/2018          INTERPRETATION:  CLINICAL INFORMATION: Leg swelling for 4 days.    COMPARISON: None available.    TECHNIQUE: Duplex sonography of BILATERAL LOWER extremities with color   and spectral Doppler, with and without compression.      FINDINGS:    There is normal compressibility of bilateral common femoral, femoral and   popliteal veins. No calf vein thrombosis is detected.    Doppler examination shows normal spontaneous and phasic flow.    A thrombosed superficial vein in the upper right calf region is seen   compatible with a superficial thrombophlebitis.    IMPRESSION:     1. No evidence of bilateral lower extremity deep venous thrombosis.  2. Superficial thrombophlebitis in the right upper calf.                ARTI MAHMOOD M.D., ATTENDING RADIOLOGIST  This document has been electronically signed. May  5 2018  6:36AM        < end of copied text >     < from: Xray Chest 1 View-PORTABLE IMMEDIATE (05.05.18 @ 05:05) >  EXAM:  XR CHEST PORTABLE IMMED 1V                            PROCEDURE DATE:  05/05/2018          INTERPRETATION:  Clinical Indication: Shortness of breath    Technique: Single Frontal view of the chest    Comparison: There are no prior studies available for comparison.      Findings/  IMPRESSION:      Lungs are clear. No pleural effusions. Mild to moderate central pulmonary   vascular congestion. Cardiomediastinal silhouette is indeterminate in   size due to technique. Bones are unremarkable.                    JONA MAYFIELD M.D., ATTENDING RADIOLOGIST  This document has been electronically signed. May  5 2018  9:21AM        < end of copied text >

## 2018-05-06 NOTE — PROGRESS NOTE ADULT - SUBJECTIVE AND OBJECTIVE BOX
PULMONARY/CRITICAL CARE      INTERVAL HPI/OVERNIGHT EVENTS:  Less sob. No cp. No fever. Diuresing.  86y FemaleHPI:  87 y/o F pmh a fib on eliquis, AS, GERD, HTN,  glaucoma, neuropathy presents with C/O SOB diaphoretic  and abdominal bloating, increased leg swelling since 4am morning of admission.  She was cardioverted successfully from AF to NSR yesterday at [facility]. Son is at bedside and provides history. States that patient has been living at home with him, ambulating with cane, no home O2 use, due to patient being in hospital two times this year. States that she was cardioverted once in April 2018 and once yesterday. States that patient was doing well after cardioversion however complaining of right elbow and shoulder pain. At 4am morning of admission, patient was suddenly SOB, diaphoretic and appeared anxious and ambulance was called.  Paramedics arrived and the patient was assisted with Cpap. Sat was 100Percent and she was downgraded BiPap in the ED. Reports b/l LE swelling that has been increasing for the past 3 days. No Chest Pain, No nausea, no vomiting, no fever, no chills.     In the ED, VS temp 98.2, HR 74, /87, RR 27, SpO2 100 on supplemental O2. WBC 14.0, Hg 11.2, Hct 34.6, platelet 209, PT 15.3, INR 1.39, D-dimer 222, Na 141, K 4.5, Cl 108, Co2 27, BUN 14, Cr 0.91, glucose 161, creatinine kinase 49, troponin 1 .0.59, proBNP 1755. ABG pH 7.40, pCo2 42, po2 140, hco3 25, oxygen sat 100, bipap 12/6.50,     Received  mg, Lasix 40m g IV x 1    Imaging:  Doppler b/l LE 1. No evidence of bilateral lower extremity deep venous thrombosis. 2. Superficial thrombophlebitis in the right upper calf.  CXR: Lungs are clear. No pleural effusions. Mild to moderate central pulmonary vascular congestion. Cardiomediastinal silhouette is indeterminate in   size due to technique. Bones are unremarkable  EKG: NSR at 68 bpm, inverted p waves notes (05 May 2018 07:05)        PAST MEDICAL & SURGICAL HISTORY:  Neuropathy  GERD (gastroesophageal reflux disease)  Anxiety  Glaucoma  Hypertension  Atrial fibrillation  No significant past surgical history        ICU Vital Signs Last 24 Hrs  T(C): 36.8 (06 May 2018 08:12), Max: 37.2 (05 May 2018 23:23)  T(F): 98.2 (06 May 2018 08:12), Max: 99 (05 May 2018 23:23)  HR: 69 (06 May 2018 08:12) (63 - 70)  BP: 122/68 (06 May 2018 08:12) (103/64 - 130/78)  BP(mean): --  ABP: --  ABP(mean): --  RR: 16 (06 May 2018 08:12) (16 - 18)  SpO2: 100% (06 May 2018 08:12) (95% - 100%)    Qtts:     I&O's Summary    05 May 2018 07:01  -  06 May 2018 07:00  --------------------------------------------------------  IN: 0 mL / OUT: 450 mL / NET: -450 mL            REVIEW OF SYSTEMS:    CONSTITUTIONAL: No fever, weight loss, or fatigue  EYES: No eye pain, visual disturbances, or discharge  ENMT:  No difficulty hearing, tinnitus, vertigo; No sinus or throat pain  NECK: No pain or stiffness  BREASTS: No pain, masses, or nipple discharge  RESPIRATORY: No cough, wheezing, chills or hemoptysis; No shortness of breath  CARDIOVASCULAR: No chest pain, palpitations, dizziness, or leg swelling  GASTROINTESTINAL: No abdominal or epigastric pain. No nausea, vomiting, or hematemesis; No diarrhea or constipation. No melena or hematochezia.  GENITOURINARY: No dysuria, frequency, hematuria, or incontinence  NEUROLOGICAL: No headaches, memory loss, loss of strength, numbness, or tremors  SKIN: No itching, burning, rashes, or lesions   LYMPH NODES: No enlarged glands  ENDOCRINE: No heat or cold intolerance; No hair loss  MUSCULOSKELETAL: No joint pain or swelling; No muscle, back, or extremity pain, no calf tenderness  PSYCHIATRIC: No depression, anxiety, mood swings, or difficulty sleeping  HEME/LYMPH: No easy bruising, or bleeding gums  ALLERGY AND IMMUNOLOGIC: No hives or eczema      PHYSICAL EXAM:    GENERAL: NAD, well-groomed, well-developed, NAD  HEAD:  Atraumatic, Normocephalic  EYES: EOMI, PERRLA, conjunctiva and sclera clear  ENMT: No tonsillar erythema, exudates, or enlargement; Moist mucous membranes, Good dentition, No lesions  NECK: Supple, No JVD, Normal thyroid  NERVOUS SYSTEM:  Alert & Oriented X3, Good concentration; Motor Strength 5/5 B/L upper and lower extremities  CHEST/LUNG: bibas rales, No rhonchi, wheezing, or rubs  HEART: Regular rate and rhythm; No murmurs, rubs, or gallops  ABDOMEN: Soft, Nontender, Nondistended; Bowel sounds present  EXTREMITIES:  2+ Peripheral Pulses, No clubbing, cyanosis, 1 plus pedal edema  LYMPH: No lymphadenopathy noted  SKIN: No rashes or lesions        LABS:                        9.9    6.7   )-----------( 163      ( 06 May 2018 07:57 )             30.7     05-06    144  |  107  |  14  ----------------------------<  91  3.2<L>   |  31  |  0.75    Ca    8.4<L>      06 May 2018 07:57    TPro  7.5  /  Alb  3.3  /  TBili  0.5  /  DBili  x   /  AST  39<H>  /  ALT  33  /  AlkPhos  89  05-05    PT/INR - ( 05 May 2018 05:03 )   PT: 15.3 sec;   INR: 1.39 ratio             ABG - ( 05 May 2018 05:12 )  pH, Arterial: 7.40  pH, Blood: x     /  pCO2: 42    /  pO2: 140   / HCO3: 25    / Base Excess: 0.7   /  SaO2: 100               vanco through     RADIOLOGY & ADDITIONAL STUDIES:  < from: Xray Abdomen 2 Views (05.05.18 @ 12:45) >  EXAM:  XR ABDOMEN 2V                            PROCEDURE DATE:  05/05/2018          INTERPRETATION:  Clinical information: Abdominal pain and distention.    Supine and upright views.  The lower pelvis is not included on this exam.    There is a nonobstructive appearing bowel gas pattern.    No free intraperitoneal air is noted.    Calcifications are noted within the pelvis.    There is suggestion of a retrocardiac left lower lobe airspace   consolidation and probable small pleural effusion.    Impression:    Findings as discussed above.                STEPHEN DORAN M.D., ATTENDING RADIOLOGIST  This document has been electronically signed. May  5 2018  1:44PM        < end of copied text >      CRITICAL CARE TIME SPENT:

## 2018-05-06 NOTE — PROGRESS NOTE ADULT - ASSESSMENT
Pt admitted with Acute respiratory failure, chf, which resolved with diuresis.  Superficial phlebitis  PAF  Hi troponin  Reviewed cxr--I do not believe there is a pneumonia.

## 2018-05-06 NOTE — PROGRESS NOTE ADULT - ASSESSMENT
The etiology of patient's decompensation is unclear but she remains in sinus rhythm with no clear evidence for an acute coronary syndrome or significant dysrhythmia. Her pro BNP is mildly elevated and her elevated troponin is of unclear significance at present.    Recommend    Cont tele SR no events 60s  Cardiac enzymes trending down.  Does not appear to be elevated from plaque rupture.  May be 2/2 due to her volume overload.  Her pro-bnp increased from admission feels better but still appears overloaded on exam.  Cont Lasix 40mg IVP daily.  Monitor strict I&Os and daily weights.  Monitor electrolytes and creatinine closely.  Maintain K >4  Mag >2  Echocardiography ordered  Supplemental oxygen as needed.  Pulm following  Venous Doppler negative for DVT with superficial thrombophlebitis in Rt upper calf  Apixaban resumed ASA dc'd  Continue amiodarone  discontinue digoxin with no clear benefit  We'll obtain old records  Further management can be dependent on her clinical course per.  Will follow      Griselda Linder, DIANNA-C  Cardiology

## 2018-05-07 ENCOUNTER — TRANSCRIPTION ENCOUNTER (OUTPATIENT)
Age: 83
End: 2018-05-07

## 2018-05-07 LAB
ANION GAP SERPL CALC-SCNC: 7 MMOL/L — SIGNIFICANT CHANGE UP (ref 5–17)
BUN SERPL-MCNC: 10 MG/DL — SIGNIFICANT CHANGE UP (ref 7–23)
CALCIUM SERPL-MCNC: 8.2 MG/DL — LOW (ref 8.5–10.1)
CHLORIDE SERPL-SCNC: 105 MMOL/L — SIGNIFICANT CHANGE UP (ref 96–108)
CO2 SERPL-SCNC: 30 MMOL/L — SIGNIFICANT CHANGE UP (ref 22–31)
CREAT SERPL-MCNC: 0.77 MG/DL — SIGNIFICANT CHANGE UP (ref 0.5–1.3)
GLUCOSE SERPL-MCNC: 86 MG/DL — SIGNIFICANT CHANGE UP (ref 70–99)
NT-PROBNP SERPL-SCNC: 4945 PG/ML — HIGH (ref 0–450)
POTASSIUM SERPL-MCNC: 3.8 MMOL/L — SIGNIFICANT CHANGE UP (ref 3.5–5.3)
POTASSIUM SERPL-SCNC: 3.8 MMOL/L — SIGNIFICANT CHANGE UP (ref 3.5–5.3)
SODIUM SERPL-SCNC: 142 MMOL/L — SIGNIFICANT CHANGE UP (ref 135–145)

## 2018-05-07 PROCEDURE — 99233 SBSQ HOSP IP/OBS HIGH 50: CPT | Mod: GC

## 2018-05-07 PROCEDURE — 71045 X-RAY EXAM CHEST 1 VIEW: CPT | Mod: 26

## 2018-05-07 PROCEDURE — 99233 SBSQ HOSP IP/OBS HIGH 50: CPT

## 2018-05-07 RX ORDER — CLONAZEPAM 1 MG
1 TABLET ORAL AT BEDTIME
Qty: 0 | Refills: 0 | Status: DISCONTINUED | OUTPATIENT
Start: 2018-05-07 | End: 2018-05-09

## 2018-05-07 RX ADMIN — Medication 1 MILLIGRAM(S): at 21:23

## 2018-05-07 RX ADMIN — DORZOLAMIDE HYDROCHLORIDE TIMOLOL MALEATE 1 DROP(S): 20; 5 SOLUTION/ DROPS OPHTHALMIC at 06:39

## 2018-05-07 RX ADMIN — APIXABAN 5 MILLIGRAM(S): 2.5 TABLET, FILM COATED ORAL at 18:08

## 2018-05-07 RX ADMIN — Medication 40 MILLIEQUIVALENT(S): at 05:32

## 2018-05-07 RX ADMIN — Medication 100 MILLIGRAM(S): at 05:32

## 2018-05-07 RX ADMIN — PANTOPRAZOLE SODIUM 40 MILLIGRAM(S): 20 TABLET, DELAYED RELEASE ORAL at 05:32

## 2018-05-07 RX ADMIN — Medication 100 MILLIGRAM(S): at 13:13

## 2018-05-07 RX ADMIN — LATANOPROST 1 DROP(S): 0.05 SOLUTION/ DROPS OPHTHALMIC; TOPICAL at 21:23

## 2018-05-07 RX ADMIN — APIXABAN 5 MILLIGRAM(S): 2.5 TABLET, FILM COATED ORAL at 05:32

## 2018-05-07 RX ADMIN — Medication 40 MILLIGRAM(S): at 05:32

## 2018-05-07 RX ADMIN — AMIODARONE HYDROCHLORIDE 200 MILLIGRAM(S): 400 TABLET ORAL at 05:33

## 2018-05-07 RX ADMIN — Medication 100 MILLIGRAM(S): at 18:08

## 2018-05-07 RX ADMIN — Medication 40 MILLIEQUIVALENT(S): at 18:08

## 2018-05-07 RX ADMIN — DORZOLAMIDE HYDROCHLORIDE TIMOLOL MALEATE 1 DROP(S): 20; 5 SOLUTION/ DROPS OPHTHALMIC at 18:08

## 2018-05-07 NOTE — DISCHARGE NOTE ADULT - MEDICATION SUMMARY - MEDICATIONS TO STOP TAKING
I will STOP taking the medications listed below when I get home from the hospital:    digoxin 125 mcg (0.125 mg) oral tablet  -- 1 tab(s) by mouth once a day I will STOP taking the medications listed below when I get home from the hospital:    digoxin 125 mcg (0.125 mg) oral tablet  -- 1 tab(s) by mouth once a day    triamterene-hydrochlorothiazide 37.5mg-25mg oral capsule  -- 1 cap(s) by mouth once a day

## 2018-05-07 NOTE — PROGRESS NOTE ADULT - ASSESSMENT
85 y/o F pmh a fib on eliquis, AS, GERD, HTN,  glaucoma, neuropathy presents with C/O SOB diaphoretic  and abdominal bloating, increased leg swelling since morning of admission. Admit to tele for acute respiratory failure 2/2 to acute CHF exacerbation, elevated troponin, abdominal distention. Patient is improved.

## 2018-05-07 NOTE — PROGRESS NOTE ADULT - SUBJECTIVE AND OBJECTIVE BOX
Patient is a 86y old  Female who presents with a chief complaint of shortness of breath, abdominal bloating, increased leg swelling (05 May 2018 07:05)      INTERVAL HPI/OVERNIGHT EVENTS: 87 y/o F pmh a fib on eliquis, AS, GERD, HTN,  glaucoma, neuropathy presents with C/O SOB diaphoretic  and abdominal bloating, increased leg swelling since morning of admission. Admit to [facility] for acute respiratory failure 2/2 to acute CHF exacerbation, elevated troponin , abdominal distention.    No events overnight. Patient was seen and examined at bedside this AM. Patient c/o minimal bloating, but otherwise no complaints.     MEDICATIONS  (STANDING):  amiodarone    Tablet 200 milliGRAM(s) Oral daily  apixaban 5 milliGRAM(s) Oral every 12 hours  clonazePAM Tablet 1 milliGRAM(s) Oral at bedtime  docusate sodium 100 milliGRAM(s) Oral three times a day  dorzolamide 2%/timolol 0.5% Ophthalmic Solution 1 Drop(s) Both EYES two times a day  furosemide   Injectable 40 milliGRAM(s) IV Push daily  gabapentin 100 milliGRAM(s) Oral at bedtime  latanoprost 0.005% Ophthalmic Solution 1 Drop(s) Both EYES at bedtime  metoprolol tartrate 100 milliGRAM(s) Oral two times a day  pantoprazole    Tablet 40 milliGRAM(s) Oral before breakfast  potassium chloride    Tablet ER 40 milliEquivalent(s) Oral two times a day  senna 2 Tablet(s) Oral at bedtime    MEDICATIONS  (PRN):      Allergies    No Known Allergies    Intolerances        REVIEW OF SYSTEMS:  CONSTITUTIONAL: No fever, No chills,No fatigue,No myalgia,No Body ache  EYES: No eye pain, visual disturbances, or discharge  ENMT:  No ear pain, No nose bleed, No vertigo; No sinus or throat pain  NECK: No pain, No stiffness  RESPIRATORY: No cough, wheezing, No  hemoptysis; No shortness of breath  CARDIOVASCULAR: No chest pain, palpitations, leg swelling  GASTROINTESTINAL: Mild abdominal bloating, no epigastric pain. No nausea, No vomiting; No diarrhea or constipation. [ ] BM  GENITOURINARY: No dysuria, No frequency, No urgency, No hematuria, or incontinence  NEUROLOGICAL: alert and oriented x 3,  No headaches, No dizziness, No numbness,  SKIN:   No itching, burning, rashes, or lesions   MUSCULOSKELETAL: No joint pain or swelling; No muscle pain, No back pain, No extremity pain  PSYCHIATRIC: No depression, anxiety, mood swings, or difficulty sleeping  ROS  [ ] Unable to obtain   REST OF REVIEW Of SYSTEM - [ ] Normal     Height (cm): 157.48 (05-05 @ 04:45), 157.48 (05-04 @ 09:53), 157.48 (04-10 @ 19:45)  Weight (kg): 68 (05-05 @ 04:45), 68 (05-04 @ 09:53), 69.9 (04-10 @ 19:45)  BMI (kg/m2): 27.4 (05-05 @ 04:45), 27.4 (05-04 @ 09:53), 28.2 (04-10 @ 19:45)  BSA (m2): 1.69 (05-05 @ 04:45), 1.69 (05-04 @ 09:53), 1.71 (04-10 @ 19:45)  Vital Signs Last 24 Hrs  T(C): 36.8 (07 May 2018 12:36), Max: 37.1 (06 May 2018 16:04)  T(F): 98.3 (07 May 2018 12:36), Max: 98.7 (06 May 2018 16:04)  HR: 59 (07 May 2018 12:36) (52 - 67)  BP: 118/74 (07 May 2018 12:36) (116/83 - 148/75)  BP(mean): --  RR: 16 (07 May 2018 12:36) (16 - 28)  SpO2: 94% (07 May 2018 12:36) (94% - 99%)  [ ] room air   [ ] 02    PHYSICAL EXAM:  GENERAL: NAD, well-groomed, well-developed  HEAD:  Atraumatic, Normocephalic  EYES: EOMI, PERRLA, conjunctiva and sclera clear  ENMT: No tonsillar erythema, exudates, or enlargement; Moist mucous membranes, Good dentition, No lesions  NECK: Supple, No JVD, Normal thyroid  NERVOUS SYSTEM:  Alert & Oriented X3, Good concentration; Motor Strength 5/5 B/L upper and lower extremities; DTRs 2+ intact and symmetric  CHEST/LUNG: Mild bibasilar crackles, no rhonchi  HEART: Regular rate and rhythm; No murmurs, rubs, or gallops  ABDOMEN: Soft, Nontender, Nondistended; Bowel sounds present  EXTREMITIES:  2+ Peripheral Pulses, No clubbing, cyanosis, or edema  LYMPH: No lymphadenopathy noted  SKIN: No rashes or lesions    LABS:    07 May 2018 06:30    142    |  105    |  10     ----------------------------<  86     3.8     |  30     |  0.77     Ca    8.2        07 May 2018 06:30        Hemoglobin A1C, Whole Blood: 5.9 % (04-11 @ 07:28)      CAPILLARY BLOOD GLUCOSE        Cultures          RADIOLOGY & ADDITIONAL TESTS:      Care Discussed with [X] Consultants  [ ] Patient  [ ] Family  [X]   /   [ ] Other; RN  DVT prophylaxis [ ] lovenox   [ ] subq heparin  [ ] coumadin  [ ] venodynes [ ] ambulating frequently at how risk for vte and no pharm , [x ] Eliquis        or  mechanical prophylaxis required    [ ] other   Advanced directive:    [ ]pt has hcp     [ ] pt declined to assign hcp  Discussed with pt @ bedside Patient is a 86y old  Female who presents with a chief complaint of shortness of breath, abdominal bloating, increased leg swelling (05 May 2018 07:05)      INTERVAL HPI/OVERNIGHT EVENTS: 85 y/o F pmh a fib on eliquis, AS, GERD, HTN,  glaucoma, neuropathy presents with C/O SOB diaphoretic  and abdominal bloating, increased leg swelling since morning of admission. Admit to [facility] for acute respiratory failure 2/2 to acute CHF exacerbation, elevated troponin , abdominal distention.    No events overnight. Patient was seen and examined at bedside this AM. Patient c/o minimal bloating, but otherwise no complaints.     MEDICATIONS  (STANDING):  amiodarone    Tablet 200 milliGRAM(s) Oral daily  apixaban 5 milliGRAM(s) Oral every 12 hours  clonazePAM Tablet 1 milliGRAM(s) Oral at bedtime  docusate sodium 100 milliGRAM(s) Oral three times a day  dorzolamide 2%/timolol 0.5% Ophthalmic Solution 1 Drop(s) Both EYES two times a day  furosemide   Injectable 40 milliGRAM(s) IV Push daily  gabapentin 100 milliGRAM(s) Oral at bedtime  latanoprost 0.005% Ophthalmic Solution 1 Drop(s) Both EYES at bedtime  metoprolol tartrate 100 milliGRAM(s) Oral two times a day  pantoprazole    Tablet 40 milliGRAM(s) Oral before breakfast  potassium chloride    Tablet ER 40 milliEquivalent(s) Oral two times a day  senna 2 Tablet(s) Oral at bedtime    MEDICATIONS  (PRN):      Allergies    No Known Allergies    Intolerances        REVIEW OF SYSTEMS:  CONSTITUTIONAL: No fever, No chills,No fatigue,No myalgia,No Body ache  EYES: No eye pain, visual disturbances, or discharge  ENMT:  No ear pain, No nose bleed, No vertigo; No sinus or throat pain  NECK: No pain, No stiffness  RESPIRATORY: No cough, wheezing, No  hemoptysis; No shortness of breath  CARDIOVASCULAR: No chest pain, palpitations, leg swelling  GASTROINTESTINAL: Mild abdominal bloating, no epigastric pain. No nausea, No vomiting; No diarrhea or constipation. [ ] BM  GENITOURINARY: No dysuria, No frequency, No urgency, No hematuria, or incontinence  NEUROLOGICAL: alert and oriented x 3,  No headaches, No dizziness, No numbness,  SKIN:   No itching, burning, rashes, or lesions   MUSCULOSKELETAL: No joint pain or swelling; No muscle pain, No back pain, No extremity pain  PSYCHIATRIC: No depression, anxiety, mood swings, or difficulty sleeping  ROS  [ ] Unable to obtain   REST OF REVIEW Of SYSTEM - [ ] Normal     Height (cm): 157.48 (05-05 @ 04:45), 157.48 (05-04 @ 09:53), 157.48 (04-10 @ 19:45)  Weight (kg): 68 (05-05 @ 04:45), 68 (05-04 @ 09:53), 69.9 (04-10 @ 19:45)  BMI (kg/m2): 27.4 (05-05 @ 04:45), 27.4 (05-04 @ 09:53), 28.2 (04-10 @ 19:45)  BSA (m2): 1.69 (05-05 @ 04:45), 1.69 (05-04 @ 09:53), 1.71 (04-10 @ 19:45)  Vital Signs Last 24 Hrs  T(C): 36.8 (07 May 2018 12:36), Max: 37.1 (06 May 2018 16:04)  T(F): 98.3 (07 May 2018 12:36), Max: 98.7 (06 May 2018 16:04)  HR: 59 (07 May 2018 12:36) (52 - 67)  BP: 118/74 (07 May 2018 12:36) (116/83 - 148/75)  BP(mean): --  RR: 16 (07 May 2018 12:36) (16 - 28)  SpO2: 94% (07 May 2018 12:36) (94% - 99%)  [ ] room air   [ ] 02    PHYSICAL EXAM:  GENERAL: NAD, well-groomed, well-developed  HEAD:  Atraumatic, Normocephalic  EYES: EOMI, PERRLA, conjunctiva and sclera clear  ENMT: No tonsillar erythema, exudates, or enlargement; Moist mucous membranes, Good dentition, No lesions  NECK: Supple, No JVD, Normal thyroid  NERVOUS SYSTEM:  Alert & Oriented X3, Good concentration; Motor Strength 5/5 B/L upper and lower extremities; DTRs 2+ intact and symmetric  CHEST/LUNG: Mild bibasilar crackles, no rhonchi  HEART: Regular rate and rhythm; No murmurs, rubs, or gallops  ABDOMEN: Soft, Nontender, Nondistended; Bowel sounds present  EXTREMITIES:  2+ Peripheral Pulses, No clubbing, cyanosis, or edema  LYMPH: No lymphadenopathy noted  SKIN: No rashes or lesions    LABS:    07 May 2018 06:30    142    |  105    |  10     ----------------------------<  86     3.8     |  30     |  0.77     Ca    8.2        07 May 2018 06:30        Hemoglobin A1C, Whole Blood: 5.9 % (04-11 @ 07:28)      CAPILLARY BLOOD GLUCOSE        Cultures          RADIOLOGY & ADDITIONAL TESTS:  < from: Xray Chest 1 View- PORTABLE-Routine (05.07.18 @ 08:49) >  Low lung volumes. No change heart mediastinum. Mild vascular congestion   similar to prior. There is a patchy left basilar subsegmental   atelectasis/infiltrate. Correlate clinically for active infection. Small   left pleural effusion.    < end of copied text >      Care Discussed with [X] Consultants  [ ] Patient  [ ] Family  [X]   /   [ ] Other; RN  DVT prophylaxis [ ] lovenox   [ ] subq heparin  [ ] coumadin  [ ] venodynes [ ] ambulating frequently at how risk for vte and no pharm , [x ] Eliquis        or  mechanical prophylaxis required    [ ] other   Advanced directive:    [ ]pt has hcp     [ ] pt declined to assign hcp  Discussed with pt @ bedside

## 2018-05-07 NOTE — DISCHARGE NOTE ADULT - CARE PLAN
Principal Discharge DX:	Acute respiratory failure  Goal:	stabilized oxygen saturation  Assessment and plan of treatment:	Continue lasix     mg PO QD  Follow up with cardiologist and pulmonologist  Secondary Diagnosis:	CHF (congestive heart failure)  Assessment and plan of treatment:	Continue lasix     mg PO QD  Follow up with cardiologist and pulmonologist  Secondary Diagnosis:	Troponin level elevated  Assessment and plan of treatment:	Enzymes trended down  Follow up with cardiologist  Secondary Diagnosis:	Atrial fibrillation  Assessment and plan of treatment:	Continue Eliquis  Follow up with cardiologist  Secondary Diagnosis:	Right elbow pain  Assessment and plan of treatment:	Xrays show osteopenia and calcific tendonitis  Follow up with PMD  Secondary Diagnosis:	GERD (gastroesophageal reflux disease)  Assessment and plan of treatment:	Continue protonix Principal Discharge DX:	Acute respiratory failure  Goal:	stabilized oxygen saturation  Assessment and plan of treatment:	Continue lasix    Follow up with cardiologist and pulmonologist  Secondary Diagnosis:	CHF (congestive heart failure)  Assessment and plan of treatment:	Continue lasix    Follow up with cardiologist and pulmonologist  Secondary Diagnosis:	Troponin level elevated  Assessment and plan of treatment:	Enzymes trended down  Follow up with cardiologist  Secondary Diagnosis:	Atrial fibrillation  Assessment and plan of treatment:	Continue Eliquis  Follow up with cardiologist  Secondary Diagnosis:	Right elbow pain  Assessment and plan of treatment:	Xrays show osteopenia and calcific tendonitis  Follow up with PMD  Secondary Diagnosis:	GERD (gastroesophageal reflux disease)  Assessment and plan of treatment:	Continue protonix Principal Discharge DX:	Acute respiratory failure  Goal:	stabilized oxygen saturation  Assessment and plan of treatment:	Continue lasix 40mg PO  Follow up with cardiologist and pulmonologist  Secondary Diagnosis:	CHF (congestive heart failure)  Assessment and plan of treatment:	Continue lasix 40mg PO  Follow up with cardiologist and pulmonologist  Secondary Diagnosis:	Troponin level elevated  Assessment and plan of treatment:	Enzymes trended down  Follow up with cardiologist  Secondary Diagnosis:	Atrial fibrillation  Assessment and plan of treatment:	Continue Eliquis  Continue Metoprolol  Stop Digoxin for now, given bradycardia  Follow up with cardiologist  Secondary Diagnosis:	Right elbow pain  Assessment and plan of treatment:	Xrays show osteopenia and calcific tendonitis  Follow up with PMD  Secondary Diagnosis:	GERD (gastroesophageal reflux disease)  Assessment and plan of treatment:	Continue protonix Principal Discharge DX:	Acute respiratory failure  Goal:	hypoxic, 2/2 CHF exacerbation, stabilized oxygen saturation  Assessment and plan of treatment:	Continue lasix 40mg PO  Follow up with cardiologist and pulmonologist  Secondary Diagnosis:	CHF (congestive heart failure)  Goal:	acute on chronic diastolic CHF, improved.  Assessment and plan of treatment:	Continue lasix 40mg PO  Follow up with cardiologist and pulmonologist  Secondary Diagnosis:	Troponin level elevated  Goal:	2/2 demand ischemia, ACS ruled out.  Assessment and plan of treatment:	Enzymes trended down  Follow up with cardiologist  Secondary Diagnosis:	Atrial fibrillation  Assessment and plan of treatment:	Continue Eliquis  Continue Metoprolol  Stop Digoxin for now, given bradycardia  Follow up with cardiologist  Secondary Diagnosis:	Right elbow pain  Assessment and plan of treatment:	Xrays show osteopenia and calcific tendonitis  Follow up with PMD  Secondary Diagnosis:	GERD (gastroesophageal reflux disease)  Assessment and plan of treatment:	Continue protonix

## 2018-05-07 NOTE — PROGRESS NOTE ADULT - ASSESSMENT
The etiology of patient's decompensation is unclear but she remains in sinus rhythm with no clear evidence for an acute coronary syndrome or significant dysrhythmia. Her pro BNP is mildly elevated and her elevated troponin is of unclear significance at present.    Recommend    Cont tele SR no events 60s  Cardiac enzymes trending down.  Does not appear to be elevated from plaque rupture.  May be 2/2 due to her volume overload.  Her pro-bnp increased from admission feels better but still appears overloaded on exam.  Cont Lasix 40mg IVP daily.  Monitor strict I&Os and daily weights.  Monitor electrolytes and creatinine closely.  Maintain K >4  Mag >2  Echocardiography reveals normal LV systolic function with LVEF 55%.  Mod AS. Mild mitral valve insufficiency.  Mod LAE.  Grade 2 diastolic dysfunction.    Supplemental oxygen as needed.  Pulm following  Venous Doppler negative for DVT with superficial thrombophlebitis in Rt upper calf on A/C  Cont Apixaban - ASA dc'd.  Hgb trending down 11.4 ->9.9.  Would monitor closely.  No overt signs of bleeding.  May consider anemia workup.    Continue amiodarone  discontinue digoxin with no clear benefit  Hemodynamics stable per flow sheet.    We'll obtain old records  Further management can be dependent on her clinical course per.  Will follow      Griselda Linder NP-C  Cardiology

## 2018-05-07 NOTE — PROGRESS NOTE ADULT - SUBJECTIVE AND OBJECTIVE BOX
[facility] -- Abena Alcantara, Manuel Yang, Roddy Alegria Savella  Office # [phone]      Follow Up:  Shortness of breath and leg swelling    Subjective/Observations:       REVIEW OF SYSTEMS: All other review of systems is negative unless indicated above    PAST MEDICAL & SURGICAL HISTORY:  Neuropathy  GERD (gastroesophageal reflux disease)  Anxiety  Glaucoma  Hypertension  Atrial fibrillation  Aortic valve stenosis, etiology of cardiac valve disease unspecified  Atrial fibrillation, unspecified type  GERD (gastroesophageal reflux disease)  Hiatal hernia  Colonic polyp  Hypovitaminosis D  Hyperlipidemia  Leg pain, bilateral  Glaucoma  Hypertension  No significant past surgical history  History of cardioversion  Abnormal endoscopy finding  Abnormal colonoscopy      MEDICATIONS  (STANDING):  amiodarone    Tablet 200 milliGRAM(s) Oral daily  apixaban 5 milliGRAM(s) Oral every 12 hours  clonazePAM Tablet 1 milliGRAM(s) Oral two times a day  docusate sodium 100 milliGRAM(s) Oral three times a day  dorzolamide 2%/timolol 0.5% Ophthalmic Solution 1 Drop(s) Both EYES two times a day  furosemide   Injectable 40 milliGRAM(s) IV Push daily  gabapentin 100 milliGRAM(s) Oral at bedtime  latanoprost 0.005% Ophthalmic Solution 1 Drop(s) Both EYES at bedtime  metoprolol tartrate 100 milliGRAM(s) Oral two times a day  pantoprazole    Tablet 40 milliGRAM(s) Oral before breakfast  potassium chloride    Tablet ER 40 milliEquivalent(s) Oral two times a day  senna 2 Tablet(s) Oral at bedtime    MEDICATIONS  (PRN):      Allergies    No Known Allergies    Intolerances            Vital Signs Last 24 Hrs  T(C): 36.9 (07 May 2018 04:34), Max: 37.2 (06 May 2018 12:12)  T(F): 98.5 (07 May 2018 04:34), Max: 98.9 (06 May 2018 12:12)  HR: 62 (07 May 2018 04:34) (60 - 67)  BP: 128/62 (07 May 2018 05:09) (104/65 - 128/62)  BP(mean): --  RR: 18 (07 May 2018 04:34) (15 - 28)  SpO2: 99% (07 May 2018 04:34) (95% - 99%)    I&O's Summary    06 May 2018 07:01  -  07 May 2018 07:00  --------------------------------------------------------  IN: 0 mL / OUT: 500 mL / NET: -500 mL          PHYSICAL EXAM:  TELE:   Constitutional: NAD, awake and alert, well-developed  HEENT: Moist Mucous Membranes, Anicteric  Pulmonary: Non-labored, breath sounds are clear bilaterally, No wheezing, rales or rhonchi  Cardiovascular: Regular, S1 and S2, No murmurs, rubs, gallops or clicks  Gastrointestinal: Bowel Sounds present, soft, nontender.   Lymph: No peripheral edema. No lymphadenopathy.  Skin: No visible rashes or ulcers.  Psych:  Mood & affect appropriate    LABS: All Labs Reviewed:                        9.9    6.7   )-----------( 163      ( 06 May 2018 07:57 )             30.7                         11.2   14.0  )-----------( 209      ( 05 May 2018 05:03 )             34.6                         11.4   7.3   )-----------( 216      ( 04 May 2018 10:07 )             36.0     07 May 2018 06:30    142    |  105    |  10     ----------------------------<  86     3.8     |  30     |  0.77   06 May 2018 07:57    144    |  107    |  14     ----------------------------<  91     3.2     |  31     |  0.75   05 May 2018 05:03    141    |  108    |  14     ----------------------------<  161    4.5     |  27     |  0.91     Ca    8.2        07 May 2018 06:30  Ca    8.4        06 May 2018 07:57  Ca    8.7        05 May 2018 05:03    TPro  7.5    /  Alb  3.3    /  TBili  0.5    /  DBili  x      /  AST  39     /  ALT  33     /  AlkPhos  89     05 May 2018 05:03  TPro  7.0    /  Alb  3.8    /  TBili  0.4    /  DBili  x      /  AST  36     /  ALT  21     /  AlkPhos  74     04 May 2018 10:07      CARDIAC MARKERS ( 06 May 2018 07:57 )  .154 ng/mL / x     / x     / x     / x      CARDIAC MARKERS ( 05 May 2018 21:19 )  .312 ng/mL / x     / x     / x     / x      CARDIAC MARKERS ( 05 May 2018 14:12 )  .575 ng/mL / x     / x     / x     / x      CARDIAC MARKERS ( 05 May 2018 11:15 )  .678 ng/mL / x     / 33 U/L / x     / x [facility] -- Abena Alcantara, Manuel Yang, Roddy Alegria Savella  Office # [phone]      Follow Up:  Shortness of breath and leg swelling    Subjective/Observations: Pt seen and examined.  NAD lying comfortably in bed.  Some mild dyspnea noted when changing positions although denies.  Pt also denies cp, sob or palpitations.  No signs of orthopnea or PND.        REVIEW OF SYSTEMS: All other review of systems is negative unless indicated above    PAST MEDICAL & SURGICAL HISTORY:  Neuropathy  GERD (gastroesophageal reflux disease)  Anxiety  Glaucoma  Hypertension  Atrial fibrillation  Aortic valve stenosis, etiology of cardiac valve disease unspecified  Atrial fibrillation, unspecified type  GERD (gastroesophageal reflux disease)  Hiatal hernia  Colonic polyp  Hypovitaminosis D  Hyperlipidemia  Leg pain, bilateral  Glaucoma  Hypertension  No significant past surgical history  History of cardioversion  Abnormal endoscopy finding  Abnormal colonoscopy      MEDICATIONS  (STANDING):  amiodarone    Tablet 200 milliGRAM(s) Oral daily  apixaban 5 milliGRAM(s) Oral every 12 hours  clonazePAM Tablet 1 milliGRAM(s) Oral two times a day  docusate sodium 100 milliGRAM(s) Oral three times a day  dorzolamide 2%/timolol 0.5% Ophthalmic Solution 1 Drop(s) Both EYES two times a day  furosemide   Injectable 40 milliGRAM(s) IV Push daily  gabapentin 100 milliGRAM(s) Oral at bedtime  latanoprost 0.005% Ophthalmic Solution 1 Drop(s) Both EYES at bedtime  metoprolol tartrate 100 milliGRAM(s) Oral two times a day  pantoprazole    Tablet 40 milliGRAM(s) Oral before breakfast  potassium chloride    Tablet ER 40 milliEquivalent(s) Oral two times a day  senna 2 Tablet(s) Oral at bedtime    MEDICATIONS  (PRN):      Allergies    No Known Allergies    Intolerances            Vital Signs Last 24 Hrs  T(C): 36.9 (07 May 2018 04:34), Max: 37.2 (06 May 2018 12:12)  T(F): 98.5 (07 May 2018 04:34), Max: 98.9 (06 May 2018 12:12)  HR: 62 (07 May 2018 04:34) (60 - 67)  BP: 128/62 (07 May 2018 05:09) (104/65 - 128/62)  BP(mean): --  RR: 18 (07 May 2018 04:34) (15 - 28)  SpO2: 99% (07 May 2018 04:34) (95% - 99%)    I&O's Summary    06 May 2018 07:01  -  07 May 2018 07:00  --------------------------------------------------------  IN: 0 mL / OUT: 500 mL / NET: -500 mL          PHYSICAL EXAM:  TELE: SR 60s no events  Constitutional: NAD, awake and alert, well-developed, obese  HEENT: Moist Mucous Membranes, Anicteric  Pulmonary: Non-labored, breath sounds with cracks in bilateral bases,   Cardiovascular: Regular, S1 and S2, No murmurs, rubs, gallops or clicks  Gastrointestinal: Bowel Sounds present, soft, nontender. Obese abdomen  Lymph: scant lower extremity edema bilaterally. No lymphadenopathy.  Skin: No visible rashes or ulcers.  Psych:  Mood & affect appropriate    LABS: All Labs Reviewed:                        9.9    6.7   )-----------( 163      ( 06 May 2018 07:57 )             30.7                         11.2   14.0  )-----------( 209      ( 05 May 2018 05:03 )             34.6                         11.4   7.3   )-----------( 216      ( 04 May 2018 10:07 )             36.0     07 May 2018 06:30    142    |  105    |  10     ----------------------------<  86     3.8     |  30     |  0.77   06 May 2018 07:57    144    |  107    |  14     ----------------------------<  91     3.2     |  31     |  0.75   05 May 2018 05:03    141    |  108    |  14     ----------------------------<  161    4.5     |  27     |  0.91     Ca    8.2        07 May 2018 06:30  Ca    8.4        06 May 2018 07:57  Ca    8.7        05 May 2018 05:03    TPro  7.5    /  Alb  3.3    /  TBili  0.5    /  DBili  x      /  AST  39     /  ALT  33     /  AlkPhos  89     05 May 2018 05:03  TPro  7.0    /  Alb  3.8    /  TBili  0.4    /  DBili  x      /  AST  36     /  ALT  21     /  AlkPhos  74     04 May 2018 10:07      CARDIAC MARKERS ( 06 May 2018 07:57 )  .154 ng/mL / x     / x     / x     / x      CARDIAC MARKERS ( 05 May 2018 21:19 )  .312 ng/mL / x     / x     / x     / x      CARDIAC MARKERS ( 05 May 2018 14:12 )  .575 ng/mL / x     / x     / x     / x      CARDIAC MARKERS ( 05 May 2018 11:15 )  .678 ng/mL / x     / 33 U/L / x     / x        < from: 12 Lead ECG (05.05.18 @ 06:31) >  Ventricular Rate 68 BPM    Atrial Rate 68 BPM    P-R Interval 158 ms    QRS Duration 98 ms    Q-T Interval 396 ms    QTC Calculation(Bezet) 421 ms    P Axis 17 degrees    R Axis -34 degrees    T Axis -29 degrees    Diagnosis Line Normal sinus rhythm  Left axis deviation  Minimal voltage criteria for LVH, may be normal variant  Septal infarct , age undetermined  Possible Lateral infarct , age undetermined  T wave abnormality, consider anterior ischemia  Abnormal ECG    Confirmed by Trey SALDANA, Aubrey ([id number]) on 5/5/2018 2:36:34 PM    < end of copied text >  < from: TTE Echo Doppler w/o Cont (05.06.18 @ 10:16) >   EXAM:  ECHO TTE W/O CON COMP W/DOPPLR         PROCEDURE DATE:  05/06/2018        INTERPRETATION:  Ordering Physician: VALDEMAR WRIGHT [id number]    Indication: Abnormal EKG  Technologist Initials: KL  Study Quality: Technically fair   A complete echocardiographic study was performed utilizing standard   protocol including spectral and color Doppler in all echocardiographic   windows.    Height: 157 cm  Weight: 68 kg  BSA: 1.69 sq m  Blood Pressure: 122/68    MEASUREMENTS  IVS: 1.2cm  PWT: 1.1cm  LA: 4.7cm  AO: 3.3cm  LVIDd: 5.1cm  LVIDs: 2.7cm    LVEF: 55%  RVSP: 41mmHg    FINDINGS  Left Ventricle: Normal left ventricular systolic function.  Aortic Valve: Calcified aortic valve with decreased opening. Peak   transaortic gradient equals 31 mmHg, and mean transaortic gradient equals   15 mmHg. The aortic valve area is 1.4 sq cm, consistent with moderate   aortic stenosis.  Mitral Valve: Mitral annular calcification and calcified mitral valve   leaflets. Mild mitral insufficiency.  TricuspidValve: Normal tricuspid valve. Mild tricuspid insufficiency.  Pulmonic Valve: Not well visualized. No pulmonic insufficiency.  Left Atrium: Moderately enlarged  Right Ventricle: Normal right ventricular size and systolic function.  Right Atrium: Normal  Diastolic Function: Grade 2 diastolic dysfunction  Pericardium/Pleura: Normal pericardium with no pericardial effusion.      CONCLUSIONS:    1. Normal left ventricular systolic function.  2.  Calcified aortic valve with decreased opening. Peak transaortic   gradient equals 31 mmHg, and mean transaortic gradient equals 15 mmHg.   The aortic valve area is 1.4 sq cm, consistent with moderate aortic   stenosis.  3. Mitral annular calcification and calcified mitral leaflets with mild   mitral insufficiency.  4. Moderate left atrial enlargement.  5. Normal right ventricular size and systolic function.  6. Grade 2 diastolic dysfunction.                      DARRELL KENNY M.D., ATTENDING CARDIOLOGIST  This document has been electronically signed. May  7 2018  7:20AM        < end of copied text >  < from: Xray Abdomen 2 Views (05.05.18 @ 12:45) >    EXAM:  XR ABDOMEN 2V                            PROCEDURE DATE:  05/05/2018          INTERPRETATION:  Clinical information: Abdominal pain and distention.    Supine and upright views.  The lower pelvis is not included on this exam.    There is a nonobstructive appearing bowel gas pattern.    No free intraperitoneal air is noted.    Calcifications are noted within the pelvis.    There is suggestion of a retrocardiac left lower lobe airspace   consolidation and probable small pleural effusion.    Impression:    Findings as discussed above.                STEPHEN DORAN M.D., ATTENDING RADIOLOGIST  This document has been electronically signed. May  5 2018  1:44PM    < end of copied text >

## 2018-05-07 NOTE — DISCHARGE NOTE ADULT - PLAN OF CARE
stabilized oxygen saturation Continue lasix     mg PO QD  Follow up with cardiologist and pulmonologist Enzymes trended down  Follow up with cardiologist Continue Eliquis  Follow up with cardiologist Xrays show osteopenia and calcific tendonitis  Follow up with PMD Continue protonix Continue lasix    Follow up with cardiologist and pulmonologist Continue lasix 40mg PO  Follow up with cardiologist and pulmonologist Continue Eliquis  Continue Metoprolol  Stop Digoxin for now, given bradycardia  Follow up with cardiologist hypoxic, 2/2 CHF exacerbation, stabilized oxygen saturation acute on chronic diastolic CHF, improved. 2/2 demand ischemia, ACS ruled out.

## 2018-05-07 NOTE — PROGRESS NOTE ADULT - PROBLEM SELECTOR PLAN 7
As per son, patient developed right elbow and shoulder pain s/p cardioversion yesterday. Likely musculoskeletal pain  Right xray elbow, shoulder shows diffuse osteopenia, calcific tendonitis As per son, patient developed right elbow and shoulder pain s/p cardioversion yesterday. Likely musculoskeletal pain  Right xray elbow, shoulder shows diffuse osteopenia, calcific tendonitis  PT eval ordered

## 2018-05-07 NOTE — DISCHARGE NOTE ADULT - HOSPITAL COURSE
85 y/o F pmh a fib on eliquis, AS, GERD, HTN,  glaucoma, neuropathy presents with C/O SOB diaphoretic  and abdominal bloating, increased leg swelling since 4am morning of admission.  She was cardioverted successfully from AF to NSR yesterday at [facility]. Son is at bedside and provides history. States that patient has been living at home with him, ambulating with cane, no home O2 use, due to patient being in hospital two times this year. States that she was cardioverted once in April 2018 and once yesterday. States that patient was doing well after cardioversion however complaining of right elbow and shoulder pain. At 4am morning of admission, patient was suddenly SOB, diaphoretic and appeared anxious and ambulance was called.  Paramedics arrived and the patient was assisted with Cpap. Sat was 100Percent and she was downgraded BiPap in the ED. Reports b/l LE swelling that has been increasing for the past 3 days. No Chest Pain, No nausea, no vomiting, no fever, no chills.     In the ED, VS temp 98.2, HR 74, /87, RR 27, SpO2 100 on supplemental O2. WBC 14.0, Hg 11.2, Hct 34.6, platelet 209, PT 15.3, INR 1.39, D-dimer 222, Na 141, K 4.5, Cl 108, Co2 27, BUN 14, Cr 0.91, glucose 161, creatinine kinase 49, troponin 1 .0.59, proBNP 1755. ABG pH 7.40, pCo2 42, po2 140, hco3 25, oxygen sat 100, bipap 12/6.50,     Received  mg, Lasix 40m g IV x 1 87 y/o F pmh a fib on eliquis, AS, GERD, HTN,  glaucoma, neuropathy presents with C/O SOB diaphoretic  and abdominal bloating, increased leg swelling since 4am morning of admission.  She was cardioverted successfully from AF to NSR yesterday at [facility]. Son is at bedside and provides history. States that patient has been living at home with him, ambulating with cane, no home O2 use, due to patient being in hospital two times this year. States that she was cardioverted once in April 2018 and once yesterday. States that patient was doing well after cardioversion however complaining of right elbow and shoulder pain. At 4am morning of admission, patient was suddenly SOB, diaphoretic and appeared anxious and ambulance was called.  Paramedics arrived and the patient was assisted with Cpap. Sat was 100Percent and she was downgraded BiPap in the ED. Reports b/l LE swelling that has been increasing for the past 3 days. No Chest Pain, No nausea, no vomiting, no fever, no chills. Patient was admitted to telemetry for acute respiratory failure 2/2 to CHF exacerbation, elevated troponin abdominal distention and b/l LE edema. 85 y/o F pmh a fib on eliquis, AS, GERD, HTN,  glaucoma, neuropathy presented with C/O SOB, diaphoretic and abdominal bloating, with increased leg swelling x 3 days, worsening since 4am morning of admission.  She was cardioverted successfully from AF to NSR on 5/4/18 at [facility] and previously in April 2018. Son stated that at 4am morning of admission, patient was suddenly SOB, diaphoretic and appeared anxious and ambulance was called. Pt was placed on BiPAP in the ED. Patient was admitted to telemetry for acute respiratory failure 2/2 to CHF exacerbation, elevated troponin abdominal distention and b/l LE edema. Patient was diuresed with lasix IV. Patient was seen by pulmonology and cardiology consultants. TTE showed preserved ejection fraction. Elevated troponins were result of demand, with no evidence of plaque rupture. Xrays of shoulder and elbow showed osteopenia and calcific tendonitis. Patient was advanced to Lasix PO. Patient was optimized to discharge home.    On morning of discharge, patient was seen and examined at bedside. Patient felt better and was without complaint.    PHYSICAL EXAM:  GENERAL: NAD, well-groomed, well-developed  HEAD:  Atraumatic, Normocephalic  EYES: EOMI, PERRLA, conjunctiva and sclera clear  ENMT: No tonsillar erythema, exudates, or enlargement; Moist mucous membranes, Good dentition, No lesions  NECK: Supple, No JVD, Normal thyroid  NERVOUS SYSTEM:  Alert & Oriented X3, Good concentration; Motor Strength 5/5 B/L upper and lower extremities; DTRs 2+ intact and symmetric  CHEST/LUNG: CTAB, no rales, rhonchi  HEART: Regular rate and rhythm; No murmurs, rubs, or gallops  ABDOMEN: Soft, Nontender, Nondistended; Bowel sounds present  EXTREMITIES:  no edema, 2+ Peripheral Pulses, No clubbing, nocyanosis, no tenderness to palpation  LYMPH: No lymphadenopathy noted  SKIN: No rashes or lesions 85 y/o F pmh a fib on eliquis, AS, GERD, HTN,  glaucoma, neuropathy presented with C/O SOB, diaphoretic and abdominal bloating, with increased leg swelling x 3 days, worsening since 4am morning of admission.  She was cardioverted successfully from AF to NSR on 5/4/18 at [facility] and previously in April 2018. Son stated that at 4am morning of admission, patient was suddenly SOB, diaphoretic and appeared anxious and ambulance was called. Pt was placed on BiPAP in the ED. Patient was admitted to telemetry for acute respiratory failure 2/2 to CHF exacerbation, elevated troponin abdominal distention and b/l LE edema. Patient was diuresed with lasix IV. Patient was seen by pulmonology and cardiology consultants. TTE showed preserved ejection fraction. Elevated troponins were result of demand, with no evidence of plaque rupture. Xrays of shoulder and elbow showed osteopenia and calcific tendonitis. Patient was advanced to Lasix PO. Patient was optimized to discharge home.    On morning of discharge, patient was seen and examined at bedside. Patient felt better and was without complaint.    PHYSICAL EXAM:  GENERAL: NAD, well-groomed, well-developed  HEAD:  Atraumatic, Normocephalic  EYES: EOMI, PERRLA, conjunctiva and sclera clear  ENMT: No tonsillar erythema, exudates, or enlargement; Moist mucous membranes, Good dentition, No lesions  NECK: Supple, No JVD, Normal thyroid  NERVOUS SYSTEM:  Alert & Oriented X3, Good concentration; Motor Strength 5/5 B/L upper and lower extremities; DTRs 2+ intact and symmetric  CHEST/LUNG: CTAB, no rales, rhonchi  HEART: Regular rate and rhythm; No murmurs, rubs, or gallops  ABDOMEN: Soft, Nontender, Nondistended; Bowel sounds present  EXTREMITIES:  no edema, 2+ Peripheral Pulses, No clubbing, nocyanosis, no tenderness to palpation  LYMPH: No lymphadenopathy noted  SKIN: No rashes or lesions 85 y/o F pmh a fib on eliquis, AS, GERD, HTN,  glaucoma, neuropathy presented with C/O SOB, diaphoretic and abdominal bloating, with increased leg swelling x 3 days, worsening since 4am morning of admission.  She was cardioverted successfully from AF to NSR on 5/4/18 at [facility] and previously in April 2018. Son stated that at 4am morning of admission, patient was suddenly SOB, diaphoretic and appeared anxious and ambulance was called. Pt was placed on BiPAP in the ED. Patient was admitted to telemetry for acute respiratory failure 2/2 to CHF exacerbation, elevated troponin abdominal distention and b/l LE edema. Patient was diuresed with lasix IV. Patient was seen by pulmonology and cardiology consultants. TTE showed preserved ejection fraction. Elevated troponins were result of demand, with no evidence of plaque rupture. Xrays of shoulder and elbow showed osteopenia and calcific tendonitis. Patient was advanced to Lasix PO. Patient was optimized to discharge home.    On morning of discharge, patient was seen and examined at bedside. Patient felt better and was without complaint. I spent 40 min and notified pcp.     PHYSICAL EXAM:  GENERAL: NAD, well-groomed, well-developed  HEAD:  Atraumatic, Normocephalic  EYES: EOMI, PERRLA, conjunctiva and sclera clear  ENMT: No tonsillar erythema, exudates, or enlargement; Moist mucous membranes, Good dentition, No lesions  NECK: Supple, No JVD, Normal thyroid  NERVOUS SYSTEM:  Alert & Oriented X3, Good concentration; Motor Strength 5/5 B/L upper and lower extremities; DTRs 2+ intact and symmetric  CHEST/LUNG: CTAB, no rales, rhonchi  HEART: Regular rate and rhythm; No murmurs, rubs, or gallops  ABDOMEN: Soft, Nontender, Nondistended; Bowel sounds present  EXTREMITIES:  no edema, 2+ Peripheral Pulses, No clubbing, nocyanosis, no tenderness to palpation  LYMPH: No lymphadenopathy noted  SKIN: No rashes or lesions    ambulatory pulse ox done and she is not qualified for home O2.

## 2018-05-07 NOTE — DISCHARGE NOTE ADULT - MEDICATION SUMMARY - MEDICATIONS TO TAKE
I will START or STAY ON the medications listed below when I get home from the hospital:    amiodarone 200 mg oral tablet  -- 1 tab(s) by mouth once a day  -- Indication: For Hypertension    Eliquis 5 mg oral tablet  -- 1 tab(s) by mouth 2 times a day  -- Indication: For Atrial fibrillation    gabapentin 100 mg oral capsule  -- 1 cap(s) by mouth once a day (at bedtime)  -- Indication: For Neuropathy    clonazePAM 1 mg oral tablet  -- 1 tab(s) by mouth 2 times a day  -- Indication: For Anxiety    Metoprolol Tartrate 100 mg oral tablet  -- 1 tab(s) by mouth 2 times a day  -- Indication: For Atrial fibrillation    furosemide 40 mg oral tablet  -- 1 tab(s) by mouth once a day  -- Indication: For CHF (congestive heart failure)    docusate sodium 100 mg oral capsule  -- 1 cap(s) by mouth 3 times a day  -- Indication: For Constipation    Senna 8.6 mg oral tablet  -- 2 tab(s) by mouth once a day (at bedtime)  -- Indication: For Constipation    Klor-Con M20 oral tablet, extended release  -- 1 tab(s) by mouth once a day  -- Indication: For Hypokalemia    Xalatan 0.005% ophthalmic solution  -- 1 drop(s) to each affected eye once a day (in the evening)  -- Indication: For Glaucoma    Cosopt 2.23%-0.68% ophthalmic solution  -- 1 drop(s) to each affected eye 2 times a day  -- Indication: For Glaucoma    pantoprazole 40 mg oral delayed release tablet  -- 1 tab(s) by mouth once a day  -- Indication: For GERD (gastroesophageal reflux disease)    Multiple Vitamins oral tablet  -- 1 tab(s) by mouth once a day  -- Indication: For Need for prophylactic measure

## 2018-05-07 NOTE — PROGRESS NOTE ADULT - SUBJECTIVE AND OBJECTIVE BOX
PULMONARY/CRITICAL CARE      INTERVAL HPI/OVERNIGHT EVENTS:    86y FemaleHPI:  Less sob, no cp.Alert, nad.  85 y/o F pmh a fib on eliquis, AS, GERD, HTN,  glaucoma, neuropathy presents with C/O SOB diaphoretic  and abdominal bloating, increased leg swelling since 4am morning of admission.  She was cardioverted successfully from AF to NSR yesterday at [facility]. Son is at bedside and provides history. States that patient has been living at home with him, ambulating with cane, no home O2 use, due to patient being in hospital two times this year. States that she was cardioverted once in April 2018 and once yesterday. States that patient was doing well after cardioversion however complaining of right elbow and shoulder pain. At 4am morning of admission, patient was suddenly SOB, diaphoretic and appeared anxious and ambulance was called.  Paramedics arrived and the patient was assisted with Cpap. Sat was 100Percent and she was downgraded BiPap in the ED. Reports b/l LE swelling that has been increasing for the past 3 days. No Chest Pain, No nausea, no vomiting, no fever, no chills.     In the ED, VS temp 98.2, HR 74, /87, RR 27, SpO2 100 on supplemental O2. WBC 14.0, Hg 11.2, Hct 34.6, platelet 209, PT 15.3, INR 1.39, D-dimer 222, Na 141, K 4.5, Cl 108, Co2 27, BUN 14, Cr 0.91, glucose 161, creatinine kinase 49, troponin 1 .0.59, proBNP 1755. ABG pH 7.40, pCo2 42, po2 140, hco3 25, oxygen sat 100, bipap 12/6.50,     Received  mg, Lasix 40m g IV x 1    Imaging:  Doppler b/l LE 1. No evidence of bilateral lower extremity deep venous thrombosis. 2. Superficial thrombophlebitis in the right upper calf.  CXR: Lungs are clear. No pleural effusions. Mild to moderate central pulmonary vascular congestion. Cardiomediastinal silhouette is indeterminate in   size due to technique. Bones are unremarkable  EKG: NSR at 68 bpm, inverted p waves notes (05 May 2018 07:05)        PAST MEDICAL & SURGICAL HISTORY:  Neuropathy  GERD (gastroesophageal reflux disease)  Anxiety  Glaucoma  Hypertension  Atrial fibrillation  Aortic valve stenosis, etiology of cardiac valve disease unspecified  Atrial fibrillation, unspecified type  GERD (gastroesophageal reflux disease)  Hiatal hernia  Colonic polyp  Hypovitaminosis D  Hyperlipidemia  Leg pain, bilateral  Glaucoma  Hypertension  No significant past surgical history  History of cardioversion  Abnormal endoscopy finding  Abnormal colonoscopy        ICU Vital Signs Last 24 Hrs  T(C): 36.9 (07 May 2018 04:34), Max: 37.2 (06 May 2018 12:12)  T(F): 98.5 (07 May 2018 04:34), Max: 98.9 (06 May 2018 12:12)  HR: 62 (07 May 2018 04:34) (60 - 67)  BP: 128/62 (07 May 2018 05:09) (104/65 - 128/62)  BP(mean): --  ABP: --  ABP(mean): --  RR: 18 (07 May 2018 04:34) (15 - 28)  SpO2: 99% (07 May 2018 04:34) (95% - 99%)    Qtts:     I&O's Summary    06 May 2018 07:01  -  07 May 2018 07:00  --------------------------------------------------------  IN: 0 mL / OUT: 500 mL / NET: -500 mL            REVIEW OF SYSTEMS:    CONSTITUTIONAL: No fever, weight loss, or fatigue  EYES: No eye pain, visual disturbances, or discharge  ENMT:  No difficulty hearing, tinnitus, vertigo; No sinus or throat pain  NECK: No pain or stiffness  BREASTS: No pain, masses, or nipple discharge  RESPIRATORY: No cough, wheezing, chills or hemoptysis; No shortness of breath  CARDIOVASCULAR: No chest pain, palpitations, dizziness, or leg swelling  GASTROINTESTINAL: No abdominal or epigastric pain. No nausea, vomiting, or hematemesis; No diarrhea or constipation. No melena or hematochezia.  GENITOURINARY: No dysuria, frequency, hematuria, or incontinence  NEUROLOGICAL: No headaches, memory loss, loss of strength, numbness, or tremors  SKIN: No itching, burning, rashes, or lesions   LYMPH NODES: No enlarged glands  ENDOCRINE: No heat or cold intolerance; No hair loss  MUSCULOSKELETAL: No joint pain or swelling; No muscle, back, or extremity pain, no calf tenderness  PSYCHIATRIC: No depression, anxiety, mood swings, or difficulty sleeping  HEME/LYMPH: No easy bruising, or bleeding gums  ALLERGY AND IMMUNOLOGIC: No hives or eczema      PHYSICAL EXAM:    GENERAL: NAD, well-groomed, well-developed, NAD  HEAD:  Atraumatic, Normocephalic  EYES: EOMI, PERRLA, conjunctiva and sclera clear  ENMT: No tonsillar erythema, exudates, or enlargement; Moist mucous membranes, Good dentition, No lesions  NECK: Supple, No JVD, Normal thyroid  NERVOUS SYSTEM:  Alert & Oriented X3, Good concentration; Motor Strength 5/5 B/L upper and lower extremities  CHEST/LUNG: Clear to percussion bilaterally; No rales, rhonchi, wheezing, or rubs  HEART: Regular rate and rhythm; No murmurs, rubs, or gallops  ABDOMEN: Soft, Nontender, Nondistended; Bowel sounds present  EXTREMITIES:  2+ Peripheral Pulses, No clubbing, cyanosis, trace edema  LYMPH: No lymphadenopathy noted  SKIN: No rashes or lesions        LABS:                        9.9    6.7   )-----------( 163      ( 06 May 2018 07:57 )             30.7     05-07    142  |  105  |  10  ----------------------------<  86  3.8   |  30  |  0.77    Ca    8.2<L>      07 May 2018 06:30            vanco through     RADIOLOGY & ADDITIONAL STUDIES:  Echo--mod as      CRITICAL CARE TIME SPENT:

## 2018-05-07 NOTE — DISCHARGE NOTE ADULT - CARE PROVIDER_API CALL
Jillian Shelley), Internal Medicine  [address] [place] [address]  Phone: [phone]  Fax: [phone]    Willie Shook), Cardiovascular Disease; Nuclear Cardiology  [address]  [address]  [place] [address]  Phone: [phone]  Fax: [phone]    Damien Gibbs), Ophthalmology  [address] [place] [address]  Phone: [phone]  Fax: [phone]

## 2018-05-07 NOTE — DISCHARGE NOTE ADULT - PATIENT PORTAL LINK FT
You can access the [facility] Patient Portal, offered by [facility], by registering with the following website: [email or web address]

## 2018-05-07 NOTE — DISCHARGE NOTE ADULT - SECONDARY DIAGNOSIS.
CHF (congestive heart failure) Troponin level elevated Atrial fibrillation Right elbow pain GERD (gastroesophageal reflux disease)

## 2018-05-08 ENCOUNTER — APPOINTMENT (OUTPATIENT)
Dept: CARDIOLOGY | Facility: CLINIC | Age: 83
End: 2018-05-08

## 2018-05-08 LAB
ANION GAP SERPL CALC-SCNC: 5 MMOL/L — SIGNIFICANT CHANGE UP (ref 5–17)
BUN SERPL-MCNC: 10 MG/DL — SIGNIFICANT CHANGE UP (ref 7–23)
CALCIUM SERPL-MCNC: 8.7 MG/DL — SIGNIFICANT CHANGE UP (ref 8.5–10.1)
CHLORIDE SERPL-SCNC: 105 MMOL/L — SIGNIFICANT CHANGE UP (ref 96–108)
CO2 SERPL-SCNC: 32 MMOL/L — HIGH (ref 22–31)
CREAT SERPL-MCNC: 0.85 MG/DL — SIGNIFICANT CHANGE UP (ref 0.5–1.3)
GLUCOSE SERPL-MCNC: 93 MG/DL — SIGNIFICANT CHANGE UP (ref 70–99)
HCT VFR BLD CALC: 32.6 % — LOW (ref 34.5–45)
HGB BLD-MCNC: 10.1 G/DL — LOW (ref 11.5–15.5)
MCHC RBC-ENTMCNC: 27.9 PG — SIGNIFICANT CHANGE UP (ref 27–34)
MCHC RBC-ENTMCNC: 31 GM/DL — LOW (ref 32–36)
MCV RBC AUTO: 90 FL — SIGNIFICANT CHANGE UP (ref 80–100)
PLATELET # BLD AUTO: 197 K/UL — SIGNIFICANT CHANGE UP (ref 150–400)
POTASSIUM SERPL-MCNC: 4.3 MMOL/L — SIGNIFICANT CHANGE UP (ref 3.5–5.3)
POTASSIUM SERPL-SCNC: 4.3 MMOL/L — SIGNIFICANT CHANGE UP (ref 3.5–5.3)
RBC # BLD: 3.62 M/UL — LOW (ref 3.8–5.2)
RBC # FLD: 13.9 % — SIGNIFICANT CHANGE UP (ref 10.3–14.5)
SODIUM SERPL-SCNC: 142 MMOL/L — SIGNIFICANT CHANGE UP (ref 135–145)
WBC # BLD: 7.6 K/UL — SIGNIFICANT CHANGE UP (ref 3.8–10.5)
WBC # FLD AUTO: 7.6 K/UL — SIGNIFICANT CHANGE UP (ref 3.8–10.5)

## 2018-05-08 PROCEDURE — 99233 SBSQ HOSP IP/OBS HIGH 50: CPT

## 2018-05-08 PROCEDURE — 99233 SBSQ HOSP IP/OBS HIGH 50: CPT | Mod: GC

## 2018-05-08 RX ORDER — FUROSEMIDE 40 MG
40 TABLET ORAL DAILY
Qty: 0 | Refills: 0 | Status: DISCONTINUED | OUTPATIENT
Start: 2018-05-09 | End: 2018-05-09

## 2018-05-08 RX ADMIN — PANTOPRAZOLE SODIUM 40 MILLIGRAM(S): 20 TABLET, DELAYED RELEASE ORAL at 05:04

## 2018-05-08 RX ADMIN — DORZOLAMIDE HYDROCHLORIDE TIMOLOL MALEATE 1 DROP(S): 20; 5 SOLUTION/ DROPS OPHTHALMIC at 05:03

## 2018-05-08 RX ADMIN — Medication 40 MILLIGRAM(S): at 05:03

## 2018-05-08 RX ADMIN — Medication 40 MILLIEQUIVALENT(S): at 05:04

## 2018-05-08 RX ADMIN — SENNA PLUS 2 TABLET(S): 8.6 TABLET ORAL at 21:24

## 2018-05-08 RX ADMIN — Medication 100 MILLIGRAM(S): at 21:24

## 2018-05-08 RX ADMIN — Medication 100 MILLIGRAM(S): at 17:19

## 2018-05-08 RX ADMIN — Medication 100 MILLIGRAM(S): at 05:03

## 2018-05-08 RX ADMIN — Medication 40 MILLIEQUIVALENT(S): at 17:19

## 2018-05-08 RX ADMIN — LATANOPROST 1 DROP(S): 0.05 SOLUTION/ DROPS OPHTHALMIC; TOPICAL at 21:23

## 2018-05-08 RX ADMIN — Medication 100 MILLIGRAM(S): at 13:52

## 2018-05-08 RX ADMIN — Medication 1 MILLIGRAM(S): at 21:24

## 2018-05-08 RX ADMIN — APIXABAN 5 MILLIGRAM(S): 2.5 TABLET, FILM COATED ORAL at 05:04

## 2018-05-08 RX ADMIN — APIXABAN 5 MILLIGRAM(S): 2.5 TABLET, FILM COATED ORAL at 17:19

## 2018-05-08 RX ADMIN — AMIODARONE HYDROCHLORIDE 200 MILLIGRAM(S): 400 TABLET ORAL at 05:04

## 2018-05-08 RX ADMIN — DORZOLAMIDE HYDROCHLORIDE TIMOLOL MALEATE 1 DROP(S): 20; 5 SOLUTION/ DROPS OPHTHALMIC at 17:18

## 2018-05-08 RX ADMIN — GABAPENTIN 100 MILLIGRAM(S): 400 CAPSULE ORAL at 21:24

## 2018-05-08 NOTE — PROGRESS NOTE ADULT - SUBJECTIVE AND OBJECTIVE BOX
[facility] -- Abena Alcantara, Trey, Manuel, Roddy Alegria Savella  Office # [phone]      Follow Up:  CHF    Subjective/Observations: Seen and examined, resting in bed in no acute distress, cont to verbalize shortness of breath, denies chest pain, dizziness, palpitations, N&V, HA, abd pain, fever/ chills.       REVIEW OF SYSTEMS: All other review of systems is negative unless indicated above    PAST MEDICAL & SURGICAL HISTORY:  Neuropathy  GERD (gastroesophageal reflux disease)  Anxiety  Glaucoma  Hypertension  Atrial fibrillation  Aortic valve stenosis, etiology of cardiac valve disease unspecified  Atrial fibrillation, unspecified type  GERD (gastroesophageal reflux disease)  Hiatal hernia  Colonic polyp  Hypovitaminosis D  Hyperlipidemia  Leg pain, bilateral  Glaucoma  Hypertension  No significant past surgical history  History of cardioversion  Abnormal endoscopy finding  Abnormal colonoscopy      MEDICATIONS  (STANDING):  amiodarone    Tablet 200 milliGRAM(s) Oral daily  apixaban 5 milliGRAM(s) Oral every 12 hours  clonazePAM Tablet 1 milliGRAM(s) Oral at bedtime  docusate sodium 100 milliGRAM(s) Oral three times a day  dorzolamide 2%/timolol 0.5% Ophthalmic Solution 1 Drop(s) Both EYES two times a day  furosemide   Injectable 40 milliGRAM(s) IV Push daily  gabapentin 100 milliGRAM(s) Oral at bedtime  latanoprost 0.005% Ophthalmic Solution 1 Drop(s) Both EYES at bedtime  metoprolol tartrate 100 milliGRAM(s) Oral two times a day  pantoprazole    Tablet 40 milliGRAM(s) Oral before breakfast  potassium chloride    Tablet ER 40 milliEquivalent(s) Oral two times a day  senna 2 Tablet(s) Oral at bedtime    MEDICATIONS  (PRN):      Allergies    No Known Allergies    Intolerances            Vital Signs Last 24 Hrs  T(C): 36.7 (08 May 2018 04:25), Max: 36.9 (07 May 2018 08:00)  T(F): 98.1 (08 May 2018 04:25), Max: 98.4 (07 May 2018 08:00)  HR: 62 (08 May 2018 04:25) (52 - 64)  BP: 138/858 (08 May 2018 04:25) (118/74 - 148/75)  BP(mean): --  RR: 18 (08 May 2018 04:25) (16 - 18)  SpO2: 100% (08 May 2018 04:25) (94% - 100%)    I&O's Summary    07 May 2018 07:01  -  08 May 2018 07:00  --------------------------------------------------------  IN: 720 mL / OUT: 500 mL / NET: 220 mL          PHYSICAL EXAM:  TELE: sinus travon 40-50's, no ectopy   Constitutional: NAD, awake and alert, well-developed, obese  HEENT: Moist Mucous Membranes, Anicteric  Pulmonary: Non-labored, rales bilateral lower lungs, No wheezing or rhonchi  Cardiovascular: Regular, S1 and S2, + AS, no rubs, gallops or clicks  Gastrointestinal: Bowel Sounds present, soft, nontender, obese abdomen   Lymph: No peripheral edema. No lymphadenopathy.  Skin: No visible rashes or ulcers.  Psych:  Mood & affect appropriate    LABS: All Labs Reviewed:                        10.1   7.6   )-----------( 197      ( 08 May 2018 06:39 )             32.6                         9.9    6.7   )-----------( 163      ( 06 May 2018 07:57 )             30.7     08 May 2018 06:39    142    |  105    |  10     ----------------------------<  93     4.3     |  32     |  0.85   07 May 2018 06:30    142    |  105    |  10     ----------------------------<  86     3.8     |  30     |  0.77   06 May 2018 07:57    144    |  107    |  14     ----------------------------<  91     3.2     |  31     |  0.75     Ca    8.7        08 May 2018 06:39  Ca    8.2        07 May 2018 06:30  Ca    8.4        06 May 2018 07:57        CARDIAC MARKERS ( 06 May 2018 07:57 )  .154 ng/mL / x     / x     / x     / x                TPro  7.5    /  Alb  3.3    /  TBili  0.5    /  DBili  x      /  AST  39     /  ALT  33     /  AlkPhos  89     05 May 2018 05:03  TPro  7.0    /  Alb  3.8    /  TBili  0.4    /  DBili  x      /  AST  36     /  ALT  21     /  AlkPhos  74     04 May 2018 10:07      CARDIAC MARKERS ( 06 May 2018 07:57 )  .154 ng/mL / x     / x     / x     / x      CARDIAC MARKERS ( 05 May 2018 21:19 )  .312 ng/mL / x     / x     / x     / x      CARDIAC MARKERS ( 05 May 2018 14:12 )  .575 ng/mL / x     / x     / x     / x      CARDIAC MARKERS ( 05 May 2018 11:15 )  .678 ng/mL / x     / 33 U/L / x     / x            < from: Xray Chest 1 View- PORTABLE-Routine (05.07.18 @ 08:49) >  EXAM:  XR CHEST PORTABLE ROUTINE 1V                            PROCEDURE DATE:  05/07/2018          INTERPRETATION:  CHF, short of breath.    AP chest. Prior 5/5/2018.    Low lung volumes. No change heart mediastinum. Mild vascular congestion   similar to prior. There is a patchy left basilar subsegmental   atelectasis/infiltrate. Correlate clinically for active infection. Small   left pleural effusion.    Impression: As above    < end of copied text >    < from: 12 Lead ECG (05.05.18 @ 06:31) >  Ventricular Rate 68 BPM    Atrial Rate 68 BPM    P-R Interval 158 ms    QRS Duration 98 ms    Q-T Interval 396 ms    QTC Calculation(Bezet) 421 ms    P Axis 17 degrees    R Axis -34 degrees    T Axis -29 degrees    Diagnosis Line Normal sinus rhythm  Left axis deviation  Minimal voltage criteria for LVH, may be normal variant  Septal infarct , age undetermined  Possible Lateral infarct , age undetermined  T wave abnormality, consider anterior ischemia  Abnormal ECG    Confirmed by Trey SALDANA, Aubrey ([id number]) on 5/5/2018 2:36:34 PM    < end of copied text >  < from: TTE Echo Doppler w/o Cont (05.06.18 @ 10:16) >   EXAM:  ECHO TTE W/O CON COMP W/DOPPLR         PROCEDURE DATE:  05/06/2018        INTERPRETATION:  Ordering Physician: VALDEMAR WRIGHT [id number]    Indication: Abnormal EKG  Technologist Initials: CHRISTIANO  Study Quality: Technically fair   A complete echocardiographic study was performed utilizing standard   protocol including spectral and color Doppler in all echocardiographic   windows.    Height: 157 cm  Weight: 68 kg  BSA: 1.69 sq m  Blood Pressure: 122/68    MEASUREMENTS  IVS: 1.2cm  PWT: 1.1cm  LA: 4.7cm  AO: 3.3cm  LVIDd: 5.1cm  LVIDs: 2.7cm    LVEF: 55%  RVSP: 41mmHg    FINDINGS  Left Ventricle: Normal left ventricular systolic function.  Aortic Valve: Calcified aortic valve with decreased opening. Peak   transaortic gradient equals 31 mmHg, and mean transaortic gradient equals   15 mmHg. The aortic valve area is 1.4 sq cm, consistent with moderate   aortic stenosis.  Mitral Valve: Mitral annular calcification and calcified mitral valve   leaflets. Mild mitral insufficiency.  TricuspidValve: Normal tricuspid valve. Mild tricuspid insufficiency.  Pulmonic Valve: Not well visualized. No pulmonic insufficiency.  Left Atrium: Moderately enlarged  Right Ventricle: Normal right ventricular size and systolic function.  Right Atrium: Normal  Diastolic Function: Grade 2 diastolic dysfunction  Pericardium/Pleura: Normal pericardium with no pericardial effusion.      CONCLUSIONS:    1. Normal left ventricular systolic function.  2.  Calcified aortic valve with decreased opening. Peak transaortic   gradient equals 31 mmHg, and mean transaortic gradient equals 15 mmHg.   The aortic valve area is 1.4 sq cm, consistent with moderate aortic   stenosis.  3. Mitral annular calcification and calcified mitral leaflets with mild   mitral insufficiency.  4. Moderate left atrial enlargement.  5. Normal right ventricular size and systolic function.  6. Grade 2 diastolic dysfunction.                      DARRELL KENNY M.D., ATTENDING CARDIOLOGIST  This document has been electronically signed. May  7 2018  7:20AM        < end of copied text >  < from: Xray Abdomen 2 Views (05.05.18 @ 12:45) >    EXAM:  XR ABDOMEN 2V                            PROCEDURE DATE:  05/05/2018          INTERPRETATION:  Clinical information: Abdominal pain and distention.    Supine and upright views.  The lower pelvis is not included on this exam.    There is a nonobstructive appearing bowel gas pattern.    No free intraperitoneal air is noted.    Calcifications are noted within the pelvis.    There is suggestion of a retrocardiac left lower lobe airspace   consolidation and probable small pleural effusion.    Impression:    Findings as discussed above.                STEPHEN DORAN M.D., ATTENDING RADIOLOGIST  This document has been electronically signed. May  5 2018  1:44PM    < end of copied text >

## 2018-05-08 NOTE — PROGRESS NOTE ADULT - SUBJECTIVE AND OBJECTIVE BOX
INTERVAL HPI/OVERNIGHT EVENTS:     87 y/o F pmh a fib on eliquis, AS, GERD, HTN,  glaucoma, neuropathy presents with C/O SOB diaphoretic  and abdominal bloating, increased leg swelling since morning of admission. Admit to tele for acute respiratory failure 2/2 to acute CHF exacerbation, elevated troponin , abdominal distention.    No events overnight. Patient was seen and examined at bedside this AM. Patient had no complaints.     MEDICATIONS  (STANDING):  amiodarone    Tablet 200 milliGRAM(s) Oral daily  apixaban 5 milliGRAM(s) Oral every 12 hours  clonazePAM Tablet 1 milliGRAM(s) Oral at bedtime  docusate sodium 100 milliGRAM(s) Oral three times a day  dorzolamide 2%/timolol 0.5% Ophthalmic Solution 1 Drop(s) Both EYES two times a day  gabapentin 100 milliGRAM(s) Oral at bedtime  latanoprost 0.005% Ophthalmic Solution 1 Drop(s) Both EYES at bedtime  metoprolol tartrate 100 milliGRAM(s) Oral two times a day  pantoprazole    Tablet 40 milliGRAM(s) Oral before breakfast  potassium chloride    Tablet ER 40 milliEquivalent(s) Oral two times a day  senna 2 Tablet(s) Oral at bedtime    MEDICATIONS  (PRN):      Allergies    No Known Allergies    Intolerances        CONSTITUTIONAL: No weakness, fevers or chills  EYES/ENT: No visual changes;  No vertigo or throat pain   NECK: No pain or stiffness  RESPIRATORY: No cough, wheezing, hemoptysis; No shortness of breath  CARDIOVASCULAR: No chest pain or palpitations  GASTROINTESTINAL: No abdominal or epigastric pain. No nausea, vomiting, or hematemesis; No diarrhea or constipation. No melena or hematochezia.  GENITOURINARY: No dysuria, frequency or hematuria  NEUROLOGICAL: No numbness or weakness  SKIN: No itching, burning, rashes, or lesions   All other review of systems is negative unless indicated above.    Vital Signs Last 24 Hrs  T(C): 36.9 (08 May 2018 13:51), Max: 36.9 (07 May 2018 19:27)  T(F): 98.5 (08 May 2018 13:51), Max: 98.5 (08 May 2018 13:51)  HR: 79 (08 May 2018 13:51) (56 - 79)  BP: 107/78 (08 May 2018 13:51) (107/78 - 138/858)  BP(mean): --  RR: 18 (08 May 2018 13:51) (16 - 18)  SpO2: 92% (08 May 2018 13:51) (89% - 100%)    05-07 @ 07:01  -  05-08 @ 07:00  --------------------------------------------------------  IN: 720 mL / OUT: 500 mL / NET: 220 mL    05-08 @ 07:01 - 05-08 @ 14:24  --------------------------------------------------------  IN: 120 mL / OUT: 0 mL / NET: 120 mL        PHYSICAL EXAM:  GENERAL: NAD, well-groomed, well-developed  HEAD:  Atraumatic, Normocephalic  EYES: EOMI, PERRLA, conjunctiva and sclera clear  ENMT: No tonsillar erythema, exudates, or enlargement; Moist mucous membranes, Good dentition, No lesions  NECK: Supple, No JVD, Normal thyroid  NERVOUS SYSTEM:  Alert & Oriented X3, Good concentration; Motor Strength 5/5 B/L upper and lower extremities; DTRs 2+ intact and symmetric  CHEST/LUNG: Mild bibasilar crackles, no rhonchi  HEART: Regular rate and rhythm; No murmurs, rubs, or gallops  ABDOMEN: Soft, Nontender, Nondistended; Bowel sounds present  EXTREMITIES:  trace edema, 2+ Peripheral Pulses, No clubbing, cyanosis  LYMPH: No lymphadenopathy noted  SKIN: No rashes or lesions      LABS:                        10.1   7.6   )-----------( 197      ( 08 May 2018 06:39 )             32.6     05-08    142  |  105  |  10  ----------------------------<  93  4.3   |  32<H>  |  0.85    Ca    8.7      08 May 2018 06:39            RADIOLOGY & ADDITIONAL TESTS:  < from: Xray Chest 1 View- PORTABLE-Routine (05.07.18 @ 08:49) >  EXAM:  XR CHEST PORTABLE ROUTINE 1V                            PROCEDURE DATE:  05/07/2018          INTERPRETATION:  CHF, short of breath.    AP chest. Prior 5/5/2018.    Low lung volumes. No change heart mediastinum. Mild vascular congestion   similar to prior. There is a patchy left basilar subsegmental   atelectasis/infiltrate. Correlate clinically for active infection. Small   left pleural effusion.    Impression: As above    < end of copied text >  < from: Xray Abdomen 2 Views (05.05.18 @ 12:45) >    EXAM:  XR ABDOMEN 2V                            PROCEDURE DATE:  05/05/2018          INTERPRETATION:  Clinical information: Abdominal pain and distention.    Supine and upright views.  The lower pelvis is not included on this exam.    There is a nonobstructive appearing bowel gas pattern.    No free intraperitoneal air is noted.    Calcifications are noted within the pelvis.    There is suggestion of a retrocardiac left lower lobe airspace   consolidation and probable small pleural effusion.    Impression:    Findings as discussed above.    < end of copied text >  < from: Xray Elbow AP + Lateral, Right (05.05.18 @ 11:46) >    EXAM:  ELBOW 2VIEWS RT                            PROCEDURE DATE:  05/05/2018          INTERPRETATION:  RIGHT ELBOW: AP, lateral, and oblique views    CLINICAL HISTORY: right elbow pain        COMPARISON: None Available    FINDINGS:    No acute fracture or dislocation is noted.    Impression:    Findings as discussed above.    < end of copied text >  < from: Xray Shoulder 2 Views, Right (05.05.18 @ 11:46) >    EXAM:  SHOULDER RIGHT (MINIMUM 2 V)                            PROCEDURE DATE:  05/05/2018          INTERPRETATION:  RIGHT SHOULDER: AP, "Y" view    CLINICAL HISTORY: Right shoulder pain.    COMPARISON: None Available    FINDINGS:    Diffuse osteopenia is present.   Small coarse calcification in the soft tissues adjacent to the superior   lateral aspect of the humeral head on the internal oblique view, likely   reflecting calcific tendinitis.    No acute fracture or dislocation is noted.    Prominence of the right hilum is noted.    Impression:    Findings as discussed above.    < end of copied text >  < from: US Duplex Venous Lower Ext Complete, Bilateral (05.05.18 @ 06:28) >  EXAM:  US DPLX LWR EXT VEINS COMPL BI                            PROCEDURE DATE:  05/05/2018          INTERPRETATION:  CLINICAL INFORMATION: Leg swelling for 4 days.    COMPARISON: None available.    TECHNIQUE: Duplex sonography of BILATERAL LOWER extremities with color   and spectral Doppler, with and without compression.      FINDINGS:    There is normal compressibility of bilateral common femoral, femoral and   popliteal veins. No calf vein thrombosis is detected.    Doppler examination shows normal spontaneous and phasic flow.    A thrombosed superficial vein in the upper right calf region is seen   compatible with a superficial thrombophlebitis.    IMPRESSION:     1. No evidence of bilateral lower extremity deep venous thrombosis.  2. Superficial thrombophlebitis in the right upper calf.    < end of copied text >  < from: Xray Chest 1 View-PORTABLE IMMEDIATE (05.05.18 @ 05:05) >    EXAM:  XR CHEST PORTABLE IMMED 1V                            PROCEDURE DATE:  05/05/2018          INTERPRETATION:  Clinical Indication: Shortness of breath    Technique: Single Frontal view of the chest    Comparison: There are no prior studies available for comparison.      Findings/  IMPRESSION:      Lungs are clear. No pleural effusions. Mild to moderate central pulmonary   vascular congestion. Cardiomediastinal silhouette is indeterminate in   size due to technique. Bones are unremarkable.    < end of copied text >  < from: CT Angio Chest w/ IV Cont (04.10.18 @ 15:19) >  EXAM:  CT ANGIO CHEST (W)AW IC                            PROCEDURE DATE:  04/10/2018            INTERPRETATION:  CLINICAL INFORMATION: Shortness of breath.    COMPARISON: CT chest from January 05, 2017.    PROCEDURE:   CT Angiography of the Chest.  90 ml of Omnipaque 350 was injected intravenously. 10 ml were discarded.  Sagittal and coronal reformats were performed as well as 3D (MIP)   reconstructions.      FINDINGS:    CHEST:     LUNGS AND LARGE AIRWAYS: Patent central airways. Bibasilar groundglass   opacities with interseptal lobular thickening. Left lower lobe   compressive atelectasis secondary to adjacent pleural effusion. No   pulmonary nodules.    PLEURA: Small small pleural effusions, left greater than right.    VESSELS: No pulmonary embolus. Normal caliber of the main pulmonary   artery and aorta. Atherosclerotic calcifications of the aorta. Trace   coronary artery calcification.    HEART: Cardiomegaly with left atrial enlargement. Trace pericardial   effusion.    MEDIASTINUM AND LA: No lymphadenopathy.    CHEST WALL AND LOWER NECK: Within normal limits.    VISUALIZED UPPER ABDOMEN: Right renal cyst.    MUSCULOSKELETAL: Mild degenerative changes of the visualized spine.    IMPRESSION: No pulmonary embolus.    Mild pulmonary edema with small pleural effusions.    < end of copied text >

## 2018-05-08 NOTE — PROGRESS NOTE ADULT - PROBLEM SELECTOR PLAN 4
Had normal bowel movement yesterday per son  Denies abdominal pain, nausea or vomiting. Normal bowel sounds on exam.   Abd Xrays normal, only pelvic calcification and small pleural effusion noted
Had normal bowel movement yesterday per son. Denies abdominal pain, nausea or vomiting. Hypoactive bowel sounds on exam.   F/u abdominal xray flat and upright
Had normal bowel movement yesterday per son  Denies abdominal pain, nausea or vomiting. Normal bowel sounds on exam.   Abd Xrays normal, only pelvic calcification and small pleural effusion noted

## 2018-05-08 NOTE — PROGRESS NOTE ADULT - PROBLEM SELECTOR PLAN 10
DVT ppx: continue eliquis   IMPROVE VTE Individual Risk Assessment        RISK                                                          Points  [  ] Previous VTE                                                3  [  ] Thrombophilia                                             2  [  ] Lower limb paralysis                                   2        (unable to hold up >15 seconds)    [  ] Current Cancer                                            2         (within 6 months)  [  ] Immobilization > 24 hrs                              1  [  ] ICU/CCU stay > 24 hours                            1  [ x ] Age > 60                                                    1    IMPROVE VTE Score ____1_____    Problem 11: GERD- continue protonix 40 mg daily    Problem 12: Neuropathy- continue gabapentin 100 mg daily   Problem 13: Glaucoma- continue home dose eye drops  Problem 14: Superficial thrombophlebitis in the right upper calf- No intervention at this time. Continue eliquis.

## 2018-05-08 NOTE — PROGRESS NOTE ADULT - PROBLEM SELECTOR PLAN 7
As per son, patient developed right elbow and shoulder pain s/p cardioversion yesterday. Likely musculoskeletal pain  Right xray elbow, shoulder shows diffuse osteopenia, calcific tendonitis  PT eval ordered, f/u reccs

## 2018-05-08 NOTE — PROGRESS NOTE ADULT - ASSESSMENT
The etiology of patient's decompensation is unclear but she remains in sinus rhythm with no clear evidence for an acute coronary syndrome or significant dysrhythmia. Her pro BNP is mildly elevated and her elevated troponin is of unclear significance at present.        -Cardiac enzymes trending down.  Does not appear to be elevated from plaque rupture.  May be 2/2 due to her volume overload.  Her pro-bnp increased from admission feels better but still appears overloaded on exam.   - Cont Lasix 40mg IVP daily.  Monitor strict I&Os and daily weights.  -Monitor electrolytes and creatinine closely.  Maintain K >4  Mag >2  -Echocardiography reveals normal LV systolic function with LVEF 55%.  Mod AS. Mild mitral valve insufficiency.  Mod LAE.  Grade 2 diastolic dysfunction.    -Supplemental oxygen as needed.  Pulm following  -Venous Doppler negative for DVT with superficial thrombophlebitis in Rt upper calf on A/C  - Cont Eliquis  Continue amiodarone  discontinue digoxin   -Hemodynamics stable per flow sheet.    -Further management can be dependent on her clinical course per.    -Will cont to follow      Nidia Ward NP  CArdiology The etiology of patient's decompensation is unclear but she remains in sinus rhythm with no clear evidence for an acute coronary syndrome or significant dysrhythmia. Her pro BNP is mildly elevated and her elevated troponin is related to demand ischemia.         -Cardiac enzymes trending down.  Does not appear to be elevated from plaque rupture.  May be 2/2 due to her volume overload.  Her pro-bnp increased from admission feels better but still appears overloaded on exam.   - Cont Lasix 40mg IVP daily.  She still has bibasilar rales on exam.  Monitor strict I&Os and daily weights.  -Monitor electrolytes and creatinine closely.  Maintain K >4  Mag >2  -Echocardiography reveals normal LV systolic function with LVEF 55%.  Mod AS. Mild mitral valve insufficiency.  Mod LAE.  Grade 2 diastolic dysfunction.    -Supplemental oxygen as needed.  Pulm following  -Venous Doppler negative for DVT with superficial thrombophlebitis in Rt upper calf on A/C  - Cont Eliquis for stroke risk reduction.  Monitor H/H closely  Continue amiodarone.  Monitor LFT and TFT  discontinue digoxin   -Hemodynamics stable per flow sheet.    -Further management can be dependent on her clinical course per.    -Will cont to follow      Nidia Ward NP  CArdiology

## 2018-05-08 NOTE — PROGRESS NOTE ADULT - PROBLEM SELECTOR PLAN 5
Likely reactive process,  As per pulm, low risk for PNA  procalcitonin negative   Patient is afebrile. Continue to monitor CBC
Likely reactive process.  procalcitonin negative   Patient is afebrile. Continue to monitor CBC
Likely reactive process.  procalcitonin negative   Patient is afebrile. Continue to monitor CBC

## 2018-05-08 NOTE — PROGRESS NOTE ADULT - PROBLEM SELECTOR PLAN 9
Stable  Continue home dose clonazepam

## 2018-05-08 NOTE — PHYSICAL THERAPY INITIAL EVALUATION ADULT - ADDITIONAL COMMENTS
pt lives with her son and dtr in law in a house, sometimes uses steps. Pt ambulates independently with SC- also has RW to use as needed and is independent with ADLs.

## 2018-05-08 NOTE — PROGRESS NOTE ADULT - SUBJECTIVE AND OBJECTIVE BOX
PULMONARY/CRITICAL CARE      INTERVAL HPI/OVERNIGHT EVENTS:  Improved, less sob. no cp.   86y FemaleHPI:  85 y/o F pmh a fib on eliquis, AS, GERD, HTN,  glaucoma, neuropathy presents with C/O SOB diaphoretic  and abdominal bloating, increased leg swelling since 4am morning of admission.  She was cardioverted successfully from AF to NSR yesterday at [facility]. Son is at bedside and provides history. States that patient has been living at home with him, ambulating with cane, no home O2 use, due to patient being in hospital two times this year. States that she was cardioverted once in April 2018 and once yesterday. States that patient was doing well after cardioversion however complaining of right elbow and shoulder pain. At 4am morning of admission, patient was suddenly SOB, diaphoretic and appeared anxious and ambulance was called.  Paramedics arrived and the patient was assisted with Cpap. Sat was 100Percent and she was downgraded BiPap in the ED. Reports b/l LE swelling that has been increasing for the past 3 days. No Chest Pain, No nausea, no vomiting, no fever, no chills.     In the ED, VS temp 98.2, HR 74, /87, RR 27, SpO2 100 on supplemental O2. WBC 14.0, Hg 11.2, Hct 34.6, platelet 209, PT 15.3, INR 1.39, D-dimer 222, Na 141, K 4.5, Cl 108, Co2 27, BUN 14, Cr 0.91, glucose 161, creatinine kinase 49, troponin 1 .0.59, proBNP 1755. ABG pH 7.40, pCo2 42, po2 140, hco3 25, oxygen sat 100, bipap 12/6.50,     Received  mg, Lasix 40m g IV x 1    Imaging:  Doppler b/l LE 1. No evidence of bilateral lower extremity deep venous thrombosis. 2. Superficial thrombophlebitis in the right upper calf.  CXR: Lungs are clear. No pleural effusions. Mild to moderate central pulmonary vascular congestion. Cardiomediastinal silhouette is indeterminate in   size due to technique. Bones are unremarkable  EKG: NSR at 68 bpm, inverted p waves notes (05 May 2018 07:05)        PAST MEDICAL & SURGICAL HISTORY:  Neuropathy  GERD (gastroesophageal reflux disease)  Anxiety  Glaucoma  Hypertension  Atrial fibrillation  Aortic valve stenosis, etiology of cardiac valve disease unspecified  Atrial fibrillation, unspecified type  GERD (gastroesophageal reflux disease)  Hiatal hernia  Colonic polyp  Hypovitaminosis D  Hyperlipidemia  Leg pain, bilateral  Glaucoma  Hypertension  No significant past surgical history  History of cardioversion  Abnormal endoscopy finding  Abnormal colonoscopy        ICU Vital Signs Last 24 Hrs  T(C): 36.8 (08 May 2018 08:01), Max: 36.9 (07 May 2018 19:27)  T(F): 98.2 (08 May 2018 08:01), Max: 98.4 (07 May 2018 19:27)  HR: 56 (08 May 2018 08:01) (56 - 64)  BP: 127/80 (08 May 2018 08:01) (118/74 - 138/858)  BP(mean): --  ABP: --  ABP(mean): --  RR: 18 (08 May 2018 08:01) (16 - 18)  SpO2: 99% (08 May 2018 08:01) (94% - 100%)    Qtts:     I&O's Summary    07 May 2018 07:01  -  08 May 2018 07:00  --------------------------------------------------------  IN: 720 mL / OUT: 500 mL / NET: 220 mL            REVIEW OF SYSTEMS:    CONSTITUTIONAL: No fever, weight loss, or fatigue  EYES: No eye pain, visual disturbances, or discharge  ENMT:  No difficulty hearing, tinnitus, vertigo; No sinus or throat pain  NECK: No pain or stiffness  BREASTS: No pain, masses, or nipple discharge  RESPIRATORY: No cough, wheezing, chills or hemoptysis; No shortness of breath  CARDIOVASCULAR: No chest pain, palpitations, dizziness, or leg swelling  GASTROINTESTINAL: No abdominal or epigastric pain. No nausea, vomiting, or hematemesis; No diarrhea or constipation. No melena or hematochezia.  GENITOURINARY: No dysuria, frequency, hematuria, or incontinence  NEUROLOGICAL: No headaches, memory loss, loss of strength, numbness, or tremors  SKIN: No itching, burning, rashes, or lesions   LYMPH NODES: No enlarged glands  ENDOCRINE: No heat or cold intolerance; No hair loss  MUSCULOSKELETAL: No joint pain or swelling; No muscle, back, or extremity pain, no calf tenderness  PSYCHIATRIC: No depression, anxiety, mood swings, or difficulty sleeping  HEME/LYMPH: No easy bruising, or bleeding gums  ALLERGY AND IMMUNOLOGIC: No hives or eczema      PHYSICAL EXAM:    GENERAL: NAD, well-groomed, well-developed, NAD  HEAD:  Atraumatic, Normocephalic  EYES: EOMI, PERRLA, conjunctiva and sclera clear  ENMT: No tonsillar erythema, exudates, or enlargement; Moist mucous membranes, Good dentition, No lesions  NECK: Supple, No JVD, Normal thyroid  NERVOUS SYSTEM:  Alert & Oriented X3, Good concentration; Motor Strength 5/5 B/L upper and lower extremities  CHEST/LUNG: few bas rales, no rhonchi, wheezing, or rubs good excursion  HEART: Regular rate and rhythm; No murmurs, rubs, or gallops  ABDOMEN: Soft, Nontender, Nondistended; Bowel sounds present  EXTREMITIES:  2+ Peripheral Pulses, No clubbing, cyanosis, trace edema  LYMPH: No lymphadenopathy noted  SKIN: No rashes or lesions        LABS:                        10.1   7.6   )-----------( 197      ( 08 May 2018 06:39 )             32.6     05-08    142  |  105  |  10  ----------------------------<  93  4.3   |  32<H>  |  0.85    Ca    8.7      08 May 2018 06:39            vanco through     RADIOLOGY & ADDITIONAL STUDIES:< from: Xray Chest 1 View- PORTABLE-Routine (05.07.18 @ 08:49) >    PROCEDURE DATE:  05/07/2018          INTERPRETATION:  CHF, short of breath.    AP chest. Prior 5/5/2018.    Low lung volumes. No change heart mediastinum. Mild vascular congestion   similar to prior. There is a patchy left basilar subsegmental   atelectasis/infiltrate. Correlate clinically for active infection. Small   left pleural effusion.    Impression: As above                ZULMA NOVA M.D., ATTENDING RADIOLOGIST  This document has been electronically signed. May  7 2018 10:45AM              < end of copied text >        CRITICAL CARE TIME SPENT:

## 2018-05-08 NOTE — PROGRESS NOTE ADULT - ASSESSMENT
87 y/o F pmh a fib on eliquis, AS, GERD, HTN,  glaucoma, neuropathy presents with C/O SOB diaphoretic  and abdominal bloating, increased leg swelling since morning of admission. Admit to tele for acute respiratory failure 2/2 to acute CHF exacerbation, elevated troponin, abdominal distention. Patient is improved.

## 2018-05-08 NOTE — PROGRESS NOTE ADULT - PROBLEM SELECTOR PLAN 8
Stable.  Continue home dose metoprolol with hold parameters

## 2018-05-09 VITALS
HEART RATE: 55 BPM | OXYGEN SATURATION: 93 % | DIASTOLIC BLOOD PRESSURE: 71 MMHG | RESPIRATION RATE: 17 BRPM | TEMPERATURE: 99 F | SYSTOLIC BLOOD PRESSURE: 104 MMHG

## 2018-05-09 LAB
ANION GAP SERPL CALC-SCNC: 4 MMOL/L — LOW (ref 5–17)
BUN SERPL-MCNC: 12 MG/DL — SIGNIFICANT CHANGE UP (ref 7–23)
CALCIUM SERPL-MCNC: 8.6 MG/DL — SIGNIFICANT CHANGE UP (ref 8.5–10.1)
CHLORIDE SERPL-SCNC: 103 MMOL/L — SIGNIFICANT CHANGE UP (ref 96–108)
CO2 SERPL-SCNC: 33 MMOL/L — HIGH (ref 22–31)
CREAT SERPL-MCNC: 0.83 MG/DL — SIGNIFICANT CHANGE UP (ref 0.5–1.3)
GLUCOSE SERPL-MCNC: 95 MG/DL — SIGNIFICANT CHANGE UP (ref 70–99)
HCT VFR BLD CALC: 33.4 % — LOW (ref 34.5–45)
HGB BLD-MCNC: 10.5 G/DL — LOW (ref 11.5–15.5)
MCHC RBC-ENTMCNC: 28.3 PG — SIGNIFICANT CHANGE UP (ref 27–34)
MCHC RBC-ENTMCNC: 31.4 GM/DL — LOW (ref 32–36)
MCV RBC AUTO: 90.3 FL — SIGNIFICANT CHANGE UP (ref 80–100)
PLATELET # BLD AUTO: 212 K/UL — SIGNIFICANT CHANGE UP (ref 150–400)
POTASSIUM SERPL-MCNC: 4.3 MMOL/L — SIGNIFICANT CHANGE UP (ref 3.5–5.3)
POTASSIUM SERPL-SCNC: 4.3 MMOL/L — SIGNIFICANT CHANGE UP (ref 3.5–5.3)
RBC # BLD: 3.7 M/UL — LOW (ref 3.8–5.2)
RBC # FLD: 13.8 % — SIGNIFICANT CHANGE UP (ref 10.3–14.5)
SODIUM SERPL-SCNC: 140 MMOL/L — SIGNIFICANT CHANGE UP (ref 135–145)
WBC # BLD: 9.7 K/UL — SIGNIFICANT CHANGE UP (ref 3.8–10.5)
WBC # FLD AUTO: 9.7 K/UL — SIGNIFICANT CHANGE UP (ref 3.8–10.5)

## 2018-05-09 PROCEDURE — 80053 COMPREHEN METABOLIC PANEL: CPT

## 2018-05-09 PROCEDURE — 97530 THERAPEUTIC ACTIVITIES: CPT

## 2018-05-09 PROCEDURE — 93306 TTE W/DOPPLER COMPLETE: CPT

## 2018-05-09 PROCEDURE — 99239 HOSP IP/OBS DSCHRG MGMT >30: CPT

## 2018-05-09 PROCEDURE — 82550 ASSAY OF CK (CPK): CPT

## 2018-05-09 PROCEDURE — 99285 EMERGENCY DEPT VISIT HI MDM: CPT | Mod: 25

## 2018-05-09 PROCEDURE — 73070 X-RAY EXAM OF ELBOW: CPT

## 2018-05-09 PROCEDURE — 73030 X-RAY EXAM OF SHOULDER: CPT

## 2018-05-09 PROCEDURE — 93970 EXTREMITY STUDY: CPT

## 2018-05-09 PROCEDURE — 80048 BASIC METABOLIC PNL TOTAL CA: CPT

## 2018-05-09 PROCEDURE — 71045 X-RAY EXAM CHEST 1 VIEW: CPT

## 2018-05-09 PROCEDURE — 85379 FIBRIN DEGRADATION QUANT: CPT

## 2018-05-09 PROCEDURE — 74019 RADEX ABDOMEN 2 VIEWS: CPT

## 2018-05-09 PROCEDURE — 93005 ELECTROCARDIOGRAM TRACING: CPT

## 2018-05-09 PROCEDURE — 82803 BLOOD GASES ANY COMBINATION: CPT

## 2018-05-09 PROCEDURE — 84145 PROCALCITONIN (PCT): CPT

## 2018-05-09 PROCEDURE — 96374 THER/PROPH/DIAG INJ IV PUSH: CPT

## 2018-05-09 PROCEDURE — 97116 GAIT TRAINING THERAPY: CPT

## 2018-05-09 PROCEDURE — 94660 CPAP INITIATION&MGMT: CPT

## 2018-05-09 PROCEDURE — 84484 ASSAY OF TROPONIN QUANT: CPT

## 2018-05-09 PROCEDURE — 97161 PT EVAL LOW COMPLEX 20 MIN: CPT

## 2018-05-09 PROCEDURE — 83880 ASSAY OF NATRIURETIC PEPTIDE: CPT

## 2018-05-09 PROCEDURE — 85027 COMPLETE CBC AUTOMATED: CPT

## 2018-05-09 PROCEDURE — 85610 PROTHROMBIN TIME: CPT

## 2018-05-09 PROCEDURE — 99232 SBSQ HOSP IP/OBS MODERATE 35: CPT

## 2018-05-09 RX ORDER — CLONAZEPAM 1 MG
1 TABLET ORAL
Qty: 0 | Refills: 0 | COMMUNITY

## 2018-05-09 RX ORDER — POTASSIUM CHLORIDE 20 MEQ
1 PACKET (EA) ORAL
Qty: 0 | Refills: 0 | COMMUNITY

## 2018-05-09 RX ORDER — DIGOXIN 250 MCG
1 TABLET ORAL
Qty: 0 | Refills: 0 | COMMUNITY

## 2018-05-09 RX ORDER — DORZOLAMIDE HYDROCHLORIDE TIMOLOL MALEATE 20; 5 MG/ML; MG/ML
1 SOLUTION/ DROPS OPHTHALMIC
Qty: 0 | Refills: 0 | COMMUNITY

## 2018-05-09 RX ORDER — LATANOPROST 0.05 MG/ML
1 SOLUTION/ DROPS OPHTHALMIC; TOPICAL
Qty: 0 | Refills: 0 | COMMUNITY

## 2018-05-09 RX ORDER — FUROSEMIDE 40 MG
1 TABLET ORAL
Qty: 30 | Refills: 0
Start: 2018-05-09 | End: 2018-06-07

## 2018-05-09 RX ORDER — FUROSEMIDE 40 MG
1 TABLET ORAL
Qty: 0 | Refills: 0 | COMMUNITY

## 2018-05-09 RX ADMIN — Medication 100 MILLIGRAM(S): at 05:27

## 2018-05-09 RX ADMIN — DORZOLAMIDE HYDROCHLORIDE TIMOLOL MALEATE 1 DROP(S): 20; 5 SOLUTION/ DROPS OPHTHALMIC at 05:26

## 2018-05-09 RX ADMIN — PANTOPRAZOLE SODIUM 40 MILLIGRAM(S): 20 TABLET, DELAYED RELEASE ORAL at 05:27

## 2018-05-09 RX ADMIN — Medication 40 MILLIEQUIVALENT(S): at 05:26

## 2018-05-09 RX ADMIN — AMIODARONE HYDROCHLORIDE 200 MILLIGRAM(S): 400 TABLET ORAL at 05:26

## 2018-05-09 RX ADMIN — Medication 40 MILLIGRAM(S): at 05:27

## 2018-05-09 RX ADMIN — Medication 100 MILLIGRAM(S): at 13:26

## 2018-05-09 RX ADMIN — APIXABAN 5 MILLIGRAM(S): 2.5 TABLET, FILM COATED ORAL at 05:26

## 2018-05-09 NOTE — PROGRESS NOTE ADULT - PROBLEM SELECTOR PROBLEM 3
Atrial fibrillation
Troponin level elevated
Atrial fibrillation
Atrial fibrillation
Troponin level elevated
Troponin level elevated
Atrial fibrillation

## 2018-05-09 NOTE — PROGRESS NOTE ADULT - ASSESSMENT
Pt admitted with Acute respiratory failure, chf, which resolved with diuresis.  Superficial phlebitis  PAF  Hi troponin  Reviewed cxr--I do not believe there is a pneumonia.  Should be ready for dc

## 2018-05-09 NOTE — PROGRESS NOTE ADULT - PROBLEM SELECTOR PROBLEM 2
CHF (congestive heart failure)

## 2018-05-09 NOTE — PROGRESS NOTE ADULT - PROBLEM SELECTOR PROBLEM 1
Acute respiratory failure

## 2018-05-09 NOTE — PROGRESS NOTE ADULT - ASSESSMENT
The etiology of patient's decompensation is unclear but she remains in sinus rhythm with no clear evidence for an acute coronary syndrome or significant dysrhythmia. Her pro BNP is mildly elevated and her elevated troponin is related to demand ischemia.     - Cardiac enzymes trending down.  Does not appear to be elevated from plaque rupture.  May be 2/2 due to her volume overload.  Her pro-bnp increased from admission feels better but still appears overloaded on exam.   - Cont Lasix 40mg PO daily.  Patient is clinically euvolemic.  Monitor strict I&Os and daily weights.  - Monitor electrolytes and creatinine closely.  Maintain K >4  Mag >2  - Echocardiography reveals normal LV systolic function with LVEF 55%.  Mod AS. Mild mitral valve insufficiency.  Mod LAE.  Grade 2 diastolic dysfunction.    - Supplemental oxygen as needed.  Pulm following  - Venous Doppler negative for DVT with superficial thrombophlebitis in Rt upper calf on A/C  - Cont Eliquis for stroke risk reduction.  Monitor H/H closely  - Continue amiodarone.  Monitor LFT and TFT  - Continue off Digoxin as patient's HR is controlled and sometimes, bradycardic  -Further management can be dependent on her clinical course per.    -Will cont to follow    Radha Mercer NP  Cardiology

## 2018-05-09 NOTE — PROGRESS NOTE ADULT - PROBLEM SELECTOR PLAN 3
watch rate
watch rate
Trending down  Rule out ACS  As per cardio, doesn't appear to be elevated from plaque rupture  Likely demand ischemia, however inverted p waves notes on EKG   Cardio Dr. Yang consulted. F/u recs
watch rate
watch rate
Likely demand ischemia, however inverted p waves notes on EKG  trending down.   Cardio Dr. Yang consulted. F/u recs
Rule out ACS  Likely demand ischemia, however inverted p waves notes on EKG  trending down.   Cardio Dr. Yang consulted. F/u recs

## 2018-05-09 NOTE — PROGRESS NOTE ADULT - PROVIDER SPECIALTY LIST ADULT
Cardiology
Critical Care
Pulmonology
Hospitalist
Hospitalist
Critical Care
Critical Care
Hospitalist

## 2018-05-09 NOTE — PROGRESS NOTE ADULT - ATTENDING COMMENTS
Chart reviewed    Patient seen and examined in    Agree with plan as outlined above
Chart reviewed    Patient seen and examined at    Agree with plan as outlined above
I personally saw and examined the patient in detail.  I have spoken to the above provider regarding the assessment and plan of care.  I reviewed the above assessment and plan of care, and agree.  I have made changes in the body of the note where appropriate.   She remains volume overloaded and on IV Lasix for diuresis.  Continue amiodarone.  Maintaining NSR.  Follow LFT and TFT closely for signs of toxicity.  Continue Eliquis for stroke risk reduction.
I personally saw and examined the patient in detail.  I have spoken to the above provider regarding the assessment and plan of care.  I reviewed the above assessment and plan of care, and agree.  I have made changes in the body of the note where appropriate.  Remains volume overloaded on IV diuretics with good urine output.  Will continue to diurese and monitor clinically.
hypokalemia, corrected.

## 2018-05-09 NOTE — PROGRESS NOTE ADULT - PROBLEM SELECTOR PLAN 1
Acute respiratory failure hypoxia, Likely 2/2 to CHF exacerbation  start lasix 40 mg PO daily, plum and cardio agree with diuresis   Tele monitor and continuous pulse ox  Procalcitonin normal  proBNP remains elevated  Cardio Dr. Yang. F/u recs  Pulm Dr. Crystal. consulted. F/u recs
Karolina STATON planning
Karolina STATON planning
Acute respiratory failure hypoxic, Likely 2/2 to CHF exacerbation    S/p lasix 40 mg iv x 1. Continue lasix 40 mg Iv daily   Tele monitor and continuos pulse ox  Patient on bipap now. Titrate down as tolerated  F/u procal  F/u repeat probNP tmr am  Cardio Dr. Yang. F/u recs  Pulm Dr. Crystal. consulted. F/u recs
Acute respiratory failure hypoxic, Likely 2/2 to CHF exacerbation  c/w lasix 40 mg Iv daily   Tele monitor and continuos pulse ox  Patient on bipap now. Titrate down as tolerated  Procalcitonin normal   proBNP remains elevated  Cardio Dr. Yang. F/u recs  Pulm Dr. Crystal. consulted. F/u recs
Huae
Huae

## 2018-05-09 NOTE — PROGRESS NOTE ADULT - SUBJECTIVE AND OBJECTIVE BOX
CARDIOLOGY FOLLOW UP:    SUBJECTIVE:  Patient is a 86y old  Female who presents with a chief complaint of shortness of breath, abdominal bloating, increased leg swelling (07 May 2018 16:56)    Sitting on the chair.  Denies CP, palpitations, SOB, PRASAD, or orthopnea.  Denies any form of bleeding.    OBJECTIVE:  Review Of Systems:  Constitutional: [ ] Fever [ ] Chills [ ] Fatigue [ ] Weight change   HEENT: [ ] Blurred vision [ ] Eye Pain [ ] Headache [ ] Runny nose [ ] Sore Throat   Respiratory: [ ] Cough [ ] Wheezing [ ] Shortness of breath  Cardiovascular: [ ] Chest Pain [ ] Palpitations [ ] PRASAD [ ] PND [ ] Orthopnea  Gastrointestinal: [ ] Abdominal Pain [ ] Diarrhea [ ] Constipation [ ] Hemorrhoids [ ] Nausea [ ] Vomiting  Genitourinary: [ ] Nocturia [ ] Dysuria [ ] Incontinence  Extremities: [ ] Swelling [ ] Joint Pain  Neurologic: [ ] Focal deficit [ ] Paresthesias [ ] Syncope  Lymphatic: [ ] Swelling [ ] Lymphadenopathy   Skin: [ ] Rash [ ] Ecchymoses [ ] Wounds [ ] Lesions  Psychiatry: [ ] Depression [ ] Suicidal/Homicidal Ideation [ ] Anxiety [ ] Sleep Disturbances  [x] 10 point review of systems is otherwise negative except as mentioned above            [ ]Unable to obtain    Allergy:  Allergies    No Known Allergies    Intolerances    Medications:  MEDICATIONS  (STANDING):  amiodarone    Tablet 200 milliGRAM(s) Oral daily  apixaban 5 milliGRAM(s) Oral every 12 hours  clonazePAM Tablet 1 milliGRAM(s) Oral at bedtime  docusate sodium 100 milliGRAM(s) Oral three times a day  dorzolamide 2%/timolol 0.5% Ophthalmic Solution 1 Drop(s) Both EYES two times a day  furosemide    Tablet 40 milliGRAM(s) Oral daily  gabapentin 100 milliGRAM(s) Oral at bedtime  latanoprost 0.005% Ophthalmic Solution 1 Drop(s) Both EYES at bedtime  metoprolol tartrate 100 milliGRAM(s) Oral two times a day  pantoprazole    Tablet 40 milliGRAM(s) Oral before breakfast  potassium chloride    Tablet ER 40 milliEquivalent(s) Oral two times a day  senna 2 Tablet(s) Oral at bedtime    MEDICATIONS  (PRN):      PMH/PSH/FH/SH: [ ] Unchanged    Vitals:  T(C): 36.5 (18 @ 08:00), Max: 36.9 (18 @ 13:51)  HR: 62 (18 @ 08:00) (56 - 79)  BP: 145/82 (18 @ 08:00) (107/78 - 145/82)  BP(mean): --  RR: 15 (18 @ 08:00) (15 - 18)  SpO2: 95% (18 @ 08:00) (89% - 100%)  Wt(kg): --  Daily     Daily Weight in k.2 (09 May 2018 04:42)  I&O's Summary    08 May 2018 07:01  -  09 May 2018 07:00  --------------------------------------------------------  IN: 700 mL / OUT: 800 mL / NET: -100 mL    Labs:                        10.5   9.7   )-----------( 212      ( 09 May 2018 07:32 )             33.4     05-09    140  |  103  |  12  ----------------------------<  95  4.3   |  33<H>  |  0.83    Ca    8.6      09 May 2018 07:32        ECG:  < from: 12 Lead ECG (18 @ 06:31) >    Ventricular Rate 68 BPM    Atrial Rate 68 BPM    P-R Interval 158 ms    QRS Duration 98 ms    Q-T Interval 396 ms    QTC Calculation(Bezet) 421 ms    P Axis 17 degrees    R Axis -34 degrees    T Axis -29 degrees    Diagnosis Line Normal sinus rhythm  Left axis deviation  Minimal voltage criteria for LVH, may be normal variant  Septal infarct , age undetermined  Possible Lateral infarct , age undetermined  T wave abnormality, consider anterior ischemia  Abnormal ECG    Confirmed by Trey SALDANA, Aubrey ([id number]) on 2018 2:36:34 PM    < end of copied text >    Echo:  < from: TTE Echo Doppler w/o Cont (18 @ 10:16) >     EXAM:  ECHO TTE W/O CON COMP W/DOPPLR         PROCEDURE DATE:  2018        INTERPRETATION:  Ordering Physician: VALDEMAR WRIGHT [id number]    Indication: Abnormal EKG  Technologist Initials: CHRISTIANO  Study Quality: Technically fair   A complete echocardiographic study was performed utilizing standard   protocol including spectral and color Doppler in all echocardiographic   windows.    Height: 157 cm  Weight: 68 kg  BSA: 1.69 sq m  Blood Pressure: 122/68    MEASUREMENTS  IVS: 1.2cm  PWT: 1.1cm  LA: 4.7cm  AO: 3.3cm  LVIDd: 5.1cm  LVIDs: 2.7cm    LVEF: 55%  RVSP: 41mmHg    FINDINGS  Left Ventricle: Normal left ventricular systolic function.  Aortic Valve: Calcified aortic valve with decreased opening. Peak   transaortic gradient equals 31 mmHg, and mean transaortic gradient equals   15 mmHg. The aortic valve area is 1.4 sq cm, consistent with moderate   aortic stenosis.  Mitral Valve: Mitral annular calcification and calcified mitral valve   leaflets. Mild mitral insufficiency.  TricuspidValve: Normal tricuspid valve. Mild tricuspid insufficiency.  Pulmonic Valve: Not well visualized. No pulmonic insufficiency.  Left Atrium: Moderately enlarged  Right Ventricle: Normal right ventricular size and systolic function.  Right Atrium: Normal  Diastolic Function: Grade 2 diastolic dysfunction  Pericardium/Pleura: Normal pericardium with no pericardial effusion.    CONCLUSIONS:    1. Normal left ventricular systolic function.  2.  Calcified aortic valve with decreased opening. Peak transaortic   gradient equals 31 mmHg, and mean transaortic gradient equals 15 mmHg.   The aortic valve area is 1.4 sq cm, consistent with moderate aortic   stenosis.  3. Mitral annular calcification and calcified mitral leaflets with mild   mitral insufficiency.  4. Moderate left atrial enlargement.  5. Normal right ventricular size and systolic function.  6. Grade 2 diastolic dysfunction.    DARRELL KENNY M.D., ATTENDING CARDIOLOGIST  This document has been electronically signed. May  7 2018  7:20AM      < end of copied text >    Stress Testing:     Cath:    Imaging:  < from: Xray Chest 1 View- PORTABLE-Routine (18 @ 08:49) >    EXAM:  XR CHEST PORTABLE ROUTINE 1V                            PROCEDURE DATE:  2018          INTERPRETATION:  CHF, short of breath.    AP chest. Prior 2018.    Low lung volumes. No change heart mediastinum. Mild vascular congestion   similar to prior. There is a patchy left basilar subsegmental   atelectasis/infiltrate. Correlate clinically for active infection. Small   left pleural effusion.    Impression: As above    ZULMA NOVA M.D., ATTENDING RADIOLOGIST  This document has been electronically signed. May  7 2018 10:45AM      < end of copied text >    Interpretation of Telemetry:  Sinus travon- sinus rhythm    Physical Exam:  Appearance: [x ] Normal  [ ] abnormal [x ] NAD   Eyes: [ ] PERRL [ ] EOMI  HENT: [ ] Normal [ ] Abnormal oral mus[place]a [ ]NC/AT  Cardiovascular: [x ] S1 [x ] S2 [x ] RRR [ ] m/r/g [ ]edema [ ] JVP  Procedural Access Site: [ ]  hematoma [ ] tender to palpation [ ] 2+ pulse [ ] bruit [ ] Ecchymosis  Respiratory: [ x] Clear to auscultation bilaterally  Gastrointestinal: [x ] Soft [ ] tenderness[ ] distension [x ] BS  Musculoskeletal: [ ] clubbing [ ] joint deformity   Neurologic: [ x] Non-focal  Lymphatic: [ ] lymphadenopathy  Psychiatry: [ x] AAOx3  [ ] confused [ ] disoriented [x ] Mood & affect appropriate  Skin: [ ]  rashes [ ] ecchymoses [ ] cyanosis

## 2018-05-09 NOTE — PROGRESS NOTE ADULT - NSHPATTENDINGPLANDISCUSS_GEN_ALL_CORE
pt and family and Dr Yang about chf management.
nursing in detail
 in detail
nursing in detail
nursing in detail
pt and family and cardiac team about her CHF.

## 2018-05-09 NOTE — PROGRESS NOTE ADULT - PROBLEM SELECTOR PLAN 6
On ac
Patient was cardioverted outpatient in [place] 5/4/18  Subtherapeutic INR  - Cont Eliquis for stroke risk reduction.  Monitor H/H closely  Continue home dose amiodarone and digoxin with hold parameters  f/u cardio reccs
Patient was cardioverted outpatient in [place] yesterday 5/4/18  Subtherapeutic INR. Continue home dose eliquis  Continue home dose amiodarone and digoxin with hold parameters  Cardio Dr. Yang
Patient was cardioverted outpatient in [place] yesterday 5/4/18  Subtherapeutic INR. Continue home dose eliquis  Continue home dose amiodarone and digoxin with hold parameters  Cardio Dr. Yang

## 2018-05-09 NOTE — PROGRESS NOTE ADULT - SUBJECTIVE AND OBJECTIVE BOX
PULMONARY/CRITICAL CARE      INTERVAL HPI/OVERNIGHT EVENTS:  Looks better, less sob. No cp.  86y FemaleHPI:  87 y/o F pmh a fib on eliquis, AS, GERD, HTN,  glaucoma, neuropathy presents with C/O SOB diaphoretic  and abdominal bloating, increased leg swelling since 4am morning of admission.  She was cardioverted successfully from AF to NSR yesterday at [facility]. Son is at bedside and provides history. States that patient has been living at home with him, ambulating with cane, no home O2 use, due to patient being in hospital two times this year. States that she was cardioverted once in April 2018 and once yesterday. States that patient was doing well after cardioversion however complaining of right elbow and shoulder pain. At 4am morning of admission, patient was suddenly SOB, diaphoretic and appeared anxious and ambulance was called.  Paramedics arrived and the patient was assisted with Cpap. Sat was 100Percent and she was downgraded BiPap in the ED. Reports b/l LE swelling that has been increasing for the past 3 days. No Chest Pain, No nausea, no vomiting, no fever, no chills.     In the ED, VS temp 98.2, HR 74, /87, RR 27, SpO2 100 on supplemental O2. WBC 14.0, Hg 11.2, Hct 34.6, platelet 209, PT 15.3, INR 1.39, D-dimer 222, Na 141, K 4.5, Cl 108, Co2 27, BUN 14, Cr 0.91, glucose 161, creatinine kinase 49, troponin 1 .0.59, proBNP 1755. ABG pH 7.40, pCo2 42, po2 140, hco3 25, oxygen sat 100, bipap 12/6.50,     Received  mg, Lasix 40m g IV x 1    Imaging:  Doppler b/l LE 1. No evidence of bilateral lower extremity deep venous thrombosis. 2. Superficial thrombophlebitis in the right upper calf.  CXR: Lungs are clear. No pleural effusions. Mild to moderate central pulmonary vascular congestion. Cardiomediastinal silhouette is indeterminate in   size due to technique. Bones are unremarkable  EKG: NSR at 68 bpm, inverted p waves notes (05 May 2018 07:05)        PAST MEDICAL & SURGICAL HISTORY:  Neuropathy  GERD (gastroesophageal reflux disease)  Anxiety  Glaucoma  Hypertension  Atrial fibrillation  Aortic valve stenosis, etiology of cardiac valve disease unspecified  Atrial fibrillation, unspecified type  GERD (gastroesophageal reflux disease)  Hiatal hernia  Colonic polyp  Hypovitaminosis D  Hyperlipidemia  Leg pain, bilateral  Glaucoma  Hypertension  No significant past surgical history  History of cardioversion  Abnormal endoscopy finding  Abnormal colonoscopy        ICU Vital Signs Last 24 Hrs  T(C): 36.5 (09 May 2018 08:00), Max: 36.9 (08 May 2018 13:51)  T(F): 97.7 (09 May 2018 08:00), Max: 98.5 (08 May 2018 13:51)  HR: 62 (09 May 2018 08:00) (56 - 79)  BP: 145/82 (09 May 2018 08:00) (107/78 - 145/82)  BP(mean): --  ABP: --  ABP(mean): --  RR: 15 (09 May 2018 08:00) (15 - 18)  SpO2: 95% (09 May 2018 08:00) (89% - 100%)    Qtts:     I&O's Summary    08 May 2018 07:01  -  09 May 2018 07:00  --------------------------------------------------------  IN: 700 mL / OUT: 800 mL / NET: -100 mL            REVIEW OF SYSTEMS:    CONSTITUTIONAL: No fever, weight loss, or fatigue  EYES: No eye pain, visual disturbances, or discharge  ENMT:  No difficulty hearing, tinnitus, vertigo; No sinus or throat pain  NECK: No pain or stiffness  BREASTS: No pain, masses, or nipple discharge  RESPIRATORY: No cough, wheezing, chills or hemoptysis; No shortness of breath  CARDIOVASCULAR: No chest pain, palpitations, dizziness, or leg swelling  GASTROINTESTINAL: No abdominal or epigastric pain. No nausea, vomiting, or hematemesis; No diarrhea or constipation. No melena or hematochezia.  GENITOURINARY: No dysuria, frequency, hematuria, or incontinence  NEUROLOGICAL: No headaches, memory loss, loss of strength, numbness, or tremors  SKIN: No itching, burning, rashes, or lesions   LYMPH NODES: No enlarged glands  ENDOCRINE: No heat or cold intolerance; No hair loss  MUSCULOSKELETAL: No joint pain or swelling; No muscle, back, or extremity pain, no calf tenderness  PSYCHIATRIC: No depression, anxiety, mood swings, or difficulty sleeping  HEME/LYMPH: No easy bruising, or bleeding gums  ALLERGY AND IMMUNOLOGIC: No hives or eczema      PHYSICAL EXAM:    GENERAL: NAD, well-groomed, well-developed, NAD  HEAD:  Atraumatic, Normocephalic  EYES: EOMI, PERRLA, conjunctiva and sclera clear  ENMT: No tonsillar erythema, exudates, or enlargement; Moist mucous membranes, Good dentition, No lesions  NECK: Supple, No JVD, Normal thyroid  NERVOUS SYSTEM:  Alert & Oriented X3, Good concentration; Motor Strength 5/5 B/L upper and lower extremities  CHEST/LUNG: Clear to percussion bilaterally; No rales, rhonchi, wheezing, or rubs  HEART: Regular rate and rhythm; No murmurs, rubs, or gallops  ABDOMEN: Soft, Nontender, Nondistended; Bowel sounds present  EXTREMITIES:  2+ Peripheral Pulses, No clubbing, cyanosis,  1 plus pedal edema  LYMPH: No lymphadenopathy noted  SKIN: No rashes or lesions        LABS:                        10.5   9.7   )-----------( 212      ( 09 May 2018 07:32 )             33.4     05-09    140  |  103  |  12  ----------------------------<  95  4.3   |  33<H>  |  0.83    Ca    8.6      09 May 2018 07:32            vanco through     RADIOLOGY & ADDITIONAL STUDIES:< from: Xray Chest 1 View- PORTABLE-Routine (05.07.18 @ 08:49) >  PROCEDURE DATE:  05/07/2018          INTERPRETATION:  CHF, short of breath.    AP chest. Prior 5/5/2018.    Low lung volumes. No change heart mediastinum. Mild vascular congestion   similar to prior. There is a patchy left basilar subsegmental   atelectasis/infiltrate. Correlate clinically for active infection. Small   left pleural effusion.    Impression: As above                ZULMA NOVA M.D., ATTENDING RADIOLOGIST  This document has been electronically signed. May  7 2018 10:45AM        < end of copied text >        CRITICAL CARE TIME SPENT:

## 2018-05-09 NOTE — PROGRESS NOTE ADULT - PROBLEM SELECTOR PLAN 2
Cardio fu
Acute on Chronic diastolic heart failure in the setting of moderate aortic stenosis  ProBNP elevated  start lasix 40 mg PO daily  f/u with cardio re: advancing to PO lasix  TTE from April 2018: severe atrial enlargement, AS, normal left ventricular systolic function  TTE 5/6/18:  Normal left ventricular systolic function (LVEF 55%), Calcified aortic valve with decreased opening, Peak transaortic gradient equals 31 mmHg, and mean transaortic gradient equals 15 mmHg, The aortic valve area is 1.4 sq cm, consistent with moderate aortic stenosis, Mitral annular calcification and calcified mitral leaflets with mild mitral insufficiency, Moderate left atrial enlargement, Normal right ventricular size and systolic function, and Grade 2 diastolic dysfunction.  Cardio Dr. Yang. F/u reccs and clearance for d/c home  Pulm Dr. Crystal consulted. F/u recs
Cardio fu
Cardio fu
Plan as above  ProBNP elevated. F/u repeat tomorrow  am  TTE from April 2018: severe atrial enlargement, AS, normal left ventricular systolic function  Continue lasix 40 mg IV daily  Cardio Dr. Yang. F/u recs  Pulm Dr. Crystal consulted. F/u recs
Acute on Chronic diastolic heart failure in the setting of moderate aortic stenosis  ProBNP elevated  c/w lasix 40 mg IV daily  f/u with cardio re: advancing to PO lasix  TTE from April 2018: severe atrial enlargement, AS, normal left ventricular systolic function  TTE 5/6/18:  Normal left ventricular systolic function (LVEF 55%), Calcified aortic valve with decreased opening, Peak transaortic gradient equals 31 mmHg, and mean transaortic gradient equals 15 mmHg, The aortic valve area is 1.4 sq cm, consistent with moderate aortic stenosis, Mitral annular calcification and calcified mitral leaflets with mild mitral insufficiency, Moderate left atrial enlargement, Normal right ventricular size and systolic function, and Grade 2 diastolic dysfunction.  Cardio Dr. Yang. F/u recs  Pulm Dr. Crystal consulted. F/u recs
Cardio fu

## 2018-05-09 NOTE — PROGRESS NOTE ADULT - PROBLEM SELECTOR PROBLEM 5
Troponin level elevated
Troponin level elevated
Leukocytosis
Troponin level elevated
Troponin level elevated
Leukocytosis
Leukocytosis

## 2018-05-09 NOTE — PROGRESS NOTE ADULT - PROBLEM SELECTOR PROBLEM 4
Hypertension
Hypertension
Abdominal distension
Hypertension
Hypertension
Abdominal distension
Abdominal distension

## 2018-05-10 PROBLEM — H40.9 UNSPECIFIED GLAUCOMA: Chronic | Status: ACTIVE | Noted: 2018-05-05

## 2018-05-10 PROBLEM — I10 ESSENTIAL (PRIMARY) HYPERTENSION: Chronic | Status: ACTIVE | Noted: 2018-05-05

## 2018-05-14 ENCOUNTER — NON-APPOINTMENT (OUTPATIENT)
Age: 83
End: 2018-05-14

## 2018-05-14 ENCOUNTER — APPOINTMENT (OUTPATIENT)
Dept: CARDIOLOGY | Facility: CLINIC | Age: 83
End: 2018-05-14
Payer: MEDICARE

## 2018-05-14 VITALS
HEART RATE: 111 BPM | BODY MASS INDEX: 28.52 KG/M2 | WEIGHT: 155 LBS | DIASTOLIC BLOOD PRESSURE: 73 MMHG | RESPIRATION RATE: 12 BRPM | HEIGHT: 62 IN | OXYGEN SATURATION: 95 % | SYSTOLIC BLOOD PRESSURE: 121 MMHG

## 2018-05-14 DIAGNOSIS — R06.00 DYSPNEA, UNSPECIFIED: ICD-10-CM

## 2018-05-14 PROCEDURE — 99215 OFFICE O/P EST HI 40 MIN: CPT | Mod: 25

## 2018-05-14 PROCEDURE — 93000 ELECTROCARDIOGRAM COMPLETE: CPT

## 2018-05-15 LAB
ALBUMIN SERPL ELPH-MCNC: 3.7 G/DL
ALBUMIN SERPL ELPH-MCNC: 3.8 G/DL
ALP BLD-CCNC: 67 U/L
ALP BLD-CCNC: 71 U/L
ALT SERPL-CCNC: 12 U/L
ALT SERPL-CCNC: 14 U/L
ANION GAP SERPL CALC-SCNC: 11 MMOL/L
ANION GAP SERPL CALC-SCNC: 15 MMOL/L
AST SERPL-CCNC: 17 U/L
AST SERPL-CCNC: 21 U/L
BASOPHILS # BLD AUTO: 0.02 K/UL
BASOPHILS # BLD AUTO: 0.03 K/UL
BASOPHILS NFR BLD AUTO: 0.3 %
BASOPHILS NFR BLD AUTO: 0.3 %
BILIRUB SERPL-MCNC: 0.2 MG/DL
BILIRUB SERPL-MCNC: 0.3 MG/DL
BUN SERPL-MCNC: 20 MG/DL
BUN SERPL-MCNC: 24 MG/DL
CALCIUM SERPL-MCNC: 9.1 MG/DL
CALCIUM SERPL-MCNC: 9.3 MG/DL
CHLORIDE SERPL-SCNC: 101 MMOL/L
CHLORIDE SERPL-SCNC: 102 MMOL/L
CK MB BLD-MCNC: 1 NG/ML
CK MB CFR SERPL: 4 %
CK SERPL-CCNC: 25 U/L
CO2 SERPL-SCNC: 25 MMOL/L
CO2 SERPL-SCNC: 29 MMOL/L
CREAT SERPL-MCNC: 1 MG/DL
CREAT SERPL-MCNC: 1.01 MG/DL
DIGOXIN SERPL-MCNC: 1.5 NG/ML
EOSINOPHIL # BLD AUTO: 0.1 K/UL
EOSINOPHIL # BLD AUTO: 0.15 K/UL
EOSINOPHIL NFR BLD AUTO: 1.3 %
EOSINOPHIL NFR BLD AUTO: 1.4 %
GLUCOSE SERPL-MCNC: 129 MG/DL
GLUCOSE SERPL-MCNC: 94 MG/DL
HCT VFR BLD CALC: 37.3 %
HCT VFR BLD CALC: 38.8 %
HGB BLD-MCNC: 11.1 G/DL
HGB BLD-MCNC: 12 G/DL
IMM GRANULOCYTES NFR BLD AUTO: 0.4 %
IMM GRANULOCYTES NFR BLD AUTO: 0.7 %
LYMPHOCYTES # BLD AUTO: 1.41 K/UL
LYMPHOCYTES # BLD AUTO: 1.83 K/UL
LYMPHOCYTES NFR BLD AUTO: 17.4 %
LYMPHOCYTES NFR BLD AUTO: 18.9 %
MAN DIFF?: NORMAL
MAN DIFF?: NORMAL
MCHC RBC-ENTMCNC: 27.1 PG
MCHC RBC-ENTMCNC: 28.9 PG
MCHC RBC-ENTMCNC: 29.8 GM/DL
MCHC RBC-ENTMCNC: 30.9 GM/DL
MCV RBC AUTO: 91.2 FL
MCV RBC AUTO: 93.5 FL
MONOCYTES # BLD AUTO: 0.95 K/UL
MONOCYTES # BLD AUTO: 0.96 K/UL
MONOCYTES NFR BLD AUTO: 12.7 %
MONOCYTES NFR BLD AUTO: 9.1 %
NEUTROPHILS # BLD AUTO: 4.94 K/UL
NEUTROPHILS # BLD AUTO: 7.49 K/UL
NEUTROPHILS NFR BLD AUTO: 66.1 %
NEUTROPHILS NFR BLD AUTO: 71.4 %
PLATELET # BLD AUTO: 283 K/UL
PLATELET # BLD AUTO: 328 K/UL
POTASSIUM SERPL-SCNC: 4.1 MMOL/L
POTASSIUM SERPL-SCNC: 4.3 MMOL/L
PROT SERPL-MCNC: 6.8 G/DL
PROT SERPL-MCNC: 7.3 G/DL
RBC # BLD: 4.09 M/UL
RBC # BLD: 4.15 M/UL
RBC # FLD: 14.5 %
RBC # FLD: 14.9 %
SODIUM SERPL-SCNC: 141 MMOL/L
SODIUM SERPL-SCNC: 142 MMOL/L
TROPONIN T SERPL-MCNC: <0.01 NG/ML
TSH SERPL-ACNC: 1.05 UIU/ML
WBC # FLD AUTO: 10.5 K/UL
WBC # FLD AUTO: 7.47 K/UL

## 2018-05-16 ENCOUNTER — APPOINTMENT (OUTPATIENT)
Dept: INTERNAL MEDICINE | Facility: CLINIC | Age: 83
End: 2018-05-16
Payer: MEDICARE

## 2018-05-16 ENCOUNTER — NON-APPOINTMENT (OUTPATIENT)
Age: 83
End: 2018-05-16

## 2018-05-16 VITALS
SYSTOLIC BLOOD PRESSURE: 124 MMHG | DIASTOLIC BLOOD PRESSURE: 75 MMHG | RESPIRATION RATE: 16 BRPM | TEMPERATURE: 98.6 F | HEART RATE: 86 BPM

## 2018-05-16 VITALS — BODY MASS INDEX: 28.17 KG/M2 | WEIGHT: 154 LBS

## 2018-05-16 DIAGNOSIS — H40.9 UNSPECIFIED GLAUCOMA: ICD-10-CM

## 2018-05-16 PROCEDURE — 99214 OFFICE O/P EST MOD 30 MIN: CPT

## 2018-05-16 RX ORDER — DORZOLAMIDE HYDROCHLORIDE TIMOLOL MALEATE 20; 5 MG/ML; MG/ML
22.3-6.8 SOLUTION/ DROPS OPHTHALMIC
Qty: 10 | Refills: 0 | Status: DISCONTINUED | COMMUNITY
Start: 2016-11-23 | End: 2018-05-16

## 2018-05-16 RX ORDER — DIGOXIN 125 UG/1
125 TABLET ORAL
Qty: 30 | Refills: 0 | Status: DISCONTINUED | COMMUNITY
Start: 2018-04-19 | End: 2018-05-16

## 2018-05-16 RX ORDER — DORZOLAMIDE HYDROCHLORIDE AND TIMOLOL MALEATE 20; 5 MG/ML; MG/ML
22.3-6.8 SOLUTION/ DROPS OPHTHALMIC
Qty: 1 | Refills: 6 | Status: ACTIVE | COMMUNITY
Start: 2018-05-16 | End: 1900-01-01

## 2018-05-16 NOTE — ASSESSMENT
[FreeTextEntry1] : #1 new onset atrial fibrillation, stable. Continue same treatment\par #2 congestive heart failure, stable. Patient is followed by the cardiologist\par #3 neuropathy of lower extremities. Better with current gabapentin . Continue with 100 mg p.o. every night\par #4 depression. Patient is not suicidal. Start sertraline 25 mg p.o. once a day\par Plan. Patient advised to return after 6 weeks

## 2018-05-16 NOTE — PHYSICAL EXAM
[No Acute Distress] : no acute distress [Well Nourished] : well nourished [Well Developed] : well developed [Well-Appearing] : well-appearing [Normal Sclera/Conjunctiva] : normal sclera/conjunctiva [PERRL] : pupils equal round and reactive to light [EOMI] : extraocular movements intact [Normal Outer Ear/Nose] : the outer ears and nose were normal in appearance [Normal Oropharynx] : the oropharynx was normal [No JVD] : no jugular venous distention [Supple] : supple [No Lymphadenopathy] : no lymphadenopathy [Thyroid Normal, No Nodules] : the thyroid was normal and there were no nodules present [No Respiratory Distress] : no respiratory distress  [Clear to Auscultation] : lungs were clear to auscultation bilaterally [No Accessory Muscle Use] : no accessory muscle use [Normal Percussion] : the chest was normal to percussion [Normal Rate] : normal rate  [Regular Rhythm] : with a regular rhythm [Normal S1, S2] : normal S1 and S2 [No Murmur] : no murmur heard [No Carotid Bruits] : no carotid bruits [No Abdominal Bruit] : a ~M bruit was not heard ~T in the abdomen [No Varicosities] : no varicosities [Pedal Pulses Present] : the pedal pulses are present [No Edema] : there was no peripheral edema [No Extremity Clubbing/Cyanosis] : no extremity clubbing/cyanosis [No Palpable Aorta] : no palpable aorta [Normal Appearance] : normal in appearance [No Nipple Discharge] : no nipple discharge [No Axillary Lymphadenopathy] : no axillary lymphadenopathy [Soft] : abdomen soft [Non Tender] : non-tender [Non-distended] : non-distended [No Masses] : no abdominal mass palpated [No HSM] : no HSM [Normal Bowel Sounds] : normal bowel sounds [Normal Supraclavicular Nodes] : no supraclavicular lymphadenopathy [Normal Axillary Nodes] : no axillary lymphadenopathy [Normal Posterior Cervical Nodes] : no posterior cervical lymphadenopathy [Normal Anterior Cervical Nodes] : no anterior cervical lymphadenopathy [No CVA Tenderness] : no CVA  tenderness [No Spinal Tenderness] : no spinal tenderness [Kyphosis] : no kyphosis [Scoliosis] : no scoliosis [No Joint Swelling] : no joint swelling [Grossly Normal Strength/Tone] : grossly normal strength/tone [No Rash] : no rash [No Skin Lesions] : no skin lesions [Normal Gait] : normal gait [Coordination Grossly Intact] : coordination grossly intact [No Focal Deficits] : no focal deficits [Deep Tendon Reflexes (DTR)] : deep tendon reflexes were 2+ and symmetric [Memory Grossly Normal] : memory grossly normal [Normal Affect] : the affect was normal [Alert and Oriented x3] : oriented to person, place, and time [Normal Insight/Judgement] : insight and judgment were intact [de-identified] : depressed mood

## 2018-05-16 NOTE — HISTORY OF PRESENT ILLNESS
[FreeTextEntry1] : Patient comes for followup of all her new onset atrial fibrillation and congestive heart failure. [de-identified] : She had been in the  Hospital twice because of her new onset atrial  fibrillation and congestive heart failure. Now she feels well without any chest pain, shortness of breath, paroxysmal nocturnal dyspnea or orthopnea. Her neuropathic pain is better with Neurontin.Patient though  is slightly depressed according to her son

## 2018-05-21 ENCOUNTER — NON-APPOINTMENT (OUTPATIENT)
Age: 83
End: 2018-05-21

## 2018-05-21 ENCOUNTER — APPOINTMENT (OUTPATIENT)
Dept: CARDIOLOGY | Facility: CLINIC | Age: 83
End: 2018-05-21
Payer: MEDICARE

## 2018-05-21 VITALS
HEART RATE: 99 BPM | DIASTOLIC BLOOD PRESSURE: 81 MMHG | HEIGHT: 62 IN | RESPIRATION RATE: 15 BRPM | SYSTOLIC BLOOD PRESSURE: 114 MMHG | TEMPERATURE: 98.3 F | BODY MASS INDEX: 28.89 KG/M2 | WEIGHT: 157 LBS | OXYGEN SATURATION: 97 %

## 2018-05-21 VITALS — HEART RATE: 87 BPM

## 2018-05-21 PROCEDURE — 93000 ELECTROCARDIOGRAM COMPLETE: CPT

## 2018-05-21 PROCEDURE — 99215 OFFICE O/P EST HI 40 MIN: CPT | Mod: 25

## 2018-05-22 ENCOUNTER — NON-APPOINTMENT (OUTPATIENT)
Age: 83
End: 2018-05-22

## 2018-05-23 ENCOUNTER — RX RENEWAL (OUTPATIENT)
Age: 83
End: 2018-05-23

## 2018-05-24 ENCOUNTER — RX RENEWAL (OUTPATIENT)
Age: 83
End: 2018-05-24

## 2018-05-26 ENCOUNTER — RX RENEWAL (OUTPATIENT)
Age: 83
End: 2018-05-26

## 2018-05-29 ENCOUNTER — RX RENEWAL (OUTPATIENT)
Age: 83
End: 2018-05-29

## 2018-05-30 ENCOUNTER — RX RENEWAL (OUTPATIENT)
Age: 83
End: 2018-05-30

## 2018-06-02 ENCOUNTER — RX RENEWAL (OUTPATIENT)
Age: 83
End: 2018-06-02

## 2018-06-04 ENCOUNTER — NON-APPOINTMENT (OUTPATIENT)
Age: 83
End: 2018-06-04

## 2018-06-04 ENCOUNTER — APPOINTMENT (OUTPATIENT)
Dept: CARDIOLOGY | Facility: CLINIC | Age: 83
End: 2018-06-04
Payer: MEDICARE

## 2018-06-04 VITALS
BODY MASS INDEX: 27.97 KG/M2 | OXYGEN SATURATION: 95 % | DIASTOLIC BLOOD PRESSURE: 79 MMHG | RESPIRATION RATE: 12 BRPM | SYSTOLIC BLOOD PRESSURE: 144 MMHG | HEIGHT: 62 IN | HEART RATE: 56 BPM | WEIGHT: 152 LBS

## 2018-06-04 VITALS — DIASTOLIC BLOOD PRESSURE: 74 MMHG | HEART RATE: 57 BPM | SYSTOLIC BLOOD PRESSURE: 136 MMHG

## 2018-06-04 PROCEDURE — 93000 ELECTROCARDIOGRAM COMPLETE: CPT

## 2018-06-04 PROCEDURE — 99215 OFFICE O/P EST HI 40 MIN: CPT | Mod: 25

## 2018-06-13 LAB
ALBUMIN SERPL ELPH-MCNC: 3.9 G/DL
ALP BLD-CCNC: 66 U/L
ALT SERPL-CCNC: 17 U/L
ANION GAP SERPL CALC-SCNC: 18 MMOL/L
AST SERPL-CCNC: 25 U/L
BASOPHILS # BLD AUTO: 0.02 K/UL
BASOPHILS NFR BLD AUTO: 0.3 %
BILIRUB SERPL-MCNC: 0.2 MG/DL
BUN SERPL-MCNC: 18 MG/DL
CALCIUM SERPL-MCNC: 9.5 MG/DL
CHLORIDE SERPL-SCNC: 98 MMOL/L
CO2 SERPL-SCNC: 24 MMOL/L
CREAT SERPL-MCNC: 1.03 MG/DL
EOSINOPHIL # BLD AUTO: 0.15 K/UL
EOSINOPHIL NFR BLD AUTO: 1.9 %
GLUCOSE SERPL-MCNC: 48 MG/DL
HCT VFR BLD CALC: 36 %
HGB BLD-MCNC: 10.8 G/DL
IMM GRANULOCYTES NFR BLD AUTO: 0.3 %
LYMPHOCYTES # BLD AUTO: 1.78 K/UL
LYMPHOCYTES NFR BLD AUTO: 22.8 %
MAN DIFF?: NORMAL
MCHC RBC-ENTMCNC: 26.8 PG
MCHC RBC-ENTMCNC: 30 GM/DL
MCV RBC AUTO: 89.3 FL
MONOCYTES # BLD AUTO: 0.98 K/UL
MONOCYTES NFR BLD AUTO: 12.5 %
NEUTROPHILS # BLD AUTO: 4.86 K/UL
NEUTROPHILS NFR BLD AUTO: 62.2 %
PLATELET # BLD AUTO: 291 K/UL
POTASSIUM SERPL-SCNC: 4.1 MMOL/L
PROT SERPL-MCNC: 7.4 G/DL
RBC # BLD: 4.03 M/UL
RBC # FLD: 15.3 %
SODIUM SERPL-SCNC: 140 MMOL/L
T4 FREE SERPL-MCNC: 2.2 NG/DL
TSH SERPL-ACNC: 0.85 UIU/ML
WBC # FLD AUTO: 7.81 K/UL

## 2018-06-22 ENCOUNTER — RX RENEWAL (OUTPATIENT)
Age: 83
End: 2018-06-22

## 2018-06-27 ENCOUNTER — RX RENEWAL (OUTPATIENT)
Age: 83
End: 2018-06-27

## 2018-06-27 DIAGNOSIS — R63.0 ANOREXIA: ICD-10-CM

## 2018-06-27 RX ORDER — BROMOCRIPTINE MESYLATE 5 MG/1
5 CAPSULE ORAL
Qty: 1 | Refills: 3 | Status: COMPLETED | COMMUNITY
Start: 2018-06-27 | End: 2018-06-27

## 2018-07-10 ENCOUNTER — APPOINTMENT (OUTPATIENT)
Dept: CARDIOLOGY | Facility: CLINIC | Age: 83
End: 2018-07-10
Payer: MEDICARE

## 2018-07-10 ENCOUNTER — NON-APPOINTMENT (OUTPATIENT)
Age: 83
End: 2018-07-10

## 2018-07-10 VITALS
HEIGHT: 62 IN | DIASTOLIC BLOOD PRESSURE: 75 MMHG | OXYGEN SATURATION: 96 % | SYSTOLIC BLOOD PRESSURE: 143 MMHG | TEMPERATURE: 98.1 F | BODY MASS INDEX: 27.42 KG/M2 | WEIGHT: 149 LBS | RESPIRATION RATE: 12 BRPM | HEART RATE: 63 BPM

## 2018-07-10 VITALS — SYSTOLIC BLOOD PRESSURE: 126 MMHG | HEART RATE: 60 BPM | DIASTOLIC BLOOD PRESSURE: 64 MMHG

## 2018-07-10 PROCEDURE — 99215 OFFICE O/P EST HI 40 MIN: CPT | Mod: 25

## 2018-07-10 PROCEDURE — 93000 ELECTROCARDIOGRAM COMPLETE: CPT

## 2018-07-11 NOTE — PHYSICAL THERAPY INITIAL EVALUATION ADULT - PHYSICAL ASSIST/NONPHYSICAL ASSIST: SUPINE/SIT, REHAB EVAL
Peripheral Nerve Block Patient Preparation  Location: Day Surgery  Preanesthetic Checklist:  Patient Identified, Risks and Benefits Discussed, Monitors and Equipment Checked, IV Bolus - Crystalloid, Patient Hemodynamically Stable, Timeout Performed, Site Marked and Post-Op Pain Mgt at Surgeon Request  Patient Position:  Prone  Sedation:  Midazolam  4 mg   Monitor(s) Used:  EKG, NIBP and Pulse Oximetry  Oxygen Supplement:  Room Air  Site Prep:  Cap, Mask, Sterile Gloves and Chloroprep  Requesting Surgeon: Krystle      Peripheral Nerve Block Details  Peripheral Nerve Block Type:  Sciatic Nerve Popliteal Approach  Block Designation: Right  Local Infiltration:  2% Lidocaine  Local Infiltration Volume:  3mL  Needle Type:  Stimulating  Stimulating Needle:  Yes  Current (mA): 1  Needle Gauge:  22 G  Needle Localization: Stimulator   Current (mA): 0.5  Local Anesthetic:  Ropivacaine  Local Anesthetic Concentration:  0.5%  Local Anesthetic Volume:  30mL  Epinephrine:  None  Aspiration:  Negative  Incremental Injection:  Yes  Paresthesias:  No  Pain on Injection:  No  Catheter Used:  No    Peripheral Nerve Block Note  Vital signs monitored throughout and remained stable.  Sterile technique maintained, confirmed with nerve stimulation.      Tammie Sun tolerated well, and without complaint.  Overall block easily performed.      Meds:    Ropivacaine 0.5% 30 ml  Lidocaine 2% 10 ml  Decadron 4 mg                
1 person assist

## 2018-07-12 ENCOUNTER — APPOINTMENT (OUTPATIENT)
Dept: INTERNAL MEDICINE | Facility: CLINIC | Age: 83
End: 2018-07-12
Payer: MEDICARE

## 2018-07-12 VITALS
HEART RATE: 66 BPM | RESPIRATION RATE: 14 BRPM | SYSTOLIC BLOOD PRESSURE: 130 MMHG | WEIGHT: 145 LBS | BODY MASS INDEX: 26.52 KG/M2 | DIASTOLIC BLOOD PRESSURE: 72 MMHG

## 2018-07-12 DIAGNOSIS — F41.1 GENERALIZED ANXIETY DISORDER: ICD-10-CM

## 2018-07-12 PROCEDURE — 99215 OFFICE O/P EST HI 40 MIN: CPT | Mod: 25

## 2018-07-12 PROCEDURE — 36415 COLL VENOUS BLD VENIPUNCTURE: CPT

## 2018-07-12 NOTE — ASSESSMENT
[FreeTextEntry1] : Diagnoses #1 weakness, most likely secondary to depression. Recent thyroid function tests were normal. Rule out anemia, myositis and hypovitaminosis.\par #2 congestive heart failure, stable. Patient seen by the cardiologist yesterday. Continue same treatment\par #3 history of recent atrial  fibrillation. Patient now is in regular sinus rhythm\par #4 hypertension, stable. Continue same treatment\par #5 depression. Patient increased  and her zoloft  approximately 10 days ago to 50 mg p.o. once a day. To wait for another 10 days and if patient is not better and to increase it to  100 mg p.o. once a day\par Plan. Patient advised to return after 2 months

## 2018-07-12 NOTE — REVIEW OF SYSTEMS
[Fatigue] : fatigue [Insomnia] : no insomnia [Anxiety] : no anxiety [Depression] : no depression [Negative] : Heme/Lymph

## 2018-07-12 NOTE — PHYSICAL EXAM
[No Acute Distress] : no acute distress [Well Nourished] : well nourished [Well Developed] : well developed [Well-Appearing] : well-appearing [Normal Sclera/Conjunctiva] : normal sclera/conjunctiva [PERRL] : pupils equal round and reactive to light [EOMI] : extraocular movements intact [Normal Outer Ear/Nose] : the outer ears and nose were normal in appearance [Normal Oropharynx] : the oropharynx was normal [Normal Nasal Mucosa] : the nasal mucosa was normal [No JVD] : no jugular venous distention [Supple] : supple [No Lymphadenopathy] : no lymphadenopathy [Thyroid Normal, No Nodules] : the thyroid was normal and there were no nodules present [No Respiratory Distress] : no respiratory distress  [Clear to Auscultation] : lungs were clear to auscultation bilaterally [No Accessory Muscle Use] : no accessory muscle use [Normal Percussion] : the chest was normal to percussion [Normal Rate] : normal rate  [Regular Rhythm] : with a regular rhythm [Normal S1, S2] : normal S1 and S2 [No Murmur] : no murmur heard [No Carotid Bruits] : no carotid bruits [No Abdominal Bruit] : a ~M bruit was not heard ~T in the abdomen [No Varicosities] : no varicosities [Pedal Pulses Present] : the pedal pulses are present [No Edema] : there was no peripheral edema [No Extremity Clubbing/Cyanosis] : no extremity clubbing/cyanosis [No Palpable Aorta] : no palpable aorta [Normal Appearance] : normal in appearance [No Nipple Discharge] : no nipple discharge [No Axillary Lymphadenopathy] : no axillary lymphadenopathy [Soft] : abdomen soft [Non Tender] : non-tender [Non-distended] : non-distended [No Masses] : no abdominal mass palpated [No HSM] : no HSM [Normal Bowel Sounds] : normal bowel sounds [Declined Rectal Exam] : declined rectal exam [Normal Supraclavicular Nodes] : no supraclavicular lymphadenopathy [Normal Axillary Nodes] : no axillary lymphadenopathy [Normal Posterior Cervical Nodes] : no posterior cervical lymphadenopathy [Normal Femoral Nodes] : no femoral lymphadenopathy [Normal Anterior Cervical Nodes] : no anterior cervical lymphadenopathy [Normal Inguinal Nodes] : no inguinal lymphadenopathy [No CVA Tenderness] : no CVA  tenderness [No Spinal Tenderness] : no spinal tenderness [Kyphosis] : no kyphosis [Scoliosis] : no scoliosis [No Joint Swelling] : no joint swelling [Grossly Normal Strength/Tone] : grossly normal strength/tone [No Rash] : no rash [No Skin Lesions] : no skin lesions [Normal Gait] : normal gait [Coordination Grossly Intact] : coordination grossly intact [No Focal Deficits] : no focal deficits [Deep Tendon Reflexes (DTR)] : deep tendon reflexes were 2+ and symmetric [Speech Grossly Normal] : speech grossly normal [Memory Grossly Normal] : memory grossly normal [Normal Affect] : the affect was normal [Alert and Oriented x3] : oriented to person, place, and time [Normal Insight/Judgement] : insight and judgment were intact [de-identified] : depressed

## 2018-07-13 LAB
ALBUMIN SERPL ELPH-MCNC: 4 G/DL
ALP BLD-CCNC: 68 U/L
ALT SERPL-CCNC: 20 U/L
ANION GAP SERPL CALC-SCNC: 18 MMOL/L
AST SERPL-CCNC: 34 U/L
BILIRUB SERPL-MCNC: <0.2 MG/DL
BUN SERPL-MCNC: 17 MG/DL
CALCIUM SERPL-MCNC: 9.1 MG/DL
CHLORIDE SERPL-SCNC: 97 MMOL/L
CO2 SERPL-SCNC: 23 MMOL/L
CREAT SERPL-MCNC: 0.95 MG/DL
GLUCOSE SERPL-MCNC: 88 MG/DL
POTASSIUM SERPL-SCNC: 3.5 MMOL/L
PROT SERPL-MCNC: 7.4 G/DL
SODIUM SERPL-SCNC: 138 MMOL/L
T4 FREE SERPL-MCNC: 1.9 NG/DL
TSH SERPL-ACNC: 0.9 UIU/ML

## 2018-07-18 ENCOUNTER — RX RENEWAL (OUTPATIENT)
Age: 83
End: 2018-07-18

## 2018-07-19 PROBLEM — K63.5 POLYP OF COLON: Chronic | Status: ACTIVE | Noted: 2018-04-10

## 2018-07-19 PROBLEM — M79.604 PAIN IN RIGHT LEG: Chronic | Status: ACTIVE | Noted: 2018-04-10

## 2018-07-19 PROBLEM — G62.9 POLYNEUROPATHY, UNSPECIFIED: Chronic | Status: ACTIVE | Noted: 2018-05-05

## 2018-07-19 PROBLEM — K21.9 GASTRO-ESOPHAGEAL REFLUX DISEASE WITHOUT ESOPHAGITIS: Chronic | Status: ACTIVE | Noted: 2018-05-05

## 2018-07-19 PROBLEM — I35.0 NONRHEUMATIC AORTIC (VALVE) STENOSIS: Chronic | Status: ACTIVE | Noted: 2018-05-04

## 2018-07-19 PROBLEM — F41.9 ANXIETY DISORDER, UNSPECIFIED: Chronic | Status: ACTIVE | Noted: 2018-05-05

## 2018-07-19 PROBLEM — K44.9 DIAPHRAGMATIC HERNIA WITHOUT OBSTRUCTION OR GANGRENE: Chronic | Status: ACTIVE | Noted: 2018-04-10

## 2018-07-19 PROBLEM — E55.9 VITAMIN D DEFICIENCY, UNSPECIFIED: Chronic | Status: ACTIVE | Noted: 2018-04-10

## 2018-07-19 PROBLEM — E78.5 HYPERLIPIDEMIA, UNSPECIFIED: Chronic | Status: ACTIVE | Noted: 2018-04-10

## 2018-07-28 ENCOUNTER — RX RENEWAL (OUTPATIENT)
Age: 83
End: 2018-07-28

## 2018-07-30 ENCOUNTER — APPOINTMENT (OUTPATIENT)
Dept: NEUROLOGY | Facility: CLINIC | Age: 83
End: 2018-07-30
Payer: MEDICARE

## 2018-07-30 VITALS
HEIGHT: 62 IN | DIASTOLIC BLOOD PRESSURE: 68 MMHG | TEMPERATURE: 98.6 F | WEIGHT: 149 LBS | OXYGEN SATURATION: 96 % | RESPIRATION RATE: 16 BRPM | HEART RATE: 60 BPM | BODY MASS INDEX: 27.42 KG/M2 | SYSTOLIC BLOOD PRESSURE: 120 MMHG

## 2018-07-30 PROCEDURE — 99204 OFFICE O/P NEW MOD 45 MIN: CPT

## 2018-07-31 ENCOUNTER — OTHER (OUTPATIENT)
Age: 83
End: 2018-07-31

## 2018-08-02 ENCOUNTER — RX RENEWAL (OUTPATIENT)
Age: 83
End: 2018-08-02

## 2018-08-09 ENCOUNTER — RX RENEWAL (OUTPATIENT)
Age: 83
End: 2018-08-09

## 2018-08-19 ENCOUNTER — OUTPATIENT (OUTPATIENT)
Dept: OUTPATIENT SERVICES | Facility: HOSPITAL | Age: 83
LOS: 1 days | End: 2018-08-19
Payer: MEDICARE

## 2018-08-19 ENCOUNTER — APPOINTMENT (OUTPATIENT)
Dept: MRI IMAGING | Facility: IMAGING CENTER | Age: 83
End: 2018-08-19
Payer: MEDICARE

## 2018-08-19 DIAGNOSIS — R19.8 OTHER SPECIFIED SYMPTOMS AND SIGNS INVOLVING THE DIGESTIVE SYSTEM AND ABDOMEN: Chronic | ICD-10-CM

## 2018-08-19 DIAGNOSIS — Z98.890 OTHER SPECIFIED POSTPROCEDURAL STATES: Chronic | ICD-10-CM

## 2018-08-19 DIAGNOSIS — Z00.8 ENCOUNTER FOR OTHER GENERAL EXAMINATION: ICD-10-CM

## 2018-08-19 DIAGNOSIS — R93.3 ABNORMAL FINDINGS ON DIAGNOSTIC IMAGING OF OTHER PARTS OF DIGESTIVE TRACT: Chronic | ICD-10-CM

## 2018-08-19 PROCEDURE — 72148 MRI LUMBAR SPINE W/O DYE: CPT | Mod: 26

## 2018-08-19 PROCEDURE — 72148 MRI LUMBAR SPINE W/O DYE: CPT

## 2018-08-21 ENCOUNTER — APPOINTMENT (OUTPATIENT)
Dept: CARDIOLOGY | Facility: CLINIC | Age: 83
End: 2018-08-21
Payer: MEDICARE

## 2018-08-21 ENCOUNTER — NON-APPOINTMENT (OUTPATIENT)
Age: 83
End: 2018-08-21

## 2018-08-21 VITALS
HEIGHT: 62 IN | WEIGHT: 150 LBS | BODY MASS INDEX: 27.6 KG/M2 | RESPIRATION RATE: 12 BRPM | TEMPERATURE: 98.1 F | HEART RATE: 57 BPM | SYSTOLIC BLOOD PRESSURE: 125 MMHG | OXYGEN SATURATION: 96 % | DIASTOLIC BLOOD PRESSURE: 69 MMHG

## 2018-08-21 PROCEDURE — 99215 OFFICE O/P EST HI 40 MIN: CPT | Mod: 25

## 2018-08-21 PROCEDURE — 93000 ELECTROCARDIOGRAM COMPLETE: CPT

## 2018-08-28 ENCOUNTER — MESSAGE (OUTPATIENT)
Age: 83
End: 2018-08-28

## 2018-08-31 ENCOUNTER — RX RENEWAL (OUTPATIENT)
Age: 83
End: 2018-08-31

## 2018-08-31 ENCOUNTER — INPATIENT (INPATIENT)
Facility: HOSPITAL | Age: 83
LOS: 4 days | Discharge: NURSING, REHAB OR LONG-TERM CARE | DRG: 394 | End: 2018-09-05
Attending: HOSPITALIST | Admitting: INTERNAL MEDICINE
Payer: MEDICARE

## 2018-08-31 ENCOUNTER — RESULT REVIEW (OUTPATIENT)
Age: 83
End: 2018-08-31

## 2018-08-31 VITALS
TEMPERATURE: 98 F | RESPIRATION RATE: 18 BRPM | SYSTOLIC BLOOD PRESSURE: 118 MMHG | HEIGHT: 63 IN | DIASTOLIC BLOOD PRESSURE: 71 MMHG | OXYGEN SATURATION: 97 % | HEART RATE: 60 BPM | WEIGHT: 149.03 LBS

## 2018-08-31 DIAGNOSIS — D62 ACUTE POSTHEMORRHAGIC ANEMIA: ICD-10-CM

## 2018-08-31 DIAGNOSIS — K92.2 GASTROINTESTINAL HEMORRHAGE, UNSPECIFIED: ICD-10-CM

## 2018-08-31 DIAGNOSIS — F41.9 ANXIETY DISORDER, UNSPECIFIED: ICD-10-CM

## 2018-08-31 DIAGNOSIS — Z29.9 ENCOUNTER FOR PROPHYLACTIC MEASURES, UNSPECIFIED: ICD-10-CM

## 2018-08-31 DIAGNOSIS — I10 ESSENTIAL (PRIMARY) HYPERTENSION: ICD-10-CM

## 2018-08-31 DIAGNOSIS — K92.1 MELENA: ICD-10-CM

## 2018-08-31 DIAGNOSIS — R79.1 ABNORMAL COAGULATION PROFILE: ICD-10-CM

## 2018-08-31 DIAGNOSIS — R19.8 OTHER SPECIFIED SYMPTOMS AND SIGNS INVOLVING THE DIGESTIVE SYSTEM AND ABDOMEN: Chronic | ICD-10-CM

## 2018-08-31 DIAGNOSIS — K21.9 GASTRO-ESOPHAGEAL REFLUX DISEASE WITHOUT ESOPHAGITIS: ICD-10-CM

## 2018-08-31 DIAGNOSIS — R93.3 ABNORMAL FINDINGS ON DIAGNOSTIC IMAGING OF OTHER PARTS OF DIGESTIVE TRACT: Chronic | ICD-10-CM

## 2018-08-31 DIAGNOSIS — Z98.890 OTHER SPECIFIED POSTPROCEDURAL STATES: Chronic | ICD-10-CM

## 2018-08-31 DIAGNOSIS — G62.9 POLYNEUROPATHY, UNSPECIFIED: ICD-10-CM

## 2018-08-31 DIAGNOSIS — I48.91 UNSPECIFIED ATRIAL FIBRILLATION: ICD-10-CM

## 2018-08-31 LAB
ALBUMIN SERPL ELPH-MCNC: 3.6 G/DL — SIGNIFICANT CHANGE UP (ref 3.3–5)
ALP SERPL-CCNC: 57 U/L — SIGNIFICANT CHANGE UP (ref 40–120)
ALT FLD-CCNC: 14 U/L — SIGNIFICANT CHANGE UP (ref 10–45)
ANION GAP SERPL CALC-SCNC: 13 MMOL/L — SIGNIFICANT CHANGE UP (ref 5–17)
APTT BLD: 27.6 SEC — SIGNIFICANT CHANGE UP (ref 27.5–37.4)
AST SERPL-CCNC: 29 U/L — SIGNIFICANT CHANGE UP (ref 10–40)
BASOPHILS # BLD AUTO: 0 K/UL — SIGNIFICANT CHANGE UP (ref 0–0.2)
BASOPHILS NFR BLD AUTO: 0.3 % — SIGNIFICANT CHANGE UP (ref 0–2)
BILIRUB SERPL-MCNC: 0.3 MG/DL — SIGNIFICANT CHANGE UP (ref 0.2–1.2)
BLD GP AB SCN SERPL QL: NEGATIVE — SIGNIFICANT CHANGE UP
BUN SERPL-MCNC: 24 MG/DL — HIGH (ref 7–23)
CALCIUM SERPL-MCNC: 8.8 MG/DL — SIGNIFICANT CHANGE UP (ref 8.4–10.5)
CHLORIDE SERPL-SCNC: 99 MMOL/L — SIGNIFICANT CHANGE UP (ref 96–108)
CK MB BLD-MCNC: 3.2 % — SIGNIFICANT CHANGE UP (ref 0–3.5)
CK MB CFR SERPL CALC: 1.4 NG/ML — SIGNIFICANT CHANGE UP (ref 0–3.8)
CK SERPL-CCNC: 44 U/L — SIGNIFICANT CHANGE UP (ref 25–170)
CO2 SERPL-SCNC: 25 MMOL/L — SIGNIFICANT CHANGE UP (ref 22–31)
CREAT SERPL-MCNC: 0.82 MG/DL — SIGNIFICANT CHANGE UP (ref 0.5–1.3)
EOSINOPHIL # BLD AUTO: 0 K/UL — SIGNIFICANT CHANGE UP (ref 0–0.5)
EOSINOPHIL NFR BLD AUTO: 0.3 % — SIGNIFICANT CHANGE UP (ref 0–6)
GAS PNL BLDV: SIGNIFICANT CHANGE UP
GLUCOSE SERPL-MCNC: 126 MG/DL — HIGH (ref 70–99)
HCT VFR BLD CALC: 21.8 % — LOW (ref 34.5–45)
HCT VFR BLD CALC: 33 % — LOW (ref 34.5–45)
HGB BLD-MCNC: 10.9 G/DL — LOW (ref 11.5–15.5)
HGB BLD-MCNC: 6.7 G/DL — CRITICAL LOW (ref 11.5–15.5)
INR BLD: 1.48 RATIO — HIGH (ref 0.88–1.16)
LYMPHOCYTES # BLD AUTO: 1.1 K/UL — SIGNIFICANT CHANGE UP (ref 1–3.3)
LYMPHOCYTES # BLD AUTO: 13.2 % — SIGNIFICANT CHANGE UP (ref 13–44)
MCHC RBC-ENTMCNC: 24.7 PG — LOW (ref 27–34)
MCHC RBC-ENTMCNC: 26.8 PG — LOW (ref 27–34)
MCHC RBC-ENTMCNC: 30.6 GM/DL — LOW (ref 32–36)
MCHC RBC-ENTMCNC: 32.9 GM/DL — SIGNIFICANT CHANGE UP (ref 32–36)
MCV RBC AUTO: 80.8 FL — SIGNIFICANT CHANGE UP (ref 80–100)
MCV RBC AUTO: 81.4 FL — SIGNIFICANT CHANGE UP (ref 80–100)
MONOCYTES # BLD AUTO: 0.6 K/UL — SIGNIFICANT CHANGE UP (ref 0–0.9)
MONOCYTES NFR BLD AUTO: 7.3 % — SIGNIFICANT CHANGE UP (ref 2–14)
NEUTROPHILS # BLD AUTO: 6.3 K/UL — SIGNIFICANT CHANGE UP (ref 1.8–7.4)
NEUTROPHILS NFR BLD AUTO: 78.8 % — HIGH (ref 43–77)
OB PNL STL: POSITIVE
PLATELET # BLD AUTO: 244 K/UL — SIGNIFICANT CHANGE UP (ref 150–400)
PLATELET # BLD AUTO: 272 K/UL — SIGNIFICANT CHANGE UP (ref 150–400)
POTASSIUM SERPL-MCNC: 4 MMOL/L — SIGNIFICANT CHANGE UP (ref 3.5–5.3)
POTASSIUM SERPL-SCNC: 4 MMOL/L — SIGNIFICANT CHANGE UP (ref 3.5–5.3)
PROT SERPL-MCNC: 6.5 G/DL — SIGNIFICANT CHANGE UP (ref 6–8.3)
PROTHROM AB SERPL-ACNC: 16.1 SEC — HIGH (ref 9.8–12.7)
RBC # BLD: 2.69 M/UL — LOW (ref 3.8–5.2)
RBC # BLD: 4.06 M/UL — SIGNIFICANT CHANGE UP (ref 3.8–5.2)
RBC # FLD: 14.8 % — HIGH (ref 10.3–14.5)
RBC # FLD: 15.5 % — HIGH (ref 10.3–14.5)
RH IG SCN BLD-IMP: POSITIVE — SIGNIFICANT CHANGE UP
RH IG SCN BLD-IMP: POSITIVE — SIGNIFICANT CHANGE UP
SODIUM SERPL-SCNC: 137 MMOL/L — SIGNIFICANT CHANGE UP (ref 135–145)
TROPONIN T, HIGH SENSITIVITY RESULT: 18 NG/L — SIGNIFICANT CHANGE UP (ref 0–51)
WBC # BLD: 12.3 K/UL — HIGH (ref 3.8–10.5)
WBC # BLD: 8 K/UL — SIGNIFICANT CHANGE UP (ref 3.8–10.5)
WBC # FLD AUTO: 12.3 K/UL — HIGH (ref 3.8–10.5)
WBC # FLD AUTO: 8 K/UL — SIGNIFICANT CHANGE UP (ref 3.8–10.5)

## 2018-08-31 PROCEDURE — 99285 EMERGENCY DEPT VISIT HI MDM: CPT

## 2018-08-31 PROCEDURE — 88305 TISSUE EXAM BY PATHOLOGIST: CPT | Mod: 26

## 2018-08-31 PROCEDURE — 43251 EGD REMOVE LESION SNARE: CPT

## 2018-08-31 PROCEDURE — 43255 EGD CONTROL BLEEDING ANY: CPT | Mod: 59

## 2018-08-31 PROCEDURE — 99223 1ST HOSP IP/OBS HIGH 75: CPT | Mod: GC

## 2018-08-31 PROCEDURE — 99223 1ST HOSP IP/OBS HIGH 75: CPT | Mod: GC,25

## 2018-08-31 RX ORDER — METOPROLOL TARTRATE 50 MG
100 TABLET ORAL
Qty: 0 | Refills: 0 | Status: DISCONTINUED | OUTPATIENT
Start: 2018-08-31 | End: 2018-09-05

## 2018-08-31 RX ORDER — CLONAZEPAM 1 MG
1 TABLET ORAL THREE TIMES A DAY
Qty: 0 | Refills: 0 | Status: DISCONTINUED | OUTPATIENT
Start: 2018-08-31 | End: 2018-09-05

## 2018-08-31 RX ORDER — SERTRALINE 25 MG/1
50 TABLET, FILM COATED ORAL DAILY
Qty: 0 | Refills: 0 | Status: DISCONTINUED | OUTPATIENT
Start: 2018-08-31 | End: 2018-09-05

## 2018-08-31 RX ORDER — GABAPENTIN 400 MG/1
100 CAPSULE ORAL AT BEDTIME
Qty: 0 | Refills: 0 | Status: DISCONTINUED | OUTPATIENT
Start: 2018-08-31 | End: 2018-09-05

## 2018-08-31 RX ORDER — PANTOPRAZOLE SODIUM 20 MG/1
8 TABLET, DELAYED RELEASE ORAL
Qty: 80 | Refills: 0 | Status: DISCONTINUED | OUTPATIENT
Start: 2018-08-31 | End: 2018-09-02

## 2018-08-31 RX ORDER — PANTOPRAZOLE SODIUM 20 MG/1
80 TABLET, DELAYED RELEASE ORAL ONCE
Qty: 0 | Refills: 0 | Status: COMPLETED | OUTPATIENT
Start: 2018-08-31 | End: 2018-08-31

## 2018-08-31 RX ADMIN — PANTOPRAZOLE SODIUM 10 MG/HR: 20 TABLET, DELAYED RELEASE ORAL at 22:34

## 2018-08-31 RX ADMIN — Medication 100 MILLIGRAM(S): at 21:11

## 2018-08-31 RX ADMIN — GABAPENTIN 100 MILLIGRAM(S): 400 CAPSULE ORAL at 21:11

## 2018-08-31 RX ADMIN — PANTOPRAZOLE SODIUM 80 MILLIGRAM(S): 20 TABLET, DELAYED RELEASE ORAL at 10:01

## 2018-08-31 RX ADMIN — PANTOPRAZOLE SODIUM 10 MG/HR: 20 TABLET, DELAYED RELEASE ORAL at 12:34

## 2018-08-31 NOTE — H&P ADULT - PROBLEM SELECTOR PLAN 3
- [id number] score of 4  - Rate control with Toprol. Hold for now  - Rhythm control with Amiodarone  - AC with Eliquis. Hold for now given GIB

## 2018-08-31 NOTE — ED PROVIDER NOTE - PSH
Abnormal colonoscopy    Abnormal endoscopy finding    History of cardioversion    No significant past surgical history

## 2018-08-31 NOTE — ED ADULT NURSE NOTE - OBJECTIVE STATEMENT
85 y/o female a+ox3 [place] speaking, pmhx HTN. afib on Elliquis, hyperlipidemia, coming from home for bloody stool x 1 week. Pt reports black tarry stool 1x per day for past week; 1 episode of dark vomit this morning. Pt presents with mild fatigue and weakness. Pt denies difficulty breathing, shortness of breath, abdominal pain, fever or chills, headache, dizziness, chest pain or discomfort. Iv established, labs obtained. Pt left in position of comfort, will reassess

## 2018-08-31 NOTE — H&P ADULT - PROBLEM SELECTOR PLAN 1
- Acute blood loss anemia likely secondary to upper GIB. Hb 6.7 on admission. On home Eliquis.   - Prior upper endoscopy significant for ulcer per patient on son. On home Protonix  - S/p 1 unit PRBC  - Patient as risk for demand ischemia. Check cardiac enzymes if symptomatic  - Maintain active type and screen and transfuse Hb <7 or <8 if symptomatic   - Continue with Protonix   - GI following. Plan for upper endoscopy

## 2018-08-31 NOTE — CONSULT NOTE ADULT - ASSESSMENT
Impression:  # Melena / Acute Blood Loss Anemia; suspect related to PUD disease given previous history (per son). Differential also includes angioectasias, esophagitis, erosive gastritis, dieulofoy lesion and cancer. Less likely varices or gurvinder-velez based on history.    Recommendations:  - NPO for EGD today  - Continue with Protonix ggt  - Trend CBC  - Supportive  per primary team    Mary Ann Acosta MD  Gastroenterology Fellow  [phone]  Please page on call fellow on weekends and after 5pm on weekdays Impression:  # Melena / Acute Blood Loss Anemia; suspect related to PUD disease given previous history (per son). Differential also includes angioectasias, esophagitis, erosive gastritis, dieulofoy lesion and cancer. Less likely varices or gurvinder-velez based on history.    Recommendations:  - NPO for EGD today  - Continue with Protonix ggt  - Trend CBC, transfuse to hemoglobin > 7.0  - Supportive  per primary team    Mary Ann Acosta MD  Gastroenterology Fellow  [phone]  Please page on call fellow on weekends and after 5pm on weekdays Impression:  # Melena / Acute Blood Loss Anemia; suspect related to PUD disease given previous history (per son). Differential also includes angioectasias, esophagitis, erosive gastritis, dieulofoy lesion and cancer. Less likely varices or gurvinder-velez based on history.    Recommendations:  - NPO for EGD today  - Continue with Protonix ggt  - Trend CBC, transfuse to hemoglobin > 7.0  - Supportive  per primary team  - Hold anticoagulation    Mary Ann Acosta MD  Gastroenterology Fellow  [phone]  Please page on call fellow on weekends and after 5pm on weekdays

## 2018-08-31 NOTE — ED PROVIDER NOTE - ATTENDING CONTRIBUTION TO CARE
Kim Mack MD - Attending Physician: I have personally seen and examined this patient with the resident/fellow.  I have fully participated in the care of this patient. I have reviewed all pertinent clinical information, including history, physical exam, plan and the Resident/Fellow’s note and agree except as noted. See MDM

## 2018-08-31 NOTE — ED ADULT NURSE REASSESSMENT NOTE - NS ED NURSE REASSESS COMMENT FT1
pt 15 minutes into transfusion; denies shortness of breath, back pain, feeling flushed; pt states she "feels ok." Will reassess

## 2018-08-31 NOTE — H&P ADULT - HISTORY OF PRESENT ILLNESS
86F PMH Afib (on Eliquis) aortic stenosis (moderate), HFpEF (EF 55%), GERD, Hypertension, PUD presents with melena and hematemesis. Symptom started about a week ago with episodes of daily melena associated with fatigue. She had about 5 episodes of nonbloody, nonbilious emesis that progressed to an episode of hematemesis this morning. Endorses blood clot with melena. Last episode of melena was this morning. She had an upper endoscopy and colonoscopy about 2 years a go that was significant for ulcer and she was started on Protonix. Denies lightheadedness, palpitations, chest pain, shortness of breath at rest, dysuria, numbness, tingling. Denies NSAID or steroid use.     ED Course:  - VS: Temp: /71, HR 60, RR 18, afebrile, SpO2 97% on RA  - Lab: Hb 6.7, INR 1.48  - Given 80mg IV Protonix and started on a Protonix drip  - 1 unit PRBC

## 2018-08-31 NOTE — H&P ADULT - PROBLEM SELECTOR PLAN 2
- Likely secondary to PUD given history. Other DDX include angiectasia given history of aortic stenosis  - Hb 6.7 on admission. On home Eliquis.   - Prior upper endoscopy significant for ulcer per patient on son. On home Protonix  - S/p 1 unit PRBC  - Patient as risk for demand ischemia. Check cardiac enzymes if symptomatic  - Maintain active type and screen and transfuse Hb <7 or <8 if symptomatic   - Continue with Protonix   - GI following. Plan for upper endoscopy

## 2018-08-31 NOTE — ED PROVIDER NOTE - MEDICAL DECISION MAKING DETAILS
Reports melena and bloody vomitus. concern for GI bleed, possibly upper > lower. Of note, she is on eliquis for A-fib. Will check CBC, CMP, coags, T+S. EKG and cardiac enzymes given extensive cardiac history. Will do fecal occult exam and likely admit for GI work up. Reports melena and bloody vomitus. concern for GI bleed, possibly upper > lower. Of note, she is on eliquis for A-fib. Will check CBC, CMP, coags, T+S. EKG and cardiac enzymes given extensive cardiac history. Will do fecal occult exam and likely admit for GI work up.    Kim Mack MD - Attending Physician: Pt here with melena, coffee ground emesis and now fatigue. Concern for UGI bleed. Labs, type, GI, to admit

## 2018-08-31 NOTE — ED PROVIDER NOTE - PHYSICAL EXAMINATION
General: WN/WD NAD  Neurology: A&Ox3, nonfocal, LR x 4  Head:  Normocephalic, atraumatic  ENT:  Mucosa moist, no ulcerations  Neck:  Supple, no sinuses or palpable masses  Respiratory: CTA B/L  CV: RRR, S1S2, no murmur  Abdominal: Soft, NT, ND no palpable mass  MSK: No edema, + peripheral pulses, FROM all 4 extremity

## 2018-08-31 NOTE — ED PROVIDER NOTE - NS ED ROS FT
REVIEW OF SYSTEMS:  CONSTITUTIONAL: No weakness, fevers or chills  EYES/ENT: No visual changes;  No vertigo or throat pain   NECK: No pain or stiffness  RESPIRATORY: No cough, wheezing, hemoptysis; No shortness of breath  CARDIOVASCULAR: No chest pain or palpitations  GASTROINTESTINAL: + melena and bloody vomitus. No abdominal pain  GENITOURINARY: No dysuria, frequency or hematuria  NEUROLOGICAL: No numbness or weakness  SKIN: No itching, burning, rashes, or lesions   All other review of systems is negative unless indicated above.

## 2018-08-31 NOTE — H&P ADULT - NSHPREVIEWOFSYSTEMS_GEN_ALL_CORE
CONSTITUTIONAL: No fevers or chills  EYES/ENT: No visual changes;  No  throat pain   NECK: No pain   RESPIRATORY: No cough, wheezing, hemoptysis; No shortness of breath  CARDIOVASCULAR: No chest pain or palpitations  GASTROINTESTINAL: +melena, hematemesis, + vomiting. No abdominal or epigastric pain. No hematochezia.  GENITOURINARY: No dysuria, frequency or hematuria  NEUROLOGICAL: No numbness or weakness  SKIN: No itching, rashes

## 2018-08-31 NOTE — H&P ADULT - NSHPPHYSICALEXAM_GEN_ALL_CORE
VS: /58 , HR 65 , RR 18, SpO2 98% while breathing ambient air  GENERAL: NAD  HEAD:  Atraumatic, Normocephalic  EYES: EOMI, conjunctiva and sclera clear  NECK: Supple, No JVD  CHEST/LUNG: Clear to auscultation bilaterally; No wheeze  HEART: Regular rate and rhythm; 3/6 systolic ejection murmur   ABDOMEN: Soft, Nontender, Nondistended; Bowel sounds present  EXTREMITIES:  2+ Peripheral Pulses, No clubbing, cyanosis, or edema  PSYCH: AAOx3  NEUROLOGY: non-focal  SKIN: No rashes or lesions

## 2018-08-31 NOTE — H&P ADULT - FAMILY HISTORY
No pertinent family history in first degree relatives     Child  Still living? Unknown  Family history of diabetes mellitus, Age at diagnosis: Age Unknown
No

## 2018-08-31 NOTE — ED PROVIDER NOTE - FAMILY HISTORY
No pertinent family history in first degree relatives     Child  Still living? Unknown  Family history of diabetes mellitus, Age at diagnosis: Age Unknown

## 2018-08-31 NOTE — CONSULT NOTE ADULT - SUBJECTIVE AND OBJECTIVE BOX
Chief Complaint:  Patient is a 86y old  Female who presents with a chief complaint of     HPI:  86 year old female with a hx of Afib (on eliquis - last dose taken last night), moderate aortic stenosis, HFpEF (55%), GERD, Hypertension, PUD presents with hematemesis. History obtained from patient and son; They report daily episodes of melena for the past week associated with increasing fatigue. PaAortic StensosiPMHx A-fib (on eliquis/amiodarone), AS, Diastolic dysfunction stage II,  GERD, HTN,  glaucoma, neuropathy presents with melena x 1 week. Per pt, she has been endorsing at least 1 episode of black tarry stool daily for one week until today where she also endorsed a single episode of vomiting food particles intermixed with blood. She lives alone at home, ambulates with a cane. Endorses fatigue and decreased exercise tolerance. Patient denies SOB, diaphoresis, nausea, abdominal pain, hematochezia, dysuria, frequency or urgency.    Allergies:  No Known Allergies      Home Medications:    Hospital Medications:  pantoprazole Infusion 8 mG/Hr IV Continuous <Continuous>      PMHX/PSHX:  Neuropathy  GERD (gastroesophageal reflux disease)  Anxiety  Glaucoma  Hypertension  Atrial fibrillation  Aortic valve stenosis, etiology of cardiac valve disease unspecified  Atrial fibrillation, unspecified type  GERD (gastroesophageal reflux disease)  Hiatal hernia  Colonic polyp  Hypovitaminosis D  Hyperlipidemia  Leg pain, bilateral  Glaucoma  Hypertension  No significant past surgical history  History of cardioversion  Abnormal endoscopy finding  Abnormal colonoscopy      Family history:  No pertinent family history in first degree relatives  Family history of diabetes mellitus (Child)      Social History:     ROS:   General:  No fevers, chills or night sweats.  Eyes:  No blurry vision or eye pain  ENT:  No sore throat or dysphagia  CV:  No pain or palpitations  Resp:  No dyspnea, cough, wheezing  GI:  No pain, No nausea, No vomiting, No diarrhea, No constipation, No weight loss, No pruritis, No rectal bleeding, No tarry stools  Neuro:  No weakness or tingling  Psych:  No fatigue, insomnia, mood problems, depression  Heme:  No petechiae or bruising  Skin:  No rash or edema      PHYSICAL EXAM:   GENERAL:  NAD, Appears stated age  HEENT:  NC/AT,  conjunctivae clear and pink, sclera -anicteric  CHEST:  CTA B/L, Normal effort  HEART:  RRR S1/S2, No murmurs  ABDOMEN:  Soft, non-tender, non-distended, normoactive bowel sounds,  no masses ,no hepato-splenomegaly, no signs of chronic liver disease  EXTEREMITIES:  No cyanosis or Edema  SKIN:  Warm & Dry. No rash or erythema  NEURO:  Alert, oriented    Vital Signs:  Vital Signs Last 24 Hrs  T(C): 36.5 (31 Aug 2018 08:17), Max: 36.5 (31 Aug 2018 08:17)  T(F): 97.7 (31 Aug 2018 08:17), Max: 97.7 (31 Aug 2018 08:17)  HR: 60 (31 Aug 2018 08:17) (60 - 60)  BP: 118/71 (31 Aug 2018 08:17) (118/71 - 118/71)  BP(mean): --  RR: 18 (31 Aug 2018 08:17) (18 - 18)  SpO2: 97% (31 Aug 2018 08:17) (97% - 97%)  Daily Height in cm: 160.02 (31 Aug 2018 08:17)    Daily     LABS:                        6.7    8.0   )-----------( 272      ( 31 Aug 2018 09:44 )             21.8     Mean Cell Volume: 80.8 fl (08-31-18 @ 09:44)    08-31    137  |  99  |  24<H>  ----------------------------<  126<H>  4.0   |  25  |  0.82    Ca    8.8      31 Aug 2018 09:44    LIVER FUNCTIONS - ( 31 Aug 2018 09:44 )  Alb: 3.6 g/dL / Pro: 6.5 g/dL / ALK PHOS: 57 U/L / ALT: 14 U/L / AST: 29 U/L / GGT: x           PT/INR - ( 31 Aug 2018 09:44 )   PT: 16.1 sec;   INR: 1.48 ratio       PTT - ( 31 Aug 2018 09:44 )  PTT:27.6 sec Chief Complaint:  Patient is a 86y old  Female who presents with a chief complaint of     HPI:  86 year old female with a hx of Afib (on eliquis - last dose taken last night), moderate aortic stenosis, HFpEF (55%), GERD, Hypertension, PUD presents with hematemesis. History obtained from patient and son; They report daily episodes of melena for the past week associated with increasing fatigue. This morning shortly after eating breakfast, she had one episode of hematemesis prompting her to come to the ED. Patient denies fevers, chills, chest pain, shortness of breath, abdominal pain, hematochezia, diaphoresis, lightheadedness and NSAID use. Last episode of melena was this morning. Reports approximately two years ago, she had an EGD and Colonoscopy    ED Course:  VS: Temp: Afebrile, HR: 60, BP: 118/71, RR: 18, O2 sat: 97% on RA  Given 80mg IV Protonix and started on a protonix ggt. 1U pRBC ordered    Allergies:  No Known Allergies    Home Medications (Per [employer]):  Amiodarone  Sertraline  Furosemide  Pantoprazole  Clonazepam  Metoprolol  Eliquis  Gabapentin    Hospital Medications:  pantoprazole Infusion 8 mG/Hr IV Continuous <Continuous>    PMHX/PSHX:  Neuropathy  GERD (gastroesophageal reflux disease)  Anxiety  Glaucoma  Hypertension  Atrial fibrillation  Aortic valve stenosis, etiology of cardiac valve disease unspecified  Atrial fibrillation, unspecified type  GERD (gastroesophageal reflux disease)  Hiatal hernia  Colonic polyp  Hypovitaminosis D  Hyperlipidemia  Leg pain, bilateral  Glaucoma  Hypertension  No significant past surgical history  History of cardioversion  Abnormal endoscopy finding  Abnormal colonoscopy    Family history:  Denies family history of IBD colon cancer, stomach cancer/polyps, pancreatic cancer/masses, liver cancer/disease, breast/ovarian cancer and endometrial cancer.   Reports family history of colon polyps    Social History:   No history of tobacco use, EtOH use or illicit drug use   Lives alone    ROS:   General:  No fevers or chills, + fatigue  ENT:  No sore throat or dysphagia  CV:  No pain or palpitations  Resp:  No dyspnea, cough, wheezing  GI:  + Hematemesis, + Melena, No pain, No nausea, No vomiting, No diarrhea, No constipation, No weight loss, No rectal bleeding  Skin:  No rash or edema    PHYSICAL EXAM:   GENERAL:  NAD, Appears stated age  HEENT:  NC/AT,  conjunctivae clear and pink, sclera -anicteric  CHEST:  CTA B/L, Normal effort  HEART:  + systolic murmur, RRR S1/S2  ABDOMEN:  Soft, non-tender, non-distended, normoactive bowel sounds, no masses  EXTEREMITIES:  No cyanosis or Edema  SKIN:  Warm & Dry. No rash or erythema  NEURO:  Alert, oriented    Vital Signs:  Vital Signs Last 24 Hrs  T(C): 36.5 (31 Aug 2018 08:17), Max: 36.5 (31 Aug 2018 08:17)  T(F): 97.7 (31 Aug 2018 08:17), Max: 97.7 (31 Aug 2018 08:17)  HR: 60 (31 Aug 2018 08:17) (60 - 60)  BP: 118/71 (31 Aug 2018 08:17) (118/71 - 118/71)  BP(mean): --  RR: 18 (31 Aug 2018 08:17) (18 - 18)  SpO2: 97% (31 Aug 2018 08:17) (97% - 97%)  Daily Height in cm: 160.02 (31 Aug 2018 08:17)    Daily     LABS:                        6.7    8.0   )-----------( 272      ( 31 Aug 2018 09:44 )             21.8     Mean Cell Volume: 80.8 fl (08-31-18 @ 09:44)    08-31    137  |  99  |  24<H>  ----------------------------<  126<H>  4.0   |  25  |  0.82    Ca    8.8      31 Aug 2018 09:44    LIVER FUNCTIONS - ( 31 Aug 2018 09:44 )  Alb: 3.6 g/dL / Pro: 6.5 g/dL / ALK PHOS: 57 U/L / ALT: 14 U/L / AST: 29 U/L / GGT: x           PT/INR - ( 31 Aug 2018 09:44 )   PT: 16.1 sec;   INR: 1.48 ratio       PTT - ( 31 Aug 2018 09:44 )  PTT:27.6 sec Chief Complaint:  Patient is a 86y old  Female who presents with a chief complaint of     HPI:  86 year old female with a hx of Afib (on eliquis - last dose taken last night), moderate aortic stenosis, HFpEF (55%), GERD, Hypertension, PUD presents with hematemesis. History obtained from patient and son; They report daily episodes of melena for the past week associated with increasing fatigue. This morning shortly after eating breakfast, she had one episode of hematemesis prompting her to come to the ED. Unclear how much blood there was, but patient reported a few globs of clots. Stools have been black for the last few days. Patient denies fevers, chills, chest pain, shortness of breath, abdominal pain, hematochezia, diaphoresis, lightheadedness and NSAID use. Last episode of melena was this morning. She does not take any NSAIDs, only Tylenol on occasion for aches and pains. Reports approximately two years ago, she had an EGD and Colonoscopy, when she also had some black stool but unclear what was actually found.    ED Course:  VS: Temp: Afebrile, HR: 60, BP: 118/71, RR: 18, O2 sat: 97% on RA  Given 80mg IV Protonix and started on a protonix ggt. 1U pRBC ordered    Allergies:  No Known Allergies    Home Medications (Per [employer]):  Amiodarone  Sertraline  Furosemide  Pantoprazole  Clonazepam  Metoprolol  Eliquis  Gabapentin    Hospital Medications:  pantoprazole Infusion 8 mG/Hr IV Continuous <Continuous>    PMHX/PSHX:  Neuropathy  GERD (gastroesophageal reflux disease)  Anxiety  Glaucoma  Hypertension  Atrial fibrillation  Aortic valve stenosis, etiology of cardiac valve disease unspecified  Atrial fibrillation, unspecified type  GERD (gastroesophageal reflux disease)  Hiatal hernia  Colonic polyp  Hypovitaminosis D  Hyperlipidemia  Leg pain, bilateral  Glaucoma  Hypertension  No significant past surgical history  History of cardioversion  Abnormal endoscopy finding  Abnormal colonoscopy    Family history:  Denies family history of IBD colon cancer, stomach cancer/polyps, pancreatic cancer/masses, liver cancer/disease, breast/ovarian cancer and endometrial cancer.   Reports family history of colon polyps    Social History:   No history of tobacco use, EtOH use or illicit drug use   Lives alone    ROS:   General:  No fevers or chills, + fatigue  ENT:  No sore throat or dysphagia  CV:  No pain or palpitations  Resp:  No dyspnea, cough, wheezing  GI:  + Hematemesis, + Melena, No pain, No nausea, No vomiting, No diarrhea, No constipation, No weight loss, No rectal bleeding  Skin:  No rash or edema    PHYSICAL EXAM:   GENERAL:  NAD, Appears stated age  HEENT:  NC/AT,  conjunctivae clear and pink, sclera -anicteric  CHEST:  CTA B/L, Normal effort  HEART:  + systolic murmur, RRR S1/S2  ABDOMEN:  Soft, non-tender, non-distended, normoactive bowel sounds, no masses  EXTEREMITIES:  No cyanosis or Edema  SKIN:  Warm & Dry. No rash or erythema  NEURO:  Alert, oriented    Vital Signs:  Vital Signs Last 24 Hrs  T(C): 36.5 (31 Aug 2018 08:17), Max: 36.5 (31 Aug 2018 08:17)  T(F): 97.7 (31 Aug 2018 08:17), Max: 97.7 (31 Aug 2018 08:17)  HR: 60 (31 Aug 2018 08:17) (60 - 60)  BP: 118/71 (31 Aug 2018 08:17) (118/71 - 118/71)  BP(mean): --  RR: 18 (31 Aug 2018 08:17) (18 - 18)  SpO2: 97% (31 Aug 2018 08:17) (97% - 97%)  Daily Height in cm: 160.02 (31 Aug 2018 08:17)    Daily     LABS:                        6.7    8.0   )-----------( 272      ( 31 Aug 2018 09:44 )             21.8     Mean Cell Volume: 80.8 fl (08-31-18 @ 09:44)    08-31    137  |  99  |  24<H>  ----------------------------<  126<H>  4.0   |  25  |  0.82    Ca    8.8      31 Aug 2018 09:44    LIVER FUNCTIONS - ( 31 Aug 2018 09:44 )  Alb: 3.6 g/dL / Pro: 6.5 g/dL / ALK PHOS: 57 U/L / ALT: 14 U/L / AST: 29 U/L / GGT: x           PT/INR - ( 31 Aug 2018 09:44 )   PT: 16.1 sec;   INR: 1.48 ratio       PTT - ( 31 Aug 2018 09:44 )  PTT:27.6 sec

## 2018-08-31 NOTE — H&P ADULT - ASSESSMENT
86F PMH Afib (on Eliquis) aortic stenosis (moderate), HFpEF (EF 55%), GERD, Hypertension, PUD presents with melena and hematemesis. Admitted for acute blood loss anemia secondary to GIB. 1 unit PRBC being transfused. Hemodynamically stable

## 2018-08-31 NOTE — ED ADULT NURSE NOTE - NSIMPLEMENTINTERV_GEN_ALL_ED
Implemented All Fall with Harm Risk Interventions:  [place] to call system. Call bell, personal items and telephone within reach. Instruct patient to call for assistance. Room bathroom lighting operational. Non-slip footwear when patient is off stretcher. Physically safe environment: no spills, clutter or unnecessary equipment. Stretcher in lowest position, wheels locked, appropriate side rails in place. Provide visual cue, wrist band, yellow gown, etc. Monitor gait and stability. Monitor for mental status changes and reorient to person, place, and time. Review medications for side effects contributing to fall risk. Reinforce activity limits and safety measures with patient and family. Provide visual clues: red socks.

## 2018-08-31 NOTE — ED PROVIDER NOTE - OBJECTIVE STATEMENT
Patient is an 86 year-old female with a PMHx A-fib (on eliquis/amiodarone), AS, Diastolic dysfunction stage II,  GERD, HTN,  glaucoma, neuropathy presents with melena x 1 week. Per pt, she has been endorsing at least 1 pisode of black tarry stool daily for one week until today where she also endorsed a single episode of vomiting food particles intermixed with blood. She lives alone at home, ambulates with a cane. Endorses fatigue and decreased exerSOB, diaphoretic and appeared anxious and ambulance was called.  Paramedics arrived and the patient was assisted with Cpap. Sat was 100Percent and she was downgraded BiPap in the ED. Reports b/l LE swelling that has been increasing for the past 3 days. No Chest Pain, No nausea, no vomiting, no fever, no chills. Patient is an 86 year-old female with a PMHx A-fib (on eliquis/amiodarone), AS, Diastolic dysfunction stage II,  GERD, HTN,  glaucoma, neuropathy presents with melena x 1 week. Per pt, she has been endorsing at least 1 episode of black tarry stool daily for one week until today where she also endorsed a single episode of vomiting food particles intermixed with blood. She lives alone at home, ambulates with a cane. Endorses fatigue and decreased exercise tolerance. Patient denies SOB, diaphoresis, nausea, abdominal pain, hematochezia, dysuria, frequency or urgency. Patient is an 86 year-old female with a PMHx A-fib (on eliquis/amiodarone), AS, Diastolic dysfunction stage II,  GERD, HTN,  glaucoma, neuropathy presents with melena x 1 week. Per pt, she has been endorsing at least 1 episode of black tarry stool daily for one week until today where she also endorsed a single episode of vomiting food particles intermixed with blood. She lives alone at home, ambulates with a cane. Endorses fatigue and decreased exercise tolerance. Patient denies SOB, diaphoresis, nausea, abdominal pain, hematochezia, dysuria, frequency or urgency.    Had EGD/[place] about 2 years ago with mild PUD. Follows with GI, Dr Lamar

## 2018-08-31 NOTE — CONSULT NOTE ADULT - ATTENDING COMMENTS
Patient seen and examined. Agree with above. Patient with melena and hematemesis in the setting of Eliquis use for A fib. Differential includes PUD, erosive esophagitis/gastritis, angiectasia, less likely neoplasm. Would transfuse PRBC for goal Hgb > 7-8, continue PPI continuous infusion. Keep NPO - we will plan for EGD today to evaluate and treat source of bleeding. Discussed with son at bedside.

## 2018-08-31 NOTE — H&P ADULT - NSHPLABSRESULTS_GEN_ALL_CORE
6.7    8.0   )-----------( 272      ( 31 Aug 2018 09:44 )             21.8     31 Aug 2018 09:44    137    |  99     |  24     ----------------------------<  126    4.0     |  25     |  0.82     Ca    8.8        31 Aug 2018 09:44    TPro  6.5    /  Alb  3.6    /  TBili  0.3    /  DBili  x      /  AST  29     /  ALT  14     /  AlkPhos  57     31 Aug 2018 09:44    LIVER FUNCTIONS - ( 31 Aug 2018 09:44 )  Alb: 3.6 g/dL / Pro: 6.5 g/dL / ALK PHOS: 57 U/L / ALT: 14 U/L / AST: 29 U/L / GGT: x           PT/INR - ( 31 Aug 2018 09:44 )   PT: 16.1 sec;   INR: 1.48 ratio         PTT - ( 31 Aug 2018 09:44 )  PTT:27.6 sec  CAPILLARY BLOOD GLUCOSE        CARDIAC MARKERS ( 31 Aug 2018 09:44 )  x     / x     / 44 U/L / x     / 1.4 ng/mL

## 2018-08-31 NOTE — ED PROVIDER NOTE - PROGRESS NOTE DETAILS
Anemic, noted symptoms of fatigue. Will admit, GI to see, transfuse 1 unit. Pt and family agree with plan of care

## 2018-08-31 NOTE — ED PROVIDER NOTE - PMH
Anxiety    Aortic valve stenosis, etiology of cardiac valve disease unspecified    Atrial fibrillation    Atrial fibrillation, unspecified type    Colonic polyp    GERD (gastroesophageal reflux disease)    GERD (gastroesophageal reflux disease)    Glaucoma    Glaucoma    Hiatal hernia    Hyperlipidemia    Hypertension    Hypertension    Hypovitaminosis D    Leg pain, bilateral    Neuropathy

## 2018-08-31 NOTE — H&P ADULT - ATTENDING COMMENTS
I saw and examined the patient on 8/31 at 4pm. Plan of care was discussed extensively with the patient and her son at bedside.   Of note Dr. Tran signed the H&P by mistake (we discussed the error).  -Dwain Power MD  [phone]

## 2018-09-01 LAB
ANION GAP SERPL CALC-SCNC: 12 MMOL/L — SIGNIFICANT CHANGE UP (ref 5–17)
BUN SERPL-MCNC: 15 MG/DL — SIGNIFICANT CHANGE UP (ref 7–23)
CALCIUM SERPL-MCNC: 9.1 MG/DL — SIGNIFICANT CHANGE UP (ref 8.4–10.5)
CHLORIDE SERPL-SCNC: 102 MMOL/L — SIGNIFICANT CHANGE UP (ref 96–108)
CO2 SERPL-SCNC: 24 MMOL/L — SIGNIFICANT CHANGE UP (ref 22–31)
CREAT SERPL-MCNC: 0.81 MG/DL — SIGNIFICANT CHANGE UP (ref 0.5–1.3)
GLUCOSE SERPL-MCNC: 135 MG/DL — HIGH (ref 70–99)
HCT VFR BLD CALC: 29.6 % — LOW (ref 34.5–45)
HCT VFR BLD CALC: 32.9 % — LOW (ref 34.5–45)
HGB BLD-MCNC: 10.7 G/DL — LOW (ref 11.5–15.5)
HGB BLD-MCNC: 9.8 G/DL — LOW (ref 11.5–15.5)
MAGNESIUM SERPL-MCNC: 2 MG/DL — SIGNIFICANT CHANGE UP (ref 1.6–2.6)
MCHC RBC-ENTMCNC: 26.2 PG — LOW (ref 27–34)
MCHC RBC-ENTMCNC: 26.8 PG — LOW (ref 27–34)
MCHC RBC-ENTMCNC: 32.6 GM/DL — SIGNIFICANT CHANGE UP (ref 32–36)
MCHC RBC-ENTMCNC: 33 GM/DL — SIGNIFICANT CHANGE UP (ref 32–36)
MCV RBC AUTO: 80.6 FL — SIGNIFICANT CHANGE UP (ref 80–100)
MCV RBC AUTO: 81.2 FL — SIGNIFICANT CHANGE UP (ref 80–100)
PHOSPHATE SERPL-MCNC: 4.3 MG/DL — SIGNIFICANT CHANGE UP (ref 2.5–4.5)
PLATELET # BLD AUTO: 246 K/UL — SIGNIFICANT CHANGE UP (ref 150–400)
PLATELET # BLD AUTO: 263 K/UL — SIGNIFICANT CHANGE UP (ref 150–400)
POTASSIUM SERPL-MCNC: 3.7 MMOL/L — SIGNIFICANT CHANGE UP (ref 3.5–5.3)
POTASSIUM SERPL-SCNC: 3.7 MMOL/L — SIGNIFICANT CHANGE UP (ref 3.5–5.3)
RBC # BLD: 3.65 M/UL — LOW (ref 3.8–5.2)
RBC # BLD: 4.09 M/UL — SIGNIFICANT CHANGE UP (ref 3.8–5.2)
RBC # FLD: 15 % — HIGH (ref 10.3–14.5)
RBC # FLD: 15 % — HIGH (ref 10.3–14.5)
SODIUM SERPL-SCNC: 138 MMOL/L — SIGNIFICANT CHANGE UP (ref 135–145)
WBC # BLD: 10.3 K/UL — SIGNIFICANT CHANGE UP (ref 3.8–10.5)
WBC # BLD: 10.8 K/UL — HIGH (ref 3.8–10.5)
WBC # FLD AUTO: 10.3 K/UL — SIGNIFICANT CHANGE UP (ref 3.8–10.5)
WBC # FLD AUTO: 10.8 K/UL — HIGH (ref 3.8–10.5)

## 2018-09-01 PROCEDURE — 99232 SBSQ HOSP IP/OBS MODERATE 35: CPT | Mod: GC

## 2018-09-01 PROCEDURE — 99233 SBSQ HOSP IP/OBS HIGH 50: CPT | Mod: GC

## 2018-09-01 RX ADMIN — Medication 100 MILLIGRAM(S): at 05:19

## 2018-09-01 RX ADMIN — SERTRALINE 50 MILLIGRAM(S): 25 TABLET, FILM COATED ORAL at 15:34

## 2018-09-01 RX ADMIN — GABAPENTIN 100 MILLIGRAM(S): 400 CAPSULE ORAL at 21:26

## 2018-09-01 RX ADMIN — Medication 100 MILLIGRAM(S): at 20:12

## 2018-09-01 RX ADMIN — PANTOPRAZOLE SODIUM 10 MG/HR: 20 TABLET, DELAYED RELEASE ORAL at 05:19

## 2018-09-01 NOTE — PROGRESS NOTE ADULT - SUBJECTIVE AND OBJECTIVE BOX
INCOMPLETE  Medicine Team CareModel B  Progress Note- PGY3    =========================================  CONTACT INFO  Noah Colon M.D., PGY-3  Pager: NS- [phone], [facility]- [phone]    Mon-Fri: pager covered by day team 7am-7pm;   ***Academic conferences M-F 8am-9am & 12pm-1pm- page ONLY if URGENT or if Consultant  /Kaylee: see chart, primary physician assigned available 7am-12pm  Sat/[place] Coverage 12pm-7pm: NS- page [phone] for Team1-4, [facility]- pager forwarded to covering Resident  For Night coverage 7pm-7am: NS- page [phone] Team1-3, page [phone] Team4 & Care Model; [facility]- page [phone] Team1-3,[id number] Tea4-CareA/B  =========================================    Chief Complaint: Patient is a 86y old  Female who presents with a chief complaint of Melena/hematemesis (31 Aug 2018 15:09)      INTERVAL HPI/OVERNIGHT EVENTS:     ROS:      MEDICATIONS  (STANDING):  gabapentin 100 milliGRAM(s) Oral at bedtime  metoprolol tartrate 100 milliGRAM(s) Oral two times a day  pantoprazole Infusion 8 mG/Hr (10 mL/Hr) IV Continuous <Continuous>  sertraline 50 milliGRAM(s) Oral daily    MEDICATIONS  (PRN):  clonazePAM Tablet 1 milliGRAM(s) Oral three times a day PRN Anxiety      Vital Signs Last 24 Hrs  T(C): 36.8 (01 Sep 2018 05:02), Max: 37.1 (31 Aug 2018 13:54)  T(F): 98.3 (01 Sep 2018 05:02), Max: 98.7 (31 Aug 2018 13:54)  HR: 72 (01 Sep 2018 05:02) (60 - 74)  BP: 146/80 (01 Sep 2018 05:02) (118/71 - 160/78)  BP(mean): --  RR: 18 (01 Sep 2018 05:02) (16 - 18)  SpO2: 96% (01 Sep 2018 05:02) (96% - 99%)  Supplemental O2: [ ] No, on Room Air [ ] Yes,     I&O's Detail    31 Aug 2018 07:01  -  01 Sep 2018 07:00  --------------------------------------------------------  IN:  Total IN: 0 mL    OUT:    Voided: 450 mL  Total OUT: 450 mL    Total NET: -450 mL        CAPILLARY BLOOD GLUCOSE  PHYSICAL EXAM:    LABS:                        10.7   10.8  )-----------( 263      ( 01 Sep 2018 05:48 )             32.9     09-01    138  |  102  |  15  ----------------------------<  135<H>  3.7   |  24  |  0.81    Ca    9.1      01 Sep 2018 05:48  Phos  4.3     09-01  Mg     2.0     09-01    TPro  6.5  /  Alb  3.6  /  TBili  0.3  /  DBili  x   /  AST  29  /  ALT  14  /  AlkPhos  57  08-31    LIVER FUNCTIONS - ( 31 Aug 2018 09:44 )  Alb: 3.6 g/dL / Pro: 6.5 g/dL / ALK PHOS: 57 U/L / ALT: 14 U/L / AST: 29 U/L / GGT: x     / T. Bili 0.3 mg/dL / D. Bili x         PT/INR - ( 31 Aug 2018 09:44 )   PT: 16.1 sec;   INR: 1.48 ratio         PTT - ( 31 Aug 2018 09:44 )  PTT:27.6 sec      ( 31 Aug 2018 09:44 )CARDIAC MARKERS   CK 44 U/L   CKMB x       CKMB Units 1.4 ng/mL   Troponin T x            Microbiology:    RADIOLOGY & ADDITIONAL TESTS:    Xray -   CT -  MRI -     Imaging Personally Reviewed:  [ ] YES  [ ] NO    Consultant(s) Notes Reviewed:  [ ] YES  [ ] NO    Care Discussed with Consultants/Other Providers [ ] YES  [ ] NO Medicine Team CareModel B  Progress Note- PGY3    =========================================  CONTACT INFO  Noah Colon M.D., PGY-3  Pager: NS- [phone], [facility]- [phone]    Mon-Fri: pager covered by day team 7am-7pm;   ***Academic conferences M-F 8am-9am & 12pm-1pm- page ONLY if URGENT or if Consultant  /Kaylee: see chart, primary physician assigned available 7am-12pm  Sat/[place] Coverage 12pm-7pm: NS- page [phone] for Team1-4, [facility]- pager forwarded to covering Resident  For Night coverage 7pm-7am: NS- page [phone] Team1-3, page [phone] Team4 & Care Model; [facility]- page [phone] [id number],[id number] Tea4-CareA/B  =========================================    Chief Complaint: Patient is a 86y old  Female who presents with a chief complaint of Melena/hematemesis (31 Aug 2018 15:09)      INTERVAL HPI/OVERNIGHT EVENTS: No acute events overnight. Patient reports no active bleeding per rectum or per os. no cp, sob, n/v/d or painful urination    ROS:  as above    MEDICATIONS  (STANDING):  gabapentin 100 milliGRAM(s) Oral at bedtime  metoprolol tartrate 100 milliGRAM(s) Oral two times a day  pantoprazole Infusion 8 mG/Hr (10 mL/Hr) IV Continuous <Continuous>  sertraline 50 milliGRAM(s) Oral daily    MEDICATIONS  (PRN):  clonazePAM Tablet 1 milliGRAM(s) Oral three times a day PRN Anxiety      Vital Signs Last 24 Hrs  T(C): 36.8 (01 Sep 2018 05:02), Max: 37.1 (31 Aug 2018 13:54)  T(F): 98.3 (01 Sep 2018 05:02), Max: 98.7 (31 Aug 2018 13:54)  HR: 72 (01 Sep 2018 05:02) (60 - 74)  BP: 146/80 (01 Sep 2018 05:02) (118/71 - 160/78)  BP(mean): --  RR: 18 (01 Sep 2018 05:02) (16 - 18)  SpO2: 96% (01 Sep 2018 05:02) (96% - 99%)  Supplemental O2: [ X] No, on Room Air [ ] Yes,     I&O's Detail    31 Aug 2018 07:01  -  01 Sep 2018 07:00  --------------------------------------------------------  IN:  Total IN: 0 mL    OUT:    Voided: 450 mL  Total OUT: 450 mL    Total NET: -450 mL  CAPILLARY BLOOD GLUCOSE  PHYSICAL EXAM:    GENERAL: NAD, well-developed  HEAD:  Atraumatic, Normocephalic  EYES: EOMI, PERRLA, conjunctiva and sclera clear  Mouth: MMM, no lesions  NECK: Supple, no appreciable masses  Lung: normal work of breathing, cta b/l  Chest: S1&S2+, rrr, systolic murmur III/VI greatest over LUSB and RUSB  ABDOMEN: bs+, soft, nt, nd, no appreciable masses  EXTREMITIES:  radial pulse present b/l, PT present b/l, no pitting edema present  Neuro: Appropriate to conversation, no focal deficits  SKIN: warm and dry, no visible rashes      LABS:                        10.7   10.8  )-----------( 263      ( 01 Sep 2018 05:48 )             32.9     09-01    138  |  102  |  15  ----------------------------<  135<H>  3.7   |  24  |  0.81    Ca    9.1      01 Sep 2018 05:48  Phos  4.3     09-01  Mg     2.0     09-01    TPro  6.5  /  Alb  3.6  /  TBili  0.3  /  DBili  x   /  AST  29  /  ALT  14  /  AlkPhos  57  08-31    LIVER FUNCTIONS - ( 31 Aug 2018 09:44 )  Alb: 3.6 g/dL / Pro: 6.5 g/dL / ALK PHOS: 57 U/L / ALT: 14 U/L / AST: 29 U/L / GGT: x     / T. Bili 0.3 mg/dL / D. Bili x         PT/INR - ( 31 Aug 2018 09:44 )   PT: 16.1 sec;   INR: 1.48 ratio    PTT - ( 31 Aug 2018 09:44 )  PTT:27.6 sec    ( 31 Aug 2018 09:44 )CARDIAC MARKERS   CK 44 U/L   CKMB x       CKMB Units 1.4 ng/mL   Troponin T x        Microbiology:    RADIOLOGY & ADDITIONAL TESTS:    Xray -   CT -  MRI -     Imaging Personally Reviewed:  [ ] YES  [ ] NO    Consultant(s) Notes Reviewed:  [X ] YES  [ ] NO    Care Discussed with Consultants/Other Providers [ ] YES  [ ] NO

## 2018-09-01 NOTE — PROGRESS NOTE ADULT - SUBJECTIVE AND OBJECTIVE BOX
Chief Complaint:  Patient is a 86y old  Female who presents with a chief complaint of Melena/hematemesis (31 Aug 2018 15:09)    Interval Events:   No acute overnight events. No repeat episodes of hematemesis    Allergies:  No Known Allergies    Hospital Medications:  clonazePAM Tablet 1 milliGRAM(s) Oral three times a day PRN  gabapentin 100 milliGRAM(s) Oral at bedtime  metoprolol tartrate 100 milliGRAM(s) Oral two times a day  pantoprazole Infusion 8 mG/Hr IV Continuous <Continuous>  sertraline 50 milliGRAM(s) Oral daily    PMHX/PSHX:  Neuropathy  GERD (gastroesophageal reflux disease)  Anxiety  Glaucoma  Hypertension  Atrial fibrillation  Aortic valve stenosis, etiology of cardiac valve disease unspecified  Atrial fibrillation, unspecified type  GERD (gastroesophageal reflux disease)  Hiatal hernia  Colonic polyp  Hypovitaminosis D  Hyperlipidemia  Leg pain, bilateral  Glaucoma  Hypertension  No significant past surgical history  History of cardioversion  Abnormal endoscopy finding  Abnormal colonoscopy    Family history:  No pertinent family history in first degree relatives  Family history of diabetes mellitus (Child)    ROS:   General:  No fevers or chills, + fatigue  ENT:  No sore throat or dysphagia  CV:  No pain or palpitations  Resp:  No dyspnea, cough, wheezing  GI:  + Hematemesis, + Melena, No pain, No nausea, No vomiting, No diarrhea, No constipation, No weight loss, No rectal bleeding  Skin:  No rash or edema    PHYSICAL EXAM:   Vital Signs:  Vital Signs Last 24 Hrs  T(C): 36.8 (01 Sep 2018 05:02), Max: 37.1 (31 Aug 2018 13:54)  T(F): 98.3 (01 Sep 2018 05:02), Max: 98.7 (31 Aug 2018 13:54)  HR: 72 (01 Sep 2018 05:02) (64 - 74)  BP: 146/80 (01 Sep 2018 05:02) (140/62 - 160/78)  BP(mean): --  RR: 18 (01 Sep 2018 05:02) (16 - 18)  SpO2: 96% (01 Sep 2018 05:02) (96% - 99%)  Daily     Daily     GENERAL:  NAD, Appears stated age  HEENT:  NC/AT,  conjunctivae clear and pink, sclera -anicteric  CHEST:  CTA B/L, Normal effort  HEART:  + systolic murmur, RRR S1/S2  ABDOMEN:  Soft, non-tender, non-distended, normoactive bowel sounds, no masses  EXTEREMITIES:  No cyanosis or Edema  SKIN:  Warm & Dry. No rash or erythema  NEURO:  Alert, oriented    LABS:                        10.7   10.8  )-----------( 263      ( 01 Sep 2018 05:48 )             32.9     Mean Cell Volume: 80.6 fl (09-01-18 @ 05:48)    09-01    138  |  102  |  15  ----------------------------<  135<H>  3.7   |  24  |  0.81    Ca    9.1      01 Sep 2018 05:48  Phos  4.3     09-01  Mg     2.0     09-01    TPro  6.5  /  Alb  3.6  /  TBili  0.3  /  DBili  x   /  AST  29  /  ALT  14  /  AlkPhos  57  08-31    LIVER FUNCTIONS - ( 31 Aug 2018 09:44 )  Alb: 3.6 g/dL / Pro: 6.5 g/dL / ALK PHOS: 57 U/L / ALT: 14 U/L / AST: 29 U/L / GGT: x           PT/INR - ( 31 Aug 2018 09:44 )   PT: 16.1 sec;   INR: 1.48 ratio         PTT - ( 31 Aug 2018 09:44 )  PTT:27.6 sec    Hemoglobin: 10.7 g/dL (09-01-18 @ 05:48)  Hemoglobin: 10.9 g/dL (08-31-18 @ 21:09)  Hemoglobin: 6.7 g/dL (08-31-18 @ 09:44)      Imaging:    < from: Upper Endoscopy (08.31.18 @ 16:57) >  Findings:       The upper third of the esophagus and middle third of the esophagus were normal.       A single 20 mm elongated polyp with bleeding (oozing) was found at the gastroesophageal        junction that extends into the gastric cardia. Area at the base of the polyp at the GE        junction was successfully injected with 4 mL of a 1:10,000 solution of epinephrine for        hemostasis. Multiple attempts to place endo-loops around the polyp were unsuccessful due to    the pliable and elongated nature of the polyp. A snare was used and placed around the polyp,        and the polyp was removed with a hot snare in piecemeal. Resection and retrieval were        complete. Estimated blood loss was minimal. To prevent bleeding after the polypectomy, eight        hemostatic clips were successfully placed (MR conditional). There was no bleeding at the end        of the procedure.       A few small sessile polyps with no bleeding and no stigmata of recent bleeding werefound in        the gastric body.       The examined duodenum was normal.                                                                                                        Impression:          - Normal upper third of esophagus and middle third of esophagus.                       - Gastroesophageal junction polyp with oozing was found. Injected, resected                        and retrieved. Clips (MR conditional) were placed.                       - A few non-bleeding gastric polyps.                - Normal examined duodenum.    < end of copied text > Chief Complaint:  Patient is a 86y old  Female who presents with a chief complaint of Melena/hematemesis (31 Aug 2018 15:09)    Interval Events:   No acute overnight events. No repeat episodes of hematemesis    Allergies:  No Known Allergies    Hospital Medications:  clonazePAM Tablet 1 milliGRAM(s) Oral three times a day PRN  gabapentin 100 milliGRAM(s) Oral at bedtime  metoprolol tartrate 100 milliGRAM(s) Oral two times a day  pantoprazole Infusion 8 mG/Hr IV Continuous <Continuous>  sertraline 50 milliGRAM(s) Oral daily    PMHX/PSHX:  Neuropathy  GERD (gastroesophageal reflux disease)  Anxiety  Glaucoma  Hypertension  Atrial fibrillation  Aortic valve stenosis, etiology of cardiac valve disease unspecified  Atrial fibrillation, unspecified type  GERD (gastroesophageal reflux disease)  Hiatal hernia  Colonic polyp  Hypovitaminosis D  Hyperlipidemia  Leg pain, bilateral  Glaucoma  Hypertension  No significant past surgical history  History of cardioversion  Abnormal endoscopy finding  Abnormal colonoscopy    Family history:  No pertinent family history in first degree relatives  Family history of diabetes mellitus (Child)    ROS:   General:  No fevers or chills, + fatigue  ENT:  No sore throat or dysphagia  CV:  No pain or palpitations  Resp:  No dyspnea, cough, wheezing  GI:  + Hematemesis, + Melena, No pain, No nausea, No vomiting, No diarrhea, No constipation, No weight loss, No rectal bleeding  Skin:  No rash or edema    PHYSICAL EXAM:   Vital Signs:  Vital Signs Last 24 Hrs  T(C): 36.8 (01 Sep 2018 05:02), Max: 37.1 (31 Aug 2018 13:54)  T(F): 98.3 (01 Sep 2018 05:02), Max: 98.7 (31 Aug 2018 13:54)  HR: 72 (01 Sep 2018 05:02) (64 - 74)  BP: 146/80 (01 Sep 2018 05:02) (140/62 - 160/78)  BP(mean): --  RR: 18 (01 Sep 2018 05:02) (16 - 18)  SpO2: 96% (01 Sep 2018 05:02) (96% - 99%)  Daily     Daily     GENERAL:  NAD, Appears stated age  HEENT:  NC/AT,  conjunctivae clear and pink, sclera -anicteric  CHEST:  CTA B/L, Normal effort  HEART:  + systolic murmur, RRR S1/S2  ABDOMEN:  Soft, non-tender, non-distended, normoactive bowel sounds, no masses  EXTEREMITIES:  No cyanosis or Edema  SKIN:  Warm & Dry. No rash or erythema  NEURO:  Alert, oriented    LABS:                        10.7   10.8  )-----------( 263      ( 01 Sep 2018 05:48 )             32.9     Mean Cell Volume: 80.6 fl (09-01-18 @ 05:48)    09-01    138  |  102  |  15  ----------------------------<  135<H>  3.7   |  24  |  0.81    Ca    9.1      01 Sep 2018 05:48  Phos  4.3     09-01  Mg     2.0     09-01    TPro  6.5  /  Alb  3.6  /  TBili  0.3  /  DBili  x   /  AST  29  /  ALT  14  /  AlkPhos  57  08-31    LIVER FUNCTIONS - ( 31 Aug 2018 09:44 )  Alb: 3.6 g/dL / Pro: 6.5 g/dL / ALK PHOS: 57 U/L / ALT: 14 U/L / AST: 29 U/L / GGT: x           PT/INR - ( 31 Aug 2018 09:44 )   PT: 16.1 sec;   INR: 1.48 ratio         PTT - ( 31 Aug 2018 09:44 )  PTT:27.6 sec    Hemoglobin: 10.7 g/dL (09-01-18 @ 05:48)  Hemoglobin: 10.9 g/dL (08-31-18 @ 21:09)  Hemoglobin: 6.7 g/dL (08-31-18 @ 09:44) s/p 3U      Imaging:    < from: Upper Endoscopy (08.31.18 @ 16:57) >  Findings:  The upper third of the esophagus and middle third of the esophagus were normal.  A single 20 mm elongated polyp with bleeding (oozing) was found at the gastroesophageal junction that extends into the gastric cardia. Area at the base of the polyp at the GE junction was successfully injected with 4 mL of a 1:10,000 solution of epinephrine for hemostasis. Multiple attempts to place endo-loops around the polyp were unsuccessful due to the pliable and elongated nature of the polyp. A snare was used and placed around the polyp, and the polyp was removed with a hot snare in piecemeal. Resection and retrieval were complete. Estimated blood loss was minimal. To prevent bleeding after the polypectomy, eight hemostatic clips were successfully placed (MR conditional). There was no bleeding at the end of the procedure.  A few small sessile polyps with no bleeding and no stigmata of recent bleeding were found in the gastric body.  The examined duodenum was normal.                                                                                                        Impression:  - Normal upper third of esophagus and middle third of esophagus.  - Gastroesophageal junction polyp with oozing was found. Injected, resected and retrieved. Clips (MR conditional) were placed.  - A few non-bleeding gastric polyps.  -Normal examined duodenum.  < end of copied text >

## 2018-09-01 NOTE — PROGRESS NOTE ADULT - PROBLEM SELECTOR PLAN 1
- Acute blood loss anemia likely secondary to upper GIB. Hb 6.7 on admission. On home Eliquis.   - Prior upper endoscopy significant for ulcer per patient on son. On home Protonix  - S/p 1 unit PRBC  - Patient as risk for demand ischemia. Check cardiac enzymes if symptomatic  - Maintain active type and screen and transfuse Hb <7 or <8 if symptomatic   - Continue with Protonix   - GI following. Plan for upper endoscopy s/p 1UpRBC with Hb rise above Hb7  s/p EGD w/ clip to GE polyp  - monitor w/ cbc daily. no signs of recurrent bleeding  - maintain type and screen q72hr  - HOLD ac

## 2018-09-01 NOTE — PROGRESS NOTE ADULT - ASSESSMENT
86F PMH Afib (on Eliquis) aortic stenosis (moderate), HFpEF (EF 55%), GERD, Hypertension, PUD presents with melena and hematemesis. Admitted for acute blood loss anemia secondary to GIB. 1 unit PRBC being transfused. Hemodynamically stable 86F w/ Afib (on Eliquis), PUD, aortic stenosis (moderate), HFpEF (EF 55%), GERD, Hypertension presents with melena and hematemesis admitted to medicine for acute blood loss anemia 2/2 acute upper gastrointestinal hemorrhage 2/2 GE polyp extending to gastric cardia s/p clip and 1U pRBC w/ appropriate response to therapy.

## 2018-09-01 NOTE — PROGRESS NOTE ADULT - PROBLEM SELECTOR PLAN 3
- [id number] score of 4  - Rate control with Toprol.  - Rhythm control with Amiodarone  - hold home eliquis - [id number] score of 4  - c/w metoprolol for rate control  - HOLD ac given GI hemorrhage and f/u GI recs regarding restarting

## 2018-09-01 NOTE — PROGRESS NOTE ADULT - PROBLEM SELECTOR PLAN 2
- Likely secondary to PUD given history. Other DDX include angiectasia given history of aortic stenosis  - Hb 6.7 on admission. On home Eliquis.   - Prior upper endoscopy significant for ulcer per patient on son. On home Protonix  - S/p 1 unit PRBC  - Patient as risk for demand ischemia. Check cardiac enzymes if symptomatic  - Maintain active type and screen and transfuse Hb <7 or <8 if symptomatic   - Continue with Protonix   - GI following. Plan for upper endoscopy 2/2 GE polyp s/p EGD w/ clip and 1UpRBC  - GI recs appreciated. c/w pantoprazole infusion per GI  - advanced diet to clears  - HOLD ac

## 2018-09-01 NOTE — PROGRESS NOTE ADULT - PROBLEM SELECTOR PLAN 4
- In the setting of GIB  - No history of liver disease  - Hold AC  - Monitor - only continuing w/ metoprolol for rate control above  - permissive HTN for now given recent blood loss anemia  - reevaluate going forward restarting home anithypertensives

## 2018-09-02 ENCOUNTER — TRANSCRIPTION ENCOUNTER (OUTPATIENT)
Age: 83
End: 2018-09-02

## 2018-09-02 DIAGNOSIS — R35.0 FREQUENCY OF MICTURITION: ICD-10-CM

## 2018-09-02 LAB
ANION GAP SERPL CALC-SCNC: 11 MMOL/L — SIGNIFICANT CHANGE UP (ref 5–17)
APPEARANCE UR: CLEAR — SIGNIFICANT CHANGE UP
BILIRUB UR-MCNC: NEGATIVE — SIGNIFICANT CHANGE UP
BUN SERPL-MCNC: 12 MG/DL — SIGNIFICANT CHANGE UP (ref 7–23)
CALCIUM SERPL-MCNC: 8.4 MG/DL — SIGNIFICANT CHANGE UP (ref 8.4–10.5)
CHLORIDE SERPL-SCNC: 104 MMOL/L — SIGNIFICANT CHANGE UP (ref 96–108)
CO2 SERPL-SCNC: 25 MMOL/L — SIGNIFICANT CHANGE UP (ref 22–31)
COLOR SPEC: YELLOW — SIGNIFICANT CHANGE UP
CREAT SERPL-MCNC: 0.89 MG/DL — SIGNIFICANT CHANGE UP (ref 0.5–1.3)
DIFF PNL FLD: NEGATIVE — SIGNIFICANT CHANGE UP
EPI CELLS # UR: SIGNIFICANT CHANGE UP /HPF
GLUCOSE SERPL-MCNC: 89 MG/DL — SIGNIFICANT CHANGE UP (ref 70–99)
GLUCOSE UR QL: NEGATIVE — SIGNIFICANT CHANGE UP
HCT VFR BLD CALC: 31.9 % — LOW (ref 34.5–45)
HGB BLD-MCNC: 10.3 G/DL — LOW (ref 11.5–15.5)
KETONES UR-MCNC: NEGATIVE — SIGNIFICANT CHANGE UP
LEUKOCYTE ESTERASE UR-ACNC: ABNORMAL
MAGNESIUM SERPL-MCNC: 1.9 MG/DL — SIGNIFICANT CHANGE UP (ref 1.6–2.6)
MCHC RBC-ENTMCNC: 26.4 PG — LOW (ref 27–34)
MCHC RBC-ENTMCNC: 32.1 GM/DL — SIGNIFICANT CHANGE UP (ref 32–36)
MCV RBC AUTO: 82 FL — SIGNIFICANT CHANGE UP (ref 80–100)
NITRITE UR-MCNC: NEGATIVE — SIGNIFICANT CHANGE UP
PH UR: 6 — SIGNIFICANT CHANGE UP (ref 5–8)
PHOSPHATE SERPL-MCNC: 3.2 MG/DL — SIGNIFICANT CHANGE UP (ref 2.5–4.5)
PLATELET # BLD AUTO: 261 K/UL — SIGNIFICANT CHANGE UP (ref 150–400)
POTASSIUM SERPL-MCNC: 3.5 MMOL/L — SIGNIFICANT CHANGE UP (ref 3.5–5.3)
POTASSIUM SERPL-SCNC: 3.5 MMOL/L — SIGNIFICANT CHANGE UP (ref 3.5–5.3)
PROT UR-MCNC: SIGNIFICANT CHANGE UP
RBC # BLD: 3.89 M/UL — SIGNIFICANT CHANGE UP (ref 3.8–5.2)
RBC # FLD: 15.4 % — HIGH (ref 10.3–14.5)
SODIUM SERPL-SCNC: 140 MMOL/L — SIGNIFICANT CHANGE UP (ref 135–145)
SP GR SPEC: 1.02 — SIGNIFICANT CHANGE UP (ref 1.01–1.02)
UROBILINOGEN FLD QL: 1 MG/DL
WBC # BLD: 8.7 K/UL — SIGNIFICANT CHANGE UP (ref 3.8–10.5)
WBC # FLD AUTO: 8.7 K/UL — SIGNIFICANT CHANGE UP (ref 3.8–10.5)
WBC UR QL: SIGNIFICANT CHANGE UP /HPF (ref 0–5)

## 2018-09-02 PROCEDURE — 99233 SBSQ HOSP IP/OBS HIGH 50: CPT | Mod: GC

## 2018-09-02 PROCEDURE — 99232 SBSQ HOSP IP/OBS MODERATE 35: CPT | Mod: GC

## 2018-09-02 RX ORDER — PANTOPRAZOLE SODIUM 20 MG/1
40 TABLET, DELAYED RELEASE ORAL
Qty: 0 | Refills: 0 | Status: DISCONTINUED | OUTPATIENT
Start: 2018-09-02 | End: 2018-09-05

## 2018-09-02 RX ORDER — APIXABAN 2.5 MG/1
5 TABLET, FILM COATED ORAL EVERY 12 HOURS
Qty: 0 | Refills: 0 | Status: DISCONTINUED | OUTPATIENT
Start: 2018-09-02 | End: 2018-09-05

## 2018-09-02 RX ADMIN — SERTRALINE 50 MILLIGRAM(S): 25 TABLET, FILM COATED ORAL at 14:00

## 2018-09-02 RX ADMIN — Medication 100 MILLIGRAM(S): at 05:12

## 2018-09-02 RX ADMIN — GABAPENTIN 100 MILLIGRAM(S): 400 CAPSULE ORAL at 21:05

## 2018-09-02 RX ADMIN — APIXABAN 5 MILLIGRAM(S): 2.5 TABLET, FILM COATED ORAL at 17:43

## 2018-09-02 RX ADMIN — Medication 100 MILLIGRAM(S): at 17:43

## 2018-09-02 RX ADMIN — PANTOPRAZOLE SODIUM 10 MG/HR: 20 TABLET, DELAYED RELEASE ORAL at 02:31

## 2018-09-02 NOTE — PROGRESS NOTE ADULT - PROBLEM SELECTOR PLAN 4
- only continuing w/ metoprolol for rate control above  - permissive HTN for now given recent blood loss anemia  - reevaluate going forward restarting home anithypertensives c/w pantoprazole - /65, on lower end. only continuing w/ metoprolol for rate control above  -will continue to hold home furosemide as now as pt's BP are well-controlled and pt is euvolemic

## 2018-09-02 NOTE — PROGRESS NOTE ADULT - PROBLEM SELECTOR PLAN 3
- [id number] score of 4  - c/w metoprolol for rate control  - HOLD ac given GI hemorrhage and f/u GI recs regarding restarting - /65, on lower end. only continuing w/ metoprolol for rate control above  -will continue to hold home furosemide as now as pt's BP are well-controlled and pt is euvolemic - [id number] score of 4  - c/w metoprolol for rate control  - resumed eliquis today

## 2018-09-02 NOTE — DISCHARGE NOTE ADULT - CONDITION (STATED IN TERMS THAT PERMIT A SPECIFIC MEASURABLE COMPARISON WITH CONDITION ON ADMISSION):
On day of discharge patient is medically and hemodynamically stable for discharge to subacute rehabilitation.

## 2018-09-02 NOTE — DISCHARGE NOTE ADULT - MEDICATION SUMMARY - MEDICATIONS TO TAKE
I will START or STAY ON the medications listed below when I get home from the hospital:    amiodarone 200 mg oral tablet  -- 1 tab(s) by mouth once a day  -- Indication: For Atrial fibrillation    Eliquis 5 mg oral tablet  -- 1 tab(s) by mouth 2 times a day  -- Indication: For Anticoagulation    gabapentin 100 mg oral capsule  -- 1 cap(s) by mouth once a day (at bedtime)  -- Indication: For Peripheral neuropathy    clonazePAM 1 mg oral tablet  -- 1 tab(s) by mouth 2 times a day, As Needed  -- Indication: For Anxiety    sertraline 50 mg oral tablet  -- 1 tab(s) by mouth once a day  -- Indication: For Depression    Metoprolol Succinate  mg oral tablet, extended release  -- 1.5 tab(s) by mouth once a day  -- Indication: For Hypertension    furosemide 40 mg oral tablet  -- 1 tab(s) by mouth once a day  -- Indication: For Hypertension    Senna 8.6 mg oral tablet  -- 2 tab(s) by mouth once a day (at bedtime)  -- Indication: For Constipation    polyethylene glycol 3350 oral powder for reconstitution  -- 17 gram(s) by mouth once  -- Indication: For Constipation    Klor-Con M20 oral tablet, extended release  -- 1 tab(s) by mouth once a day  -- Indication: For Supplement    Xalatan 0.005% ophthalmic solution  -- 1 drop(s) to each affected eye once a day (in the evening)  -- Indication: For Glaucoma    Cosopt 2.23%-0.68% ophthalmic solution  -- 1 drop(s) to each affected eye 2 times a day  -- Indication: For Glaucoma    pantoprazole 40 mg oral delayed release tablet  -- 1 tab(s) by mouth once a day (before a meal)  -- Indication: For Gastrointestinal hemorrhage with melena    Multiple Vitamins oral tablet  -- 1 tab(s) by mouth once a day  -- Indication: For Nutitional supplement

## 2018-09-02 NOTE — DISCHARGE NOTE ADULT - CARE PLAN
Principal Discharge DX:	Gastrointestinal hemorrhage with melena  Goal:	resolution of bleeding  Assessment and plan of treatment:	You came in with dark blood in your stools. Because your blood levels were low, you were given a blood transfusion. You also underwent a procedure to visualize the inside of your esophagus and stomach, which showed a bleeding polyp just above your stomach. It was removed and a clip was placed to stop the bleeding. Your blood levels remained stable during the rest of your admission. Please make an appointment to see your primary care doctor and the gastroenterologist within 1-2 weeks to ensure no further bleeding. Principal Discharge DX:	Gastrointestinal hemorrhage with melena  Goal:	resolution of bleeding  Assessment and plan of treatment:	You came in with dark blood in your stools. Because your blood levels were low, you were given a blood transfusion. You also underwent a procedure to visualize the inside of your esophagus and stomach, which showed a bleeding polyp just above your stomach. It was removed and a clip was placed to stop the bleeding. Your blood counts remained stable during the rest of your admission. Please make an appointment to see your primary care doctor and the gastroenterologist within 1-2 weeks to ensure no further bleeding.  Secondary Diagnosis:	Atrial fibrillation  Secondary Diagnosis:	GERD (gastroesophageal reflux disease) Principal Discharge DX:	Gastrointestinal hemorrhage with melena  Goal:	resolution of bleeding  Assessment and plan of treatment:	You came in with dark blood in your stools. Because your blood levels were low, you were given a blood transfusion. You also underwent a procedure to visualize the inside of your esophagus and stomach, which showed a bleeding polyp just above your stomach. It was removed and a clip was placed to stop the bleeding. Your blood counts remained stable during the rest of your admission. Please make an appointment to see your primary care doctor and the gastroenterologist within 1-2 weeks and to discuss the results of the polyp biopsy.  Secondary Diagnosis:	Atrial fibrillation  Assessment and plan of treatment:	You have a history of atrial fibrillation. Please continue taking your home metoprolol and Eliquis for your atrial fibrillation.  Secondary Diagnosis:	GERD (gastroesophageal reflux disease)  Assessment and plan of treatment:	You have a history of gastroesophageal reflux. Please continue taking your pantoprazole after discharge. Principal Discharge DX:	Gastrointestinal hemorrhage with melena  Goal:	resolution of bleeding  Assessment and plan of treatment:	You came in with blood in your stools. Because your hemoglobin levels were low, you were given a blood transfusion. You also underwent an endoscopy to visualize the inside of your esophagus and stomach, which showed a bleeding polyp just above your stomach. It was removed and a clip was placed to stop the bleeding. Your blood counts remained stable during the rest of your admission. Please make an appointment to see your primary care doctor and the gastroenterologist within 1-2 weeks.  Secondary Diagnosis:	Atrial fibrillation  Assessment and plan of treatment:	You have a history of atrial fibrillation. Please continue taking your home metoprolol and Eliquis for your atrial fibrillation.  Secondary Diagnosis:	GERD (gastroesophageal reflux disease)  Assessment and plan of treatment:	You have a history of gastroesophageal reflux. Please continue taking your pantoprazole after discharge. Principal Discharge DX:	Gastrointestinal hemorrhage with melena  Goal:	resolution of bleeding  Assessment and plan of treatment:	You came in with blood in your stools. Because your hemoglobin levels were low, you were given a blood transfusion. You also underwent an endoscopy to visualize the inside of your esophagus and stomach, which showed a bleeding polyp in your stomach. It was removed and a clip was placed to stop the bleeding. Your blood counts remained stable during the rest of your admission. Please make an appointment to see your primary care doctor and the gastroenterologist within 1-2 weeks.  Secondary Diagnosis:	Atrial fibrillation  Assessment and plan of treatment:	You have a history of atrial fibrillation. Please continue taking your home metoprolol, amiodarone, and Eliquis for your atrial fibrillation.  Secondary Diagnosis:	GERD (gastroesophageal reflux disease)  Assessment and plan of treatment:	You have a history of gastroesophageal reflux. Please continue taking your pantoprazole after discharge.  Secondary Diagnosis:	Hypertension  Assessment and plan of treatment:	You have a history of hypertension. Please continue taking metoprolol and furosemide after discharge.

## 2018-09-02 NOTE — PROGRESS NOTE ADULT - SUBJECTIVE AND OBJECTIVE BOX
Chief Complaint:  Patient is a 86y old  Female who presents with a chief complaint of GIB (02 Sep 2018 00:39)    Interval Events:   No acute overnight events - denies fevers, chills, hematemesis, melena or hematochezia    Allergies:  No Known Allergies    Hospital Medications:  clonazePAM Tablet 1 milliGRAM(s) Oral three times a day PRN  gabapentin 100 milliGRAM(s) Oral at bedtime  metoprolol tartrate 100 milliGRAM(s) Oral two times a day  pantoprazole Infusion 8 mG/Hr IV Continuous <Continuous>  sertraline 50 milliGRAM(s) Oral daily    PMHX/PSHX:  Neuropathy  GERD (gastroesophageal reflux disease)  Anxiety  Glaucoma  Hypertension  Atrial fibrillation  Aortic valve stenosis, etiology of cardiac valve disease unspecified  Atrial fibrillation, unspecified type  GERD (gastroesophageal reflux disease)  Hiatal hernia  Colonic polyp  Hypovitaminosis D  Hyperlipidemia  Leg pain, bilateral  Glaucoma  Hypertension  No significant past surgical history  History of cardioversion  Abnormal endoscopy finding  Abnormal colonoscopy      Family history:  No pertinent family history in first degree relatives  Family history of diabetes mellitus (Child)      ROS:   General:  No fevers or chills, + fatigue  ENT:  No sore throat or dysphagia  CV:  No pain or palpitations  Resp:  No dyspnea, cough, wheezing  GI:  No Hematemesis, No Melena, No pain, No nausea, No vomiting, No diarrhea, No constipation, No weight loss, No rectal bleeding  Skin:  No rash or edema    PHYSICAL EXAM:   Vital Signs:  Vital Signs Last 24 Hrs  T(C): 37.1 (02 Sep 2018 04:06), Max: 37.1 (02 Sep 2018 04:06)  T(F): 98.7 (02 Sep 2018 04:06), Max: 98.7 (02 Sep 2018 04:06)  HR: 62 (02 Sep 2018 04:06) (62 - 77)  BP: 159/71 (02 Sep 2018 04:06) (139/81 - 159/71)  BP(mean): --  RR: 18 (02 Sep 2018 04:06) (18 - 18)  SpO2: 95% (02 Sep 2018 04:06) (95% - 98%)  Daily     Daily     GENERAL:  NAD, Appears stated age  HEENT:  NC/AT,  conjunctivae clear and pink, sclera -anicteric  CHEST:  CTA B/L, Normal effort  HEART:  + systolic murmur, RRR S1/S2  ABDOMEN:  Soft, non-tender, non-distended, normoactive bowel sounds, no masses  EXTEREMITIES:  No cyanosis or Edema  SKIN:  Warm & Dry. No rash or erythema  NEURO:  Alert, oriented    LABS:                        10.3   8.7   )-----------( 261      ( 02 Sep 2018 07:07 )             31.9     Mean Cell Volume: 82.0 fl (09-02-18 @ 07:07)    09-02    140  |  104  |  12  ----------------------------<  89  3.5   |  25  |  0.89    Ca    8.4      02 Sep 2018 07:07  Phos  3.2     09-02  Mg     1.9     09-02    TPro  6.5  /  Alb  3.6  /  TBili  0.3  /  DBili  x   /  AST  29  /  ALT  14  /  AlkPhos  57  08-31    LIVER FUNCTIONS - ( 31 Aug 2018 09:44 )  Alb: 3.6 g/dL / Pro: 6.5 g/dL / ALK PHOS: 57 U/L / ALT: 14 U/L / AST: 29 U/L / GGT: x           PT/INR - ( 31 Aug 2018 09:44 )   PT: 16.1 sec;   INR: 1.48 ratio       PTT - ( 31 Aug 2018 09:44 )  PTT:27.6 sec    Hemoglobin: 10.3 g/dL (09-02-18 @ 07:07)  Hemoglobin: 9.8 g/dL (09-01-18 @ 20:15)  Hemoglobin: 10.7 g/dL (09-01-18 @ 05:48)  Hemoglobin: 10.9 g/dL (08-31-18 @ 21:09)  Hemoglobin: 6.7 g/dL (08-31-18 @ 09:44) s/p 3U

## 2018-09-02 NOTE — PROGRESS NOTE ADULT - ASSESSMENT
86F w/ Afib (on Eliquis), PUD, aortic stenosis (moderate), HFpEF (EF 55%), GERD, Hypertension presents with melena and hematemesis admitted to medicine for acute blood loss anemia 2/2 acute upper gastrointestinal hemorrhage 2/2 GE polyp extending to gastric cardia s/p clip and 1U pRBC w/ appropriate response to therapy. 86F w/ Afib (on Eliquis), PUD, aortic stenosis (moderate), HFpEF (EF 55%), GERD, Hypertension who was admitted for GIB 2/2 GE polyp extending to gastric cardia now s/p clip and 1U pRBC w/ appropriate response to therapy. Pt remains HD stable w/ improved H/H. Resumed eliquis, switched to PO protonix and advanced diet.

## 2018-09-02 NOTE — PHYSICAL THERAPY INITIAL EVALUATION ADULT - RANGE OF MOTION EXAMINATION, REHAB EVAL
bilateral upper extremity ROM was WNL (within normal limits)/bilateral upper extremity ROM was WFL (within functional limits)

## 2018-09-02 NOTE — DISCHARGE NOTE ADULT - HOSPITAL COURSE
Pt is a 86F w/ Atrial fibrillation (on Eliquis/Amiodarone/Metoprolol), PUD, aortic stenosis (moderate), HFpEF (EF 55%), GERD, and Hypertension who presented with 1 week of dark stools and fatigue. In the ED, pt's vital signs were stable. Her hemoglobin was found to be 6.7 (INR 1.48) so she was transfused 1 U pRBC, with appropriate response, and started on a protonix drip. GI was consulted. Pt underwent an endoscopy, during which she was found to have a gastroesophageal junction polyp extending to the gastric cardiac and underwent   - 1 unit PRBC 2/2 GE polyp extending to gastric cardia now s/p clip and 1U pRBC w/ appropriate response to therapy. Pt remains HD stable w/ improved H/H. Resumed eliquis, switched to PO protonix and advanced diet. Pt is a 87yo F w/ atrial fibrillation (on eliquis/amiodarone/metoprolol), PUD, aortic stenosis (moderate), HFpEF (EF 55%), GERD, and HTN who presented with 1 week of dark stools and fatigue. In the ED, pt's vital signs were stable. Her hemoglobin was found to be 6.7 (INR 1.48) so she was transfused 1 U pRBC with appropriate response, and started on a protonix drip. GI was consulted. Pt underwent an endoscopy, during which she was found to have a gastroesophageal junction polyp extending to the gastric cardia. A clip was placed. Pt was found to have no further stigmata of bleeding. Her Hgb levels improved throughout admission and her INR remained stable. She was resumed on eliquis after 48 hours, and gradually advanced to a regular diet. Pt was seen by physical therapy, who recommended [facility]. On discharge, pt is hemodynamically and medically stable for discharge to [facility]. She has been instructed to follow up with GI and her PMD within 1-2 wks. Pt is a 87yo F w/ atrial fibrillation (on eliquis/amiodarone/metoprolol), PUD, aortic stenosis (moderate), HFpEF (EF 55%), GERD, and HTN who presented with 1 week of dark stools and fatigue. In the ED, pt's vital signs were stable. Her hemoglobin was found to be 6.7 (INR 1.48) so she was transfused 1 U pRBC with appropriate response, and started on a protonix drip. GI was consulted. Pt underwent an endoscopy, during which she was found to have a gastroesophageal junction polyp extending to the gastric cardia. A clip was placed. Pt was found to have no further stigmata of bleeding. Her Hgb levels improved throughout admission and her INR remained stable. Results of the GE junction polyp biopsy showed a hyperplastic polyp with intestinal metaplasia negative for dysplasia and squamous mucosa. She was resumed on eliquis after 48 hours, and gradually advanced to a regular diet. Pt was seen by physical therapy, who recommended [facility]. On discharge, pt is hemodynamically and medically stable for discharge to [facility]. She has been instructed to follow up with GI and her PMD within 1-2 wks. Pt is a 85yo F w/ atrial fibrillation (on eliquis/amiodarone/metoprolol), PUD, aortic stenosis (moderate), HFpEF (EF 55%), GERD, and HTN who presented with 1 week of dark stools and fatigue. In the ED, pt's vital signs were stable. Her hemoglobin was found to be 6.7 (INR 1.48) so she was transfused 1 U pRBC with appropriate response, and started on a protonix drip. GI was consulted. Pt underwent an endoscopy, during which she was found to have a gastroesophageal junction polyp extending to the gastric cardia. A clip was placed. Pt was found to have no further stigmata of bleeding. Her Hgb levels improved throughout admission and her INR remained stable. Results of the GE junction polyp biopsy showed a hyperplastic polyp with intestinal metaplasia negative for dysplasia and squamous mucosa. She was resumed on eliquis after 48 hours, and gradually advanced to a regular diet. Pt was seen by physical therapy, who recommended [facility]. On discharge, pt is hemodynamically and medically stable for discharge to [facility]. She has been instructed to follow up with GI and her PMD within 1-2 wks. Restarted on amio prior to d/c as she had been on this, metop 150 from 200 as per home cardiologist reccs per son. Close f/u for medication titration.

## 2018-09-02 NOTE — PROGRESS NOTE ADULT - ASSESSMENT
Impression:  # Melena / Acute Blood Loss Anemia secondary to bleeding polyp s/p removal and placement of hemostatic clips. Patient hemodynamically stable with stable hemoglobin after transfusions.    Recommendations:  - Advance diet as tolerated  - Transition to PO protonix  - Trend CBC, transfuse to hemoglobin > 7.0  - Supportive care per primary team    Mary Ann Acosta MD  Gastroenterology Fellow  [phone]  Please page on call fellow on weekends and after 5pm on weekdays

## 2018-09-02 NOTE — PHYSICAL THERAPY INITIAL EVALUATION ADULT - PERTINENT HX OF CURRENT PROBLEM, REHAB EVAL
Pt is an 85 yo F presents with melena  for 1 week. She had upper endoscopy and colonoscopy about 2 years ago sig for ulcer and started on Protonix. 8/31 Upper endoscopy s/p removal of polyp and placement of hemostatic clips.

## 2018-09-02 NOTE — PROGRESS NOTE ADULT - SUBJECTIVE AND OBJECTIVE BOX
***************************************************************  Kelly Solomon PGY1  Internal Medicine   pager [phone]  ***************************************************************    DEANDRE GREWAL  86y  MRN: [id number]    Patient is a 86y old  Female who presents with a chief complaint of GIB (02 Sep 2018 00:39)      Subjective: no events ON. Denies fever, CP, SOB, abn pain, N/V, dysuria. Tolerating diet.      MEDICATIONS  (STANDING):  gabapentin 100 milliGRAM(s) Oral at bedtime  metoprolol tartrate 100 milliGRAM(s) Oral two times a day  pantoprazole Infusion 8 mG/Hr (10 mL/Hr) IV Continuous <Continuous>  sertraline 50 milliGRAM(s) Oral daily    MEDICATIONS  (PRN):  clonazePAM Tablet 1 milliGRAM(s) Oral three times a day PRN Anxiety      Objective:    Vitals: Vital Signs Last 24 Hrs  T(C): 37.1 (09-02-18 @ 04:06), Max: 37.1 (09-02-18 @ 04:06)  T(F): 98.7 (09-02-18 @ 04:06), Max: 98.7 (09-02-18 @ 04:06)  HR: 62 (09-02-18 @ 04:06) (62 - 77)  BP: 159/71 (09-02-18 @ 04:06) (139/81 - 159/71)  BP(mean): --  RR: 18 (09-02-18 @ 04:06) (18 - 18)  SpO2: 95% (09-02-18 @ 04:06) (95% - 98%)              I&O's Summary    01 Sep 2018 07:01  -  02 Sep 2018 07:00  --------------------------------------------------------  IN: 120 mL / OUT: 450 mL / NET: -330 mL        PHYSICAL EXAM:  GENERAL: NAD  HEAD:  Atraumatic, Normocephalic  EYES: EOMI, conjunctiva and sclera clear  CHEST/LUNG: Clear to percussion bilaterally; No rales, rhonchi, wheezing, or rubs  HEART: Regular rate and rhythm; No murmurs, rubs, or gallops  ABDOMEN: Soft, Nontender, Nondistended;   SKIN: No rashes or lesions  NERVOUS SYSTEM:  Alert & Oriented X3, no focal deficit    LABS:                        10.3   8.7   )-----------( 261      ( 02 Sep 2018 07:07 )             31.9                         9.8    10.3  )-----------( 246      ( 01 Sep 2018 20:15 )             29.6                         10.7   10.8  )-----------( 263      ( 01 Sep 2018 05:48 )             32.9     09-02    140  |  104  |  12  ----------------------------<  89  3.5   |  25  |  0.89  09-01    138  |  102  |  15  ----------------------------<  135<H>  3.7   |  24  |  0.81  08-31    137  |  99  |  24<H>  ----------------------------<  126<H>  4.0   |  25  |  0.82    Ca    8.4      02 Sep 2018 07:07  Ca    9.1      01 Sep 2018 05:48  Ca    8.8      31 Aug 2018 09:44  Phos  3.2     09-02  Mg     1.9     09-02    TPro  6.5  /  Alb  3.6  /  TBili  0.3  /  DBili  x   /  AST  29  /  ALT  14  /  AlkPhos  57  08-31    PT/INR - ( 31 Aug 2018 09:44 )   PT: 16.1 sec;   INR: 1.48 ratio         PTT - ( 31 Aug 2018 09:44 )  PTT:27.6 sec                  CAPILLARY BLOOD GLUCOSE        RADIOLOGY & ADDITIONAL TESTS:  Imaging Personally Reviewed:  [x ] YES  [ ] NO  Consultants involved in case:   Consultant(s) Notes Reviewed:  [ x] YES  [ ] NO:   Care Discussed with Consultants/Other Providers [x ] YES  [ ] NO ***************************************************************  Kelly Solomon PGY1  Internal Medicine   pager [phone]  ***************************************************************    DEANDRE GREWAL  86y  MRN: [id number]    Patient is a 86y old  Female who presents with a chief complaint of GIB (02 Sep 2018 00:39)      Subjective: no events ON. Son at bedside, pt prefers him to translate from [place]. Pt tolerated full liquid breakfast well. Denies fever, CP, SOB, abn pain, N/V. Per son, pt lives at home by herself, is fully independent. Currently applying for HHA. Son would prefer pt go home w/ home PT but wishes to reconsider need for PT overnight.    MEDICATIONS  (STANDING):  gabapentin 100 milliGRAM(s) Oral at bedtime  metoprolol tartrate 100 milliGRAM(s) Oral two times a day  pantoprazole Infusion 8 mG/Hr (10 mL/Hr) IV Continuous <Continuous>  sertraline 50 milliGRAM(s) Oral daily    MEDICATIONS  (PRN):  clonazePAM Tablet 1 milliGRAM(s) Oral three times a day PRN Anxiety      Objective:    Vitals: Vital Signs Last 24 Hrs  T(C): 37.1 (09-02-18 @ 04:06), Max: 37.1 (09-02-18 @ 04:06)  T(F): 98.7 (09-02-18 @ 04:06), Max: 98.7 (09-02-18 @ 04:06)  HR: 62 (09-02-18 @ 04:06) (62 - 77)  BP: 159/71 (09-02-18 @ 04:06) (139/81 - 159/71)  BP(mean): --  RR: 18 (09-02-18 @ 04:06) (18 - 18)  SpO2: 95% (09-02-18 @ 04:06) (95% - 98%)              I&O's Summary    01 Sep 2018 07:01  -  02 Sep 2018 07:00  --------------------------------------------------------  IN: 120 mL / OUT: 450 mL / NET: -330 mL        PHYSICAL EXAM:  GENERAL: NAD, pleasant elderly woman laying in bed  HEAD:  Atraumatic, Normocephalic  EYES: EOMI, conjunctiva and sclera clear  CHEST/LUNG: Clear to percussion bilaterally; No rales, rhonchi, wheezing, or rubs  HEART: +3/6 crescendo/decrescendo murmur radiating to carotids, RRR  ABDOMEN: Soft, Nontender, Nondistended  SKIN: No rashes or lesions  NERVOUS SYSTEM:  Alert & Oriented X3, no focal deficit    LABS:                        10.3   8.7   )-----------( 261      ( 02 Sep 2018 07:07 )             31.9                         9.8    10.3  )-----------( 246      ( 01 Sep 2018 20:15 )             29.6                         10.7   10.8  )-----------( 263      ( 01 Sep 2018 05:48 )             32.9     09-02    140  |  104  |  12  ----------------------------<  89  3.5   |  25  |  0.89  09-01    138  |  102  |  15  ----------------------------<  135<H>  3.7   |  24  |  0.81  08-31    137  |  99  |  24<H>  ----------------------------<  126<H>  4.0   |  25  |  0.82    Ca    8.4      02 Sep 2018 07:07  Ca    9.1      01 Sep 2018 05:48  Ca    8.8      31 Aug 2018 09:44  Phos  3.2     09-02  Mg     1.9     09-02    TPro  6.5  /  Alb  3.6  /  TBili  0.3  /  DBili  x   /  AST  29  /  ALT  14  /  AlkPhos  57  08-31    PT/INR - ( 31 Aug 2018 09:44 )   PT: 16.1 sec;   INR: 1.48 ratio         PTT - ( 31 Aug 2018 09:44 )  PTT:27.6 sec                  CAPILLARY BLOOD GLUCOSE        RADIOLOGY & ADDITIONAL TESTS:  Imaging Personally Reviewed:  [x ] YES  [ ] NO  Consultants involved in case:   Consultant(s) Notes Reviewed:  [ x] YES  [ ] NO:   Care Discussed with Consultants/Other Providers [x ] YES  [ ] NO ***************************************************************  Kelly Solomon PGY1  Internal Medicine   pager [phone]  ***************************************************************    DEANDRE GREWAL  86y  MRN: [id number]    Patient is a 86y old  Female who presents with a chief complaint of GIB (02 Sep 2018 00:39)      Subjective: no events ON. Son at bedside, pt prefers him to translate from Thai. Pt tolerated full liquid breakfast well. Denies CP, SOB, abn pain, N/V. Endorses dysuria w/ increased urinary frequency overnight. no fevers/chills. Per son, pt is incontinent.  Per son, pt lives at home by herself, is fully independent. Currently applying for HHA. Son would prefer pt go home w/ home PT but wishes to reconsider need for PT overnight.    MEDICATIONS  (STANDING):  gabapentin 100 milliGRAM(s) Oral at bedtime  metoprolol tartrate 100 milliGRAM(s) Oral two times a day  pantoprazole Infusion 8 mG/Hr (10 mL/Hr) IV Continuous <Continuous>  sertraline 50 milliGRAM(s) Oral daily    MEDICATIONS  (PRN):  clonazePAM Tablet 1 milliGRAM(s) Oral three times a day PRN Anxiety      Objective:    Vitals: Vital Signs Last 24 Hrs  T(C): 37.1 (09-02-18 @ 04:06), Max: 37.1 (09-02-18 @ 04:06)  T(F): 98.7 (09-02-18 @ 04:06), Max: 98.7 (09-02-18 @ 04:06)  HR: 62 (09-02-18 @ 04:06) (62 - 77)  BP: 159/71 (09-02-18 @ 04:06) (139/81 - 159/71)  BP(mean): --  RR: 18 (09-02-18 @ 04:06) (18 - 18)  SpO2: 95% (09-02-18 @ 04:06) (95% - 98%)              I&O's Summary    01 Sep 2018 07:01  -  02 Sep 2018 07:00  --------------------------------------------------------  IN: 120 mL / OUT: 450 mL / NET: -330 mL        PHYSICAL EXAM:  GENERAL: NAD, pleasant elderly woman laying in bed  HEAD:  Atraumatic, Normocephalic  EYES: EOMI, conjunctiva and sclera clear  CHEST/LUNG: Clear to percussion bilaterally; No rales, rhonchi, wheezing, or rubs  HEART: +3/6 crescendo/decrescendo murmur radiating to carotids, RRR  ABDOMEN: Soft, Nontender, Nondistended, no CVA tenderness  SKIN: No rashes or lesions  NERVOUS SYSTEM:  Alert & Oriented X3, no focal deficit    LABS:                        10.3   8.7   )-----------( 261      ( 02 Sep 2018 07:07 )             31.9                         9.8    10.3  )-----------( 246      ( 01 Sep 2018 20:15 )             29.6                         10.7   10.8  )-----------( 263      ( 01 Sep 2018 05:48 )             32.9     09-02    140  |  104  |  12  ----------------------------<  89  3.5   |  25  |  0.89  09-01    138  |  102  |  15  ----------------------------<  135<H>  3.7   |  24  |  0.81  08-31    137  |  99  |  24<H>  ----------------------------<  126<H>  4.0   |  25  |  0.82    Ca    8.4      02 Sep 2018 07:07  Ca    9.1      01 Sep 2018 05:48  Ca    8.8      31 Aug 2018 09:44  Phos  3.2     09-02  Mg     1.9     09-02    TPro  6.5  /  Alb  3.6  /  TBili  0.3  /  DBili  x   /  AST  29  /  ALT  14  /  AlkPhos  57  08-31    PT/INR - ( 31 Aug 2018 09:44 )   PT: 16.1 sec;   INR: 1.48 ratio         PTT - ( 31 Aug 2018 09:44 )  PTT:27.6 sec                  CAPILLARY BLOOD GLUCOSE        RADIOLOGY & ADDITIONAL TESTS:  Imaging Personally Reviewed:  [x ] YES  [ ] NO  Consultants involved in case:   Consultant(s) Notes Reviewed:  [ x] YES  [ ] NO:   Care Discussed with Consultants/Other Providers [x ] YES  [ ] NO ***************************************************************  Kelly Solomon PGY1  Internal Medicine   pager [phone]  ***************************************************************    DEANDRE GREWAL  86y  MRN: [id number]    Patient is a 86y old  Female who presents with a chief complaint of GIB (02 Sep 2018 00:39)      Subjective: no events ON. Son at bedside, pt prefers him to translate from [place]. Pt tolerated full liquid breakfast well. Denies CP, SOB, abn pain, N/V. Endorses dysuria w/ increased urinary frequency overnight. no fevers/chills. Per son, pt is incontinent.  Per son, pt lives at home by herself, is fully independent. Currently applying for [facility].     MEDICATIONS  (STANDING):  gabapentin 100 milliGRAM(s) Oral at bedtime  metoprolol tartrate 100 milliGRAM(s) Oral two times a day  pantoprazole Infusion 8 mG/Hr (10 mL/Hr) IV Continuous <Continuous>  sertraline 50 milliGRAM(s) Oral daily    MEDICATIONS  (PRN):  clonazePAM Tablet 1 milliGRAM(s) Oral three times a day PRN Anxiety      Objective:    Vitals: Vital Signs Last 24 Hrs  T(C): 37.1 (09-02-18 @ 04:06), Max: 37.1 (09-02-18 @ 04:06)  T(F): 98.7 (09-02-18 @ 04:06), Max: 98.7 (09-02-18 @ 04:06)  HR: 62 (09-02-18 @ 04:06) (62 - 77)  BP: 159/71 (09-02-18 @ 04:06) (139/81 - 159/71)  BP(mean): --  RR: 18 (09-02-18 @ 04:06) (18 - 18)  SpO2: 95% (09-02-18 @ 04:06) (95% - 98%)              I&O's Summary    01 Sep 2018 07:01  -  02 Sep 2018 07:00  --------------------------------------------------------  IN: 120 mL / OUT: 450 mL / NET: -330 mL        PHYSICAL EXAM:  GENERAL: NAD, pleasant elderly woman laying in bed  HEAD:  Atraumatic, Normocephalic  EYES: EOMI, conjunctiva and sclera clear  CHEST/LUNG: Clear to percussion bilaterally; No rales, rhonchi, wheezing, or rubs  HEART: +3/6 crescendo/decrescendo murmur radiating to carotids, RRR  ABDOMEN: Soft, Nontender, Nondistended, no CVA tenderness  SKIN: No rashes or lesions  NERVOUS SYSTEM:  Alert & Oriented X3, no focal deficit    LABS:                        10.3   8.7   )-----------( 261      ( 02 Sep 2018 07:07 )             31.9                         9.8    10.3  )-----------( 246      ( 01 Sep 2018 20:15 )             29.6                         10.7   10.8  )-----------( 263      ( 01 Sep 2018 05:48 )             32.9     09-02    140  |  104  |  12  ----------------------------<  89  3.5   |  25  |  0.89  09-01    138  |  102  |  15  ----------------------------<  135<H>  3.7   |  24  |  0.81  08-31    137  |  99  |  24<H>  ----------------------------<  126<H>  4.0   |  25  |  0.82    Ca    8.4      02 Sep 2018 07:07  Ca    9.1      01 Sep 2018 05:48  Ca    8.8      31 Aug 2018 09:44  Phos  3.2     09-02  Mg     1.9     09-02    TPro  6.5  /  Alb  3.6  /  TBili  0.3  /  DBili  x   /  AST  29  /  ALT  14  /  AlkPhos  57  08-31    PT/INR - ( 31 Aug 2018 09:44 )   PT: 16.1 sec;   INR: 1.48 ratio         PTT - ( 31 Aug 2018 09:44 )  PTT:27.6 sec                  CAPILLARY BLOOD GLUCOSE        RADIOLOGY & ADDITIONAL TESTS:  Imaging Personally Reviewed:  [x ] YES  [ ] NO  Consultants involved in case:   Consultant(s) Notes Reviewed:  [ x] YES  [ ] NO:   Care Discussed with Consultants/Other Providers [x ] YES  [ ] NO

## 2018-09-02 NOTE — PHYSICAL THERAPY INITIAL EVALUATION ADULT - ACTIVE RANGE OF MOTION EXAMINATION, REHAB EVAL
real. upper extremity Active ROM was WNL (within normal limits)/bilateral upper extremity Active ROM was WFL (within functional limits)

## 2018-09-02 NOTE — PHYSICAL THERAPY INITIAL EVALUATION ADULT - ADDITIONAL COMMENTS
Pt speaks Italian, son present to translate. Pt lives alone in prv house. 4 steps to enter +handrail. Bedrooms upstairs + chair lift. As per pt's son, pt uses cane at home. Occasionally uses rolling walker.

## 2018-09-02 NOTE — DISCHARGE NOTE ADULT - CARE PROVIDER_API CALL
Betty Olmstead), Gastroenterology  [address]  [address]  [place] [address]  Phone: [phone]  Fax: [phone]    Jillian Shelley), Internal Medicine  [id number] [place] [address]  Phone: [phone]  Fax: [phone]

## 2018-09-02 NOTE — PROGRESS NOTE ADULT - PROBLEM SELECTOR PLAN 2
2/2 GE polyp s/p EGD w/ clip and 1UpRBC  - GI recs appreciated. c/w pantoprazole infusion per GI  - advanced diet to clears  - HOLD ac - [id number] score of 4  - c/w metoprolol for rate control  - resumed eliquis today w/ dysuria. Will eval for symptomatic UTI w/ UA, then Ucx if +

## 2018-09-02 NOTE — DISCHARGE NOTE ADULT - FINDINGS/TREATMENT
Impression:  - Normal upper third of esophagus and middle third of esophagus.  - Gastroesophageal junction polyp with oozing was found. Injected, resected and retrieved. Clips (MR conditional) were placed.  - A few non-bleeding gastric polyps.  -Normal examined duodenum.

## 2018-09-02 NOTE — PROGRESS NOTE ADULT - PROBLEM SELECTOR PLAN 1
s/p 1UpRBC with Hb rise above Hb7  s/p EGD w/ clip to GE polyp  - monitor w/ cbc daily. no signs of recurrent bleeding  - maintain type and screen q72hr  - HOLD ac 2/2 bleeding GE polyp, now s/p clip and 1U pRBC. Hgb improved to 10.3 from 9.8 yesterday. No stigmata of ongoing bleed.  - continue to monitor cbc daily  - maintain type and screen q72hr - will resend with tomorrow's AM labs  - resume eliquis today - will monitor CBC, INR overnight and monitor response overnight 2/2 GE polyp s/p EGD w/ clip and 1UpRBC  -switched to PO protonix   - tolerated mech soft - will advance to reg (DASH) diet for dinner  - GI recs appreciated

## 2018-09-02 NOTE — DISCHARGE NOTE ADULT - MEDICATION SUMMARY - MEDICATIONS TO STOP TAKING
I will STOP taking the medications listed below when I get home from the hospital:    docusate sodium 100 mg oral capsule  -- 1 cap(s) by mouth 3 times a day    Metoprolol Tartrate 100 mg oral tablet  -- 1 tab(s) by mouth 2 times a day

## 2018-09-02 NOTE — DISCHARGE NOTE ADULT - PLAN OF CARE
resolution of bleeding You came in with dark blood in your stools. Because your blood levels were low, you were given a blood transfusion. You also underwent a procedure to visualize the inside of your esophagus and stomach, which showed a bleeding polyp just above your stomach. It was removed and a clip was placed to stop the bleeding. Your blood levels remained stable during the rest of your admission. Please make an appointment to see your primary care doctor and the gastroenterologist within 1-2 weeks to ensure no further bleeding. You came in with dark blood in your stools. Because your blood levels were low, you were given a blood transfusion. You also underwent a procedure to visualize the inside of your esophagus and stomach, which showed a bleeding polyp just above your stomach. It was removed and a clip was placed to stop the bleeding. Your blood counts remained stable during the rest of your admission. Please make an appointment to see your primary care doctor and the gastroenterologist within 1-2 weeks to ensure no further bleeding. You came in with dark blood in your stools. Because your blood levels were low, you were given a blood transfusion. You also underwent a procedure to visualize the inside of your esophagus and stomach, which showed a bleeding polyp just above your stomach. It was removed and a clip was placed to stop the bleeding. Your blood counts remained stable during the rest of your admission. Please make an appointment to see your primary care doctor and the gastroenterologist within 1-2 weeks and to discuss the results of the polyp biopsy. You have a history of atrial fibrillation. Please continue taking your home metoprolol and Eliquis for your atrial fibrillation. You have a history of gastroesophageal reflux. Please continue taking your pantoprazole after discharge. You came in with blood in your stools. Because your hemoglobin levels were low, you were given a blood transfusion. You also underwent an endoscopy to visualize the inside of your esophagus and stomach, which showed a bleeding polyp just above your stomach. It was removed and a clip was placed to stop the bleeding. Your blood counts remained stable during the rest of your admission. Please make an appointment to see your primary care doctor and the gastroenterologist within 1-2 weeks. You came in with blood in your stools. Because your hemoglobin levels were low, you were given a blood transfusion. You also underwent an endoscopy to visualize the inside of your esophagus and stomach, which showed a bleeding polyp in your stomach. It was removed and a clip was placed to stop the bleeding. Your blood counts remained stable during the rest of your admission. Please make an appointment to see your primary care doctor and the gastroenterologist within 1-2 weeks. You have a history of atrial fibrillation. Please continue taking your home metoprolol, amiodarone, and Eliquis for your atrial fibrillation. You have a history of hypertension. Please continue taking metoprolol and furosemide after discharge.

## 2018-09-02 NOTE — DISCHARGE NOTE ADULT - PATIENT PORTAL LINK FT
You can access the [facility] Patient Portal, offered by [facility], by registering with the following website: [email or web address]

## 2018-09-02 NOTE — DISCHARGE NOTE ADULT - ADDITIONAL INSTRUCTIONS
1) Gastroenterology (Dr Cohen) - please make an appointment to see the gastroenterologist in 1-2 weeks to evaluate for further bleeding  2) PMD (Dr Shelley) - please make an appointment to see your PMD within 1-2 weeks. 1) Gastroenterology (Dr. Cohen) - please make an appointment to see the gastroenterologist in 1-2 weeks to evaluate for further bleeding  2) PMD (Dr. Shelley) - please make an appointment to see your PMD within 1-2 weeks. 1) Gastroenterology (Dr. Cohen) - please make an appointment to see the gastroenterologist in 1-2.  2) PMD (Dr. Shelley) - please make an appointment to see your PMD within 1-2 weeks.

## 2018-09-02 NOTE — PROGRESS NOTE ADULT - PROBLEM SELECTOR PLAN 6
- Continue home meds. Clonazepam prn, Sertraline  #iSTOP verified: [id number] - Continue Gabapentin - Continue home meds. Clonazepam prn, Sertraline  #iSTOP verified: [id number]

## 2018-09-02 NOTE — DISCHARGE NOTE ADULT - CARE PROVIDERS DIRECT ADDRESSES
[email or web address]

## 2018-09-02 NOTE — PHYSICAL THERAPY INITIAL EVALUATION ADULT - PATIENT/FAMILY AGREES WITH PLAN
Pt's son does not want subacute rehab. Pt's son states he can stay with her or she can go to his house./no

## 2018-09-03 LAB
ANION GAP SERPL CALC-SCNC: 11 MMOL/L — SIGNIFICANT CHANGE UP (ref 5–17)
APTT BLD: 27.6 SEC — SIGNIFICANT CHANGE UP (ref 27.5–37.4)
BLD GP AB SCN SERPL QL: NEGATIVE — SIGNIFICANT CHANGE UP
BUN SERPL-MCNC: 10 MG/DL — SIGNIFICANT CHANGE UP (ref 7–23)
CALCIUM SERPL-MCNC: 9 MG/DL — SIGNIFICANT CHANGE UP (ref 8.4–10.5)
CHLORIDE SERPL-SCNC: 100 MMOL/L — SIGNIFICANT CHANGE UP (ref 96–108)
CO2 SERPL-SCNC: 27 MMOL/L — SIGNIFICANT CHANGE UP (ref 22–31)
CREAT SERPL-MCNC: 0.72 MG/DL — SIGNIFICANT CHANGE UP (ref 0.5–1.3)
GLUCOSE SERPL-MCNC: 87 MG/DL — SIGNIFICANT CHANGE UP (ref 70–99)
HCT VFR BLD CALC: 33.8 % — LOW (ref 34.5–45)
HGB BLD-MCNC: 10.5 G/DL — LOW (ref 11.5–15.5)
INR BLD: 1.22 RATIO — HIGH (ref 0.88–1.16)
MCHC RBC-ENTMCNC: 25.6 PG — LOW (ref 27–34)
MCHC RBC-ENTMCNC: 31.2 GM/DL — LOW (ref 32–36)
MCV RBC AUTO: 82.2 FL — SIGNIFICANT CHANGE UP (ref 80–100)
PLATELET # BLD AUTO: 270 K/UL — SIGNIFICANT CHANGE UP (ref 150–400)
POTASSIUM SERPL-MCNC: 3.6 MMOL/L — SIGNIFICANT CHANGE UP (ref 3.5–5.3)
POTASSIUM SERPL-SCNC: 3.6 MMOL/L — SIGNIFICANT CHANGE UP (ref 3.5–5.3)
PROTHROM AB SERPL-ACNC: 13.3 SEC — HIGH (ref 9.8–12.7)
RBC # BLD: 4.11 M/UL — SIGNIFICANT CHANGE UP (ref 3.8–5.2)
RBC # FLD: 15.2 % — HIGH (ref 10.3–14.5)
RH IG SCN BLD-IMP: POSITIVE — SIGNIFICANT CHANGE UP
SODIUM SERPL-SCNC: 138 MMOL/L — SIGNIFICANT CHANGE UP (ref 135–145)
WBC # BLD: 8.4 K/UL — SIGNIFICANT CHANGE UP (ref 3.8–10.5)
WBC # FLD AUTO: 8.4 K/UL — SIGNIFICANT CHANGE UP (ref 3.8–10.5)

## 2018-09-03 PROCEDURE — 99233 SBSQ HOSP IP/OBS HIGH 50: CPT | Mod: GC

## 2018-09-03 RX ORDER — DOCUSATE SODIUM 100 MG
100 CAPSULE ORAL DAILY
Qty: 0 | Refills: 0 | Status: DISCONTINUED | OUTPATIENT
Start: 2018-09-03 | End: 2018-09-03

## 2018-09-03 RX ORDER — DOCUSATE SODIUM 100 MG
100 CAPSULE ORAL THREE TIMES A DAY
Qty: 0 | Refills: 0 | Status: DISCONTINUED | OUTPATIENT
Start: 2018-09-03 | End: 2018-09-05

## 2018-09-03 RX ADMIN — APIXABAN 5 MILLIGRAM(S): 2.5 TABLET, FILM COATED ORAL at 17:19

## 2018-09-03 RX ADMIN — Medication 100 MILLIGRAM(S): at 17:19

## 2018-09-03 RX ADMIN — Medication 100 MILLIGRAM(S): at 05:25

## 2018-09-03 RX ADMIN — SERTRALINE 50 MILLIGRAM(S): 25 TABLET, FILM COATED ORAL at 11:43

## 2018-09-03 RX ADMIN — APIXABAN 5 MILLIGRAM(S): 2.5 TABLET, FILM COATED ORAL at 05:25

## 2018-09-03 RX ADMIN — PANTOPRAZOLE SODIUM 40 MILLIGRAM(S): 20 TABLET, DELAYED RELEASE ORAL at 05:25

## 2018-09-03 RX ADMIN — Medication 100 MILLIGRAM(S): at 15:34

## 2018-09-03 NOTE — PROGRESS NOTE ADULT - PROBLEM SELECTOR PLAN 2
w/ dysuria. Will eval for symptomatic UTI w/ UA, then Ucx if + UA reviewed, no evidence of UTI.  - Monitor

## 2018-09-03 NOTE — PROGRESS NOTE ADULT - ASSESSMENT
86F w/ Afib (on Eliquis), PUD, aortic stenosis (moderate), HFpEF (EF 55%), GERD, Hypertension who was admitted for GIB 2/2 GE polyp extending to gastric cardia now s/p clip and 1U pRBC w/ appropriate response to therapy. Pt remains HD stable w/ improved H/H. Resumed eliquis, switched to PO protonix and advanced diet.

## 2018-09-03 NOTE — PROGRESS NOTE ADULT - PROBLEM SELECTOR PLAN 4
- /65, on lower end. only continuing w/ metoprolol for rate control above  -will continue to hold home furosemide as now as pt's BP are well-controlled and pt is euvolemic /74 this am.  -will continue to hold home furosemide for now as pt's BP are well-controlled and pt is euvolemic  - on metoprolol for afib

## 2018-09-03 NOTE — PROGRESS NOTE ADULT - PROBLEM SELECTOR PLAN 1
2/2 bleeding GE polyp, now s/p clip and 1U pRBC. Hgb improved to 10.3 from 9.8 yesterday. No stigmata of ongoing bleed.  - continue to monitor cbc daily  - maintain type and screen q72hr - will resend with tomorrow's AM labs  - resume eliquis today - will monitor CBC, INR overnight and monitor response overnight 2/2 GE polyp s/p EGD w/ clip and 1UpRBC  -switched to PO protonix   - tolerated mech soft - will advance to reg (DASH) diet for dinner  - GI recs appreciated 2/2 bleeding GE polyp, now s/p clip and 1U pRBC. Hgb improved to 10.5 today. No stigmata of ongoing bleed.  - continue to monitor cbc daily  - maintain type and screen q72hr   - Eliquis resumed - will monitor CBC, INR    -switched to PO protonix   - advanced to regular diet  - GI recs appreciated

## 2018-09-03 NOTE — PROGRESS NOTE ADULT - PROBLEM SELECTOR PLAN 3
- [id number] score of 4  - c/w metoprolol for rate control  - resumed eliquis today - [id number] score of 4  - c/w metoprolol for rate control  - resumed eliquis yesterday

## 2018-09-03 NOTE — PROGRESS NOTE ADULT - SUBJECTIVE AND OBJECTIVE BOX
Subjective: No events overnight.        T(C): 36.9 (09-03-18 @ 04:11), Max: 37.1 (09-02-18 @ 14:14)  HR: 63 (09-03-18 @ 04:11) (63 - 74)  BP: 160/74 (09-03-18 @ 04:11) (116/65 - 160/74)  RR: 18 (09-03-18 @ 04:11) (18 - 18)  SpO2: 95% (09-03-18 @ 04:11) (95% - 100%)        Consultant(s) Notes Reviewed:  [x ] YES  [ ] NO  Care Discussed with Consultants/Other Providers [ x] YES  [ ] NO    LABS:          RADIOLOGY & ADDITIONAL TESTS:    Imaging Personally Reviewed:  [ ] YES  [ ] NO  MedsMEDICATIONS  (STANDING):  apixaban 5 milliGRAM(s) Oral every 12 hours  gabapentin 100 milliGRAM(s) Oral at bedtime  metoprolol tartrate 100 milliGRAM(s) Oral two times a day  pantoprazole    Tablet 40 milliGRAM(s) Oral before breakfast  sertraline 50 milliGRAM(s) Oral daily    MEDICATIONS  (PRN):  clonazePAM Tablet 1 milliGRAM(s) Oral three times a day PRN Anxiety Subjective: No events overnight. Pt endorsing cold sensation and pain in b/l LE bilaterally from knees down. Per pt, this is new but per nursing, pt's son has stated that this is a chronic problem for her. Pt denies F/C, N/V, CP, SOB. No episodes of melena o/n. Tolerating regular diet.      Objective:  T(C): 36.9 (18 @ 04:11), Max: 37.1 (18 @ 14:14)  HR: 63 (18 @ 04:11) (63 - 74)  BP: 160/74 (18 @ 04:11) (116/65 - 160/74)  RR: 18 (18 @ 04:11) (18 - 18)  SpO2: 95% (18 @ 04:11) (95% - 100%)      Consultant(s) Notes Reviewed:  [x ] YES  [ ] NO  Care Discussed with Consultants/Other Providers [ x] YES  [ ] NO    PHYSICAL EXAM:  GENERAL: NAD, pleasant elderly woman laying in bed  HEAD:  Atraumatic, Normocephalic  EYES: EOMI, conjunctiva and sclera clear  CHEST/LUNG: CTABL; No rales, rhonchi, wheezing, or rubs  HEART: +3/6 crescendo/decrescendo murmur radiating to carotids, RRR  ABDOMEN: Soft, Nontender, Nondistended, no CVA tenderness  SKIN: No rashes or lesions  NERVOUS SYSTEM:  Alert & Oriented X3, no focal deficit    LABS:                        10.5   8.4   )-----------( 270      ( 03 Sep 2018 07:54 )             33.8         138  |  100  |  10  ----------------------------<  87  3.6   |  27  |  0.72    Ca    9.0      03 Sep 2018 07:54  Phos  3.2       Mg     1.9           PT/INR - ( 03 Sep 2018 07:54 )   PT: 13.3 sec;   INR: 1.22 ratio         PTT - ( 03 Sep 2018 07:54 )  PTT:27.6 sec  Urinalysis Basic - ( 02 Sep 2018 18:32 )    Color: Yellow / Appearance: Clear / S.017 / pH: x  Gluc: x / Ketone: Negative  / Bili: Negative / Urobili: 1 mg/dL   Blood: x / Protein: Trace / Nitrite: Negative   Leuk Esterase: Large / RBC: x / WBC 11-25 /HPF   Sq Epi: x / Non Sq Epi: OCC /HPF / Bacteria: x      RADIOLOGY, EKG & ADDITIONAL TESTS: Reviewed.     RADIOLOGY & ADDITIONAL TESTS:    Imaging Personally Reviewed:  [ ] YES  [ ] NO  MedsMEDICATIONS  (STANDING):  apixaban 5 milliGRAM(s) Oral every 12 hours  gabapentin 100 milliGRAM(s) Oral at bedtime  metoprolol tartrate 100 milliGRAM(s) Oral two times a day  pantoprazole    Tablet 40 milliGRAM(s) Oral before breakfast  sertraline 50 milliGRAM(s) Oral daily    MEDICATIONS  (PRN):  clonazePAM Tablet 1 milliGRAM(s) Oral three times a day PRN Anxiety CC: Melena    Subjective: No events overnight. Pt endorsing cold sensation and pain in b/l LE bilaterally from knees down. Per pt, this is new but per nursing, pt's son has stated that this is a chronic problem for her. Pt denies F/C, N/V, CP, SOB. No episodes of melena o/n. Tolerating regular diet.      Objective:  T(C): 36.9 (18 @ 04:11), Max: 37.1 (18 @ 14:14)  HR: 63 (18 @ 04:11) (63 - 74)  BP: 160/74 (18 @ 04:11) (116/65 - 160/74)  RR: 18 (18 @ 04:11) (18 - 18)  SpO2: 95% (18 @ 04:11) (95% - 100%)      Consultant(s) Notes Reviewed:  [x ] YES  [ ] NO  Care Discussed with Consultants/Other Providers [ x] YES  [ ] NO    PHYSICAL EXAM:  GENERAL: NAD, pleasant elderly woman laying in bed  HEAD:  Atraumatic, Normocephalic  EYES: EOMI, conjunctiva and sclera clear  CHEST/LUNG: CTABL; No rales, rhonchi, wheezing, or rubs  HEART: +3/6 crescendo/decrescendo murmur radiating to carotids, RRR  ABDOMEN: Soft, Nontender, Nondistended, no CVA tenderness  SKIN: No rashes or lesions  NERVOUS SYSTEM:  Alert & Oriented X3, no focal deficit    LABS:                        10.5   8.4   )-----------( 270      ( 03 Sep 2018 07:54 )             33.8         138  |  100  |  10  ----------------------------<  87  3.6   |  27  |  0.72    Ca    9.0      03 Sep 2018 07:54  Phos  3.2       Mg     1.9           PT/INR - ( 03 Sep 2018 07:54 )   PT: 13.3 sec;   INR: 1.22 ratio         PTT - ( 03 Sep 2018 07:54 )  PTT:27.6 sec  Urinalysis Basic - ( 02 Sep 2018 18:32 )    Color: Yellow / Appearance: Clear / S.017 / pH: x  Gluc: x / Ketone: Negative  / Bili: Negative / Urobili: 1 mg/dL   Blood: x / Protein: Trace / Nitrite: Negative   Leuk Esterase: Large / RBC: x / WBC 11-25 /HPF   Sq Epi: x / Non Sq Epi: OCC /HPF / Bacteria: x      RADIOLOGY, EKG & ADDITIONAL TESTS: Reviewed.     RADIOLOGY & ADDITIONAL TESTS:    Imaging Personally Reviewed:  [ ] YES  [ ] NO  MedsMEDICATIONS  (STANDING):  apixaban 5 milliGRAM(s) Oral every 12 hours  gabapentin 100 milliGRAM(s) Oral at bedtime  metoprolol tartrate 100 milliGRAM(s) Oral two times a day  pantoprazole    Tablet 40 milliGRAM(s) Oral before breakfast  sertraline 50 milliGRAM(s) Oral daily    MEDICATIONS  (PRN):  clonazePAM Tablet 1 milliGRAM(s) Oral three times a day PRN Anxiety

## 2018-09-04 PROCEDURE — 99232 SBSQ HOSP IP/OBS MODERATE 35: CPT | Mod: GC

## 2018-09-04 RX ORDER — SERTRALINE 25 MG/1
1 TABLET, FILM COATED ORAL
Qty: 0 | Refills: 0 | DISCHARGE
Start: 2018-09-04

## 2018-09-04 RX ORDER — METOPROLOL TARTRATE 50 MG
1 TABLET ORAL
Qty: 0 | Refills: 0 | COMMUNITY
Start: 2018-09-04

## 2018-09-04 RX ORDER — PANTOPRAZOLE SODIUM 20 MG/1
1 TABLET, DELAYED RELEASE ORAL
Qty: 0 | Refills: 0 | DISCHARGE
Start: 2018-09-04

## 2018-09-04 RX ORDER — PANTOPRAZOLE SODIUM 20 MG/1
1 TABLET, DELAYED RELEASE ORAL
Qty: 0 | Refills: 0 | COMMUNITY

## 2018-09-04 RX ORDER — METOPROLOL TARTRATE 50 MG
1 TABLET ORAL
Qty: 0 | Refills: 0 | COMMUNITY

## 2018-09-04 RX ORDER — CLONAZEPAM 1 MG
1 TABLET ORAL
Qty: 0 | Refills: 0 | COMMUNITY
Start: 2018-09-04

## 2018-09-04 RX ORDER — CLONAZEPAM 1 MG
1 TABLET ORAL
Qty: 0 | Refills: 0 | COMMUNITY

## 2018-09-04 RX ADMIN — Medication 100 MILLIGRAM(S): at 12:30

## 2018-09-04 RX ADMIN — SERTRALINE 50 MILLIGRAM(S): 25 TABLET, FILM COATED ORAL at 12:30

## 2018-09-04 RX ADMIN — APIXABAN 5 MILLIGRAM(S): 2.5 TABLET, FILM COATED ORAL at 06:33

## 2018-09-04 RX ADMIN — Medication 1 MILLIGRAM(S): at 22:03

## 2018-09-04 RX ADMIN — Medication 100 MILLIGRAM(S): at 06:33

## 2018-09-04 RX ADMIN — Medication 100 MILLIGRAM(S): at 18:56

## 2018-09-04 RX ADMIN — PANTOPRAZOLE SODIUM 40 MILLIGRAM(S): 20 TABLET, DELAYED RELEASE ORAL at 06:33

## 2018-09-04 RX ADMIN — GABAPENTIN 100 MILLIGRAM(S): 400 CAPSULE ORAL at 22:03

## 2018-09-04 RX ADMIN — Medication 100 MILLIGRAM(S): at 22:03

## 2018-09-04 RX ADMIN — APIXABAN 5 MILLIGRAM(S): 2.5 TABLET, FILM COATED ORAL at 18:56

## 2018-09-04 NOTE — PROGRESS NOTE ADULT - ASSESSMENT
Impression:  1)Acute blood loss anemia- secondary to hyperplastic appearing large gastric polyp s/p EGD with resection  2)Atrial Fibrillation on a/c    Recommendations:  -f/u pathology from EGD  -c/w PPI PO daily  -c/w anticoagulation  -c/w diet as tolerated  -f/u with GI as outpatient ([phone])       Please call with questions  Tamia Haywood  GI Fellow  Pager: [phone]

## 2018-09-04 NOTE — PROGRESS NOTE ADULT - PROBLEM SELECTOR PLAN 4
BP 140s systolic.  -will continue to hold home furosemide for now as pt's BP are well-controlled and pt is euvolemic  - on metoprolol for afib

## 2018-09-04 NOTE — PROGRESS NOTE ADULT - SUBJECTIVE AND OBJECTIVE BOX
Chief Complaint:  Patient is a 86y old  Female who presents with a chief complaint of dark stools, fatigue (02 Sep 2018 22:07)      Interval Events:   -no complaints  -no BM's  -no nausea, vomiting or abdominal pain  -restarted on a/c     Hospital Medications:  apixaban 5 milliGRAM(s) Oral every 12 hours  clonazePAM Tablet 1 milliGRAM(s) Oral three times a day PRN  docusate sodium 100 milliGRAM(s) Oral three times a day  gabapentin 100 milliGRAM(s) Oral at bedtime  metoprolol tartrate 100 milliGRAM(s) Oral two times a day  pantoprazole    Tablet 40 milliGRAM(s) Oral before breakfast  sertraline 50 milliGRAM(s) Oral daily        PHYSICAL EXAM:   Vital Signs:  Vital Signs Last 24 Hrs  T(C): 36.9 (04 Sep 2018 05:06), Max: 37.1 (03 Sep 2018 15:01)  T(F): 98.4 (04 Sep 2018 05:06), Max: 98.7 (03 Sep 2018 15:01)  HR: 62 (04 Sep 2018 05:06) (59 - 66)  BP: 147/87 (04 Sep 2018 05:06) (139/64 - 149/75)  BP(mean): --  RR: 18 (04 Sep 2018 05:06) (18 - 18)  SpO2: 96% (04 Sep 2018 05:06) (96% - 98%)  Daily     Daily     GENERAL:  Appears stated age,  no distress  HEENT:   sclera -anicteric  CHEST:   no increased effort, breath sounds clear  HEART:  Regular rhythm, S1, S2,   ABDOMEN:  Soft, non-tender, non-distended, normoactive bowel sounds,    EXTEREMITIES:  no cyanosis,clubbing or edema  SKIN:  No rash/no jaundice   NEURO:  Alert, oriented    LABS:                        10.5   8.4   )-----------( 270      ( 03 Sep 2018 07:54 )             33.8   Hemoglobin: 10.5 g/dL (18 @ 07:54)  Hemoglobin: 10.3 g/dL (18 @ 07:07)  Hemoglobin: 9.8 g/dL (18 @ 20:15)  Hemoglobin: 10.7 g/dL (18 @ 05:48)  Hemoglobin: 10.9 g/dL (18 @ 21:09)          138  |  100  |  10  ----------------------------<  87  3.6   |  27  |  0.72    Ca    9.0      03 Sep 2018 07:54        PT/INR - ( 03 Sep 2018 07:54 )   PT: 13.3 sec;   INR: 1.22 ratio         PTT - ( 03 Sep 2018 07:54 )  PTT:27.6 sec  Urinalysis Basic - ( 02 Sep 2018 18:32 )    Color: Yellow / Appearance: Clear / S.017 / pH: x  Gluc: x / Ketone: Negative  / Bili: Negative / Urobili: 1 mg/dL   Blood: x / Protein: Trace / Nitrite: Negative   Leuk Esterase: Large / RBC: x / WBC 11-25 /HPF   Sq Epi: x / Non Sq Epi: OCC /HPF / Bacteria: x                              10.5   8.4   )-----------( 270      ( 03 Sep 2018 07:54 )             33.8                         10.3   8.7   )-----------( 261      ( 02 Sep 2018 07:07 )             31.9                         9.8    10.3  )-----------( 246      ( 01 Sep 2018 20:15 )             29.6     Imaging:  < from: Upper Endoscopy (18 @ 16:57) >  Impression:          - Normal upper third of esophagus and middle third of esophagus.                       - Gastroesophageal junction polyp with oozing was found. Injected, resected                        and retrieved. Clips (MR conditional) were placed.                       - A few non-bleeding gastric polyps.                - Normal examined duodenum.  Recommendation:      - Return patient to [facility] for [facility] care.                       - NPO today.                       - Continue continuous PPI infusion.                       - Monitor blood counts.                       - Await pathology results.                       - Hold anticoagulation for at least 48 hours.    < end of copied text >

## 2018-09-04 NOTE — PROGRESS NOTE ADULT - SUBJECTIVE AND OBJECTIVE BOX
DEANDRE GREWAL  86y  Female    Subjective: No events o/n. Pt denies CP, SOB, abdominal pain. Endorses feeling fatigued and having b/l LE pain.       Objective:  T(C): 36.9 (09-04-18 @ 05:06), Max: 37.1 (09-03-18 @ 15:01)  HR: 62 (09-04-18 @ 05:06) (59 - 66)  BP: 147/87 (09-04-18 @ 05:06) (139/64 - 149/75)  RR: 18 (09-04-18 @ 05:06) (18 - 18)  SpO2: 96% (09-04-18 @ 05:06) (96% - 98%)    PHYSICAL EXAM:  GENERAL: NAD, woman sitting in chair  HEAD: Atraumatic, Normocephalic  EYES: EOMI, conjunctiva and sclera clear  CHEST/LUNG: CTABL; No rales, rhonchi, wheezing, or rubs  HEART: +3/6 crescendo/decrescendo murmur radiating to carotids, RRR  ABDOMEN: Soft, Nontender, Nondistended, no CVA tenderness  SKIN: No rashes or lesions  NERVOUS SYSTEM: Alert & Oriented X3, no focal deficit    Consultant(s) Notes Reviewed:  [x ] YES  [ ] NO  Care Discussed with Consultants/Other Providers [ x] YES  [ ] NO    LABS:          RADIOLOGY & ADDITIONAL TESTS:    Imaging Personally Reviewed:  [ ] YES  [ ] NO  MedsMEDICATIONS  (STANDING):  apixaban 5 milliGRAM(s) Oral every 12 hours  docusate sodium 100 milliGRAM(s) Oral three times a day  gabapentin 100 milliGRAM(s) Oral at bedtime  metoprolol tartrate 100 milliGRAM(s) Oral two times a day  pantoprazole    Tablet 40 milliGRAM(s) Oral before breakfast  sertraline 50 milliGRAM(s) Oral daily    MEDICATIONS  (PRN):  clonazePAM Tablet 1 milliGRAM(s) Oral three times a day PRN Anxiety DEANDRE GREWAL  86y  Female    Subjective: No events o/n. Pt denies CP, SOB, abdominal pain. Endorses feeling fatigued and having b/l LE pain.     Objective:  T(C): 36.9 (09-04-18 @ 05:06), Max: 37.1 (09-03-18 @ 15:01)  HR: 62 (09-04-18 @ 05:06) (59 - 66)  BP: 147/87 (09-04-18 @ 05:06) (139/64 - 149/75)  RR: 18 (09-04-18 @ 05:06) (18 - 18)  SpO2: 96% (09-04-18 @ 05:06) (96% - 98%)    PHYSICAL EXAM:  GENERAL: NAD, woman sitting in chair.   HEAD: Atraumatic, Normocephalic  EYES: EOMI, conjunctiva and sclera clear  CHEST/LUNG: CTABL; No rales, rhonchi, wheezing, or rubs  HEART: +3/6 crescendo/decrescendo murmur radiating to carotids, RRR  ABDOMEN: Soft, Nontender, Nondistended, no CVA tenderness  SKIN: No rashes or lesions  NERVOUS SYSTEM: Alert & Oriented X3, no focal deficit. 4+/5 strength proximally and distally in UE and LE b/l.     Consultant(s) Notes Reviewed:  [x ] YES  [ ] NO  Care Discussed with Consultants/Other Providers [ x] YES  [ ] NO    LABS:  reviewed.    RADIOLOGY & ADDITIONAL TESTS:    Imaging Personally Reviewed: yes    MedsMEDICATIONS  (STANDING):  apixaban 5 milliGRAM(s) Oral every 12 hours  docusate sodium 100 milliGRAM(s) Oral three times a day  gabapentin 100 milliGRAM(s) Oral at bedtime  metoprolol tartrate 100 milliGRAM(s) Oral two times a day  pantoprazole    Tablet 40 milliGRAM(s) Oral before breakfast  sertraline 50 milliGRAM(s) Oral daily    MEDICATIONS  (PRN):  clonazePAM Tablet 1 milliGRAM(s) Oral three times a day PRN Anxiety

## 2018-09-04 NOTE — PROGRESS NOTE ADULT - PROBLEM SELECTOR PLAN 1
2/2 bleeding GE polyp, now s/p clip and 1U pRBC. Hgb improved to 10.5 today. No stigmata of ongoing bleed.  - continue to monitor cbc daily  - maintain type and screen q72hr   - Eliquis resumed - will monitor CBC, INR    -switched to PO protonix   - advanced to regular diet  - GI recs appreciated 2/2 bleeding GE polyp, now s/p clip and 1U pRBC. Hgb improved to 10.5. No stigmata of ongoing bleed.  - continue to monitor cbc daily  - maintain type and screen q72hr   - Eliquis resumed - will monitor CBC, INR    -switched to PO protonix   - advanced to regular diet  - GI recs appreciated

## 2018-09-05 VITALS
RESPIRATION RATE: 18 BRPM | SYSTOLIC BLOOD PRESSURE: 134 MMHG | TEMPERATURE: 98 F | HEART RATE: 64 BPM | OXYGEN SATURATION: 98 % | DIASTOLIC BLOOD PRESSURE: 74 MMHG

## 2018-09-05 PROCEDURE — 85014 HEMATOCRIT: CPT

## 2018-09-05 PROCEDURE — 85027 COMPLETE CBC AUTOMATED: CPT

## 2018-09-05 PROCEDURE — 82435 ASSAY OF BLOOD CHLORIDE: CPT

## 2018-09-05 PROCEDURE — 85610 PROTHROMBIN TIME: CPT

## 2018-09-05 PROCEDURE — 84132 ASSAY OF SERUM POTASSIUM: CPT

## 2018-09-05 PROCEDURE — 80053 COMPREHEN METABOLIC PANEL: CPT

## 2018-09-05 PROCEDURE — 86850 RBC ANTIBODY SCREEN: CPT

## 2018-09-05 PROCEDURE — 99285 EMERGENCY DEPT VISIT HI MDM: CPT | Mod: 25

## 2018-09-05 PROCEDURE — 85730 THROMBOPLASTIN TIME PARTIAL: CPT

## 2018-09-05 PROCEDURE — 84484 ASSAY OF TROPONIN QUANT: CPT

## 2018-09-05 PROCEDURE — 97110 THERAPEUTIC EXERCISES: CPT

## 2018-09-05 PROCEDURE — 82553 CREATINE MB FRACTION: CPT

## 2018-09-05 PROCEDURE — 97116 GAIT TRAINING THERAPY: CPT

## 2018-09-05 PROCEDURE — 81001 URINALYSIS AUTO W/SCOPE: CPT

## 2018-09-05 PROCEDURE — 82803 BLOOD GASES ANY COMBINATION: CPT

## 2018-09-05 PROCEDURE — 86923 COMPATIBILITY TEST ELECTRIC: CPT

## 2018-09-05 PROCEDURE — 88305 TISSUE EXAM BY PATHOLOGIST: CPT

## 2018-09-05 PROCEDURE — 36430 TRANSFUSION BLD/BLD COMPNT: CPT

## 2018-09-05 PROCEDURE — 84295 ASSAY OF SERUM SODIUM: CPT

## 2018-09-05 PROCEDURE — 86901 BLOOD TYPING SEROLOGIC RH(D): CPT

## 2018-09-05 PROCEDURE — 83735 ASSAY OF MAGNESIUM: CPT

## 2018-09-05 PROCEDURE — P9040: CPT

## 2018-09-05 PROCEDURE — 82330 ASSAY OF CALCIUM: CPT

## 2018-09-05 PROCEDURE — 86900 BLOOD TYPING SEROLOGIC ABO: CPT

## 2018-09-05 PROCEDURE — 99239 HOSP IP/OBS DSCHRG MGMT >30: CPT

## 2018-09-05 PROCEDURE — 82947 ASSAY GLUCOSE BLOOD QUANT: CPT

## 2018-09-05 PROCEDURE — 83605 ASSAY OF LACTIC ACID: CPT

## 2018-09-05 PROCEDURE — 82550 ASSAY OF CK (CPK): CPT

## 2018-09-05 PROCEDURE — C1889: CPT

## 2018-09-05 PROCEDURE — 84100 ASSAY OF PHOSPHORUS: CPT

## 2018-09-05 PROCEDURE — P9016: CPT

## 2018-09-05 PROCEDURE — 80048 BASIC METABOLIC PNL TOTAL CA: CPT

## 2018-09-05 PROCEDURE — 97161 PT EVAL LOW COMPLEX 20 MIN: CPT

## 2018-09-05 PROCEDURE — 96374 THER/PROPH/DIAG INJ IV PUSH: CPT

## 2018-09-05 PROCEDURE — 82272 OCCULT BLD FECES 1-3 TESTS: CPT

## 2018-09-05 RX ORDER — POLYETHYLENE GLYCOL 3350 17 G/17G
17 POWDER, FOR SOLUTION ORAL
Qty: 0 | Refills: 0 | DISCHARGE
Start: 2018-09-05

## 2018-09-05 RX ORDER — POLYETHYLENE GLYCOL 3350 17 G/17G
17 POWDER, FOR SOLUTION ORAL ONCE
Qty: 0 | Refills: 0 | Status: COMPLETED | OUTPATIENT
Start: 2018-09-05 | End: 2018-09-05

## 2018-09-05 RX ORDER — DOCUSATE SODIUM 100 MG
1 CAPSULE ORAL
Qty: 0 | Refills: 0 | COMMUNITY

## 2018-09-05 RX ADMIN — Medication 100 MILLIGRAM(S): at 05:50

## 2018-09-05 RX ADMIN — PANTOPRAZOLE SODIUM 40 MILLIGRAM(S): 20 TABLET, DELAYED RELEASE ORAL at 06:40

## 2018-09-05 RX ADMIN — APIXABAN 5 MILLIGRAM(S): 2.5 TABLET, FILM COATED ORAL at 05:50

## 2018-09-05 RX ADMIN — Medication 100 MILLIGRAM(S): at 12:40

## 2018-09-05 RX ADMIN — SERTRALINE 50 MILLIGRAM(S): 25 TABLET, FILM COATED ORAL at 12:39

## 2018-09-05 RX ADMIN — APIXABAN 5 MILLIGRAM(S): 2.5 TABLET, FILM COATED ORAL at 17:01

## 2018-09-05 RX ADMIN — Medication 100 MILLIGRAM(S): at 17:00

## 2018-09-05 NOTE — PROGRESS NOTE ADULT - PROVIDER SPECIALTY LIST ADULT
Gastroenterology
Internal Medicine

## 2018-09-05 NOTE — PROGRESS NOTE ADULT - ATTENDING COMMENTS
Agree with above. No further bleeding even with resumption of anticoagulation. Continue PPI, diet as tolerated.
Agree.  Seen and examined this AM, sleepy this AM w/ poor sleep last evening.   Easily arousable, d/w patient w/ son as  per his request. Patient also on clonazepam + gabapentin, if sleepiness persists would adjust meds as needed; per son though this is likely just secondary to poor sleep.   Non focal neuro exam.   HD stable w/ no blood in stools, stable hemoglobin.   Can f/u on d/c w/ GI and cardiology.  To [facility] pending acceptance.   45 minutes spent coordinating d/c, see d/c sum for details.
AGree.  Miralax for constipation please.   She looks great today, otherwise asymptomatic (other than constipation).   add back amiodarone.   adjust metoprolol to 150 succ per cardiology recent reccs.  add back home lasix prior to d/c w/ HD stability + HFpEF.  D/w son at length, await dispo to [facility]
Patient seen and examined. H/H remains stable, no further melena. Dispo is to [facility], pending placement.     Plan d/w patient and her son at bedside, d/w team resident (Dr. Escobar).    Angel Freitas M.D.  Hospitalist  Pager: [phone]
Patient seen and examined. She is comfortable and in NAD this AM. H/H stable s/p pRBC and EGD w/ intervention yesterday. GI f/u appreciated, c/w protonix gtt, holding A/C at least 48 hours, monitoring CBC. PT c/s placed.     Plan d/w patient and her son at bedside, d/w GI (Dr. Acosta) and team resident (Dr. Colon).    Angel Freitas M.D.  Hospitalist  Pager: [phone]
Patient seen and examined. H/H is stable, no further bleeding. GI f/u appreciated, diet advanced, protonix gtt transitioned to PO, and patient restarted on A/C. PT c/s appreciated, dispo to [facility].    Plan d/w patient and her son at bedside. d/w team resident (Dr. Solomon).    Angel Freitas M.D.  Hospitalist  Pager: [phone]

## 2018-09-05 NOTE — PROGRESS NOTE ADULT - PROBLEM SELECTOR PLAN 3
- [id number] score of 4  - c/w metoprolol for rate control  - resumed eliquis yesterday - [id number] score of 4  - c/w metoprolol for rate control  - Eliquis - [id number] score of 4  - c/w metoprolol for rate control  - add back amiodarone  - Eliquis

## 2018-09-05 NOTE — PROGRESS NOTE ADULT - PROBLEM SELECTOR PLAN 1
2/2 bleeding GE polyp, now s/p clip and 1U pRBC. Hgb improved to 10.5. No stigmata of ongoing bleed.  - continue to monitor cbc daily  - maintain type and screen q72hr   - Eliquis resumed - will monitor CBC, INR    -switched to PO protonix   - advanced to regular diet  - GI recs appreciated 2/2 bleeding GE polyp, now s/p clip and 1U pRBC. Hgb improved. No stigmata of ongoing bleed.  - continue to monitor cbc daily  - maintain type and screen q72hr   - Eliquis resumed - will monitor CBC, INR    -switched to PO protonix   - advanced to regular diet  - GI recs appreciated

## 2018-09-05 NOTE — PROGRESS NOTE ADULT - SUBJECTIVE AND OBJECTIVE BOX
DEANDRE GREWAL  86y  Female    Subjective: No events overnight. Pt denies CP, SOB, N/V, abdominal pain. Continues to endorse fatigue, chronic LE pain.      Objective:  T(C): 36.6 (09-05-18 @ 06:55), Max: 37.1 (09-04-18 @ 20:24)  HR: 64 (09-05-18 @ 06:55) (60 - 65)  BP: 159/73 (09-05-18 @ 06:55) (112/58 - 159/73)  RR: 18 (09-05-18 @ 06:55) (18 - 18)  SpO2: 97% (09-05-18 @ 06:55) (97% - 97%)    PHYSICAL EXAM:  GENERAL: NAD, well-groomed, well-developed  HEAD:  Atraumatic, Normocephalic  EYES: EOMI, PERRLA, conjunctiva and sclera clear  ENMT: No tonsillar erythema, exudates, or enlargement; Moist mucous membranes, Good dentition, No lesions  NECK: Supple, No JVD, Normal thyroid  NERVOUS SYSTEM:  Alert & Oriented X3, Good concentration; Motor Strength 5/5 B/L upper and lower extremities; DTRs 2+ intact and symmetric  CHEST/LUNG: Clear to percussion bilaterally; No rales, rhonchi, wheezing, or rubs  HEART: Regular rate and rhythm; No murmurs, rubs, or gallops  ABDOMEN: Soft, Nontender, Nondistended; Bowel sounds present  EXTREMITIES:  2+ Peripheral Pulses, No clubbing, cyanosis, or edema  LYMPH: No lymphadenopathy noted  SKIN: No rashes or lesions    Consultant(s) Notes Reviewed:  [x ] YES  [ ] NO  Care Discussed with Consultants/Other Providers [ x] YES  [ ] NO    LABS:          RADIOLOGY & ADDITIONAL TESTS:    Imaging Personally Reviewed:  [ ] YES  [ ] NO  MedsMEDICATIONS  (STANDING):  apixaban 5 milliGRAM(s) Oral every 12 hours  docusate sodium 100 milliGRAM(s) Oral three times a day  gabapentin 100 milliGRAM(s) Oral at bedtime  metoprolol tartrate 100 milliGRAM(s) Oral two times a day  pantoprazole    Tablet 40 milliGRAM(s) Oral before breakfast  sertraline 50 milliGRAM(s) Oral daily    MEDICATIONS  (PRN):  clonazePAM Tablet 1 milliGRAM(s) Oral three times a day PRN Anxiety

## 2018-09-18 ENCOUNTER — NON-APPOINTMENT (OUTPATIENT)
Age: 83
End: 2018-09-18

## 2018-09-18 ENCOUNTER — APPOINTMENT (OUTPATIENT)
Dept: CARDIOLOGY | Facility: CLINIC | Age: 83
End: 2018-09-18
Payer: MEDICARE

## 2018-09-18 VITALS
OXYGEN SATURATION: 98 % | HEART RATE: 60 BPM | DIASTOLIC BLOOD PRESSURE: 76 MMHG | SYSTOLIC BLOOD PRESSURE: 144 MMHG | RESPIRATION RATE: 12 BRPM | HEIGHT: 62 IN | BODY MASS INDEX: 26.5 KG/M2 | WEIGHT: 144 LBS

## 2018-09-18 VITALS — DIASTOLIC BLOOD PRESSURE: 72 MMHG | HEART RATE: 63 BPM | SYSTOLIC BLOOD PRESSURE: 120 MMHG

## 2018-09-18 PROCEDURE — 99215 OFFICE O/P EST HI 40 MIN: CPT | Mod: 25

## 2018-09-18 PROCEDURE — 93000 ELECTROCARDIOGRAM COMPLETE: CPT

## 2018-09-27 ENCOUNTER — APPOINTMENT (OUTPATIENT)
Dept: INTERNAL MEDICINE | Facility: CLINIC | Age: 83
End: 2018-09-27
Payer: MEDICARE

## 2018-09-27 VITALS
HEIGHT: 62 IN | WEIGHT: 143.5 LBS | OXYGEN SATURATION: 98 % | DIASTOLIC BLOOD PRESSURE: 62 MMHG | BODY MASS INDEX: 26.41 KG/M2 | SYSTOLIC BLOOD PRESSURE: 122 MMHG | HEART RATE: 56 BPM | TEMPERATURE: 97.7 F

## 2018-09-27 DIAGNOSIS — Z87.19 PERSONAL HISTORY OF OTHER DISEASES OF THE DIGESTIVE SYSTEM: ICD-10-CM

## 2018-09-27 LAB
BILIRUB UR QL STRIP: NEGATIVE
CLARITY UR: CLEAR
COLLECTION METHOD: NORMAL
GLUCOSE UR-MCNC: NEGATIVE
HCG UR QL: 0.2 EU/DL
HGB UR QL STRIP.AUTO: NORMAL
KETONES UR-MCNC: NEGATIVE
LEUKOCYTE ESTERASE UR QL STRIP: NORMAL
NITRITE UR QL STRIP: NEGATIVE
PH UR STRIP: 6.5
SP GR UR STRIP: 1.01

## 2018-09-27 PROCEDURE — 90662 IIV NO PRSV INCREASED AG IM: CPT

## 2018-09-27 PROCEDURE — 81003 URINALYSIS AUTO W/O SCOPE: CPT | Mod: QW

## 2018-09-27 PROCEDURE — G0008: CPT

## 2018-09-27 PROCEDURE — 99215 OFFICE O/P EST HI 40 MIN: CPT | Mod: 25

## 2018-09-27 PROCEDURE — 36415 COLL VENOUS BLD VENIPUNCTURE: CPT

## 2018-09-27 RX ORDER — MECLIZINE HYDROCHLORIDE 12.5 MG/1
12.5 TABLET ORAL
Qty: 90 | Refills: 3 | Status: DISCONTINUED | COMMUNITY
Start: 2017-12-13 | End: 2018-09-27

## 2018-09-27 NOTE — HISTORY OF PRESENT ILLNESS
[FreeTextEntry1] : Patient presents for chronic disease management [de-identified] : Patient desires to be reached at [phone]\par Patient states she was hospitalized in the summer for GI bleed. Endoscopy and colonoscopy showed polyp in the abdomen. Patient denies vomiting blood, black tarry stools or any blood in the stool. Patient states she does not feel lightheaded or had any syncopal episodes.\par States she feels increased urgency to urinate over the last few weeks. Denies any blood in the urine or fevers or chills. Patient states sometimes associated with burning sensation. \par

## 2018-09-27 NOTE — ASSESSMENT
[FreeTextEntry1] : 1) Increased urgency:Possibly secondary to medications, given history of hospitalization will treat empirically with macrobid. Urine cx sent. 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 Check TSH and Vitamin B12\par 3)H/o of GI bleed: Patient denies any melena or vomiting blood. Check CBC. Patient refused rectal\par 4)Hypovitaminosis D: Check Vitamin d levels\par 5)Mild anemia: Check CBC\par 6)Paroxysmal afib: Rate controlled and on anticoagulation\par 7) CHF: Patient denies shortness of breath and no signs of fluid overload.\par 8) Mixed HLD: check lipid panel\par 9) Flu vaccine administration: Flu vaccine administered at current vist

## 2018-09-28 ENCOUNTER — LABORATORY RESULT (OUTPATIENT)
Age: 83
End: 2018-09-28

## 2018-09-30 ENCOUNTER — MESSAGE (OUTPATIENT)
Age: 83
End: 2018-09-30

## 2018-09-30 ENCOUNTER — RESULT CHARGE (OUTPATIENT)
Age: 83
End: 2018-09-30

## 2018-09-30 LAB
25(OH)D3 SERPL-MCNC: 33.6 NG/ML
ALBUMIN SERPL ELPH-MCNC: 4.3 G/DL
ALP BLD-CCNC: 86 U/L
ALT SERPL-CCNC: 16 U/L
ANION GAP SERPL CALC-SCNC: 12 MMOL/L
APPEARANCE: CLEAR
AST SERPL-CCNC: 24 U/L
BACTERIA: NEGATIVE
BASOPHILS # BLD AUTO: 0.03 K/UL
BASOPHILS NFR BLD AUTO: 0.5 %
BILIRUB SERPL-MCNC: 0.2 MG/DL
BILIRUBIN URINE: NEGATIVE
BLOOD URINE: NEGATIVE
BUN SERPL-MCNC: 15 MG/DL
CALCIUM SERPL-MCNC: 9.2 MG/DL
CHLORIDE SERPL-SCNC: 99 MMOL/L
CHOLEST SERPL-MCNC: 194 MG/DL
CHOLEST/HDLC SERPL: 3.8 RATIO
CO2 SERPL-SCNC: 30 MMOL/L
COLOR: YELLOW
CREAT SERPL-MCNC: 1.01 MG/DL
EOSINOPHIL # BLD AUTO: 0.08 K/UL
EOSINOPHIL NFR BLD AUTO: 1.2 %
GLUCOSE QUALITATIVE U: NEGATIVE MG/DL
GLUCOSE SERPL-MCNC: 91 MG/DL
HBA1C MFR BLD HPLC: 5.9 %
HCT VFR BLD CALC: 36.5 %
HDLC SERPL-MCNC: 51 MG/DL
HGB BLD-MCNC: 10.7 G/DL
IMM GRANULOCYTES NFR BLD AUTO: 0.3 %
KETONES URINE: NEGATIVE
LDLC SERPL CALC-MCNC: 119 MG/DL
LEUKOCYTE ESTERASE URINE: ABNORMAL
LYMPHOCYTES # BLD AUTO: 1.38 K/UL
LYMPHOCYTES NFR BLD AUTO: 20.9 %
MAN DIFF?: NORMAL
MCHC RBC-ENTMCNC: 25.5 PG
MCHC RBC-ENTMCNC: 29.3 GM/DL
MCV RBC AUTO: 86.9 FL
MICROSCOPIC-UA: NORMAL
MONOCYTES # BLD AUTO: 0.74 K/UL
MONOCYTES NFR BLD AUTO: 11.2 %
NEUTROPHILS # BLD AUTO: 4.34 K/UL
NEUTROPHILS NFR BLD AUTO: 65.9 %
NITRITE URINE: NEGATIVE
PH URINE: 6.5
PLATELET # BLD AUTO: 312 K/UL
POTASSIUM SERPL-SCNC: 4.4 MMOL/L
PROT SERPL-MCNC: 7 G/DL
PROTEIN URINE: NEGATIVE MG/DL
RBC # BLD: 4.2 M/UL
RBC # FLD: 17.2 %
RED BLOOD CELLS URINE: 0 /HPF
SODIUM SERPL-SCNC: 141 MMOL/L
SPECIFIC GRAVITY URINE: 1.01
SQUAMOUS EPITHELIAL CELLS: 3 /HPF
TRIGL SERPL-MCNC: 120 MG/DL
TSH SERPL-ACNC: 1.07 UIU/ML
UROBILINOGEN URINE: NEGATIVE MG/DL
VIT B12 SERPL-MCNC: 1199 PG/ML
WBC # FLD AUTO: 6.59 K/UL
WHITE BLOOD CELLS URINE: 0 /HPF

## 2018-10-01 ENCOUNTER — OTHER (OUTPATIENT)
Age: 83
End: 2018-10-01

## 2018-10-15 ENCOUNTER — RX RENEWAL (OUTPATIENT)
Age: 83
End: 2018-10-15

## 2018-10-16 ENCOUNTER — RX RENEWAL (OUTPATIENT)
Age: 83
End: 2018-10-16

## 2018-10-22 ENCOUNTER — RX RENEWAL (OUTPATIENT)
Age: 83
End: 2018-10-22

## 2018-11-06 ENCOUNTER — APPOINTMENT (OUTPATIENT)
Dept: CARDIOLOGY | Facility: CLINIC | Age: 83
End: 2018-11-06
Payer: MEDICARE

## 2018-11-06 ENCOUNTER — NON-APPOINTMENT (OUTPATIENT)
Age: 83
End: 2018-11-06

## 2018-11-06 VITALS
HEIGHT: 62 IN | HEART RATE: 64 BPM | OXYGEN SATURATION: 95 % | WEIGHT: 146 LBS | DIASTOLIC BLOOD PRESSURE: 81 MMHG | RESPIRATION RATE: 12 BRPM | SYSTOLIC BLOOD PRESSURE: 151 MMHG | TEMPERATURE: 98.6 F | BODY MASS INDEX: 26.87 KG/M2

## 2018-11-06 PROCEDURE — 93000 ELECTROCARDIOGRAM COMPLETE: CPT

## 2018-11-06 PROCEDURE — 99215 OFFICE O/P EST HI 40 MIN: CPT | Mod: 25

## 2018-11-06 NOTE — DISCUSSION/SUMMARY
[FreeTextEntry1] : IMPRESSION: Ms. GREWAL is a 86 year-old woman with a history of HTN, arthritis, moderate aortic stenosis, mitral regurgitation, hyperlipidemia, anxiety, and atrial fibrillation status post cardioversion on 5/4/2018 who presents today for follow up of atrial fibrillation and HTN.\par \par PLAN:\par 1. She is currently in sinus rhythm and is feeling well. I have decreased her Amiodarone to 100mg daily to see if she can tolerate a lower dose and remain in sinus rhythm. She will also continue on metoprolol 100 mg in the AM and 50mg in the evening for rate control. She will continue on Eliquis 5mg twice daily for systemic anticoagulation of her atrial fibrillation. \par 2.  There are no signs of heart failure on exam today, however, her blood pressure is elevated. Given that her blood pressure is high and she does not want to add any new medications, I have changed her Lasix to 20mg twice daily.  \par 3. She should have a repeat echocardiogram in approximately 1-2 months to followup her aortic stenosis and mitral regurgitation.\par 4. We will need to follow her TSH and LFTs while on Amiodarone. She will also need to see Pulmonary as well as have annual eye exams.\par 5. She will follow up with me in 4 weeks to follow up her blood pressure and to follow up her atrial fibrillation.

## 2018-11-06 NOTE — PHYSICAL EXAM
[General Appearance - Well Developed] : well developed [Normal Appearance] : normal appearance [Well Groomed] : well groomed [General Appearance - Well Nourished] : well nourished [No Deformities] : no deformities [General Appearance - In No Acute Distress] : no acute distress [Normal Conjunctiva] : the conjunctiva exhibited no abnormalities [Normal Oral Mucosa] : normal oral mucosa [No Oral Pallor] : no oral pallor [No Oral Cyanosis] : no oral cyanosis [Normal Jugular Venous A Waves Present] : normal jugular venous A waves present [Normal Jugular Venous V Waves Present] : normal jugular venous V waves present [No Jugular Venous Oreilly A Waves] : no jugular venous oreilly A waves [Respiration, Rhythm And Depth] : normal respiratory rhythm and effort [Exaggerated Use Of Accessory Muscles For Inspiration] : no accessory muscle use [Auscultation Breath Sounds / Voice Sounds] : lungs were clear to auscultation bilaterally [Bowel Sounds] : normal bowel sounds [Abdomen Soft] : soft [Abdomen Tenderness] : non-tender [FreeTextEntry1] : Her gait is slow. [Nail Clubbing] : no clubbing of the fingernails [Cyanosis, Localized] : no localized cyanosis [Petechial Hemorrhages (___cm)] : no petechial hemorrhages [Skin Color & Pigmentation] : normal skin color and pigmentation [] : no rash [Skin Lesions] : no skin lesions [No Skin Ulcers] : no skin ulcer [Oriented To Time, Place, And Person] : oriented to person, place, and time [Affect] : the affect was normal [Mood] : the mood was normal [No Anxiety] : not feeling anxious [Normal Rate] : normal [Rhythm Regular] : regular [Normal S1] : normal S1 [Normal S2] : normal S2 [S3] : no S3 [III] : a grade 3 [II] : a grade 2 [Right Carotid Bruit] : no bruit heard over the right carotid [Left Carotid Bruit] : no bruit heard over the left carotid [Bruit] : no bruit heard [No Pitting Edema] : no pitting edema present

## 2018-11-06 NOTE — HISTORY OF PRESENT ILLNESS
[FreeTextEntry1] : Patient is an 86 year old woman with a history of HTN, arthritis, hyperlipidemia, anxiety, atrial fibrillation, moderate aortic stenosis, and mitral regurgitation who presents today for follow up of atrial fibrillation and HTN. She states that she has been feeling relatively well with her only complaint being frequent urination, which only slightly subsided with decreasing the dose of her Lasix. She otherwise denies any chest pain, dyspnea, palpitations, headaches, PND, lower extremity edema, abdominal bloating, and dizziness. She is asking to either eliminate or reduce more medications at this time.

## 2018-11-09 ENCOUNTER — APPOINTMENT (OUTPATIENT)
Dept: INTERNAL MEDICINE | Facility: CLINIC | Age: 83
End: 2018-11-09
Payer: MEDICARE

## 2018-11-09 VITALS — WEIGHT: 140.74 LBS | BODY MASS INDEX: 25.74 KG/M2

## 2018-11-09 DIAGNOSIS — Z23 ENCOUNTER FOR IMMUNIZATION: ICD-10-CM

## 2018-11-09 DIAGNOSIS — Z00.01 ENCOUNTER FOR GENERAL ADULT MEDICAL EXAMINATION WITH ABNORMAL FINDINGS: ICD-10-CM

## 2018-11-09 PROCEDURE — 90662 IIV NO PRSV INCREASED AG IM: CPT

## 2018-11-09 PROCEDURE — G0442 ANNUAL ALCOHOL SCREEN 15 MIN: CPT

## 2018-11-09 PROCEDURE — G0444 DEPRESSION SCREEN ANNUAL: CPT

## 2018-11-09 PROCEDURE — 81003 URINALYSIS AUTO W/O SCOPE: CPT | Mod: QW

## 2018-11-09 PROCEDURE — G0008: CPT

## 2018-11-09 PROCEDURE — G0009: CPT

## 2018-11-09 PROCEDURE — 36415 COLL VENOUS BLD VENIPUNCTURE: CPT

## 2018-11-09 PROCEDURE — G0439: CPT

## 2018-11-09 PROCEDURE — 90732 PPSV23 VACC 2 YRS+ SUBQ/IM: CPT

## 2018-11-09 PROCEDURE — 99215 OFFICE O/P EST HI 40 MIN: CPT | Mod: 25

## 2018-11-09 RX ORDER — NITROFURANTOIN (MONOHYDRATE/MACROCRYSTALS) 25; 75 MG/1; MG/1
100 CAPSULE ORAL
Qty: 10 | Refills: 0 | Status: DISCONTINUED | COMMUNITY
Start: 2018-09-27 | End: 2018-11-09

## 2018-11-10 ENCOUNTER — MESSAGE (OUTPATIENT)
Age: 83
End: 2018-11-10

## 2018-11-10 VITALS
OXYGEN SATURATION: 97 % | DIASTOLIC BLOOD PRESSURE: 82 MMHG | SYSTOLIC BLOOD PRESSURE: 148 MMHG | TEMPERATURE: 98.8 F | RESPIRATION RATE: 14 BRPM | HEART RATE: 70 BPM

## 2018-11-10 LAB
25(OH)D3 SERPL-MCNC: 38.7 NG/ML
ALBUMIN SERPL ELPH-MCNC: 4.2 G/DL
ALP BLD-CCNC: 84 U/L
ALT SERPL-CCNC: 12 U/L
ANION GAP SERPL CALC-SCNC: 14 MMOL/L
AST SERPL-CCNC: 19 U/L
BASOPHILS # BLD AUTO: 0.02 K/UL
BASOPHILS NFR BLD AUTO: 0.3 %
BILIRUB SERPL-MCNC: 0.2 MG/DL
BUN SERPL-MCNC: 14 MG/DL
CALCIUM SERPL-MCNC: 9.2 MG/DL
CHLORIDE SERPL-SCNC: 100 MMOL/L
CHOLEST SERPL-MCNC: 179 MG/DL
CHOLEST/HDLC SERPL: 3.1 RATIO
CO2 SERPL-SCNC: 28 MMOL/L
CREAT SERPL-MCNC: 0.95 MG/DL
EOSINOPHIL # BLD AUTO: 0.09 K/UL
EOSINOPHIL NFR BLD AUTO: 1.4 %
GLUCOSE SERPL-MCNC: 90 MG/DL
HBA1C MFR BLD HPLC: 5.9 %
HCT VFR BLD CALC: 36.1 %
HDLC SERPL-MCNC: 57 MG/DL
HGB BLD-MCNC: 10.4 G/DL
IMM GRANULOCYTES NFR BLD AUTO: 0.2 %
LDLC SERPL CALC-MCNC: 102 MG/DL
LYMPHOCYTES # BLD AUTO: 1.39 K/UL
LYMPHOCYTES NFR BLD AUTO: 21.7 %
MAN DIFF?: NORMAL
MCHC RBC-ENTMCNC: 24.8 PG
MCHC RBC-ENTMCNC: 28.8 GM/DL
MCV RBC AUTO: 86.2 FL
MONOCYTES # BLD AUTO: 0.69 K/UL
MONOCYTES NFR BLD AUTO: 10.7 %
NEUTROPHILS # BLD AUTO: 4.22 K/UL
NEUTROPHILS NFR BLD AUTO: 65.7 %
PLATELET # BLD AUTO: 286 K/UL
POTASSIUM SERPL-SCNC: 3.6 MMOL/L
PROT SERPL-MCNC: 7.2 G/DL
RBC # BLD: 4.19 M/UL
RBC # FLD: 16.7 %
SODIUM SERPL-SCNC: 142 MMOL/L
TRIGL SERPL-MCNC: 102 MG/DL
VIT B12 SERPL-MCNC: 1136 PG/ML
WBC # FLD AUTO: 6.42 K/UL

## 2018-11-10 NOTE — ASSESSMENT
[FreeTextEntry1] : 1) Annual physical: Patient presents for annual physical. Influenza administered today. Annual alcohol use and depression screen negative.\par 2) HTN: BP elevated given CHF, patient taking lasix twice a day and states she has incontinence at night. Will add amlodipine 2.5 mg and make lasix once a day. RTO in 2 weeks for BP check\par 3) CHF: stable, no signs of fluid overlaod\par 4) Depression: Patient denies any symptoms\par 5) Hypokalemia: Will check electrolytes as patient is on Lasix\par 6) Hypovitaminosis D: Check Vitamin D levels\par 7)Hyperlipidemia: Will check lipid panel\par 8) On amiodarone therapy: will check TSH and LFT's\par 9)Urinary frequency: Will check UA and cx, possibly secondary to increased lasix dose. Will start amlodipine 2.5 mg and advised to take lasix once a day. RTO in 2 weeks to assess symptoms. \par influenza and pneumovax administered during office visit

## 2018-11-10 NOTE — PHYSICAL EXAM
[No Acute Distress] : no acute distress [Well Nourished] : well nourished [Well Developed] : well developed [Well-Appearing] : well-appearing [Normal Sclera/Conjunctiva] : normal sclera/conjunctiva [PERRL] : pupils equal round and reactive to light [EOMI] : extraocular movements intact [Normal Outer Ear/Nose] : the outer ears and nose were normal in appearance [Normal Oropharynx] : the oropharynx was normal [Normal TMs] : both tympanic membranes were normal [Normal Nasal Mucosa] : the nasal mucosa was normal [No JVD] : no jugular venous distention [Supple] : supple [No Lymphadenopathy] : no lymphadenopathy [Thyroid Normal, No Nodules] : the thyroid was normal and there were no nodules present [No Respiratory Distress] : no respiratory distress  [Clear to Auscultation] : lungs were clear to auscultation bilaterally [No Accessory Muscle Use] : no accessory muscle use [Normal Rate] : normal rate  [Regular Rhythm] : with a regular rhythm [Normal S1, S2] : normal S1 and S2 [No Murmur] : no murmur heard [No Carotid Bruits] : no carotid bruits [No Abdominal Bruit] : a ~M bruit was not heard ~T in the abdomen [No Varicosities] : no varicosities [Pedal Pulses Present] : the pedal pulses are present [No Edema] : there was no peripheral edema [No Extremity Clubbing/Cyanosis] : no extremity clubbing/cyanosis [No Palpable Aorta] : no palpable aorta [Normal Appearance] : normal in appearance [No Nipple Discharge] : no nipple discharge [Soft] : abdomen soft [Non Tender] : non-tender [Non-distended] : non-distended [No Masses] : no abdominal mass palpated [No HSM] : no HSM [Normal Bowel Sounds] : normal bowel sounds [Normal Posterior Cervical Nodes] : no posterior cervical lymphadenopathy [Normal Anterior Cervical Nodes] : no anterior cervical lymphadenopathy [No CVA Tenderness] : no CVA  tenderness [No Spinal Tenderness] : no spinal tenderness [No Joint Swelling] : no joint swelling [Grossly Normal Strength/Tone] : grossly normal strength/tone [No Rash] : no rash [No Skin Lesions] : no skin lesions [No Focal Deficits] : no focal deficits [Normal Affect] : the affect was normal [Normal Insight/Judgement] : insight and judgment were intact [de-identified] : Gait instability

## 2018-11-10 NOTE — REVIEW OF SYSTEMS
[Incontinence] : incontinence [Nocturia] : nocturia [Frequency] : frequency [Negative] : Heme/Lymph [Dysuria] : no dysuria [Poor Libido] : libido not poor [Hematuria] : no hematuria [Vaginal Discharge] : no vaginal discharge [Dysmenorrhea] : no dysmenorrhea

## 2018-11-10 NOTE — HISTORY OF PRESENT ILLNESS
[FreeTextEntry1] : Patient presents for annual physical and annual medicare wellness visit [de-identified] : Patient states that she has been losing control if urine at night. Patient has been taking lasix in the day and at night. Lasix adjusted a few days ago. Patient denies any dysuria, pelvic pain, fevers or chills.  Patient denies previous pelvic surgeries. Patient denies any other OTC medications\par

## 2018-11-14 ENCOUNTER — RX RENEWAL (OUTPATIENT)
Age: 83
End: 2018-11-14

## 2018-11-20 ENCOUNTER — APPOINTMENT (OUTPATIENT)
Dept: INTERNAL MEDICINE | Facility: CLINIC | Age: 83
End: 2018-11-20
Payer: MEDICARE

## 2018-11-20 VITALS — DIASTOLIC BLOOD PRESSURE: 76 MMHG | SYSTOLIC BLOOD PRESSURE: 132 MMHG

## 2018-11-20 VITALS — WEIGHT: 143.3 LBS | BODY MASS INDEX: 26.21 KG/M2

## 2018-11-20 DIAGNOSIS — E87.6 HYPOKALEMIA: ICD-10-CM

## 2018-11-20 LAB
BILIRUB UR QL STRIP: NEGATIVE
COLLECTION METHOD: NORMAL
GLUCOSE UR-MCNC: NEGATIVE
HCG UR QL: 0.2 EU/DL
HGB UR QL STRIP.AUTO: NEGATIVE
KETONES UR-MCNC: NEGATIVE
LEUKOCYTE ESTERASE UR QL STRIP: ABNORMAL
NITRITE UR QL STRIP: POSITIVE
PH UR STRIP: 5.5
PROT UR STRIP-MCNC: NEGATIVE
SP GR UR STRIP: 1.02

## 2018-11-20 PROCEDURE — 99214 OFFICE O/P EST MOD 30 MIN: CPT | Mod: 25

## 2018-11-20 PROCEDURE — 81003 URINALYSIS AUTO W/O SCOPE: CPT | Mod: QW

## 2018-11-20 PROCEDURE — 36415 COLL VENOUS BLD VENIPUNCTURE: CPT

## 2018-11-21 NOTE — ASSESSMENT
[FreeTextEntry1] : 1) HTN: controlled with change in medication, continue current management. Patient to f/u with cardiologist\par 2) CHF: No signs of fluid overload, continue with lasix as prescribed\par 3) Hypokalemia: To check BMP\par 4) Nocturia: Will check UA and cx, no improvement from change in lasix. Will also obtain ultrasound of kidneys and bladder to assess PVR. Advised to f/u with urogynecologist however patient declined. Hesitate to prescribe any medications due to side effects.\par RTO in 3 months.

## 2018-11-21 NOTE — HISTORY OF PRESENT ILLNESS
[FreeTextEntry1] : Patient presents for f/u of HTN and urinary urgency [de-identified] : Patient denies any chest pain, shortness of breath, palpitations, headache, blurry vision, numbness or weakness. Patient still has urinary urgency at night. Patient has 5 episodes a night where she loses urine. Patient denies any burning sensation, fevers, chills, nausea or decreased appetite.

## 2018-11-21 NOTE — PHYSICAL EXAM
[No Acute Distress] : no acute distress [Well Nourished] : well nourished [Well Developed] : well developed [Well-Appearing] : well-appearing [Normal Sclera/Conjunctiva] : normal sclera/conjunctiva [PERRL] : pupils equal round and reactive to light [EOMI] : extraocular movements intact [Normal Outer Ear/Nose] : the outer ears and nose were normal in appearance [Normal Oropharynx] : the oropharynx was normal [Normal TMs] : both tympanic membranes were normal [Normal Nasal Mucosa] : the nasal mucosa was normal [No JVD] : no jugular venous distention [Supple] : supple [No Lymphadenopathy] : no lymphadenopathy [Thyroid Normal, No Nodules] : the thyroid was normal and there were no nodules present [No Respiratory Distress] : no respiratory distress  [Clear to Auscultation] : lungs were clear to auscultation bilaterally [No Accessory Muscle Use] : no accessory muscle use [Normal Rate] : normal rate  [Regular Rhythm] : with a regular rhythm [Normal S1, S2] : normal S1 and S2 [No Murmur] : no murmur heard [No Carotid Bruits] : no carotid bruits [No Abdominal Bruit] : a ~M bruit was not heard ~T in the abdomen [No Varicosities] : no varicosities [Pedal Pulses Present] : the pedal pulses are present [No Edema] : there was no peripheral edema [No Extremity Clubbing/Cyanosis] : no extremity clubbing/cyanosis [No Palpable Aorta] : no palpable aorta [Soft] : abdomen soft [Non Tender] : non-tender [Non-distended] : non-distended [No Masses] : no abdominal mass palpated [No HSM] : no HSM [Normal Bowel Sounds] : normal bowel sounds [Normal Posterior Cervical Nodes] : no posterior cervical lymphadenopathy [Normal Anterior Cervical Nodes] : no anterior cervical lymphadenopathy [No CVA Tenderness] : no CVA  tenderness [No Spinal Tenderness] : no spinal tenderness [No Joint Swelling] : no joint swelling [Grossly Normal Strength/Tone] : grossly normal strength/tone [Coordination Grossly Intact] : coordination grossly intact [No Focal Deficits] : no focal deficits [de-identified] : Walks with walking aid

## 2018-11-21 NOTE — REVIEW OF SYSTEMS
[Nocturia] : nocturia [Negative] : Heme/Lymph [Dysuria] : no dysuria [Poor Libido] : libido not poor [Hematuria] : no hematuria [Frequency] : no frequency [Vaginal Discharge] : no vaginal discharge [Dysmenorrhea] : no dysmenorrhea

## 2018-11-23 ENCOUNTER — MESSAGE (OUTPATIENT)
Age: 83
End: 2018-11-23

## 2018-11-23 LAB
ANION GAP SERPL CALC-SCNC: 14 MMOL/L
APPEARANCE: ABNORMAL
BACTERIA UR CULT: ABNORMAL
BACTERIA: NEGATIVE
BILIRUBIN URINE: NEGATIVE
BLOOD URINE: NEGATIVE
BUN SERPL-MCNC: 17 MG/DL
CALCIUM SERPL-MCNC: 9.3 MG/DL
CHLORIDE SERPL-SCNC: 101 MMOL/L
CO2 SERPL-SCNC: 27 MMOL/L
COLOR: YELLOW
CREAT SERPL-MCNC: 1 MG/DL
GLUCOSE QUALITATIVE U: NEGATIVE MG/DL
GLUCOSE SERPL-MCNC: 104 MG/DL
KETONES URINE: NEGATIVE
LEUKOCYTE ESTERASE URINE: ABNORMAL
MICROSCOPIC-UA: NORMAL
NITRITE URINE: POSITIVE
PH URINE: 5.5
POTASSIUM SERPL-SCNC: 4.3 MMOL/L
PROTEIN URINE: NEGATIVE MG/DL
RED BLOOD CELLS URINE: 0 /HPF
SODIUM SERPL-SCNC: 142 MMOL/L
SPECIFIC GRAVITY URINE: 1.02
SQUAMOUS EPITHELIAL CELLS: 0 /HPF
UROBILINOGEN URINE: NEGATIVE MG/DL
WHITE BLOOD CELLS URINE: 70 /HPF

## 2018-11-27 ENCOUNTER — RX RENEWAL (OUTPATIENT)
Age: 83
End: 2018-11-27

## 2018-11-29 ENCOUNTER — RX RENEWAL (OUTPATIENT)
Age: 83
End: 2018-11-29

## 2018-12-04 ENCOUNTER — MESSAGE (OUTPATIENT)
Age: 83
End: 2018-12-04

## 2018-12-06 ENCOUNTER — APPOINTMENT (OUTPATIENT)
Dept: CARDIOLOGY | Facility: CLINIC | Age: 83
End: 2018-12-06
Payer: MEDICARE

## 2018-12-06 ENCOUNTER — NON-APPOINTMENT (OUTPATIENT)
Age: 83
End: 2018-12-06

## 2018-12-06 VITALS
HEART RATE: 59 BPM | SYSTOLIC BLOOD PRESSURE: 175 MMHG | TEMPERATURE: 98 F | DIASTOLIC BLOOD PRESSURE: 83 MMHG | RESPIRATION RATE: 14 BRPM | OXYGEN SATURATION: 97 %

## 2018-12-06 VITALS — HEART RATE: 63 BPM | DIASTOLIC BLOOD PRESSURE: 82 MMHG | SYSTOLIC BLOOD PRESSURE: 144 MMHG

## 2018-12-06 VITALS — BODY MASS INDEX: 26.5 KG/M2 | HEIGHT: 62 IN | WEIGHT: 144 LBS

## 2018-12-06 PROCEDURE — 99215 OFFICE O/P EST HI 40 MIN: CPT | Mod: 25

## 2018-12-06 PROCEDURE — 93000 ELECTROCARDIOGRAM COMPLETE: CPT

## 2018-12-09 ENCOUNTER — NON-APPOINTMENT (OUTPATIENT)
Age: 83
End: 2018-12-09

## 2018-12-09 NOTE — PHYSICAL EXAM
[General Appearance - Well Developed] : well developed [Normal Appearance] : normal appearance [Well Groomed] : well groomed [General Appearance - Well Nourished] : well nourished [No Deformities] : no deformities [General Appearance - In No Acute Distress] : no acute distress [Normal Conjunctiva] : the conjunctiva exhibited no abnormalities [Normal Oral Mucosa] : normal oral mucosa [No Oral Pallor] : no oral pallor [No Oral Cyanosis] : no oral cyanosis [Normal Jugular Venous A Waves Present] : normal jugular venous A waves present [Normal Jugular Venous V Waves Present] : normal jugular venous V waves present [No Jugular Venous Oreilly A Waves] : no jugular venous oreilly A waves [Respiration, Rhythm And Depth] : normal respiratory rhythm and effort [Exaggerated Use Of Accessory Muscles For Inspiration] : no accessory muscle use [Auscultation Breath Sounds / Voice Sounds] : lungs were clear to auscultation bilaterally [Bowel Sounds] : normal bowel sounds [Abdomen Soft] : soft [Abdomen Tenderness] : non-tender [Nail Clubbing] : no clubbing of the fingernails [Cyanosis, Localized] : no localized cyanosis [Petechial Hemorrhages (___cm)] : no petechial hemorrhages [Skin Color & Pigmentation] : normal skin color and pigmentation [] : no rash [Skin Lesions] : no skin lesions [No Skin Ulcers] : no skin ulcer [Oriented To Time, Place, And Person] : oriented to person, place, and time [Affect] : the affect was normal [Mood] : the mood was normal [No Anxiety] : not feeling anxious [Normal Rate] : normal [Rhythm Regular] : regular [Normal S1] : normal S1 [Normal S2] : normal S2 [III] : a grade 3 [II] : a grade 2 [No Pitting Edema] : no pitting edema present [FreeTextEntry1] : Her gait is slow. [S3] : no S3 [Right Carotid Bruit] : no bruit heard over the right carotid [Left Carotid Bruit] : no bruit heard over the left carotid [Bruit] : no bruit heard

## 2018-12-09 NOTE — HISTORY OF PRESENT ILLNESS
[FreeTextEntry1] : Patient is an 87 year old woman with a history of HTN, arthritis, hyperlipidemia, anxiety, paroxysmal atrial fibrillation, moderate aortic stenosis, and mitral regurgitation who presents today for follow up of atrial fibrillation and HTN. She states that she has been feeling relatively well from the cardiac standpoint with decreased urinary frequency since the dose of her Lasix was decreased and since being treated for a UTI. She states that she has been worried given her newly diagnosed renal cyst. She otherwise denies any chest pain, dyspnea, palpitations, headaches, PND, lower extremity edema, abdominal bloating, and dizziness.

## 2018-12-09 NOTE — DISCUSSION/SUMMARY
[FreeTextEntry1] : IMPRESSION: Ms. GREWAL is a 87 year-old woman with a history of HTN, arthritis, moderate aortic stenosis, mitral regurgitation, hyperlipidemia, anxiety, and paroxysmal atrial fibrillation status post cardioversion on 5/4/2018 who presents today for follow up of atrial fibrillation and HTN.\par \par PLAN:\par 1. She is currently in sinus rhythm and is feeling well. She will continue on Amiodarone 100mg daily for maintenance of sinus rhythm. She will also continue on metoprolol 100 mg in the AM and 50mg in the evening for rate control. She will continue on Eliquis 5mg twice daily for systemic anticoagulation of her atrial fibrillation. \par 2.  There are no signs of heart failure on exam today, however, her blood pressure is mildly elevated. Her blood pressure may be elevated given her increased stress given her renal cyst. I have not made any changes at this time and she will continue on Lasix 20mg daily.  \par 3. I will arrange a repeat echocardiogram for the patient at the time of her next visit to follow up her aortic stenosis and mitral regurgitation.\par 4. We will need to follow her TSH and LFTs while on Amiodarone. She will also need to see Pulmonary as well as have annual eye exams given to potential long term toxicities.\par 5. She will follow up with me in 4-6 weeks to follow up her blood pressure and to follow up her atrial fibrillation.

## 2018-12-17 NOTE — PATIENT PROFILE ADULT. - EXTENSIONS OF SELF_ADULT
made as the colonoscope was withdrawn, including a retroflexed view of the rectum; findings and interventions are described below. Appropriate photodocumentation Was Obtained. Findings: -normal colonic mucosa throughout  -mild sigmoid diverticulosis  -internal hemorrhoids  - PREP: miralax  - Overall difficulty: minimal in degree  - Abdominal pressure: yes - sigmoid  - Change in position: no  - Anesthesia issues: no  - Medivator use: no    Specimens: Was Not Obtained    Complications:   None; patient tolerated the procedure well. Disposition:   PACU - hemodynamically stable. Withdrawal Time:  6 minutes    Impression:   -Normal colonoscopy to the cecum, with no evidence of neoplasia, or mucosal abnormality.   -family history of colon cancer in father arount 36    Recommendations  -Repeat colonoscopy in 5 years.  -diverticulosis education        [place] [address] 12/17/18 9:11 AM Eyeglasses

## 2018-12-31 ENCOUNTER — RX RENEWAL (OUTPATIENT)
Age: 83
End: 2018-12-31

## 2018-12-31 ENCOUNTER — MESSAGE (OUTPATIENT)
Age: 83
End: 2018-12-31

## 2019-01-10 ENCOUNTER — APPOINTMENT (OUTPATIENT)
Dept: INTERNAL MEDICINE | Facility: CLINIC | Age: 84
End: 2019-01-10
Payer: MEDICARE

## 2019-01-10 VITALS
HEART RATE: 55 BPM | SYSTOLIC BLOOD PRESSURE: 150 MMHG | BODY MASS INDEX: 26.8 KG/M2 | OXYGEN SATURATION: 98 % | WEIGHT: 145.61 LBS | HEIGHT: 62 IN | DIASTOLIC BLOOD PRESSURE: 81 MMHG

## 2019-01-10 PROCEDURE — 99213 OFFICE O/P EST LOW 20 MIN: CPT | Mod: 25

## 2019-01-10 PROCEDURE — 81003 URINALYSIS AUTO W/O SCOPE: CPT | Mod: QW

## 2019-01-11 VITALS — RESPIRATION RATE: 16 BRPM

## 2019-01-11 NOTE — REVIEW OF SYSTEMS
[Dysuria] : no dysuria [Incontinence] : no incontinence [Nocturia] : nocturia [Hematuria] : no hematuria [Frequency] : no frequency [Vaginal Discharge] : no vaginal discharge [Negative] : Heme/Lymph

## 2019-01-11 NOTE — PHYSICAL EXAM
[No Acute Distress] : no acute distress [Well Nourished] : well nourished [Well Developed] : well developed [Well-Appearing] : well-appearing [Normal Sclera/Conjunctiva] : normal sclera/conjunctiva [PERRL] : pupils equal round and reactive to light [EOMI] : extraocular movements intact [Normal Outer Ear/Nose] : the outer ears and nose were normal in appearance [Normal Oropharynx] : the oropharynx was normal [Normal TMs] : both tympanic membranes were normal [No JVD] : no jugular venous distention [Supple] : supple [No Lymphadenopathy] : no lymphadenopathy [Thyroid Normal, No Nodules] : the thyroid was normal and there were no nodules present [No Respiratory Distress] : no respiratory distress  [Clear to Auscultation] : lungs were clear to auscultation bilaterally [No Accessory Muscle Use] : no accessory muscle use [Normal Rate] : normal rate  [Regular Rhythm] : with a regular rhythm [Normal S1, S2] : normal S1 and S2 [No Murmur] : no murmur heard [No Carotid Bruits] : no carotid bruits [No Abdominal Bruit] : a ~M bruit was not heard ~T in the abdomen [No Varicosities] : no varicosities [Pedal Pulses Present] : the pedal pulses are present [No Edema] : there was no peripheral edema [No Extremity Clubbing/Cyanosis] : no extremity clubbing/cyanosis [No Palpable Aorta] : no palpable aorta [Normal Appearance] : normal in appearance [No Nipple Discharge] : no nipple discharge [Soft] : abdomen soft [Non Tender] : non-tender [Non-distended] : non-distended [No Masses] : no abdominal mass palpated [No HSM] : no HSM [Normal Bowel Sounds] : normal bowel sounds [Normal Posterior Cervical Nodes] : no posterior cervical lymphadenopathy [Normal Anterior Cervical Nodes] : no anterior cervical lymphadenopathy [Normal Inguinal Nodes] : no inguinal lymphadenopathy [No CVA Tenderness] : no CVA  tenderness [No Spinal Tenderness] : no spinal tenderness [No Joint Swelling] : no joint swelling [No Rash] : no rash [No Skin Lesions] : no skin lesions [Normal Gait] : normal gait [Coordination Grossly Intact] : coordination grossly intact [No Focal Deficits] : no focal deficits [Normal Affect] : the affect was normal [Normal Insight/Judgement] : insight and judgment were intact [de-identified] : Strength 4/5 in lower extremities bilaterally. Walks with cane, get up and go test greater than 12 seconds

## 2019-01-11 NOTE — ASSESSMENT
[FreeTextEntry1] : 1) Nocturia: Unsure of etiology, have reviewed medications and patient has been treated for UTI. U/S done. Will check UA and cx and place urology referral. possibly third spacing from CHF?\par 2)HTN: BP elevated today, patient to continue with current medications and f/u in one month with cardiologist\par 3) History of falls: Get up and go test greater than 12 seconds, patient walks with assistance and has history of falls, will place home PT referral.

## 2019-01-11 NOTE — HISTORY OF PRESENT ILLNESS
[FreeTextEntry8] : Patient presents with a history of nocturia. Symptoms have been occurring the last few months. Patient states she takes Lasix during the day. When treated with antibiotics patient states symptoms were improved. Patient denies any dysuria, hematuria, or weak stream. Patient denies any changes water intake through out the day. Patient denies any previous instrumentation. Patient also states she feels unsteadiness on her feet and has had multiple falls.

## 2019-01-16 ENCOUNTER — NON-APPOINTMENT (OUTPATIENT)
Age: 84
End: 2019-01-16

## 2019-01-16 ENCOUNTER — APPOINTMENT (OUTPATIENT)
Dept: CARDIOLOGY | Facility: CLINIC | Age: 84
End: 2019-01-16
Payer: MEDICARE

## 2019-01-16 VITALS
BODY MASS INDEX: 27.42 KG/M2 | SYSTOLIC BLOOD PRESSURE: 182 MMHG | TEMPERATURE: 98.4 F | HEIGHT: 62 IN | WEIGHT: 149 LBS | OXYGEN SATURATION: 97 % | HEART RATE: 80 BPM | RESPIRATION RATE: 12 BRPM | DIASTOLIC BLOOD PRESSURE: 78 MMHG

## 2019-01-16 VITALS — DIASTOLIC BLOOD PRESSURE: 80 MMHG | SYSTOLIC BLOOD PRESSURE: 160 MMHG | HEART RATE: 60 BPM

## 2019-01-16 PROCEDURE — 99215 OFFICE O/P EST HI 40 MIN: CPT | Mod: 25

## 2019-01-16 PROCEDURE — 93000 ELECTROCARDIOGRAM COMPLETE: CPT

## 2019-01-17 ENCOUNTER — APPOINTMENT (OUTPATIENT)
Dept: UROLOGY | Facility: CLINIC | Age: 84
End: 2019-01-17
Payer: MEDICARE

## 2019-01-17 VITALS
OXYGEN SATURATION: 97 % | SYSTOLIC BLOOD PRESSURE: 142 MMHG | WEIGHT: 149 LBS | RESPIRATION RATE: 13 BRPM | TEMPERATURE: 97.9 F | HEIGHT: 62 IN | BODY MASS INDEX: 27.42 KG/M2 | DIASTOLIC BLOOD PRESSURE: 80 MMHG | HEART RATE: 55 BPM

## 2019-01-17 DIAGNOSIS — Z80.1 FAMILY HISTORY OF MALIGNANT NEOPLASM OF TRACHEA, BRONCHUS AND LUNG: ICD-10-CM

## 2019-01-17 DIAGNOSIS — Z86.79 PERSONAL HISTORY OF OTHER DISEASES OF THE CIRCULATORY SYSTEM: ICD-10-CM

## 2019-01-17 PROCEDURE — 99204 OFFICE O/P NEW MOD 45 MIN: CPT

## 2019-01-17 NOTE — ASSESSMENT
[FreeTextEntry1] : Isolated nocturia. Likely multifactorial. \par --UA/UCx. will call with results\par --D/c pm pepsi\par --Curb fluids 2h prior to bed (already doing)\par --Will call with results of urine. If negative and d/c pepsi makes no impact, will consider 20mg qhs trospium. discussed r/b/a. \par

## 2019-01-17 NOTE — PHYSICAL EXAM
[General Appearance - Well Developed] : well developed [General Appearance - Well Nourished] : well nourished [General Appearance - In No Acute Distress] : no acute distress [Edema] : no peripheral edema [Exaggerated Use Of Accessory Muscles For Inspiration] : no accessory muscle use [Abdomen Soft] : soft [Abdomen Tenderness] : non-tender [Costovertebral Angle Tenderness] : no ~M costovertebral angle tenderness [FreeTextEntry1] : slow steady gate with cane for assistance

## 2019-01-17 NOTE — HISTORY OF PRESENT ILLNESS
[FreeTextEntry1] : 88yo female with cc of nocturia. Pt reports bother over many months with nighttime urination. She is waking up 3-4 times. Only going 2-3 times during the day. She endorses urge incontinence but this is mostly at night and less in the day. She wears pullups for this. Goes to bed 9:30. Eats dinner 6. Has half a can of pepsi in the evening. Has a coffee at 9:30/10. On lasix in am. Denies LE edema. Snores, unknown sleep apnea. Does not drink after dinner. No sensation of prolapse. \par \par Had MRI that showed B renal simple cysts. \par \par SOn helped with translation to Polish.

## 2019-01-17 NOTE — REVIEW OF SYSTEMS
[Date of last menstrual period ____] : date of last menstrual period: [unfilled] [Negative] : Heme/Lymph [FreeTextEntry3] : frequent urination 3-4 times

## 2019-01-19 ENCOUNTER — RX RENEWAL (OUTPATIENT)
Age: 84
End: 2019-01-19

## 2019-01-21 ENCOUNTER — NON-APPOINTMENT (OUTPATIENT)
Age: 84
End: 2019-01-21

## 2019-01-21 NOTE — HISTORY OF PRESENT ILLNESS
[FreeTextEntry1] : Patient is an 87 year old woman with a history of HTN, arthritis, hyperlipidemia, anxiety, paroxysmal atrial fibrillation, moderate aortic stenosis, and mitral regurgitation who presents today for follow up of atrial fibrillation and HTN. She states that she has been feeling relatively well from the cardiac standpoint denying any chest pain, dyspnea at rest, palpitations, headaches, PND, lower extremity edema, abdominal bloating, and dizziness.

## 2019-01-21 NOTE — DISCUSSION/SUMMARY
[FreeTextEntry1] : IMPRESSION: Ms. GREWAL is a 87 year-old woman with a history of HTN, arthritis, moderate aortic stenosis, mitral regurgitation, hyperlipidemia, anxiety, and paroxysmal atrial fibrillation status post cardioversion on 5/4/2018 who presents today for follow up of atrial fibrillation and HTN.\par \par PLAN:\par 1. She is currently in sinus rhythm and is feeling well. She will continue on Amiodarone 100mg daily for maintenance of sinus rhythm. She will also continue on metoprolol 100 mg in the AM and 50mg in the evening for rate control. She will continue on Eliquis 5mg twice daily for systemic anticoagulation of her atrial fibrillation. \par 2.  There are no signs of heart failure on exam today, however, her blood pressure is elevated. She will continue on Lasix 20mg daily.  Given her elevated blood pressure, she will continue on Lasix, Metoprolol, and I have increased Amlodipine to 5 mg daily.\par 3. I will arrange a repeat echocardiogram for the patient at the time of her next visit to follow up her aortic stenosis and mitral regurgitation.\par 4. We will need to follow her TSH and LFTs while on Amiodarone. She will also need to see Pulmonary as well as have annual eye exams given to potential long term toxicities.\par 5. She will follow up with me in 1 month for follow up her blood pressure.

## 2019-01-22 LAB
APPEARANCE: ABNORMAL
BACTERIA UR CULT: ABNORMAL
BACTERIA: ABNORMAL
BILIRUBIN URINE: NEGATIVE
BLOOD URINE: NEGATIVE
COLOR: YELLOW
GLUCOSE QUALITATIVE U: NEGATIVE MG/DL
HYALINE CASTS: 2 /LPF
KETONES URINE: NEGATIVE
LEUKOCYTE ESTERASE URINE: ABNORMAL
MICROSCOPIC-UA: NORMAL
NITRITE URINE: POSITIVE
PH URINE: 5.5
PROTEIN URINE: NEGATIVE MG/DL
RED BLOOD CELLS URINE: 2 /HPF
SPECIFIC GRAVITY URINE: 1.02
SQUAMOUS EPITHELIAL CELLS: 1 /HPF
UROBILINOGEN URINE: NEGATIVE MG/DL
WHITE BLOOD CELLS URINE: 61 /HPF

## 2019-01-22 RX ORDER — NITROFURANTOIN (MONOHYDRATE/MACROCRYSTALS) 25; 75 MG/1; MG/1
100 CAPSULE ORAL
Qty: 14 | Refills: 0 | Status: DISCONTINUED | COMMUNITY
Start: 2018-11-23 | End: 2019-01-22

## 2019-02-09 NOTE — ED PROVIDER NOTE - DATE/TIME 1
Acute on chronic HFpEF exacerbation likely 2/2 Sepsis and afib w/t RVR  TTE from 5/2017 showed EF73%, normal RVSF and LVSF, moderate TR, and moderate pulm HTN. Prelim read of CXR showing mild pulm edema. proBNP elevated to 2306  - STAT dose 40mg lasix IV and then 40mg QD  - repeat TTE  -daily weights  -Strict Is/Os  -will continue metoprolol tart 50mg bid Acute on chronic HFpEF exacerbation likely 2/2 Sepsis and afib w/t RVR  TTE from 5/2017 showed EF73%, normal RVSF and LVSF, moderate TR, and moderate pulm HTN. Prelim read of CXR showing mild pulm edema. proBNP elevated to 2306  - 40mg lasix IV BID   - f/u TTE  -daily weights  -Strict Is/Os  -increase to metoprolol tart 75mg bid 05-May-2018 07:00

## 2019-02-13 ENCOUNTER — APPOINTMENT (OUTPATIENT)
Dept: CARDIOLOGY | Facility: CLINIC | Age: 84
End: 2019-02-13
Payer: MEDICARE

## 2019-02-13 ENCOUNTER — NON-APPOINTMENT (OUTPATIENT)
Age: 84
End: 2019-02-13

## 2019-02-13 VITALS
BODY MASS INDEX: 27.05 KG/M2 | HEART RATE: 57 BPM | DIASTOLIC BLOOD PRESSURE: 80 MMHG | OXYGEN SATURATION: 96 % | RESPIRATION RATE: 12 BRPM | SYSTOLIC BLOOD PRESSURE: 168 MMHG | HEIGHT: 62 IN | WEIGHT: 147 LBS

## 2019-02-13 VITALS — DIASTOLIC BLOOD PRESSURE: 70 MMHG | HEART RATE: 57 BPM | SYSTOLIC BLOOD PRESSURE: 140 MMHG

## 2019-02-13 PROCEDURE — 99215 OFFICE O/P EST HI 40 MIN: CPT | Mod: 25

## 2019-02-13 PROCEDURE — 93000 ELECTROCARDIOGRAM COMPLETE: CPT

## 2019-02-14 LAB
ALBUMIN SERPL ELPH-MCNC: 4.1 G/DL
ALP BLD-CCNC: 71 U/L
ALT SERPL-CCNC: 11 U/L
ANION GAP SERPL CALC-SCNC: 14 MMOL/L
AST SERPL-CCNC: 23 U/L
BILIRUB SERPL-MCNC: <0.2 MG/DL
BUN SERPL-MCNC: 14 MG/DL
CALCIUM SERPL-MCNC: 9.2 MG/DL
CHLORIDE SERPL-SCNC: 102 MMOL/L
CO2 SERPL-SCNC: 27 MMOL/L
CREAT SERPL-MCNC: 0.79 MG/DL
GLUCOSE SERPL-MCNC: 74 MG/DL
POTASSIUM SERPL-SCNC: 4 MMOL/L
PROT SERPL-MCNC: 7 G/DL
SODIUM SERPL-SCNC: 143 MMOL/L
TSH SERPL-ACNC: 1 UIU/ML

## 2019-02-14 NOTE — PHYSICAL EXAM
[General Appearance - Well Developed] : well developed [Normal Appearance] : normal appearance [Well Groomed] : well groomed [General Appearance - Well Nourished] : well nourished [No Deformities] : no deformities [General Appearance - In No Acute Distress] : no acute distress [Normal Conjunctiva] : the conjunctiva exhibited no abnormalities [Normal Oral Mucosa] : normal oral mucosa [No Oral Pallor] : no oral pallor [No Oral Cyanosis] : no oral cyanosis [Normal Jugular Venous A Waves Present] : normal jugular venous A waves present [Normal Jugular Venous V Waves Present] : normal jugular venous V waves present [No Jugular Venous Oreilly A Waves] : no jugular venous oreilly A waves [Respiration, Rhythm And Depth] : normal respiratory rhythm and effort [Exaggerated Use Of Accessory Muscles For Inspiration] : no accessory muscle use [Auscultation Breath Sounds / Voice Sounds] : lungs were clear to auscultation bilaterally [Bowel Sounds] : normal bowel sounds [Abdomen Soft] : soft [Abdomen Tenderness] : non-tender [Nail Clubbing] : no clubbing of the fingernails [Cyanosis, Localized] : no localized cyanosis [Petechial Hemorrhages (___cm)] : no petechial hemorrhages [Skin Color & Pigmentation] : normal skin color and pigmentation [] : no rash [Skin Lesions] : no skin lesions [No Skin Ulcers] : no skin ulcer [Oriented To Time, Place, And Person] : oriented to person, place, and time [Affect] : the affect was normal [Mood] : the mood was normal [No Anxiety] : not feeling anxious [Rhythm Regular] : regular [Normal S1] : normal S1 [Normal S2] : normal S2 [III] : a grade 3 [II] : a grade 2 [No Pitting Edema] : no pitting edema present [FreeTextEntry1] : Her gait is slow. [Bradycardia] : bradycardic [S3] : no S3 [Right Carotid Bruit] : no bruit heard over the right carotid [Left Carotid Bruit] : no bruit heard over the left carotid [Bruit] : no bruit heard

## 2019-02-14 NOTE — HISTORY OF PRESENT ILLNESS
[FreeTextEntry1] : Patient is an 87 year old woman with a history of HTN, arthritis, hyperlipidemia, anxiety, paroxysmal atrial fibrillation, moderate aortic stenosis, and mitral regurgitation who presents today for follow up of atrial fibrillation and HTN. She states that she has been feeling relatively well from the cardiac standpoint denying any chest pain, dyspnea at rest, palpitations, headaches, PND, lower extremity edema, abdominal bloating, and dizziness. Her major complaint at this time is that she has been experiencing urinary urgency.

## 2019-02-14 NOTE — DISCUSSION/SUMMARY
[FreeTextEntry1] : IMPRESSION: Ms. GREWAL is a 87 year-old woman with a history of HTN, arthritis, moderate aortic stenosis, mitral regurgitation, hyperlipidemia, anxiety, and paroxysmal atrial fibrillation status post cardioversion on 5/4/2018 who presents today for follow up of atrial fibrillation and HTN.\par \par PLAN:\par 1. She is currently in sinus rhythm and is feeling well. She will continue on Amiodarone 100mg daily for maintenance of sinus rhythm. She will also continue on metoprolol 100 mg in the AM and 50mg in the evening for rate control. She will continue on Eliquis 5mg twice daily for systemic anticoagulation of her atrial fibrillation. I will be checking a TSH and LFTs given that she is on Amiodarone.\par 2.  There are no signs of heart failure on exam today. Her blood pressure is mildly elevated today, however, acceptable for now. She is also not very keen on taking more or higher dosages of medications. She will continue on Lasix 20mg daily, Metoprolol, and Amlodipine 5 mg daily.\par 3. I have asked her to schedule an echocardiogram to follow up her aortic stenosis and mitral regurgitation.\par 4. She will also see Pulmonary as well as have annual eye exams given to potential long term toxicities.\par 5. She will follow up with me in 6 weeks for follow up her blood pressure.

## 2019-02-18 ENCOUNTER — RX RENEWAL (OUTPATIENT)
Age: 84
End: 2019-02-18

## 2019-02-25 ENCOUNTER — APPOINTMENT (OUTPATIENT)
Dept: UROLOGY | Facility: CLINIC | Age: 84
End: 2019-02-25
Payer: MEDICARE

## 2019-02-25 ENCOUNTER — RX RENEWAL (OUTPATIENT)
Age: 84
End: 2019-02-25

## 2019-02-25 DIAGNOSIS — R35.0 FREQUENCY OF MICTURITION: ICD-10-CM

## 2019-02-25 PROCEDURE — 99213 OFFICE O/P EST LOW 20 MIN: CPT

## 2019-02-25 NOTE — HISTORY OF PRESENT ILLNESS
[FreeTextEntry1] : 88yo female with cc of nocturia. Pt reports bother over many months with nighttime urination. She is waking up 3-4 times. Only going 2-3 times during the day. She endorses urge incontinence but this is mostly at night and less in the day. She wears pullups for this. Goes to bed 9:30. Eats dinner 6. Has half a can of pepsi in the evening. Has a coffee at 9:30/10. On lasix in am. Denies LE edema. Snores, unknown sleep apnea. Does not drink after dinner. No sensation of prolapse. Plan made to discontinue pepsi. She also had urine sent that showed UTI with EColi treated with bactrim. Pt returns today for follow-up. \par \par She had stopped pepsi in the evening and still getting up 2-4 times. The treatment of UTI has also not made a significant difference. \par \par Had MRI that showed B renal simple cysts. \par \par SOn helped with translation to Colombian.

## 2019-02-25 NOTE — ASSESSMENT
[FreeTextEntry1] : Isolated nocturia. Likely multifactorial. No significant benefit with lifestyle changes. \par --UA/UCx. will call with results\par --Cont D/c pm pepsi\par --Curb fluids 2h prior to bed (already doing)\par --Trial of 20mg qhs trospium. discussed r/b/a. \par

## 2019-02-25 NOTE — PHYSICAL EXAM
[General Appearance - Well Developed] : well developed [General Appearance - Well Nourished] : well nourished [General Appearance - In No Acute Distress] : no acute distress [Abdomen Soft] : soft [Abdomen Tenderness] : non-tender [Costovertebral Angle Tenderness] : no ~M costovertebral angle tenderness [Edema] : no peripheral edema [Exaggerated Use Of Accessory Muscles For Inspiration] : no accessory muscle use [Oriented To Time, Place, And Person] : oriented to person, place, and time [FreeTextEntry1] : slow steady gate with cane for assistance

## 2019-02-27 LAB
APPEARANCE: CLEAR
BACTERIA UR CULT: NORMAL
BACTERIA: NEGATIVE
BILIRUBIN URINE: NEGATIVE
BLOOD URINE: NEGATIVE
COLOR: YELLOW
GLUCOSE QUALITATIVE U: NEGATIVE
HYALINE CASTS: 0 /LPF
KETONES URINE: NEGATIVE
LEUKOCYTE ESTERASE URINE: NEGATIVE
MICROSCOPIC-UA: NORMAL
NITRITE URINE: NEGATIVE
PH URINE: 6
PROTEIN URINE: NEGATIVE
RED BLOOD CELLS URINE: 1 /HPF
SPECIFIC GRAVITY URINE: 1.01
SQUAMOUS EPITHELIAL CELLS: 0 /HPF
UROBILINOGEN URINE: NORMAL
WHITE BLOOD CELLS URINE: 1 /HPF

## 2019-03-20 ENCOUNTER — RX CHANGE (OUTPATIENT)
Age: 84
End: 2019-03-20

## 2019-04-03 ENCOUNTER — APPOINTMENT (OUTPATIENT)
Dept: CARDIOLOGY | Facility: CLINIC | Age: 84
End: 2019-04-03
Payer: MEDICARE

## 2019-04-03 ENCOUNTER — NON-APPOINTMENT (OUTPATIENT)
Age: 84
End: 2019-04-03

## 2019-04-03 VITALS
RESPIRATION RATE: 12 BRPM | DIASTOLIC BLOOD PRESSURE: 81 MMHG | BODY MASS INDEX: 26.5 KG/M2 | TEMPERATURE: 98 F | OXYGEN SATURATION: 98 % | HEIGHT: 62 IN | WEIGHT: 144 LBS | HEART RATE: 64 BPM | SYSTOLIC BLOOD PRESSURE: 151 MMHG

## 2019-04-03 VITALS — SYSTOLIC BLOOD PRESSURE: 142 MMHG | HEART RATE: 72 BPM | DIASTOLIC BLOOD PRESSURE: 70 MMHG

## 2019-04-03 PROCEDURE — 93000 ELECTROCARDIOGRAM COMPLETE: CPT

## 2019-04-03 PROCEDURE — 93306 TTE W/DOPPLER COMPLETE: CPT

## 2019-04-03 PROCEDURE — 99215 OFFICE O/P EST HI 40 MIN: CPT | Mod: 25

## 2019-04-08 ENCOUNTER — NON-APPOINTMENT (OUTPATIENT)
Age: 84
End: 2019-04-08

## 2019-04-08 NOTE — DISCUSSION/SUMMARY
[FreeTextEntry1] : IMPRESSION: Ms. GREWAL is an 87 year-old woman with a history of HTN, arthritis, aortic stenosis, mitral regurgitation, hyperlipidemia, anxiety, and paroxysmal atrial fibrillation status post cardioversion on 5/4/2018 who presents today for follow up of atrial fibrillation and HTN.\par \par PLAN:\par 1. She is currently in sinus rhythm and is feeling well. She will continue on Amiodarone 100mg daily for maintenance of sinus rhythm. She will also continue on metoprolol 100 mg in the AM and 50mg in the evening for rate control. She will continue on Eliquis 5mg twice daily for systemic anticoagulation of her atrial fibrillation. We will need to continue to follow her TSH and LFTs.\par 2.  There are no signs of heart failure on exam today. Her blood pressure is mildly elevated today, however, acceptable for now. She is also not very keen on taking more or higher dosages of medications. She will continue on Lasix 20mg daily, Metoprolol, and Amlodipine 5 mg daily.\par 3. She should have a repeat echocardiogram in about 6 months to follow up her aortic stenosis and mitral regurgitation.\par 4. She will also see Pulmonary as well as have annual eye exams given to potential long term toxicities on Amiodarone.\par 5. She will follow up with me in 3 months for follow up her blood pressure or sooner should she experience any symptoms in the interim.

## 2019-04-08 NOTE — PHYSICAL EXAM
[General Appearance - Well Developed] : well developed [Normal Appearance] : normal appearance [Well Groomed] : well groomed [General Appearance - Well Nourished] : well nourished [No Deformities] : no deformities [General Appearance - In No Acute Distress] : no acute distress [Normal Conjunctiva] : the conjunctiva exhibited no abnormalities [Normal Oral Mucosa] : normal oral mucosa [No Oral Pallor] : no oral pallor [No Oral Cyanosis] : no oral cyanosis [Normal Jugular Venous A Waves Present] : normal jugular venous A waves present [Normal Jugular Venous V Waves Present] : normal jugular venous V waves present [No Jugular Venous Oreilly A Waves] : no jugular venous oreilly A waves [Respiration, Rhythm And Depth] : normal respiratory rhythm and effort [Exaggerated Use Of Accessory Muscles For Inspiration] : no accessory muscle use [Auscultation Breath Sounds / Voice Sounds] : lungs were clear to auscultation bilaterally [Bowel Sounds] : normal bowel sounds [Abdomen Soft] : soft [Abdomen Tenderness] : non-tender [Nail Clubbing] : no clubbing of the fingernails [Cyanosis, Localized] : no localized cyanosis [Petechial Hemorrhages (___cm)] : no petechial hemorrhages [Skin Color & Pigmentation] : normal skin color and pigmentation [] : no rash [Skin Lesions] : no skin lesions [No Skin Ulcers] : no skin ulcer [Oriented To Time, Place, And Person] : oriented to person, place, and time [Affect] : the affect was normal [Mood] : the mood was normal [No Anxiety] : not feeling anxious [Rhythm Regular] : regular [Normal S1] : normal S1 [Normal S2] : normal S2 [III] : a grade 3 [II] : a grade 2 [No Pitting Edema] : no pitting edema present [FreeTextEntry1] : Her gait is slow. [Normal Rate] : normal [S3] : no S3 [Right Carotid Bruit] : no bruit heard over the right carotid [Left Carotid Bruit] : no bruit heard over the left carotid [Bruit] : no bruit heard

## 2019-05-08 ENCOUNTER — LABORATORY RESULT (OUTPATIENT)
Age: 84
End: 2019-05-08

## 2019-05-08 ENCOUNTER — APPOINTMENT (OUTPATIENT)
Dept: INTERNAL MEDICINE | Facility: CLINIC | Age: 84
End: 2019-05-08
Payer: MEDICARE

## 2019-05-08 VITALS
OXYGEN SATURATION: 98 % | TEMPERATURE: 98.1 F | SYSTOLIC BLOOD PRESSURE: 145 MMHG | BODY MASS INDEX: 26.81 KG/M2 | DIASTOLIC BLOOD PRESSURE: 77 MMHG | HEIGHT: 61.02 IN | WEIGHT: 141.97 LBS | HEART RATE: 61 BPM

## 2019-05-08 PROCEDURE — 81003 URINALYSIS AUTO W/O SCOPE: CPT | Mod: QW

## 2019-05-08 PROCEDURE — 36415 COLL VENOUS BLD VENIPUNCTURE: CPT

## 2019-05-08 PROCEDURE — 99214 OFFICE O/P EST MOD 30 MIN: CPT | Mod: 25

## 2019-05-08 RX ORDER — SULFAMETHOXAZOLE AND TRIMETHOPRIM 800; 160 MG/1; MG/1
800-160 TABLET ORAL
Qty: 14 | Refills: 0 | Status: DISCONTINUED | COMMUNITY
Start: 2019-01-22 | End: 2019-05-08

## 2019-05-09 NOTE — HISTORY OF PRESENT ILLNESS
[FreeTextEntry1] : Patient presents for chronic disease management [de-identified] : Patient presents for complete disease management. Patient denies any chest pain chest tightness or shortness of breath. Patient denies any wheezing or cough. Patient states that her urinary urgency has improved. Patient has made some last modifications including limiting Pepsi at night. Patient states that she has right-sided abdominal pain for the past week. Patient states that her worse on palpation. Patient denies any urinary complaints nausea or vomiting.

## 2019-05-09 NOTE — PHYSICAL EXAM
[Well Nourished] : well nourished [No Acute Distress] : no acute distress [Well-Appearing] : well-appearing [Normal Sclera/Conjunctiva] : normal sclera/conjunctiva [Well Developed] : well developed [EOMI] : extraocular movements intact [PERRL] : pupils equal round and reactive to light [Normal Outer Ear/Nose] : the outer ears and nose were normal in appearance [Normal Oropharynx] : the oropharynx was normal [Normal TMs] : both tympanic membranes were normal [No JVD] : no jugular venous distention [Supple] : supple [Normal Nasal Mucosa] : the nasal mucosa was normal [No Respiratory Distress] : no respiratory distress  [Thyroid Normal, No Nodules] : the thyroid was normal and there were no nodules present [No Lymphadenopathy] : no lymphadenopathy [Clear to Auscultation] : lungs were clear to auscultation bilaterally [No Accessory Muscle Use] : no accessory muscle use [Regular Rhythm] : with a regular rhythm [Normal Rate] : normal rate  [Normal S1, S2] : normal S1 and S2 [No Carotid Bruits] : no carotid bruits [No Murmur] : no murmur heard [No Varicosities] : no varicosities [No Abdominal Bruit] : a ~M bruit was not heard ~T in the abdomen [No Edema] : there was no peripheral edema [No Extremity Clubbing/Cyanosis] : no extremity clubbing/cyanosis [Pedal Pulses Present] : the pedal pulses are present [No Palpable Aorta] : no palpable aorta [Normal Appearance] : normal in appearance [Soft] : abdomen soft [No Nipple Discharge] : no nipple discharge [No Axillary Lymphadenopathy] : no axillary lymphadenopathy [Non-distended] : non-distended [Non Tender] : non-tender [No Masses] : no abdominal mass palpated [No HSM] : no HSM [Normal Bowel Sounds] : normal bowel sounds [Normal Supraclavicular Nodes] : no supraclavicular lymphadenopathy [Normal Posterior Cervical Nodes] : no posterior cervical lymphadenopathy [Normal Axillary Nodes] : no axillary lymphadenopathy [Normal Inguinal Nodes] : no inguinal lymphadenopathy [No CVA Tenderness] : no CVA  tenderness [Normal Anterior Cervical Nodes] : no anterior cervical lymphadenopathy [No Spinal Tenderness] : no spinal tenderness [Grossly Normal Strength/Tone] : grossly normal strength/tone [No Joint Swelling] : no joint swelling [Normal Affect] : the affect was normal [Coordination Grossly Intact] : coordination grossly intact [No Focal Deficits] : no focal deficits [Normal Insight/Judgement] : insight and judgment were intact [de-identified] : Carnett's sign negative [de-identified] : Unsteady gait

## 2019-05-09 NOTE — REVIEW OF SYSTEMS
[Incontinence] : incontinence [Frequency] : frequency [Negative] : Heme/Lymph [Dysuria] : no dysuria [Poor Libido] : libido not poor [Nocturia] : no nocturia [Dysmenorrhea] : no dysmenorrhea [Vaginal Discharge] : no vaginal discharge [Hematuria] : no hematuria

## 2019-05-09 NOTE — ASSESSMENT
[FreeTextEntry1] : 1) CHF: Patient clinically stable, patient denies any orthopnea paroxysmal nocturnal dyspnea or lower extremity swelling. \par 2) Paroxysmal atrial fibrillation: Patient on anticoagulation and heart rate stable\par 3) Hypertension: Repeat blood pressure 140/68. Patient to continue current management advised low-salt diet.\par 4) Right lower quadrant pain: Hip exam within normal limits abdomen soft and nontender to palpation. Advise patient to get urine sample to rule out blood in the urine for renal calculus. Patient unable to give blood advised to return to office with urine sample. Advised to call if symptoms are worsening\par 5) AS: currently asymptomatic patient follows with cardiologist\par 6) Pancreatic cyst: Patient to followup MRI in November of 2019. Patient and son aware.\par 7) HLD: check lipid panel\par 8) Urinary urgency: Improved, patient to take prunes daily to help with constipation.\par 9) On amiodarone therapy: Check TFT's LFT's. Referral placed for pulmonology.

## 2019-05-12 ENCOUNTER — MESSAGE (OUTPATIENT)
Age: 84
End: 2019-05-12

## 2019-05-12 LAB
25(OH)D3 SERPL-MCNC: 38.7 NG/ML
ALBUMIN SERPL ELPH-MCNC: 4.5 G/DL
ALP BLD-CCNC: 79 U/L
ALT SERPL-CCNC: 10 U/L
ANION GAP SERPL CALC-SCNC: 12 MMOL/L
AST SERPL-CCNC: 19 U/L
BASOPHILS # BLD AUTO: 0.03 K/UL
BASOPHILS NFR BLD AUTO: 0.5 %
BILIRUB SERPL-MCNC: 0.3 MG/DL
BUN SERPL-MCNC: 16 MG/DL
CALCIUM SERPL-MCNC: 9.6 MG/DL
CHLORIDE SERPL-SCNC: 101 MMOL/L
CHOLEST SERPL-MCNC: 190 MG/DL
CHOLEST/HDLC SERPL: 3.3 RATIO
CO2 SERPL-SCNC: 27 MMOL/L
CREAT SERPL-MCNC: 0.75 MG/DL
EOSINOPHIL # BLD AUTO: 0.08 K/UL
EOSINOPHIL NFR BLD AUTO: 1.2 %
ESTIMATED AVERAGE GLUCOSE: 126 MG/DL
GLUCOSE SERPL-MCNC: 89 MG/DL
HBA1C MFR BLD HPLC: 6 %
HCT VFR BLD CALC: 36.8 %
HDLC SERPL-MCNC: 58 MG/DL
HGB BLD-MCNC: 10.2 G/DL
IMM GRANULOCYTES NFR BLD AUTO: 0.2 %
LDLC SERPL CALC-MCNC: 112 MG/DL
LYMPHOCYTES # BLD AUTO: 1.47 K/UL
LYMPHOCYTES NFR BLD AUTO: 22.9 %
MAN DIFF?: NORMAL
MCHC RBC-ENTMCNC: 24.1 PG
MCHC RBC-ENTMCNC: 27.7 GM/DL
MCV RBC AUTO: 86.8 FL
MONOCYTES # BLD AUTO: 0.57 K/UL
MONOCYTES NFR BLD AUTO: 8.9 %
NEUTROPHILS # BLD AUTO: 4.26 K/UL
NEUTROPHILS NFR BLD AUTO: 66.3 %
PLATELET # BLD AUTO: 296 K/UL
POTASSIUM SERPL-SCNC: 4.2 MMOL/L
PROT SERPL-MCNC: 7.4 G/DL
RBC # BLD: 4.24 M/UL
RBC # FLD: 17.2 %
SODIUM SERPL-SCNC: 140 MMOL/L
TRIGL SERPL-MCNC: 101 MG/DL
TSH SERPL-ACNC: 0.87 UIU/ML
WBC # FLD AUTO: 6.42 K/UL

## 2019-05-13 ENCOUNTER — RX RENEWAL (OUTPATIENT)
Age: 84
End: 2019-05-13

## 2019-05-14 ENCOUNTER — APPOINTMENT (OUTPATIENT)
Dept: PULMONOLOGY | Facility: CLINIC | Age: 84
End: 2019-05-14
Payer: MEDICARE

## 2019-05-14 ENCOUNTER — OTHER (OUTPATIENT)
Age: 84
End: 2019-05-14

## 2019-05-14 VITALS
DIASTOLIC BLOOD PRESSURE: 82 MMHG | BODY MASS INDEX: 27.38 KG/M2 | SYSTOLIC BLOOD PRESSURE: 156 MMHG | OXYGEN SATURATION: 98 % | HEART RATE: 60 BPM | HEIGHT: 61 IN | WEIGHT: 145 LBS

## 2019-05-14 DIAGNOSIS — R10.31 RIGHT LOWER QUADRANT PAIN: ICD-10-CM

## 2019-05-14 DIAGNOSIS — Z00.8 ENCOUNTER FOR OTHER GENERAL EXAMINATION: ICD-10-CM

## 2019-05-14 DIAGNOSIS — Z92.89 PERSONAL HISTORY OF OTHER MEDICAL TREATMENT: ICD-10-CM

## 2019-05-14 PROCEDURE — 94729 DIFFUSING CAPACITY: CPT

## 2019-05-14 PROCEDURE — 99213 OFFICE O/P EST LOW 20 MIN: CPT | Mod: 25

## 2019-05-14 PROCEDURE — 99203 OFFICE O/P NEW LOW 30 MIN: CPT | Mod: 25

## 2019-05-14 PROCEDURE — 94727 GAS DIL/WSHOT DETER LNG VOL: CPT

## 2019-05-14 PROCEDURE — 94060 EVALUATION OF WHEEZING: CPT

## 2019-05-15 ENCOUNTER — MESSAGE (OUTPATIENT)
Age: 84
End: 2019-05-15

## 2019-05-15 LAB
APPEARANCE: CLEAR
APPEARANCE: CLEAR
BACTERIA: NEGATIVE
BILIRUBIN URINE: NEGATIVE
BILIRUBIN URINE: NEGATIVE
BLOOD URINE: NEGATIVE
BLOOD URINE: NEGATIVE
COLOR: NORMAL
COLOR: NORMAL
GLUCOSE QUALITATIVE U: NEGATIVE
GLUCOSE QUALITATIVE U: NEGATIVE
HYALINE CASTS: 1 /LPF
KETONES URINE: NEGATIVE
KETONES URINE: NEGATIVE
LEUKOCYTE ESTERASE URINE: NEGATIVE
LEUKOCYTE ESTERASE URINE: NEGATIVE
MICROSCOPIC-UA: NORMAL
NITRITE URINE: NEGATIVE
NITRITE URINE: NEGATIVE
PH URINE: 7.5
PH URINE: 7.5
PROTEIN URINE: NEGATIVE
PROTEIN URINE: NEGATIVE
RED BLOOD CELLS URINE: 1 /HPF
SPECIFIC GRAVITY URINE: 1.01
SPECIFIC GRAVITY URINE: 1.01
SQUAMOUS EPITHELIAL CELLS: 0 /HPF
UROBILINOGEN URINE: NORMAL
UROBILINOGEN URINE: NORMAL
WHITE BLOOD CELLS URINE: 1 /HPF

## 2019-05-19 PROBLEM — Z00.8 ENCOUNTER FOR PULMONARY FUNCTION TESTING: Status: ACTIVE | Noted: 2019-05-19

## 2019-05-19 PROBLEM — Z92.89 HISTORY OF PULMONARY FUNCTION TESTS: Status: RESOLVED | Noted: 2019-05-19 | Resolved: 2019-05-19

## 2019-05-19 NOTE — PHYSICAL EXAM
[General Appearance - Well Developed] : well developed [Normal Appearance] : normal appearance [Well Groomed] : well groomed [General Appearance - Well Nourished] : well nourished [General Appearance - In No Acute Distress] : no acute distress [No Deformities] : no deformities [Normal Conjunctiva] : the conjunctiva exhibited no abnormalities [Eyelids - No Xanthelasma] : the eyelids demonstrated no xanthelasmas [Neck Appearance] : the appearance of the neck was normal [Normal Oropharynx] : normal oropharynx [Jugular Venous Distention Increased] : there was no jugular-venous distention [Neck Cervical Mass (___cm)] : no neck mass was observed [Thyroid Diffuse Enlargement] : the thyroid was not enlarged [Thyroid Nodule] : there were no palpable thyroid nodules [Heart Sounds] : normal S1 and S2 [Heart Rate And Rhythm] : heart rate and rhythm were normal [Irregularly Irregular] : the rhythm was irregularly irregular [Systolic grade ___/6] : A grade [unfilled]/6 systolic murmur was heard. [Respiration, Rhythm And Depth] : normal respiratory rhythm and effort [Exaggerated Use Of Accessory Muscles For Inspiration] : no accessory muscle use [Auscultation Breath Sounds / Voice Sounds] : lungs were clear to auscultation bilaterally [Abdomen Soft] : soft [Abdomen Tenderness] : non-tender [Abdomen Mass (___ Cm)] : no abdominal mass palpated [Abnormal Walk] : normal gait [Gait - Sufficient For Exercise Testing] : the gait was sufficient for exercise testing [Nail Clubbing] : no clubbing of the fingernails [Cyanosis, Localized] : no localized cyanosis [Petechial Hemorrhages (___cm)] : no petechial hemorrhages [Skin Turgor] : normal skin turgor [Skin Color & Pigmentation] : normal skin color and pigmentation [No Focal Deficits] : no focal deficits [] : no rash [Impaired Insight] : insight and judgment were intact [Oriented To Time, Place, And Person] : oriented to person, place, and time [Affect] : the affect was normal

## 2019-05-19 NOTE — REVIEW OF SYSTEMS
[Hypertension] : ~T hypertension [Dysrhythmia] : dysrhythmia [Heartburn] : heartburn [Reflux] : reflux [Negative] : Pulmonary Hypertension

## 2019-05-19 NOTE — CONSULT LETTER
[Dear  ___] : Dear  [unfilled], [Please see my note below.] : Please see my note below. [Courtesy Letter:] : I had the pleasure of seeing your patient, [unfilled], in my office today. [Sincerely,] : Sincerely, [Consult Closing:] : Thank you very much for allowing me to participate in the care of this patient.  If you have any questions, please do not hesitate to contact me. [DrBrigid  ___] : Dr. LAYTON

## 2019-05-19 NOTE — HISTORY OF PRESENT ILLNESS
[FreeTextEntry1] : 86 yo female with hx of Afib on amiodarone, presents for pulmonary evaluation. The patient denies cough, PRASAD, chest pain or hemoptysis.

## 2019-05-19 NOTE — DISCUSSION/SUMMARY
[FreeTextEntry1] : 88 yo female with stable pulmonary exam. PFTs with  normal diffusion despite limited effort and amiodarone use. The patient is to follow up with her PMD and cardiologist as before. PFT will be repeated in the future.

## 2019-05-24 ENCOUNTER — RX RENEWAL (OUTPATIENT)
Age: 84
End: 2019-05-24

## 2019-06-11 ENCOUNTER — RX RENEWAL (OUTPATIENT)
Age: 84
End: 2019-06-11

## 2019-06-25 ENCOUNTER — RX RENEWAL (OUTPATIENT)
Age: 84
End: 2019-06-25

## 2019-07-16 ENCOUNTER — RX RENEWAL (OUTPATIENT)
Age: 84
End: 2019-07-16

## 2019-07-18 ENCOUNTER — APPOINTMENT (OUTPATIENT)
Dept: UROLOGY | Facility: CLINIC | Age: 84
End: 2019-07-18

## 2019-07-29 ENCOUNTER — APPOINTMENT (OUTPATIENT)
Dept: CARDIOLOGY | Facility: CLINIC | Age: 84
End: 2019-07-29
Payer: MEDICARE

## 2019-07-29 ENCOUNTER — NON-APPOINTMENT (OUTPATIENT)
Age: 84
End: 2019-07-29

## 2019-07-29 VITALS
OXYGEN SATURATION: 96 % | HEIGHT: 61 IN | BODY MASS INDEX: 28.51 KG/M2 | SYSTOLIC BLOOD PRESSURE: 158 MMHG | DIASTOLIC BLOOD PRESSURE: 80 MMHG | RESPIRATION RATE: 12 BRPM | TEMPERATURE: 98 F | HEART RATE: 62 BPM | WEIGHT: 151 LBS

## 2019-07-29 VITALS — SYSTOLIC BLOOD PRESSURE: 136 MMHG | DIASTOLIC BLOOD PRESSURE: 72 MMHG

## 2019-07-29 PROCEDURE — 99215 OFFICE O/P EST HI 40 MIN: CPT | Mod: 25

## 2019-07-29 PROCEDURE — 93000 ELECTROCARDIOGRAM COMPLETE: CPT

## 2019-07-30 NOTE — PHYSICAL EXAM
[General Appearance - Well Developed] : well developed [Normal Appearance] : normal appearance [No Deformities] : no deformities [General Appearance - Well Nourished] : well nourished [Well Groomed] : well groomed [Normal Conjunctiva] : the conjunctiva exhibited no abnormalities [General Appearance - In No Acute Distress] : no acute distress [No Oral Pallor] : no oral pallor [Normal Oral Mucosa] : normal oral mucosa [No Oral Cyanosis] : no oral cyanosis [Normal Jugular Venous A Waves Present] : normal jugular venous A waves present [No Jugular Venous Oreilly A Waves] : no jugular venous oreilly A waves [Normal Jugular Venous V Waves Present] : normal jugular venous V waves present [Exaggerated Use Of Accessory Muscles For Inspiration] : no accessory muscle use [Respiration, Rhythm And Depth] : normal respiratory rhythm and effort [Abdomen Soft] : soft [Bowel Sounds] : normal bowel sounds [Auscultation Breath Sounds / Voice Sounds] : lungs were clear to auscultation bilaterally [Abdomen Tenderness] : non-tender [Nail Clubbing] : no clubbing of the fingernails [Cyanosis, Localized] : no localized cyanosis [Petechial Hemorrhages (___cm)] : no petechial hemorrhages [Skin Color & Pigmentation] : normal skin color and pigmentation [] : no rash [No Skin Ulcers] : no skin ulcer [Oriented To Time, Place, And Person] : oriented to person, place, and time [Skin Lesions] : no skin lesions [Mood] : the mood was normal [Affect] : the affect was normal [No Anxiety] : not feeling anxious [Normal Rate] : normal [Rhythm Regular] : regular [Normal S1] : normal S1 [Normal S2] : normal S2 [III] : a grade 3 [II] : a grade 2 [No Pitting Edema] : no pitting edema present [FreeTextEntry1] : Her gait is slow. [S3] : no S3 [Right Carotid Bruit] : no bruit heard over the right carotid [Bruit] : no bruit heard [Left Carotid Bruit] : no bruit heard over the left carotid

## 2019-07-30 NOTE — DISCUSSION/SUMMARY
[FreeTextEntry1] : IMPRESSION: Ms. GREWAL is an 87 year-old woman with a history of HTN, arthritis, aortic stenosis, mitral regurgitation, hyperlipidemia, anxiety, and paroxysmal atrial fibrillation status post cardioversion on 5/4/2018 who presents today for follow up of atrial fibrillation and HTN.\par \par PLAN:\par 1. She is currently in sinus rhythm and is feeling well. She will continue on Amiodarone 100mg daily for maintenance of sinus rhythm. She will also continue on metoprolol 100 mg in the AM and 50mg in the evening for rate control. She will continue on Eliquis 5mg twice daily for systemic anticoagulation of her atrial fibrillation. We will need to continue to follow her TSH and LFTs. She will follow up with her Ophthalmologist and she was recently seen by Pulmonary.\par 2.  There are no signs of heart failure on exam today and her blood pressure is acceptable today. She feels that her weight gain is likely secondary to eating more snacks. I have not made any changes at this time and she will try to modify her diet. She will continue on Lasix 20mg daily, Metoprolol, and Amlodipine 5 mg daily.\par 3. She should have a repeat echocardiogram in about 6 months to follow up her aortic stenosis and mitral regurgitation.\par 4. She will follow up with me in 3 months for follow up her blood pressure or sooner should she experience any symptoms in the interim.

## 2019-07-30 NOTE — HISTORY OF PRESENT ILLNESS
[FreeTextEntry1] : Patient is an 87 year old woman with a history of HTN, arthritis, hyperlipidemia, anxiety, paroxysmal atrial fibrillation, aortic stenosis, and mitral regurgitation who presents today for follow up of atrial fibrillation and HTN. She states that she has been feeling relatively well from the cardiac standpoint denying any chest pain, dyspnea at rest, palpitations, headaches, PND, lower extremity edema, abdominal bloating, and dizziness. She has gained weight since her last visit with me which she attributes to having a better appetite.

## 2019-08-06 NOTE — H&P ADULT - NSCORESITESY/N_GEN_A_CORE_RD
No

## 2019-08-08 ENCOUNTER — RX RENEWAL (OUTPATIENT)
Age: 84
End: 2019-08-08

## 2019-08-08 ENCOUNTER — RX CHANGE (OUTPATIENT)
Age: 84
End: 2019-08-08

## 2019-08-12 ENCOUNTER — RX RENEWAL (OUTPATIENT)
Age: 84
End: 2019-08-12

## 2019-08-21 ENCOUNTER — RX RENEWAL (OUTPATIENT)
Age: 84
End: 2019-08-21

## 2019-09-09 ENCOUNTER — RX RENEWAL (OUTPATIENT)
Age: 84
End: 2019-09-09

## 2019-09-17 ENCOUNTER — RX RENEWAL (OUTPATIENT)
Age: 84
End: 2019-09-17

## 2019-09-25 ENCOUNTER — MEDICATION RENEWAL (OUTPATIENT)
Age: 84
End: 2019-09-25

## 2019-10-02 ENCOUNTER — RX RENEWAL (OUTPATIENT)
Age: 84
End: 2019-10-02

## 2019-10-13 NOTE — ED ADULT NURSE NOTE - CAS EDP DISCH DISPOSITION ADMI
[facility]  [address] [place] [address]  Phone: [phone]  Fax: [phone]  Follow Up Time:
[facility]

## 2019-10-30 ENCOUNTER — RX RENEWAL (OUTPATIENT)
Age: 84
End: 2019-10-30

## 2019-11-04 ENCOUNTER — RX RENEWAL (OUTPATIENT)
Age: 84
End: 2019-11-04

## 2019-11-11 ENCOUNTER — NON-APPOINTMENT (OUTPATIENT)
Age: 84
End: 2019-11-11

## 2019-11-11 ENCOUNTER — APPOINTMENT (OUTPATIENT)
Dept: CARDIOLOGY | Facility: CLINIC | Age: 84
End: 2019-11-11
Payer: MEDICARE

## 2019-11-11 VITALS
WEIGHT: 150 LBS | SYSTOLIC BLOOD PRESSURE: 138 MMHG | HEIGHT: 61 IN | TEMPERATURE: 98.6 F | RESPIRATION RATE: 12 BRPM | DIASTOLIC BLOOD PRESSURE: 74 MMHG | OXYGEN SATURATION: 98 % | HEART RATE: 64 BPM | BODY MASS INDEX: 28.32 KG/M2

## 2019-11-11 PROCEDURE — 93000 ELECTROCARDIOGRAM COMPLETE: CPT

## 2019-11-11 PROCEDURE — 99215 OFFICE O/P EST HI 40 MIN: CPT | Mod: 25

## 2019-11-11 NOTE — HISTORY OF PRESENT ILLNESS
[FreeTextEntry1] : Patient is an 87 year old woman with a history of HTN, arthritis, hyperlipidemia, anxiety, paroxysmal atrial fibrillation, aortic stenosis, and mitral regurgitation who presents today for follow up of atrial fibrillation and HTN. She states that she has been feeling relatively well from the cardiac standpoint denying any chest pain, dyspnea at rest, palpitations, headaches, PND, lower extremity edema, abdominal bloating, and dizziness. She had a mechanical fall 2 weeks ago as she did not have her walker. She denies any trauma to the head as she states that she fell on her right side.

## 2019-11-11 NOTE — PHYSICAL EXAM
[General Appearance - Well Developed] : well developed [Normal Appearance] : normal appearance [Well Groomed] : well groomed [General Appearance - Well Nourished] : well nourished [No Deformities] : no deformities [General Appearance - In No Acute Distress] : no acute distress [Normal Conjunctiva] : the conjunctiva exhibited no abnormalities [Normal Oral Mucosa] : normal oral mucosa [No Oral Pallor] : no oral pallor [No Oral Cyanosis] : no oral cyanosis [Normal Jugular Venous A Waves Present] : normal jugular venous A waves present [No Jugular Venous Oreilly A Waves] : no jugular venous oreilly A waves [Normal Jugular Venous V Waves Present] : normal jugular venous V waves present [Respiration, Rhythm And Depth] : normal respiratory rhythm and effort [Exaggerated Use Of Accessory Muscles For Inspiration] : no accessory muscle use [Auscultation Breath Sounds / Voice Sounds] : lungs were clear to auscultation bilaterally [Bowel Sounds] : normal bowel sounds [Abdomen Soft] : soft [Abdomen Tenderness] : non-tender [Nail Clubbing] : no clubbing of the fingernails [Cyanosis, Localized] : no localized cyanosis [Petechial Hemorrhages (___cm)] : no petechial hemorrhages [Skin Color & Pigmentation] : normal skin color and pigmentation [] : no rash [No Skin Ulcers] : no skin ulcer [Oriented To Time, Place, And Person] : oriented to person, place, and time [Affect] : the affect was normal [Mood] : the mood was normal [No Anxiety] : not feeling anxious [Normal Rate] : normal [Rhythm Regular] : regular [Normal S1] : normal S1 [Normal S2] : normal S2 [III] : a grade 3 [II] : a grade 2 [No Pitting Edema] : no pitting edema present [FreeTextEntry1] : Her gait is slow. [S3] : no S3 [Right Carotid Bruit] : no bruit heard over the right carotid [Left Carotid Bruit] : no bruit heard over the left carotid [Bruit] : no bruit heard

## 2019-11-11 NOTE — DISCUSSION/SUMMARY
[FreeTextEntry1] : IMPRESSION: Ms. GREWAL is an 87 year-old woman with a history of HTN, arthritis, aortic stenosis, mitral regurgitation, hyperlipidemia, anxiety, and paroxysmal atrial fibrillation status post cardioversion on 5/4/2018 who presents today for follow up of atrial fibrillation and HTN.\par \par PLAN:\par 1. She is currently in sinus rhythm and is feeling well. She will continue on Amiodarone 100mg daily for maintenance of sinus rhythm. She will also continue on metoprolol 100 mg in the AM and 50mg in the evening for rate control. She will continue on Eliquis 5mg twice daily for systemic anticoagulation of her atrial fibrillation. We will need to continue to follow her TSH and LFTs. She will follow up with her Ophthalmologist and she was seen by Pulmonary 6 months ago. I did not do blood work on her today, however, she should have TFTs and a CMP at the time of her visit with you later this week. \par 2.  There are no signs of heart failure on exam today and her blood pressure is acceptable today. She will continue on Lasix 20mg daily, Metoprolol, and Amlodipine 5 mg daily.\par 3. I have asked her to schedule an echocardiogram to follow up her aortic stenosis and mitral regurgitation at the time of her next visit.\par 4. She will follow up with me in 4 months for follow up her blood pressure or sooner should she experience any symptoms in the interim.

## 2019-11-13 ENCOUNTER — APPOINTMENT (OUTPATIENT)
Dept: INTERNAL MEDICINE | Facility: CLINIC | Age: 84
End: 2019-11-13
Payer: MEDICARE

## 2019-11-13 VITALS
DIASTOLIC BLOOD PRESSURE: 80 MMHG | HEART RATE: 63 BPM | TEMPERATURE: 98 F | WEIGHT: 151.99 LBS | SYSTOLIC BLOOD PRESSURE: 160 MMHG | BODY MASS INDEX: 29.07 KG/M2 | OXYGEN SATURATION: 98 % | RESPIRATION RATE: 141 BRPM | HEIGHT: 60.63 IN

## 2019-11-13 DIAGNOSIS — F32.9 MAJOR DEPRESSIVE DISORDER, SINGLE EPISODE, UNSPECIFIED: ICD-10-CM

## 2019-11-13 PROCEDURE — G0439: CPT | Mod: 25

## 2019-11-13 PROCEDURE — 90662 IIV NO PRSV INCREASED AG IM: CPT

## 2019-11-13 PROCEDURE — G0008: CPT

## 2019-11-13 PROCEDURE — G0444 DEPRESSION SCREEN ANNUAL: CPT

## 2019-11-13 PROCEDURE — 81003 URINALYSIS AUTO W/O SCOPE: CPT | Mod: QW

## 2019-11-13 PROCEDURE — 99214 OFFICE O/P EST MOD 30 MIN: CPT | Mod: 25

## 2019-11-13 PROCEDURE — 36415 COLL VENOUS BLD VENIPUNCTURE: CPT

## 2019-11-15 NOTE — HISTORY OF PRESENT ILLNESS
[FreeTextEntry1] : patient presents for Medicare annual wellness assessment chronic disease management [de-identified] : Overall patient feels well denies any chest pain chest tightness shortness of breath lightheadedness or dizziness. Patient had a fall that was mechanical. Follows with cardiologist. Nocturia has improved. Planning on following up with urologist

## 2019-11-15 NOTE — PHYSICAL EXAM
[Normal Appearance] : normal in appearance [No Nipple Discharge] : no nipple discharge [No Axillary Lymphadenopathy] : no axillary lymphadenopathy [Non Tender] : non-tender [Soft] : abdomen soft [Non-distended] : non-distended [No HSM] : no HSM [No Masses] : no abdominal mass palpated [Normal Bowel Sounds] : normal bowel sounds [Normal] : no rash [Normal Affect] : the affect was normal [No Focal Deficits] : no focal deficits [Normal Insight/Judgement] : insight and judgment were intact [de-identified] : Unsteady gait

## 2019-11-15 NOTE — COUNSELING
[Fall prevention counseling provided] : Fall prevention counseling provided [Adequate lighting] : Adequate lighting [No throw rugs] : No throw rugs [Use proper foot wear] : Use proper foot wear [FreeTextEntry1] : PT ordered

## 2019-11-15 NOTE — HEALTH RISK ASSESSMENT
[Good] : ~his/her~  mood as  good [One fall no injury in past year] : Patient reported one fall in the past year without injury [FreeTextEntry1] : Overall health maintenance [0] : 2) Feeling down, depressed, or hopeless: Not at all (0) [de-identified] : none [de-identified] : Dr. Shook, Dr. Yip [Change in mental status noted] : No change in mental status noted [Language] : denies difficulty with language [Learning/Retaining New Information] : denies difficulty learning/retaining new information [Behavior] : denies difficulty with behavior [Handling Complex Tasks] : denies difficulty handling complex tasks [Reasoning] : denies difficulty with reasoning [Spatial Ability and Orientation] : denies difficulty with spatial ability and orientation [Transportation] : transportation [With Family] : lives with family [Independent] : feeding [Some assistance needed] : managing medications [Full assistance needed] : managing finances [Reports changes in vision] : Reports no changes in vision [Reports changes in hearing] : Reports no changes in hearing [Reports changes in dental health] : Reports no changes in dental health [Reports normal functional visual acuity (ie: able to read med bottle)] : Reports normal functional visual acuity [Carbon Monoxide Detector] : carbon monoxide detector [Smoke Detector] : smoke detector [Guns at Home] : no guns at home [Safety elements used in home] : no safety elements used in home [Seat Belt] :  uses seat belt [Sunscreen] : uses sunscreen [Travel to Developing Areas] : does not  travel to developing areas [TB Exposure] : is not being exposed to tuberculosis [MammogramComments] : n/a [Caregiver Concerns] : does not have caregiver concerns [PapSmearComments] : n/a [BoneDensityComments] : n/a [AdvancecareDate] : 11/2019 [FreeTextEntry4] : health care proxy form given to patient

## 2019-11-15 NOTE — ASSESSMENT
[FreeTextEntry1] : 1) Medicare annual wellness visit: PT ordered for fall risk, influenza administered\par 2) Fall frequently: PT ordered, check Vitamin D levels\par 3) HTN: repeat 152/82, patient follows with cardiology, has not taken medications today\par 4) On amiodarone therapy: Check LFT's and TFT's\par 5) CHF: clinically euvolemic\par 6) Paroxysmal afib: continue current management, rate controlled\par 7) Nocturia: Improved, will f/u with urology, check UA\par 8) Depression: will increase sertraline to 100 mg, has felt slightly more depressed according to son\par 9) Pancreatic cyst: script given for MRI of the abdomen

## 2019-11-15 NOTE — REASON FOR VISIT
[Annual Wellness Visit] : an annual wellness visit [FreeTextEntry1] : Patient presents for Medicare annual wellness visit and chronic disease management

## 2019-11-17 LAB
25(OH)D3 SERPL-MCNC: 43.4 NG/ML
ALBUMIN SERPL ELPH-MCNC: 4.4 G/DL
ALP BLD-CCNC: 83 U/L
ALT SERPL-CCNC: 6 U/L
ANION GAP SERPL CALC-SCNC: 11 MMOL/L
AST SERPL-CCNC: 17 U/L
BASOPHILS # BLD AUTO: 0.04 K/UL
BASOPHILS NFR BLD AUTO: 0.7 %
BILIRUB SERPL-MCNC: 0.3 MG/DL
BUN SERPL-MCNC: 14 MG/DL
CALCIUM SERPL-MCNC: 9.7 MG/DL
CHLORIDE SERPL-SCNC: 103 MMOL/L
CHOLEST SERPL-MCNC: 179 MG/DL
CHOLEST/HDLC SERPL: 3.3 RATIO
CO2 SERPL-SCNC: 27 MMOL/L
CREAT SERPL-MCNC: 0.81 MG/DL
EOSINOPHIL # BLD AUTO: 0.09 K/UL
EOSINOPHIL NFR BLD AUTO: 1.6 %
ESTIMATED AVERAGE GLUCOSE: 120 MG/DL
GLUCOSE SERPL-MCNC: 86 MG/DL
HBA1C MFR BLD HPLC: 5.8 %
HCT VFR BLD CALC: 37 %
HDLC SERPL-MCNC: 55 MG/DL
HGB BLD-MCNC: 10.7 G/DL
IMM GRANULOCYTES NFR BLD AUTO: 0.4 %
LDLC SERPL CALC-MCNC: 106 MG/DL
LYMPHOCYTES # BLD AUTO: 1.41 K/UL
LYMPHOCYTES NFR BLD AUTO: 25.3 %
MAN DIFF?: NORMAL
MCHC RBC-ENTMCNC: 25.2 PG
MCHC RBC-ENTMCNC: 28.9 GM/DL
MCV RBC AUTO: 87.3 FL
MONOCYTES # BLD AUTO: 0.56 K/UL
MONOCYTES NFR BLD AUTO: 10.1 %
NEUTROPHILS # BLD AUTO: 3.45 K/UL
NEUTROPHILS NFR BLD AUTO: 61.9 %
PLATELET # BLD AUTO: 254 K/UL
POTASSIUM SERPL-SCNC: 4.6 MMOL/L
PROT SERPL-MCNC: 7.1 G/DL
RBC # BLD: 4.24 M/UL
RBC # FLD: 16.1 %
SODIUM SERPL-SCNC: 141 MMOL/L
TRIGL SERPL-MCNC: 90 MG/DL
TSH SERPL-ACNC: 0.7 UIU/ML
VIT B12 SERPL-MCNC: 1305 PG/ML
WBC # FLD AUTO: 5.57 K/UL

## 2019-11-19 ENCOUNTER — RX RENEWAL (OUTPATIENT)
Age: 84
End: 2019-11-19

## 2019-11-26 ENCOUNTER — RX RENEWAL (OUTPATIENT)
Age: 84
End: 2019-11-26

## 2019-12-03 ENCOUNTER — MEDICATION RENEWAL (OUTPATIENT)
Age: 84
End: 2019-12-03

## 2019-12-31 ENCOUNTER — RX RENEWAL (OUTPATIENT)
Age: 84
End: 2019-12-31

## 2020-01-27 ENCOUNTER — RX RENEWAL (OUTPATIENT)
Age: 85
End: 2020-01-27

## 2020-02-06 ENCOUNTER — APPOINTMENT (OUTPATIENT)
Dept: UROLOGY | Facility: CLINIC | Age: 85
End: 2020-02-06
Payer: MEDICARE

## 2020-02-06 VITALS
DIASTOLIC BLOOD PRESSURE: 70 MMHG | HEART RATE: 63 BPM | OXYGEN SATURATION: 95 % | SYSTOLIC BLOOD PRESSURE: 130 MMHG | BODY MASS INDEX: 29.26 KG/M2 | WEIGHT: 153 LBS

## 2020-02-06 PROCEDURE — 99213 OFFICE O/P EST LOW 20 MIN: CPT

## 2020-02-06 NOTE — HISTORY OF PRESENT ILLNESS
[FreeTextEntry1] : Ms. Iglesias is a 89yo f who presents for followup for urge incontinence.  Since last visit about a year ago, her symptoms are largely unchanged.  She has frequent episodes of urgency, which are worse at night.  She awakens 2-3 times per night with these symptoms, with frequent incontinent episodes.  She goes through 2-3 pads per  day.  She says she feels like she is not emptying completely, and often double voids.  She also needs to strain to urinate often.  The trospium prescribed at last visit is of minimal aid. She has had no dysuria or hematuria.\par \par She has had issues with constipation, and has been taking prune juice and stool softener.  She reports she is now defecating every day.  She has one cup of coffee a day.  She drinks water regularly throughout the day.\par \par PVR today is 66

## 2020-02-06 NOTE — ASSESSMENT
[FreeTextEntry1] : Based on her reports of straining and incomplete emptying, I performed a PVR and  exam today in office.  She is emptying well without signs of prolapse.  Based on her symptoms, I think she may benefit from a trial of a different agent at this time.  Given her age, I would not use medication with significant anticholinergic properties such as oxybutynin due to risk of delirium.  We discussed Myrbetriq as another possible agent.  We also discussed botox injections as a non-medical therapy.  After discussions with her and her son, we decided that we would avoid any major changes in her regimen at this time.  After reviewing her home medications, I advised that she should try taking her Lasix later in the day at lunch so it is less active at night.

## 2020-02-06 NOTE — PHYSICAL EXAM
[General Appearance - Well Developed] : well developed [General Appearance - Well Nourished] : well nourished [Well Groomed] : well groomed [General Appearance - In No Acute Distress] : no acute distress [Normal Appearance] : normal appearance [Edema] : no peripheral edema [Respiration, Rhythm And Depth] : normal respiratory rhythm and effort [Exaggerated Use Of Accessory Muscles For Inspiration] : no accessory muscle use [Abdomen Soft] : soft [Costovertebral Angle Tenderness] : no ~M costovertebral angle tenderness [Abdomen Tenderness] : non-tender [Urinary Bladder Findings] : the bladder was normal on palpation [Normal Station and Gait] : the gait and station were normal for the patient's age [] : no rash [No Focal Deficits] : no focal deficits [External Female Genitalia] : normal external genitalia [Vagina] : normal vaginal exam [Oriented To Time, Place, And Person] : oriented to person, place, and time [FreeTextEntry1] : No prolapse on  exam

## 2020-02-27 ENCOUNTER — APPOINTMENT (OUTPATIENT)
Dept: INTERNAL MEDICINE | Facility: CLINIC | Age: 85
End: 2020-02-27
Payer: MEDICARE

## 2020-02-27 VITALS
TEMPERATURE: 98.5 F | BODY MASS INDEX: 29.52 KG/M2 | WEIGHT: 154.32 LBS | SYSTOLIC BLOOD PRESSURE: 145 MMHG | DIASTOLIC BLOOD PRESSURE: 86 MMHG | HEART RATE: 67 BPM | HEIGHT: 60.63 IN | OXYGEN SATURATION: 97 %

## 2020-02-27 PROCEDURE — 36415 COLL VENOUS BLD VENIPUNCTURE: CPT

## 2020-02-27 PROCEDURE — 99214 OFFICE O/P EST MOD 30 MIN: CPT | Mod: 25

## 2020-02-28 NOTE — DISCHARGE NOTE ADULT - OTHER SIGNIFICANT FINDINGS
Upper Endoscopy (08.31.18 @ 16:57  Impression:          - Normal upper third of esophagus and middle third of esophagus.                       - Gastroesophageal junction polyp with oozing was found. Injected, resected                        and retrieved. Clips (MR conditional) were placed.                       - A few non-bleeding gastric polyps.                - Normal examined duodenum. None

## 2020-03-02 NOTE — ASSESSMENT
[FreeTextEntry1] : 1) HTN: increase amlodipine to 10 mg, will f/u with cardiology in the next few weeks\par 2) CHF: clinically euvolemic, continue current management\par 3) Paroxysmal afib: currently rate controlled\par 4) Hypovitaminosis D: check levels no fall at home\par 5) pancreatic cyst to f/u for annual MRI

## 2020-03-02 NOTE — HISTORY OF PRESENT ILLNESS
[de-identified] : Patient overall feels well, denies any chest pain, chest tightness or shortness of breath. Denies any falls, has been checking /160's at home. No orthopnea or PND.  [FreeTextEntry1] : Patient presents for f/u of chronic disease management

## 2020-03-03 LAB
25(OH)D3 SERPL-MCNC: 41.6 NG/ML
ALBUMIN SERPL ELPH-MCNC: 4.5 G/DL
ALP BLD-CCNC: 89 U/L
ALT SERPL-CCNC: 7 U/L
ANION GAP SERPL CALC-SCNC: 13 MMOL/L
AST SERPL-CCNC: 16 U/L
BASOPHILS # BLD AUTO: 0.02 K/UL
BASOPHILS NFR BLD AUTO: 0.3 %
BILIRUB SERPL-MCNC: <0.2 MG/DL
BUN SERPL-MCNC: 14 MG/DL
CALCIUM SERPL-MCNC: 9.4 MG/DL
CHLORIDE SERPL-SCNC: 102 MMOL/L
CHOLEST SERPL-MCNC: 185 MG/DL
CHOLEST/HDLC SERPL: 3.3 RATIO
CO2 SERPL-SCNC: 25 MMOL/L
CREAT SERPL-MCNC: 0.78 MG/DL
EOSINOPHIL # BLD AUTO: 0.14 K/UL
EOSINOPHIL NFR BLD AUTO: 2.3 %
ESTIMATED AVERAGE GLUCOSE: 123 MG/DL
GLUCOSE SERPL-MCNC: 89 MG/DL
HBA1C MFR BLD HPLC: 5.9 %
HCT VFR BLD CALC: 36.6 %
HDLC SERPL-MCNC: 56 MG/DL
HGB BLD-MCNC: 10.6 G/DL
IMM GRANULOCYTES NFR BLD AUTO: 0.2 %
LDLC SERPL CALC-MCNC: 105 MG/DL
LYMPHOCYTES # BLD AUTO: 1.45 K/UL
LYMPHOCYTES NFR BLD AUTO: 23.7 %
MAN DIFF?: NORMAL
MCHC RBC-ENTMCNC: 24.4 PG
MCHC RBC-ENTMCNC: 29 GM/DL
MCV RBC AUTO: 84.3 FL
MONOCYTES # BLD AUTO: 0.72 K/UL
MONOCYTES NFR BLD AUTO: 11.7 %
NEUTROPHILS # BLD AUTO: 3.79 K/UL
NEUTROPHILS NFR BLD AUTO: 61.8 %
PLATELET # BLD AUTO: 286 K/UL
POTASSIUM SERPL-SCNC: 4.3 MMOL/L
PROT SERPL-MCNC: 7 G/DL
RBC # BLD: 4.34 M/UL
RBC # FLD: 15.9 %
SODIUM SERPL-SCNC: 140 MMOL/L
TRIGL SERPL-MCNC: 122 MG/DL
TSH SERPL-ACNC: 1.04 UIU/ML
VIT B12 SERPL-MCNC: 1311 PG/ML
WBC # FLD AUTO: 6.13 K/UL

## 2020-03-11 ENCOUNTER — NON-APPOINTMENT (OUTPATIENT)
Age: 85
End: 2020-03-11

## 2020-03-11 ENCOUNTER — APPOINTMENT (OUTPATIENT)
Dept: CARDIOLOGY | Facility: CLINIC | Age: 85
End: 2020-03-11
Payer: MEDICARE

## 2020-03-11 VITALS
HEIGHT: 61 IN | OXYGEN SATURATION: 95 % | HEART RATE: 62 BPM | BODY MASS INDEX: 29.07 KG/M2 | WEIGHT: 154 LBS | SYSTOLIC BLOOD PRESSURE: 153 MMHG | TEMPERATURE: 98.3 F | RESPIRATION RATE: 12 BRPM | DIASTOLIC BLOOD PRESSURE: 79 MMHG

## 2020-03-11 VITALS — SYSTOLIC BLOOD PRESSURE: 134 MMHG | DIASTOLIC BLOOD PRESSURE: 68 MMHG

## 2020-03-11 PROCEDURE — 99214 OFFICE O/P EST MOD 30 MIN: CPT | Mod: 25

## 2020-03-11 PROCEDURE — 93000 ELECTROCARDIOGRAM COMPLETE: CPT

## 2020-03-15 NOTE — HISTORY OF PRESENT ILLNESS
[FreeTextEntry1] : Patient is an 88 year old woman with a history of HTN, arthritis, hyperlipidemia, anxiety, paroxysmal atrial fibrillation, aortic stenosis, and mitral regurgitation who presents today for follow up of atrial fibrillation and HTN. She states that she has been feeling relatively well from the cardiac standpoint denying any chest pain, dyspnea at rest, palpitations, headaches, PND, lower extremity edema, abdominal bloating, and dizziness. She states that she has tolerated the increase in dose of Amlodipine without developing any lower extremity edema.

## 2020-03-15 NOTE — DISCUSSION/SUMMARY
[FreeTextEntry1] : IMPRESSION: Ms. GREWAL is an 88 year-old woman with a history of HTN, arthritis, aortic stenosis, mitral regurgitation, hyperlipidemia, anxiety, and paroxysmal atrial fibrillation status post cardioversion on 5/4/2018 who presents today for follow up of atrial fibrillation and HTN.\par \par PLAN:\par 1. She is currently in sinus rhythm and is feeling well. She will continue on Amiodarone 100mg daily for maintenance of sinus rhythm. She will also continue on metoprolol 100 mg in the AM and 50mg in the evening for rate control. She will continue on Eliquis 5mg twice daily for systemic anticoagulation of her atrial fibrillation. We will need to continue to follow her TSH and LFTs. She will follow up with her Ophthalmologist and will need to see her Pulmonologist at least annually. Her most recent LFTs and TSH were normal.  \par 2.  There are no signs of heart failure on exam today and her blood pressure is acceptable today. She will continue on Lasix 20mg daily, Metoprolol, and Amlodipine 10 mg daily.\par 3. I have asked her to schedule an echocardiogram to follow up her aortic stenosis and mitral regurgitation at the time of her next visit.\par 4. She will follow up with me in 3-4 months for follow up her blood pressure or sooner should she experience any symptoms in the interim.

## 2020-03-15 NOTE — PHYSICAL EXAM
[General Appearance - Well Developed] : well developed [Normal Appearance] : normal appearance [Well Groomed] : well groomed [General Appearance - Well Nourished] : well nourished [No Deformities] : no deformities [General Appearance - In No Acute Distress] : no acute distress [Normal Conjunctiva] : the conjunctiva exhibited no abnormalities [Normal Oral Mucosa] : normal oral mucosa [No Oral Pallor] : no oral pallor [No Oral Cyanosis] : no oral cyanosis [Normal Jugular Venous A Waves Present] : normal jugular venous A waves present [Normal Jugular Venous V Waves Present] : normal jugular venous V waves present [No Jugular Venous Oreilly A Waves] : no jugular venous oreilly A waves [Respiration, Rhythm And Depth] : normal respiratory rhythm and effort [Exaggerated Use Of Accessory Muscles For Inspiration] : no accessory muscle use [Auscultation Breath Sounds / Voice Sounds] : lungs were clear to auscultation bilaterally [Bowel Sounds] : normal bowel sounds [Abdomen Soft] : soft [Abdomen Tenderness] : non-tender [Nail Clubbing] : no clubbing of the fingernails [Cyanosis, Localized] : no localized cyanosis [Petechial Hemorrhages (___cm)] : no petechial hemorrhages [Oriented To Time, Place, And Person] : oriented to person, place, and time [Affect] : the affect was normal [Mood] : the mood was normal [No Anxiety] : not feeling anxious [Normal Rate] : normal [Rhythm Regular] : regular [Normal S1] : normal S1 [Normal S2] : normal S2 [III] : a grade 3 [II] : a grade 2 [No Pitting Edema] : no pitting edema present [FreeTextEntry1] : Her gait is slow. [Skin Color & Pigmentation] : normal skin color and pigmentation [] : no rash [No Skin Ulcers] : no skin ulcer [S3] : no S3 [Right Carotid Bruit] : no bruit heard over the right carotid [Left Carotid Bruit] : no bruit heard over the left carotid [Bruit] : no bruit heard

## 2020-05-08 ENCOUNTER — RX RENEWAL (OUTPATIENT)
Age: 85
End: 2020-05-08

## 2020-05-22 ENCOUNTER — APPOINTMENT (OUTPATIENT)
Dept: UROLOGY | Facility: CLINIC | Age: 85
End: 2020-05-22

## 2020-05-29 ENCOUNTER — RX RENEWAL (OUTPATIENT)
Age: 85
End: 2020-05-29

## 2020-06-22 ENCOUNTER — APPOINTMENT (OUTPATIENT)
Dept: INTERNAL MEDICINE | Facility: CLINIC | Age: 85
End: 2020-06-22
Payer: MEDICARE

## 2020-06-22 ENCOUNTER — LABORATORY RESULT (OUTPATIENT)
Age: 85
End: 2020-06-22

## 2020-06-22 VITALS
WEIGHT: 152.32 LBS | HEIGHT: 60.24 IN | TEMPERATURE: 98.6 F | BODY MASS INDEX: 29.51 KG/M2 | OXYGEN SATURATION: 97 % | HEART RATE: 68 BPM | SYSTOLIC BLOOD PRESSURE: 147 MMHG | DIASTOLIC BLOOD PRESSURE: 78 MMHG

## 2020-06-22 VITALS — DIASTOLIC BLOOD PRESSURE: 80 MMHG | SYSTOLIC BLOOD PRESSURE: 142 MMHG

## 2020-06-22 DIAGNOSIS — N39.0 URINARY TRACT INFECTION, SITE NOT SPECIFIED: ICD-10-CM

## 2020-06-22 PROCEDURE — 36415 COLL VENOUS BLD VENIPUNCTURE: CPT

## 2020-06-22 PROCEDURE — 99214 OFFICE O/P EST MOD 30 MIN: CPT | Mod: 25

## 2020-06-24 LAB
APPEARANCE: CLEAR
BACTERIA: NEGATIVE
BILIRUBIN URINE: NEGATIVE
BLOOD URINE: NEGATIVE
COLOR: NORMAL
GLUCOSE QUALITATIVE U: NEGATIVE
HYALINE CASTS: 0 /LPF
KETONES URINE: NEGATIVE
LEUKOCYTE ESTERASE URINE: NEGATIVE
MICROSCOPIC-UA: NORMAL
NITRITE URINE: NEGATIVE
PH URINE: 6.5
PROTEIN URINE: NEGATIVE
RED BLOOD CELLS URINE: 3 /HPF
SPECIFIC GRAVITY URINE: 1.01
SQUAMOUS EPITHELIAL CELLS: 0 /HPF
UROBILINOGEN URINE: NORMAL
WHITE BLOOD CELLS URINE: 1 /HPF

## 2020-06-25 LAB
25(OH)D3 SERPL-MCNC: 39.2 NG/ML
ALBUMIN SERPL ELPH-MCNC: 4.2 G/DL
ALP BLD-CCNC: 78 U/L
ALT SERPL-CCNC: 6 U/L
ANION GAP SERPL CALC-SCNC: 11 MMOL/L
AST SERPL-CCNC: 16 U/L
BACTERIA UR CULT: NORMAL
BASOPHILS # BLD AUTO: 0.04 K/UL
BASOPHILS NFR BLD AUTO: 0.6 %
BILIRUB SERPL-MCNC: 0.2 MG/DL
BUN SERPL-MCNC: 11 MG/DL
CALCIUM SERPL-MCNC: 9.1 MG/DL
CHLORIDE SERPL-SCNC: 105 MMOL/L
CHOLEST SERPL-MCNC: 166 MG/DL
CHOLEST/HDLC SERPL: 3.5 RATIO
CO2 SERPL-SCNC: 26 MMOL/L
CREAT SERPL-MCNC: 0.75 MG/DL
EOSINOPHIL # BLD AUTO: 0.15 K/UL
EOSINOPHIL NFR BLD AUTO: 2.4 %
ESTIMATED AVERAGE GLUCOSE: 120 MG/DL
GLUCOSE SERPL-MCNC: 83 MG/DL
HBA1C MFR BLD HPLC: 5.8 %
HCT VFR BLD CALC: 36.1 %
HDLC SERPL-MCNC: 48 MG/DL
HGB BLD-MCNC: 9.9 G/DL
IMM GRANULOCYTES NFR BLD AUTO: 0.2 %
LDLC SERPL CALC-MCNC: 97 MG/DL
LYMPHOCYTES # BLD AUTO: 1.43 K/UL
LYMPHOCYTES NFR BLD AUTO: 22.9 %
MAN DIFF?: NORMAL
MCHC RBC-ENTMCNC: 24.3 PG
MCHC RBC-ENTMCNC: 27.4 GM/DL
MCV RBC AUTO: 88.7 FL
MONOCYTES # BLD AUTO: 0.72 K/UL
MONOCYTES NFR BLD AUTO: 11.5 %
NEUTROPHILS # BLD AUTO: 3.89 K/UL
NEUTROPHILS NFR BLD AUTO: 62.4 %
PLATELET # BLD AUTO: 217 K/UL
POTASSIUM SERPL-SCNC: 4.1 MMOL/L
PROT SERPL-MCNC: 6.5 G/DL
RBC # BLD: 4.07 M/UL
RBC # FLD: 16.8 %
SODIUM SERPL-SCNC: 141 MMOL/L
TRIGL SERPL-MCNC: 105 MG/DL
VIT B12 SERPL-MCNC: 1138 PG/ML
WBC # FLD AUTO: 6.24 K/UL

## 2020-06-25 NOTE — ASSESSMENT
[FreeTextEntry1] : #1 hypertension: Currently stable continue current management\par #2 aortic stenosis asymptomatic will have followup echocardiogram with cardiologist\par #3 congestive heart failure clinically euvolemic continue current management\par #4 hyperlipidemia: Check lipid panel advised to continue current management\par #5 amiodarone therapy: Check LFT's and TFT's, to also f/u with pulm\par #6 Low Vitamin D: check levels\par #7 Acute UTI: no symptoms, check UA and culture

## 2020-06-25 NOTE — HISTORY OF PRESENT ILLNESS
[FreeTextEntry1] : Patient presents for f/u of chronic disease management [de-identified] : Congestive heart failure and atrial fibrillation aortic stenosis: The patient denies any chest pain palpitations shortness of breath orthopnea. Patient denies any syncopal episodes or near syncope\par \par Acute UTI: Went to urgent care approximately one week ago, was found to have urinary tract infection took antibiotics currently denies any increased urgency dysuria hematuria or pain decreased appetite. According to son no increased confusion.

## 2020-06-25 NOTE — PHYSICAL EXAM
[No Masses] : no palpable masses [Normal Appearance] : normal in appearance [No Axillary Lymphadenopathy] : no axillary lymphadenopathy [No Nipple Discharge] : no nipple discharge [Coordination Grossly Intact] : coordination grossly intact [No Focal Deficits] : no focal deficits [de-identified] : Unsteady gait [Normal] : affect was normal and insight and judgment were intact

## 2020-06-30 ENCOUNTER — APPOINTMENT (OUTPATIENT)
Dept: CARDIOLOGY | Facility: CLINIC | Age: 85
End: 2020-06-30
Payer: MEDICARE

## 2020-06-30 ENCOUNTER — NON-APPOINTMENT (OUTPATIENT)
Age: 85
End: 2020-06-30

## 2020-06-30 VITALS
HEART RATE: 64 BPM | SYSTOLIC BLOOD PRESSURE: 144 MMHG | RESPIRATION RATE: 14 BRPM | WEIGHT: 152 LBS | DIASTOLIC BLOOD PRESSURE: 76 MMHG | BODY MASS INDEX: 29.45 KG/M2 | OXYGEN SATURATION: 94 % | HEIGHT: 60.24 IN

## 2020-06-30 VITALS — DIASTOLIC BLOOD PRESSURE: 70 MMHG | SYSTOLIC BLOOD PRESSURE: 136 MMHG

## 2020-06-30 PROCEDURE — 99215 OFFICE O/P EST HI 40 MIN: CPT | Mod: 25

## 2020-06-30 PROCEDURE — 93306 TTE W/DOPPLER COMPLETE: CPT

## 2020-06-30 PROCEDURE — 93000 ELECTROCARDIOGRAM COMPLETE: CPT

## 2020-07-05 NOTE — HISTORY OF PRESENT ILLNESS
[FreeTextEntry1] : Patient is an 88 year old woman with a history of HTN, arthritis, hyperlipidemia, anxiety, paroxysmal atrial fibrillation, aortic stenosis, and mitral regurgitation who presents today for follow up of atrial fibrillation and HTN. She states that she has been feeling relatively well from the cardiac standpoint denying any chest pain, dyspnea at rest, palpitations, headaches, PND, lower extremity edema, abdominal bloating, and dizziness.

## 2020-07-05 NOTE — PHYSICAL EXAM
[General Appearance - Well Developed] : well developed [Normal Appearance] : normal appearance [Well Groomed] : well groomed [General Appearance - Well Nourished] : well nourished [No Deformities] : no deformities [General Appearance - In No Acute Distress] : no acute distress [Normal Conjunctiva] : the conjunctiva exhibited no abnormalities [Normal Oral Mucosa] : normal oral mucosa [No Oral Pallor] : no oral pallor [Normal Jugular Venous A Waves Present] : normal jugular venous A waves present [No Oral Cyanosis] : no oral cyanosis [Normal Jugular Venous V Waves Present] : normal jugular venous V waves present [No Jugular Venous Oreilly A Waves] : no jugular venous oreilly A waves [Exaggerated Use Of Accessory Muscles For Inspiration] : no accessory muscle use [Respiration, Rhythm And Depth] : normal respiratory rhythm and effort [Auscultation Breath Sounds / Voice Sounds] : lungs were clear to auscultation bilaterally [Bowel Sounds] : normal bowel sounds [Abdomen Tenderness] : non-tender [Abdomen Soft] : soft [Petechial Hemorrhages (___cm)] : no petechial hemorrhages [Cyanosis, Localized] : no localized cyanosis [Nail Clubbing] : no clubbing of the fingernails [Skin Color & Pigmentation] : normal skin color and pigmentation [] : no rash [No Skin Ulcers] : no skin ulcer [Affect] : the affect was normal [Oriented To Time, Place, And Person] : oriented to person, place, and time [Mood] : the mood was normal [No Anxiety] : not feeling anxious [Normal Rate] : normal [Rhythm Regular] : regular [Normal S1] : normal S1 [Normal S2] : normal S2 [II] : a grade 2 [III] : a grade 3 [No Pitting Edema] : no pitting edema present [FreeTextEntry1] : Her gait is slow. [S3] : no S3 [Right Carotid Bruit] : no bruit heard over the right carotid [Bruit] : no bruit heard [Left Carotid Bruit] : no bruit heard over the left carotid

## 2020-07-05 NOTE — DISCUSSION/SUMMARY
[FreeTextEntry1] : IMPRESSION: Ms. GREWAL is an 88 year-old woman with a history of HTN, arthritis, aortic stenosis, mitral regurgitation, hyperlipidemia, anxiety, and paroxysmal atrial fibrillation status post cardioversion on 5/4/2018 who presents today for follow up of atrial fibrillation and HTN.\par \par PLAN:\par 1. She is currently in sinus rhythm and is feeling well. She will continue on Amiodarone 100mg daily for maintenance of sinus rhythm. She will also continue on metoprolol 100 mg in the AM and 50mg in the evening for rate control. She will continue on Eliquis 5mg twice daily for systemic anticoagulation of her atrial fibrillation. We will need to continue to follow her TSH and LFTs. She will follow up with her Ophthalmologist and will need to see her Pulmonologist at least annually. Her most recent LFTs and TSH were normal. She should have a TSH checked with her next blood test.  \par 2.  There are no signs of heart failure on exam today and her blood pressure is acceptable today. She will continue on Lasix 20mg daily, Metoprolol, and Amlodipine 10 mg daily.\par 3. She had an echocardiogram performed today that preliminarily revealed a preserved LVEF and moderate aortic stenosis. She should have a repeat echocardiogram in about 1 year.\par 4. She will follow up with me in 3-4 months for follow up her blood pressure or sooner should she experience any symptoms in the interim. \par 5. We will need to follow her Hgb as it has trended down, although her hematocrit is still within a normal range.\par 6. I spent approximately 40 minutes with the patient and her son and more than 50% of the time was spent discussing her medical regimen given her HTN and aortic stenosis.

## 2020-07-10 ENCOUNTER — APPOINTMENT (OUTPATIENT)
Dept: UROLOGY | Facility: CLINIC | Age: 85
End: 2020-07-10
Payer: MEDICARE

## 2020-07-10 VITALS — TEMPERATURE: 97.1 F

## 2020-07-10 VITALS — HEART RATE: 60 BPM | SYSTOLIC BLOOD PRESSURE: 108 MMHG | RESPIRATION RATE: 15 BRPM | DIASTOLIC BLOOD PRESSURE: 67 MMHG

## 2020-07-10 PROCEDURE — 99213 OFFICE O/P EST LOW 20 MIN: CPT

## 2020-07-10 NOTE — ASSESSMENT
[FreeTextEntry1] : Bothersome irritative urinary sx. Very mild PVR. \par --Consider trial of d/c trospium to see if it is having any benefit\par --Bowel regimen\par --Avoid strain!!!\par --Heating pad before bed. \par --Referral to Dr Raya for consideration of botox

## 2020-07-10 NOTE — HISTORY OF PRESENT ILLNESS
[FreeTextEntry1] : Ms. Iglesias is a 89yo f who presents for followup for urge incontinence.  Since last visit about a 6mo ago, her symptoms are largely unchanged.  She has frequent episodes of urgency, which are worse at night.  She awakens 2-3 times per night with these symptoms, with frequent incontinent episodes.  She goes through 2-3 pads per  day.  She says she feels like she is not emptying completely, and often double voids.  She also needs to strain to urinate often.  The trospium prescribed in the past. PVR in the past was 66cc and is exactly the same today. No evidence of prolapse on prior exam. She was counseled about options and elected observation. \par \par Still having some bother. Biggest bother is from the nighttime. She does have the urge incontinence and irritative voiding sx during the day. She feels she\par \par She has had issues with constipation, and has been taking prune juice and stool softener.  She reports she is now defecating every day.  She has one cup of coffee a day.  She drinks water regularly throughout the day.

## 2020-07-10 NOTE — PHYSICAL EXAM
[General Appearance - Well Developed] : well developed [General Appearance - Well Nourished] : well nourished [Normal Appearance] : normal appearance [Well Groomed] : well groomed [General Appearance - In No Acute Distress] : no acute distress [Abdomen Soft] : soft [Abdomen Tenderness] : non-tender [Costovertebral Angle Tenderness] : no ~M costovertebral angle tenderness [Edema] : no peripheral edema [] : no respiratory distress [Respiration, Rhythm And Depth] : normal respiratory rhythm and effort [Exaggerated Use Of Accessory Muscles For Inspiration] : no accessory muscle use [Oriented To Time, Place, And Person] : oriented to person, place, and time [FreeTextEntry1] : slow gait, uses cane

## 2020-07-31 ENCOUNTER — RX RENEWAL (OUTPATIENT)
Age: 85
End: 2020-07-31

## 2020-08-06 ENCOUNTER — APPOINTMENT (OUTPATIENT)
Dept: UROLOGY | Facility: CLINIC | Age: 85
End: 2020-08-06
Payer: MEDICARE

## 2020-08-06 VITALS — TEMPERATURE: 97.7 F

## 2020-08-06 DIAGNOSIS — R39.15 URGENCY OF URINATION: ICD-10-CM

## 2020-08-06 PROCEDURE — 99213 OFFICE O/P EST LOW 20 MIN: CPT

## 2020-08-06 RX ORDER — FUROSEMIDE 20 MG/1
20 TABLET ORAL
Refills: 0 | Status: DISCONTINUED | COMMUNITY
Start: 2018-04-19 | End: 2020-08-06

## 2020-08-06 RX ORDER — TROSPIUM CHLORIDE 20 MG/1
20 TABLET, FILM COATED ORAL TWICE DAILY
Qty: 180 | Refills: 3 | Status: COMPLETED | COMMUNITY
Start: 2019-02-25 | End: 2020-08-06

## 2020-08-06 NOTE — HISTORY OF PRESENT ILLNESS
[FreeTextEntry1] : 88 y.o G 2 P 2   2 woman with hx of bothersome nocturia x 3-4  with UUI requiring depends X 1 at night. Day time frequency  is 3-4  times .She stopped trospium a week ago due to constipation and feels her nocturia improved from 4 -5 times to 3-4 times. Fluids : 5 glasses of water , and 1 cup of coffee. Drinks close to bedtime .Fluids management - refrain from drinking 2-3  hours close to bedtime. \par PT and son not interested in Bladder Botox. RTO if urinary ss changes  for worse.

## 2020-08-06 NOTE — PHYSICAL EXAM
[General Appearance - Well Developed] : well developed [General Appearance - Well Nourished] : well nourished [Normal Appearance] : normal appearance [Well Groomed] : well groomed [General Appearance - In No Acute Distress] : no acute distress [Abdomen Soft] : soft [Abdomen Tenderness] : non-tender [Urinary Bladder Findings] : the bladder was normal on palpation [Costovertebral Angle Tenderness] : no ~M costovertebral angle tenderness [Edema] : no peripheral edema [FreeTextEntry1] :  deferred [] : no respiratory distress [Respiration, Rhythm And Depth] : normal respiratory rhythm and effort [Oriented To Time, Place, And Person] : oriented to person, place, and time [Exaggerated Use Of Accessory Muscles For Inspiration] : no accessory muscle use [Mood] : the mood was normal [Not Anxious] : not anxious [Affect] : the affect was normal [Normal Station and Gait] : the gait and station were normal for the patient's age

## 2020-08-06 NOTE — ASSESSMENT
[FreeTextEntry1] : \par \par Impression/Plan:88 y.o G 2 P 2   2 woman referred by Dr Yip , pt  with hx of bothersome nocturia x 3-4  with UUI requiring depends X 1 at night. Day time frequency  is 3-4  times .She stopped trospium a week ago due to constipation and feels her nocturia improved from 4 -5 times to 3-4 times. Fluids : 5 glasses of water , and 1 cup of coffee. Drinks close to bedtime .Fluids management - refrain from drinking 2-3  hours close to bedtime. \par PT and son not interested in Bladder Botox.\par 1. RTO if urinary ss changes  for worse. \par 2.Follow up with Dr Yip as directed.

## 2020-08-06 NOTE — PHYSICAL EXAM
[General Appearance - Well Developed] : well developed [General Appearance - Well Nourished] : well nourished [Normal Appearance] : normal appearance [Well Groomed] : well groomed [General Appearance - In No Acute Distress] : no acute distress [Abdomen Soft] : soft [Abdomen Tenderness] : non-tender [Urinary Bladder Findings] : the bladder was normal on palpation [Costovertebral Angle Tenderness] : no ~M costovertebral angle tenderness [Edema] : no peripheral edema [FreeTextEntry1] :  deferred [Respiration, Rhythm And Depth] : normal respiratory rhythm and effort [] : no respiratory distress [Exaggerated Use Of Accessory Muscles For Inspiration] : no accessory muscle use [Oriented To Time, Place, And Person] : oriented to person, place, and time [Mood] : the mood was normal [Affect] : the affect was normal [Not Anxious] : not anxious [Normal Station and Gait] : the gait and station were normal for the patient's age

## 2020-08-17 DIAGNOSIS — R29.6 REPEATED FALLS: ICD-10-CM

## 2020-08-20 ENCOUNTER — RX RENEWAL (OUTPATIENT)
Age: 85
End: 2020-08-20

## 2020-09-22 NOTE — ED PROVIDER NOTE - RATE
130 Myalgia Monitoring: I explained this is common when taking isotretinoin. If this worsens they will contact us.

## 2020-10-01 ENCOUNTER — MED ADMIN CHARGE (OUTPATIENT)
Age: 85
End: 2020-10-01

## 2020-10-01 ENCOUNTER — APPOINTMENT (OUTPATIENT)
Dept: CARDIOLOGY | Facility: CLINIC | Age: 85
End: 2020-10-01
Payer: MEDICARE

## 2020-10-01 ENCOUNTER — NON-APPOINTMENT (OUTPATIENT)
Age: 85
End: 2020-10-01

## 2020-10-01 VITALS
DIASTOLIC BLOOD PRESSURE: 79 MMHG | SYSTOLIC BLOOD PRESSURE: 158 MMHG | OXYGEN SATURATION: 96 % | WEIGHT: 154 LBS | HEART RATE: 63 BPM | HEIGHT: 60.24 IN | TEMPERATURE: 96.9 F | BODY MASS INDEX: 29.84 KG/M2

## 2020-10-01 VITALS — DIASTOLIC BLOOD PRESSURE: 76 MMHG | SYSTOLIC BLOOD PRESSURE: 136 MMHG

## 2020-10-01 PROCEDURE — 93000 ELECTROCARDIOGRAM COMPLETE: CPT

## 2020-10-01 PROCEDURE — 99214 OFFICE O/P EST MOD 30 MIN: CPT | Mod: 25

## 2020-10-12 ENCOUNTER — NON-APPOINTMENT (OUTPATIENT)
Age: 85
End: 2020-10-12

## 2020-10-12 NOTE — HISTORY OF PRESENT ILLNESS
[FreeTextEntry1] : Patient is an 88 year old woman with a history of HTN, arthritis, hyperlipidemia, anxiety, paroxysmal atrial fibrillation, aortic stenosis, and mitral regurgitation who presents today for follow up of atrial fibrillation and HTN. She states that she has been feeling relatively well from the cardiac standpoint denying any chest pain, dyspnea at rest, palpitations, headaches, PND, lower extremity edema, abdominal bloating, and dizziness. As per her son her weight has been stable at home.

## 2020-10-12 NOTE — DISCUSSION/SUMMARY
[FreeTextEntry1] : IMPRESSION: Ms. GREWAL is an 88 year-old woman with a history of HTN, arthritis, aortic stenosis, mitral regurgitation, hyperlipidemia, anxiety, and paroxysmal atrial fibrillation status post cardioversion on 5/4/2018 who presents today for follow up of atrial fibrillation and HTN.\par \par PLAN:\par 1. She is currently in sinus rhythm and is feeling well. She will continue on Amiodarone 100mg daily for maintenance of sinus rhythm. She will also continue on metoprolol 100 mg in the AM and 50mg in the evening for rate control. She will continue on Eliquis 5mg twice daily for systemic anticoagulation of her atrial fibrillation. We will need to continue to follow her TSH and LFTs. She will follow up with her Ophthalmologist and will need to see her Pulmonologist at least annually. Her most recent LFTs and TSH were normal. She should have a TSH checked with her next blood test.  She states that she will be following up with you within the next month for blood work.\par 2.  There are no signs of heart failure on exam and her blood pressure is acceptable today. She will continue on Lasix 20mg daily, Metoprolol, and Amlodipine 10 mg daily.\par 3. She should schedule an echocardiogram in about 9-12 months to follow up her moderate aortic stenosis.\par 4. She will follow up with me in 3-4 months for follow up her blood pressure or sooner should she experience any symptoms in the interim. \par 5. We will need to follow her Hgb as it has trended down, although her hematocrit is still within a normal range.\par 6. She got the flu shot today.

## 2020-10-12 NOTE — PHYSICAL EXAM
[General Appearance - Well Developed] : well developed [Normal Appearance] : normal appearance [Well Groomed] : well groomed [General Appearance - Well Nourished] : well nourished [No Deformities] : no deformities [General Appearance - In No Acute Distress] : no acute distress [Normal Conjunctiva] : the conjunctiva exhibited no abnormalities [Normal Oral Mucosa] : normal oral mucosa [No Oral Pallor] : no oral pallor [No Oral Cyanosis] : no oral cyanosis [Normal Jugular Venous A Waves Present] : normal jugular venous A waves present [Normal Jugular Venous V Waves Present] : normal jugular venous V waves present [Respiration, Rhythm And Depth] : normal respiratory rhythm and effort [Exaggerated Use Of Accessory Muscles For Inspiration] : no accessory muscle use [Auscultation Breath Sounds / Voice Sounds] : lungs were clear to auscultation bilaterally [Abdomen Soft] : soft [Abdomen Tenderness] : non-tender [Nail Clubbing] : no clubbing of the fingernails [Cyanosis, Localized] : no localized cyanosis [Petechial Hemorrhages (___cm)] : no petechial hemorrhages [Skin Color & Pigmentation] : normal skin color and pigmentation [] : no rash [Oriented To Time, Place, And Person] : oriented to person, place, and time [Affect] : the affect was normal [Mood] : the mood was normal [No Anxiety] : not feeling anxious [Normal Rate] : normal [Rhythm Regular] : regular [Normal S1] : normal S1 [Normal S2] : normal S2 [No Pitting Edema] : no pitting edema present [II] : a grade 2 [III] : a grade 3 [Bowel Sounds] : normal bowel sounds [S3] : no S3 [Right Carotid Bruit] : no bruit heard over the right carotid [Left Carotid Bruit] : no bruit heard over the left carotid [Bruit] : no bruit heard [FreeTextEntry1] : Her gait is slow. [No Skin Ulcers] : no skin ulcer

## 2020-10-23 ENCOUNTER — RX RENEWAL (OUTPATIENT)
Age: 85
End: 2020-10-23

## 2020-11-10 ENCOUNTER — RX RENEWAL (OUTPATIENT)
Age: 85
End: 2020-11-10

## 2020-11-16 ENCOUNTER — APPOINTMENT (OUTPATIENT)
Dept: INTERNAL MEDICINE | Facility: CLINIC | Age: 85
End: 2020-11-16
Payer: MEDICARE

## 2020-11-16 ENCOUNTER — RX RENEWAL (OUTPATIENT)
Age: 85
End: 2020-11-16

## 2020-11-16 VITALS
WEIGHT: 151.11 LBS | HEART RATE: 59 BPM | DIASTOLIC BLOOD PRESSURE: 72 MMHG | SYSTOLIC BLOOD PRESSURE: 151 MMHG | BODY MASS INDEX: 29.28 KG/M2 | HEIGHT: 60.24 IN | TEMPERATURE: 96.6 F | OXYGEN SATURATION: 97 %

## 2020-11-16 DIAGNOSIS — I48.91 UNSPECIFIED ATRIAL FIBRILLATION: ICD-10-CM

## 2020-11-16 PROCEDURE — 99214 OFFICE O/P EST MOD 30 MIN: CPT | Mod: 25

## 2020-11-16 PROCEDURE — 36415 COLL VENOUS BLD VENIPUNCTURE: CPT

## 2020-11-16 PROCEDURE — G0439: CPT

## 2020-11-19 NOTE — HISTORY OF PRESENT ILLNESS
[FreeTextEntry1] : Patient presents for subsequent Medicare annual wellness visit and chronic disease management [de-identified] : Stopped taking true sputum cannot find beneficial, denies any abdominal pain nausea vomiting palpitations lower extremity swelling and abdominal bloating. Denies any difficulty breathing when lying flat. Denies any unintentional weight loss

## 2020-11-19 NOTE — ED PROVIDER NOTE - CHPI ED SYMPTOMS NEG
----- Message from Radha Person sent at 11/19/2020 10:02 AM CST -----    ----- Message -----  From: Bonny Wick MD  Sent: 11/19/2020  10:00 AM CST  To: MARGARET Wick  Nurse Msg Pool      Saw urology DR Philippe [id number] , I put in a referral today to see Dr. Philippe for elevated PSA level 10.10  The more fruits and vegetables lean eat echo chicken fish turkey decrease red meat intake exercise like walking half an hour a day,loose weight  Had colonoscopy 2 years ago DR Santizo    
LM for pt to call back.   
Pt was notified of results / recommendations and verbalized understanding.   
no chills/no fever

## 2020-11-19 NOTE — PHYSICAL EXAM
[Normal Appearance] : normal in appearance [No Masses] : no palpable masses [No Nipple Discharge] : no nipple discharge [No Axillary Lymphadenopathy] : no axillary lymphadenopathy [Normal] : affect was normal and insight and judgment were intact [de-identified] : 2/6 sysolic murmur LUSB and RUSB

## 2020-11-19 NOTE — ASSESSMENT
[FreeTextEntry1] : Repeat blood pressure 142/80 prescription for MRI given her pancreatic cyst check thyroid function tests and liver function tests given amiodarone use ice followup with pulmonology clinically euvolemic on exam check CBC and iron levels, check lipid panel.

## 2020-11-19 NOTE — HEALTH RISK ASSESSMENT
[Fair] :  ~his/her~ mood as fair [FreeTextEntry1] : health maintenance [] : No [No] : No [No falls in past year] : Patient reported no falls in the past year [0] : 2) Feeling down, depressed, or hopeless: Not at all (0) [de-identified] : none [de-identified] : Dr. Shook. Dr. Yip [de-identified] : walks with cane [Change in mental status noted] : No change in mental status noted [Language] : denies difficulty with language [Behavior] : denies difficulty with behavior [Learning/Retaining New Information] : denies difficulty learning/retaining new information [Handling Complex Tasks] : denies difficulty handling complex tasks [Reasoning] : denies difficulty with reasoning [Spatial Ability and Orientation] : denies difficulty with spatial ability and orientation [Transportation] : transportation [With Family] : lives with family [Fully functional (bathing, dressing, toileting, transferring, walking, feeding)] : Fully functional (bathing, dressing, toileting, transferring, walking, feeding) [Fully functional (using the telephone, shopping, preparing meals, housekeeping, doing laundry, using] : Fully functional and needs no help or supervision to perform IADLs (using the telephone, shopping, preparing meals, housekeeping, doing laundry, using transportation, managing medications and managing finances) [Reports changes in hearing] : Reports no changes in hearing [Reports changes in vision] : Reports no changes in vision [Reports normal functional visual acuity (ie: able to read med bottle)] : Reports normal functional visual acuity [Reports changes in dental health] : Reports no changes in dental health [Smoke Detector] : smoke detector [Carbon Monoxide Detector] : carbon monoxide detector [Guns at Home] : no guns at home [Safety elements used in home] : safety elements used in home [Seat Belt] :  uses seat belt [Sunscreen] : uses sunscreen [Travel to Developing Areas] : does not  travel to developing areas [TB Exposure] : is not being exposed to tuberculosis [Caregiver Concerns] : does not have caregiver concerns [Reviewed no changes] : Reviewed no changes [AdvancecareDate] : 11/2020

## 2020-11-22 LAB
25(OH)D3 SERPL-MCNC: 40 NG/ML
ALBUMIN SERPL ELPH-MCNC: 4.5 G/DL
ALP BLD-CCNC: 90 U/L
ALT SERPL-CCNC: 8 U/L
ANION GAP SERPL CALC-SCNC: 11 MMOL/L
APPEARANCE: ABNORMAL
APPEARANCE: ABNORMAL
AST SERPL-CCNC: 16 U/L
BACTERIA: NEGATIVE
BASOPHILS # BLD AUTO: 0.05 K/UL
BASOPHILS NFR BLD AUTO: 0.9 %
BILIRUB SERPL-MCNC: 0.3 MG/DL
BILIRUBIN URINE: NEGATIVE
BILIRUBIN URINE: NEGATIVE
BLOOD URINE: NEGATIVE
BLOOD URINE: NEGATIVE
BUN SERPL-MCNC: 13 MG/DL
CALCIUM SERPL-MCNC: 9.5 MG/DL
CHLORIDE SERPL-SCNC: 102 MMOL/L
CHOLEST SERPL-MCNC: 181 MG/DL
CO2 SERPL-SCNC: 28 MMOL/L
COLOR: YELLOW
COLOR: YELLOW
CREAT SERPL-MCNC: 0.77 MG/DL
EOSINOPHIL # BLD AUTO: 0.08 K/UL
EOSINOPHIL NFR BLD AUTO: 1.5 %
ESTIMATED AVERAGE GLUCOSE: 126 MG/DL
FERRITIN SERPL-MCNC: 14 NG/ML
FOLATE SERPL-MCNC: >20 NG/ML
GLUCOSE QUALITATIVE U: NEGATIVE
GLUCOSE QUALITATIVE U: NEGATIVE
GLUCOSE SERPL-MCNC: 79 MG/DL
HBA1C MFR BLD HPLC: 6 %
HCT VFR BLD CALC: 37.3 %
HDLC SERPL-MCNC: 55 MG/DL
HGB BLD-MCNC: 10.4 G/DL
HYALINE CASTS: 0 /LPF
IMM GRANULOCYTES NFR BLD AUTO: 0.2 %
IRON SATN MFR SERPL: 9 %
IRON SERPL-MCNC: 32 UG/DL
KETONES URINE: NEGATIVE
KETONES URINE: NEGATIVE
LDLC SERPL CALC-MCNC: 107 MG/DL
LEUKOCYTE ESTERASE URINE: NEGATIVE
LEUKOCYTE ESTERASE URINE: NEGATIVE
LYMPHOCYTES # BLD AUTO: 1.2 K/UL
LYMPHOCYTES NFR BLD AUTO: 22.4 %
MAN DIFF?: NORMAL
MCHC RBC-ENTMCNC: 25 PG
MCHC RBC-ENTMCNC: 27.9 GM/DL
MCV RBC AUTO: 89.7 FL
MICROSCOPIC-UA: NORMAL
MONOCYTES # BLD AUTO: 0.47 K/UL
MONOCYTES NFR BLD AUTO: 8.8 %
NEUTROPHILS # BLD AUTO: 3.54 K/UL
NEUTROPHILS NFR BLD AUTO: 66.2 %
NITRITE URINE: NEGATIVE
NITRITE URINE: NEGATIVE
NONHDLC SERPL-MCNC: 125 MG/DL
PH URINE: 7.5
PH URINE: 7.5
PLATELET # BLD AUTO: 238 K/UL
POTASSIUM SERPL-SCNC: 4.4 MMOL/L
PROT SERPL-MCNC: 7.2 G/DL
PROTEIN URINE: NORMAL
PROTEIN URINE: NORMAL
RBC # BLD: 4.16 M/UL
RBC # FLD: 16.3 %
RED BLOOD CELLS URINE: 3 /HPF
SODIUM SERPL-SCNC: 141 MMOL/L
SPECIFIC GRAVITY URINE: 1.02
SPECIFIC GRAVITY URINE: 1.02
SQUAMOUS EPITHELIAL CELLS: 0 /HPF
TIBC SERPL-MCNC: 369 UG/DL
TRIGL SERPL-MCNC: 93 MG/DL
TSH SERPL-ACNC: 0.78 UIU/ML
UIBC SERPL-MCNC: 336 UG/DL
UROBILINOGEN URINE: NORMAL
UROBILINOGEN URINE: NORMAL
VIT B12 SERPL-MCNC: 1406 PG/ML
WBC # FLD AUTO: 5.35 K/UL
WHITE BLOOD CELLS URINE: 1 /HPF

## 2020-12-07 RX ORDER — GABAPENTIN 100 MG/1
100 CAPSULE ORAL
Qty: 180 | Refills: 3 | Status: ACTIVE | COMMUNITY
Start: 2018-04-10 | End: 1900-01-01

## 2020-12-21 ENCOUNTER — NON-APPOINTMENT (OUTPATIENT)
Age: 85
End: 2020-12-21

## 2020-12-23 PROBLEM — N39.0 ACUTE UTI: Status: RESOLVED | Noted: 2019-01-22 | Resolved: 2020-12-23

## 2021-02-04 ENCOUNTER — NON-APPOINTMENT (OUTPATIENT)
Age: 86
End: 2021-02-04

## 2021-02-04 ENCOUNTER — APPOINTMENT (OUTPATIENT)
Dept: CARDIOLOGY | Facility: CLINIC | Age: 86
End: 2021-02-04
Payer: COMMERCIAL

## 2021-02-04 VITALS
WEIGHT: 153 LBS | TEMPERATURE: 97.3 F | DIASTOLIC BLOOD PRESSURE: 81 MMHG | BODY MASS INDEX: 29.65 KG/M2 | SYSTOLIC BLOOD PRESSURE: 168 MMHG | OXYGEN SATURATION: 98 % | HEART RATE: 64 BPM | HEIGHT: 60.24 IN

## 2021-02-04 VITALS — DIASTOLIC BLOOD PRESSURE: 82 MMHG | SYSTOLIC BLOOD PRESSURE: 140 MMHG

## 2021-02-04 PROCEDURE — 93000 ELECTROCARDIOGRAM COMPLETE: CPT

## 2021-02-04 PROCEDURE — 99214 OFFICE O/P EST MOD 30 MIN: CPT | Mod: 25

## 2021-02-04 PROCEDURE — 99072 ADDL SUPL MATRL&STAF TM PHE: CPT

## 2021-02-08 NOTE — PHYSICAL EXAM
[General Appearance - Well Developed] : well developed [Normal Appearance] : normal appearance [Well Groomed] : well groomed [General Appearance - Well Nourished] : well nourished [No Deformities] : no deformities [General Appearance - In No Acute Distress] : no acute distress [Normal Conjunctiva] : the conjunctiva exhibited no abnormalities [Normal Jugular Venous A Waves Present] : normal jugular venous A waves present [Normal Jugular Venous V Waves Present] : normal jugular venous V waves present [Respiration, Rhythm And Depth] : normal respiratory rhythm and effort [Exaggerated Use Of Accessory Muscles For Inspiration] : no accessory muscle use [Auscultation Breath Sounds / Voice Sounds] : lungs were clear to auscultation bilaterally [Abdomen Soft] : soft [Abdomen Tenderness] : non-tender [Nail Clubbing] : no clubbing of the fingernails [Cyanosis, Localized] : no localized cyanosis [Petechial Hemorrhages (___cm)] : no petechial hemorrhages [Skin Color & Pigmentation] : normal skin color and pigmentation [] : no rash [Oriented To Time, Place, And Person] : oriented to person, place, and time [Affect] : the affect was normal [Mood] : the mood was normal [No Anxiety] : not feeling anxious [Normal Rate] : normal [Rhythm Regular] : regular [Normal S1] : normal S1 [Normal S2] : normal S2 [No Pitting Edema] : no pitting edema present [II] : a grade 2 [III] : a grade 3 [Bowel Sounds] : normal bowel sounds [No Skin Ulcers] : no skin ulcer [S3] : no S3 [Right Carotid Bruit] : no bruit heard over the right carotid [Left Carotid Bruit] : no bruit heard over the left carotid [Bruit] : no bruit heard [FreeTextEntry1] : Her gait is slow.

## 2021-02-08 NOTE — HISTORY OF PRESENT ILLNESS
[FreeTextEntry1] : Patient is an 89 year old woman with a history of HTN, arthritis, hyperlipidemia, anxiety, paroxysmal atrial fibrillation, aortic stenosis, and mitral regurgitation who presents today for follow up of atrial fibrillation and HTN. She states that she has been feeling relatively well from the cardiac standpoint denying any chest pain, dyspnea at rest, palpitations, headaches, PND, lower extremity edema, abdominal bloating, and dizziness. As per her son her weight has been stable at home. She has an appointment for a COVID vaccine in April.

## 2021-02-08 NOTE — DISCUSSION/SUMMARY
[FreeTextEntry1] : IMPRESSION: Ms. GREWAL is an 89 year-old woman with a history of HTN, arthritis, aortic stenosis, mitral regurgitation, hyperlipidemia, anxiety, and paroxysmal atrial fibrillation status post cardioversion on 5/4/2018 who presents today for follow up of atrial fibrillation and HTN.\par \par PLAN:\par 1. She is currently in sinus rhythm and is feeling well. She will continue on Amiodarone 100mg daily for maintenance of sinus rhythm. She will also continue on metoprolol 100 mg in the AM and 50mg in the evening for rate control. She will continue on Eliquis 5mg twice daily for systemic anticoagulation of her atrial fibrillation. We will need to continue to follow her TSH and LFTs. She will follow up with her Ophthalmologist and will need to see her Pulmonologist at least annually. Her most recent LFTs and TSH were normal. \par 2.  There are no signs of heart failure on exam and her blood pressure is mildly elevated today, however, acceptable at this time. She will watch her sodium intake and continue on Lasix 20mg daily, Metoprolol, and Amlodipine 10 mg daily.\par 3. She should schedule an echocardiogram in about 6 months to follow up her moderate aortic stenosis.\par 4. She will follow up with me in 3-4 months for follow up her blood pressure or sooner should she experience any symptoms in the interim.

## 2021-02-15 ENCOUNTER — RX RENEWAL (OUTPATIENT)
Age: 86
End: 2021-02-15

## 2021-02-18 ENCOUNTER — APPOINTMENT (OUTPATIENT)
Dept: GASTROENTEROLOGY | Facility: CLINIC | Age: 86
End: 2021-02-18

## 2021-02-28 ENCOUNTER — RX RENEWAL (OUTPATIENT)
Age: 86
End: 2021-02-28

## 2021-03-01 ENCOUNTER — RX RENEWAL (OUTPATIENT)
Age: 86
End: 2021-03-01

## 2021-03-24 ENCOUNTER — RX RENEWAL (OUTPATIENT)
Age: 86
End: 2021-03-24

## 2021-04-28 ENCOUNTER — APPOINTMENT (OUTPATIENT)
Dept: INTERNAL MEDICINE | Facility: CLINIC | Age: 86
End: 2021-04-28
Payer: MEDICARE

## 2021-04-28 VITALS — DIASTOLIC BLOOD PRESSURE: 78 MMHG | SYSTOLIC BLOOD PRESSURE: 144 MMHG

## 2021-04-28 VITALS
TEMPERATURE: 97.8 F | DIASTOLIC BLOOD PRESSURE: 82 MMHG | SYSTOLIC BLOOD PRESSURE: 156 MMHG | WEIGHT: 150.68 LBS | OXYGEN SATURATION: 96 % | HEART RATE: 65 BPM | BODY MASS INDEX: 29.58 KG/M2 | HEIGHT: 60 IN

## 2021-04-28 DIAGNOSIS — K29.70 GASTRITIS, UNSPECIFIED, W/OUT BLEEDING: ICD-10-CM

## 2021-04-28 DIAGNOSIS — R14.2 ERUCTATION: ICD-10-CM

## 2021-04-28 PROCEDURE — 99214 OFFICE O/P EST MOD 30 MIN: CPT | Mod: 25

## 2021-04-28 PROCEDURE — 36415 COLL VENOUS BLD VENIPUNCTURE: CPT

## 2021-04-28 PROCEDURE — 99072 ADDL SUPL MATRL&STAF TM PHE: CPT

## 2021-04-29 ENCOUNTER — APPOINTMENT (OUTPATIENT)
Dept: UROLOGY | Facility: CLINIC | Age: 86
End: 2021-04-29
Payer: MEDICARE

## 2021-04-29 VITALS
DIASTOLIC BLOOD PRESSURE: 81 MMHG | SYSTOLIC BLOOD PRESSURE: 145 MMHG | WEIGHT: 150 LBS | HEIGHT: 60 IN | RESPIRATION RATE: 17 BRPM | HEART RATE: 63 BPM | TEMPERATURE: 98 F | BODY MASS INDEX: 29.45 KG/M2

## 2021-04-29 DIAGNOSIS — R35.1 NOCTURIA: ICD-10-CM

## 2021-04-29 DIAGNOSIS — N32.81 OVERACTIVE BLADDER: ICD-10-CM

## 2021-04-29 PROCEDURE — 99214 OFFICE O/P EST MOD 30 MIN: CPT

## 2021-04-29 PROCEDURE — 99072 ADDL SUPL MATRL&STAF TM PHE: CPT

## 2021-04-29 NOTE — HISTORY OF PRESENT ILLNESS
[FreeTextEntry1] : 89 year old female patient presents to office today for complaints of Nocturia and frequent urge incontinence, accompanied by her son, Yonathan. Faroese speaking, son is the . \par \par patient's most bothersome symptom is the nocturia x 3-4 times, requires change of adult diaper x 1-2 times. patient has a commode by the bedside. Day time is about 3-4 times, does not bother her. patient had tried Trospium with no benefit, d/c in aug 2020 due to constipation. No constipation now - daily BM. Denies dysuria and hematuria. \par \par Daily fluid intake: 1 cup of coffee, 2-3 glasses of water, no soda, no juice, no tea. Patient restricts her fluid intake after 6pm everyday.\par \par Social hx: never smoker, no alcohol. \par \par PVR today: 11 mL\par \par \par \par

## 2021-04-29 NOTE — ASSESSMENT
[FreeTextEntry1] : Plan: \par \par - PVR: 11mL\par \par - We discussed second line therapies for OAB including medications from the anti-cholinergic and B3 agonist categories. Side effects from each category were reviewed.\par \par - start trial of Myrbetriq 25mg PO QD\par Patient was prescribed Myrbetriq. We discussed the side effects including headache, flushing, hypertension, rhinorrhea, and palpitations. Patient also understood that the medication would be costly if insurance did not approve it.\par \par - We discussed third line therapies for OAB\par 1. Percutaneous Tibial Nerve Modulation\par r/b/a procedure discussed with patient\par Patient understands that stimulation will be required for every week, 30 minutes at a time, for 12 weeks.\par After the 12th week, we can extend therapy to every 2-4 weeks in perpetuity\par Patient understands peek effect should be around week #8\par \par 2. Cystoscopy with Intravesical Injection of OnabotulinumtoxinA\par Patient understands that the intervention lasts between 6-9 months before needing a repeat injection.\par That 3-6% of patients experience urinary retention and may need to self catheterize or need a lopez for a few weeks before return of bladder function.\par Other risks including UTI and hematuria discussed.\par \par 3. We discussed the r/b/a of Sacral Neuromodulation. The patient understands the procedure is done in 2 procedures or stages, and will only get the implanted battery if there is 50% improvement of the most bothersome symptom. The patient understands the non-rechargeable battery has an estimated life of 5-8 years, and the rechargeable battery has a life of 15 years but needs to be charged 1x/week for 20 minutes.  Risks including infection, bleeding, and rare allergy may lead to device removal. Need for revision surgery may be required.\par \par - patient and family will reconsider the third line therapies we discussed\par \par - RTO 3 month\par

## 2021-04-29 NOTE — PHYSICAL EXAM
[General Appearance - Well Developed] : well developed [Normal Appearance] : normal appearance [Abdomen Soft] : soft [Skin Color & Pigmentation] : normal skin color and pigmentation [Edema] : no peripheral edema [Not Anxious] : not anxious [FreeTextEntry1] : weak gait, ambulates with cane

## 2021-04-30 LAB
APPEARANCE: CLEAR
BACTERIA: NEGATIVE
BILIRUBIN URINE: NEGATIVE
BLOOD URINE: NEGATIVE
CALCIUM OXALATE CRYSTALS: ABNORMAL
COLOR: NORMAL
GLUCOSE QUALITATIVE U: NEGATIVE
HYALINE CASTS: 0 /LPF
KETONES URINE: NEGATIVE
LEUKOCYTE ESTERASE URINE: NEGATIVE
MICROSCOPIC-UA: NORMAL
NITRITE URINE: NEGATIVE
PH URINE: 6
PROTEIN URINE: NORMAL
RED BLOOD CELLS URINE: 3 /HPF
SPECIFIC GRAVITY URINE: 1.02
SQUAMOUS EPITHELIAL CELLS: 0 /HPF
UROBILINOGEN URINE: NORMAL
WHITE BLOOD CELLS URINE: 2 /HPF

## 2021-05-03 LAB — BACTERIA UR CULT: NORMAL

## 2021-05-04 LAB
25(OH)D3 SERPL-MCNC: 41.9 NG/ML
ALBUMIN SERPL ELPH-MCNC: 4.3 G/DL
ALP BLD-CCNC: 94 U/L
ALT SERPL-CCNC: 9 U/L
ANION GAP SERPL CALC-SCNC: 11 MMOL/L
AST SERPL-CCNC: 15 U/L
BASOPHILS # BLD AUTO: 0.04 K/UL
BASOPHILS NFR BLD AUTO: 0.7 %
BILIRUB SERPL-MCNC: 0.3 MG/DL
BUN SERPL-MCNC: 11 MG/DL
CALCIUM SERPL-MCNC: 9.4 MG/DL
CHLORIDE SERPL-SCNC: 105 MMOL/L
CHOLEST SERPL-MCNC: 169 MG/DL
CO2 SERPL-SCNC: 28 MMOL/L
CREAT SERPL-MCNC: 0.79 MG/DL
EOSINOPHIL # BLD AUTO: 0.08 K/UL
EOSINOPHIL NFR BLD AUTO: 1.3 %
ESTIMATED AVERAGE GLUCOSE: 117 MG/DL
FERRITIN SERPL-MCNC: 16 NG/ML
GLUCOSE SERPL-MCNC: 89 MG/DL
HBA1C MFR BLD HPLC: 5.7 %
HCT VFR BLD CALC: 39.3 %
HDLC SERPL-MCNC: 50 MG/DL
HGB BLD-MCNC: 11 G/DL
IMM GRANULOCYTES NFR BLD AUTO: 0.3 %
IRON SATN MFR SERPL: 8 %
IRON SERPL-MCNC: 27 UG/DL
LDLC SERPL CALC-MCNC: 97 MG/DL
LYMPHOCYTES # BLD AUTO: 1.28 K/UL
LYMPHOCYTES NFR BLD AUTO: 21.2 %
MAN DIFF?: NORMAL
MCHC RBC-ENTMCNC: 26.4 PG
MCHC RBC-ENTMCNC: 28 GM/DL
MCV RBC AUTO: 94.2 FL
MONOCYTES # BLD AUTO: 0.59 K/UL
MONOCYTES NFR BLD AUTO: 9.8 %
NEUTROPHILS # BLD AUTO: 4.02 K/UL
NEUTROPHILS NFR BLD AUTO: 66.7 %
NONHDLC SERPL-MCNC: 119 MG/DL
PLATELET # BLD AUTO: 220 K/UL
POTASSIUM SERPL-SCNC: 4 MMOL/L
PROT SERPL-MCNC: 6.8 G/DL
RBC # BLD: 4.17 M/UL
RBC # FLD: 15.7 %
SODIUM SERPL-SCNC: 143 MMOL/L
TIBC SERPL-MCNC: 343 UG/DL
TRIGL SERPL-MCNC: 111 MG/DL
TSH SERPL-ACNC: 0.83 UIU/ML
UIBC SERPL-MCNC: 316 UG/DL
UREA BREATH TEST QL: NEGATIVE
WBC # FLD AUTO: 6.03 K/UL

## 2021-05-04 NOTE — ASSESSMENT
[FreeTextEntry1] : Blood work done today to check CBC, check lipid panel blood pressure slightly elevated continue current management check thyroid function tests and liver function test given chronic amiodarone therapy patient denies any cough wheezing to follow-up with pulmonology for PFTs.  We will check H. pylori for constant belching.

## 2021-05-04 NOTE — HISTORY OF PRESENT ILLNESS
[FreeTextEntry1] : Patient presents for follow-up of chronic disease management [de-identified] : Will be undergoing procedure for hyperactive bladder, denies any shortness of breath palpitations denies any orthopnea paroxysmal nocturnal dyspnea abdominal bloating has followed up with GI for anemia.  Mood is stable.  Denies any melena or black or tarry stools.  Has constant belching, denies any nausea vomiting dysphagia odynophagia.

## 2021-05-17 ENCOUNTER — RX RENEWAL (OUTPATIENT)
Age: 86
End: 2021-05-17

## 2021-05-19 ENCOUNTER — APPOINTMENT (OUTPATIENT)
Dept: INTERNAL MEDICINE | Facility: CLINIC | Age: 86
End: 2021-05-19
Payer: MEDICARE

## 2021-05-19 VITALS
DIASTOLIC BLOOD PRESSURE: 79 MMHG | HEART RATE: 64 BPM | TEMPERATURE: 96.9 F | OXYGEN SATURATION: 96 % | SYSTOLIC BLOOD PRESSURE: 154 MMHG

## 2021-05-19 DIAGNOSIS — G47.00 INSOMNIA, UNSPECIFIED: ICD-10-CM

## 2021-05-19 PROCEDURE — 99213 OFFICE O/P EST LOW 20 MIN: CPT | Mod: 25

## 2021-05-19 PROCEDURE — 99072 ADDL SUPL MATRL&STAF TM PHE: CPT

## 2021-05-19 PROCEDURE — 36415 COLL VENOUS BLD VENIPUNCTURE: CPT

## 2021-05-24 NOTE — HISTORY OF PRESENT ILLNESS
[FreeTextEntry8] : Patient presents to the office for insomnia, has difficulty with falling and staying asleep.  He usually wakes up after 3 hours.  Very fatigued during the day, denies any pain does have increased urgency to urinate.  Denies any swelling anxiety denies any restless leg symptoms.

## 2021-05-24 NOTE — ASSESSMENT
[FreeTextEntry1] : Discussed insomnia with patient which is likely secondary to anxiety, advised different options, will increase clonazepam for now discussed increased risk of falls increased risk of somnolence disorientation patient agreeable.  Also discussed adequate sleep hygiene and stress management.

## 2021-05-26 ENCOUNTER — EMERGENCY (EMERGENCY)
Facility: HOSPITAL | Age: 86
LOS: 1 days | Discharge: HOME | End: 2021-05-26
Attending: EMERGENCY MEDICINE
Payer: MEDICARE

## 2021-05-26 VITALS
DIASTOLIC BLOOD PRESSURE: 84 MMHG | RESPIRATION RATE: 17 BRPM | HEART RATE: 67 BPM | TEMPERATURE: 98 F | SYSTOLIC BLOOD PRESSURE: 169 MMHG | OXYGEN SATURATION: 100 %

## 2021-05-26 VITALS
HEART RATE: 72 BPM | HEIGHT: 63 IN | DIASTOLIC BLOOD PRESSURE: 76 MMHG | TEMPERATURE: 98 F | RESPIRATION RATE: 18 BRPM | WEIGHT: 160.06 LBS | SYSTOLIC BLOOD PRESSURE: 123 MMHG | OXYGEN SATURATION: 100 %

## 2021-05-26 DIAGNOSIS — Z98.890 OTHER SPECIFIED POSTPROCEDURAL STATES: Chronic | ICD-10-CM

## 2021-05-26 DIAGNOSIS — R93.3 ABNORMAL FINDINGS ON DIAGNOSTIC IMAGING OF OTHER PARTS OF DIGESTIVE TRACT: Chronic | ICD-10-CM

## 2021-05-26 DIAGNOSIS — R19.8 OTHER SPECIFIED SYMPTOMS AND SIGNS INVOLVING THE DIGESTIVE SYSTEM AND ABDOMEN: Chronic | ICD-10-CM

## 2021-05-26 PROCEDURE — 99284 EMERGENCY DEPT VISIT MOD MDM: CPT

## 2021-05-26 PROCEDURE — 73502 X-RAY EXAM HIP UNI 2-3 VIEWS: CPT | Mod: 26,LT

## 2021-05-26 PROCEDURE — 99284 EMERGENCY DEPT VISIT MOD MDM: CPT | Mod: 25

## 2021-05-26 PROCEDURE — 73502 X-RAY EXAM HIP UNI 2-3 VIEWS: CPT

## 2021-05-26 PROCEDURE — 72100 X-RAY EXAM L-S SPINE 2/3 VWS: CPT

## 2021-05-26 PROCEDURE — 72100 X-RAY EXAM L-S SPINE 2/3 VWS: CPT | Mod: 26

## 2021-05-26 RX ORDER — TRAMADOL HYDROCHLORIDE 50 MG/1
1 TABLET ORAL
Qty: 10 | Refills: 0
Start: 2021-05-26

## 2021-05-26 RX ORDER — LIDOCAINE 4 G/100G
1 CREAM TOPICAL ONCE
Refills: 0 | Status: COMPLETED | OUTPATIENT
Start: 2021-05-26 | End: 2021-05-26

## 2021-05-26 RX ORDER — TRAMADOL HYDROCHLORIDE 50 MG/1
25 TABLET ORAL ONCE
Refills: 0 | Status: DISCONTINUED | OUTPATIENT
Start: 2021-05-26 | End: 2021-05-26

## 2021-05-26 RX ADMIN — LIDOCAINE 1 PATCH: 4 CREAM TOPICAL at 13:55

## 2021-05-26 RX ADMIN — TRAMADOL HYDROCHLORIDE 25 MILLIGRAM(S): 50 TABLET ORAL at 13:54

## 2021-05-26 NOTE — ED PROVIDER NOTE - PHYSICAL EXAMINATION
Attn - alert, mod/severe distress due to pain.  lungs - clear, cor - rr, no M, Abdo - soft, NT, ND, no CVAT, no HSM,  Back - no rash or lesion, tender lumbar spine and paralumbar muscles.  no SLR, no leg edema or c/t.  paresthesia left leg.  DTRs +1/4,=, no clonus or foot drop.

## 2021-05-26 NOTE — ED PROVIDER NOTE - CLINICAL SUMMARY MEDICAL DECISION MAKING FREE TEXT BOX
Attn - pt with hx of LBP and onset      3-4 days ago when weeding.  no relief with Tylenol.  pt takes Eliquis and cannot take NSAIDs.  Lidoderm patch, Tramado.  Xrays of LS spine, pelvis and hip.

## 2021-05-26 NOTE — ED ADULT NURSE NOTE - OBJECTIVE STATEMENT
88 yo F pt A&Ox4, LR, BIBA with PMHx of AFIB on Eliquis presents to ED c/o left lower back pain radiating to her left buttocks and leg x 4 days. pt is primarily Montserratian speaking  #[id number]. Pt admits pain started after doing work in her yard/bending over and she has had worse back pain in the past d/t similar activity. pt's Son's phone number is [phone]. pt has taken Tylenol with mild relief. pt's son wanted pt to be seen as she was unable to walk properly.  pt's left para lumbar-sacral region minimally tender to palpation with no erythema, no ecchymosis, no crepitus, no CVA tenderness, no turners sign, pt's skin is normal for race, warm dry and intact, cap refill <2 seconds, sensation grossly intact to upper and lower extremities, no facial droop, no arm or leg drift, PERRL.  pt denies: dizziness, trauma, SOB, abd pain, urinary symptoms, chest pain, HA, vision changes, fever, chills, NVD, BRBPR, saddle anesthesia, bowel incontinence, generalized or focal weakness at this time, dizziness

## 2021-05-26 NOTE — ED PROVIDER NOTE - NSFOLLOWUPINSTRUCTIONS_ED_ALL_ED_FT
[Negative] : Heme/Lymph Call [facility] - [phone] for further evaluation and management of your lower back pain and sciatica. Call tomorrow for appointment.   Tylenol (acetaminophen) two tablets every 4-6 hours if needed.  Rx - Tramadol 50 mgs - cut in half and take every 6 hours if needed for severe pain. Tramadol may increase risk of falling.   Return to ER if have recurrent severe pain and not able to ambulate.

## 2021-05-26 NOTE — ED PROVIDER NOTE - PATIENT PORTAL LINK FT
You can access the FollowMyHealth Patient Portal offered by [facility] by registering at the following website: [email or web address]. By joining [facility]’s FollowMyHealth portal, you will also be able to view your health information using other applications (apps) compatible with our system.

## 2021-05-26 NOTE — ED PROVIDER NOTE - OBJECTIVE STATEMENT
Attn - pt seen in OR58 - pt BIB EMS from home called by son - pt c/o severe pain lower back with radiation to left leg/foot.  pt states has had LBP for years intermittently, but was well until pulling weeds 3-4 days ago and severe sharp pain since then with paresthesia left leg and foot.  no hx of cancer, no weakness, but felt unsteady when attempted to walk today.  pt takes Eliquis and took Tylenol at 7:30 am today with min relief.  no wt loss, B/B dysfunction or saddle anesthesia.

## 2021-05-27 ENCOUNTER — NON-APPOINTMENT (OUTPATIENT)
Age: 86
End: 2021-05-27

## 2021-05-27 ENCOUNTER — APPOINTMENT (OUTPATIENT)
Dept: CARDIOLOGY | Facility: CLINIC | Age: 86
End: 2021-05-27
Payer: MEDICARE

## 2021-05-27 VITALS
TEMPERATURE: 96.9 F | HEART RATE: 62 BPM | HEIGHT: 60 IN | OXYGEN SATURATION: 97 % | SYSTOLIC BLOOD PRESSURE: 139 MMHG | DIASTOLIC BLOOD PRESSURE: 81 MMHG | WEIGHT: 151 LBS | BODY MASS INDEX: 29.64 KG/M2 | RESPIRATION RATE: 16 BRPM

## 2021-05-27 PROCEDURE — 99072 ADDL SUPL MATRL&STAF TM PHE: CPT

## 2021-05-27 PROCEDURE — 93000 ELECTROCARDIOGRAM COMPLETE: CPT

## 2021-05-27 PROCEDURE — 99214 OFFICE O/P EST MOD 30 MIN: CPT | Mod: 25

## 2021-05-29 ENCOUNTER — INPATIENT (INPATIENT)
Facility: HOSPITAL | Age: 86
LOS: 4 days | Discharge: NURSING, REHAB OR LONG-TERM CARE | DRG: 552 | End: 2021-06-03
Attending: HOSPITALIST | Admitting: HOSPITALIST
Payer: MEDICARE

## 2021-05-29 VITALS
OXYGEN SATURATION: 99 % | SYSTOLIC BLOOD PRESSURE: 178 MMHG | HEART RATE: 75 BPM | TEMPERATURE: 99 F | WEIGHT: 149.91 LBS | RESPIRATION RATE: 18 BRPM | DIASTOLIC BLOOD PRESSURE: 84 MMHG | HEIGHT: 63 IN
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
## 2021-05-29 DIAGNOSIS — Z29.9 ENCOUNTER FOR PROPHYLACTIC MEASURES, UNSPECIFIED: ICD-10-CM

## 2021-05-29 DIAGNOSIS — M79.605 PAIN IN LEFT LEG: ICD-10-CM

## 2021-05-29 DIAGNOSIS — R09.89 OTHER SPECIFIED SYMPTOMS AND SIGNS INVOLVING THE CIRCULATORY AND RESPIRATORY SYSTEMS: ICD-10-CM

## 2021-05-29 DIAGNOSIS — I35.0 NONRHEUMATIC AORTIC (VALVE) STENOSIS: ICD-10-CM

## 2021-05-29 DIAGNOSIS — F41.9 ANXIETY DISORDER, UNSPECIFIED: ICD-10-CM

## 2021-05-29 DIAGNOSIS — I48.91 UNSPECIFIED ATRIAL FIBRILLATION: ICD-10-CM

## 2021-05-29 DIAGNOSIS — Z98.890 OTHER SPECIFIED POSTPROCEDURAL STATES: Chronic | ICD-10-CM

## 2021-05-29 DIAGNOSIS — R93.3 ABNORMAL FINDINGS ON DIAGNOSTIC IMAGING OF OTHER PARTS OF DIGESTIVE TRACT: Chronic | ICD-10-CM

## 2021-05-29 DIAGNOSIS — I10 ESSENTIAL (PRIMARY) HYPERTENSION: ICD-10-CM

## 2021-05-29 DIAGNOSIS — K21.9 GASTRO-ESOPHAGEAL REFLUX DISEASE WITHOUT ESOPHAGITIS: ICD-10-CM

## 2021-05-29 DIAGNOSIS — R19.8 OTHER SPECIFIED SYMPTOMS AND SIGNS INVOLVING THE DIGESTIVE SYSTEM AND ABDOMEN: Chronic | ICD-10-CM

## 2021-05-29 LAB
ALBUMIN SERPL ELPH-MCNC: 4.2 G/DL — SIGNIFICANT CHANGE UP (ref 3.3–5)
ALP SERPL-CCNC: 92 U/L — SIGNIFICANT CHANGE UP (ref 40–120)
ALT FLD-CCNC: 11 U/L — SIGNIFICANT CHANGE UP (ref 10–45)
ANION GAP SERPL CALC-SCNC: 16 MMOL/L — SIGNIFICANT CHANGE UP (ref 5–17)
AST SERPL-CCNC: 29 U/L — SIGNIFICANT CHANGE UP (ref 10–40)
BASOPHILS # BLD AUTO: 0.03 K/UL — SIGNIFICANT CHANGE UP (ref 0–0.2)
BASOPHILS NFR BLD AUTO: 0.3 % — SIGNIFICANT CHANGE UP (ref 0–2)
BILIRUB SERPL-MCNC: 0.4 MG/DL — SIGNIFICANT CHANGE UP (ref 0.2–1.2)
BUN SERPL-MCNC: 9 MG/DL — SIGNIFICANT CHANGE UP (ref 7–23)
CALCIUM SERPL-MCNC: 9.5 MG/DL — SIGNIFICANT CHANGE UP (ref 8.4–10.5)
CHLORIDE SERPL-SCNC: 105 MMOL/L — SIGNIFICANT CHANGE UP (ref 96–108)
CK SERPL-CCNC: 293 U/L — HIGH (ref 25–170)
CO2 SERPL-SCNC: 23 MMOL/L — SIGNIFICANT CHANGE UP (ref 22–31)
CREAT SERPL-MCNC: 0.57 MG/DL — SIGNIFICANT CHANGE UP (ref 0.5–1.3)
EOSINOPHIL # BLD AUTO: 0 K/UL — SIGNIFICANT CHANGE UP (ref 0–0.5)
EOSINOPHIL NFR BLD AUTO: 0 % — SIGNIFICANT CHANGE UP (ref 0–6)
GLUCOSE SERPL-MCNC: 120 MG/DL — HIGH (ref 70–99)
HCT VFR BLD CALC: 39.4 % — SIGNIFICANT CHANGE UP (ref 34.5–45)
HGB BLD-MCNC: 11.5 G/DL — SIGNIFICANT CHANGE UP (ref 11.5–15.5)
IMM GRANULOCYTES NFR BLD AUTO: 0.5 % — SIGNIFICANT CHANGE UP (ref 0–1.5)
LYMPHOCYTES # BLD AUTO: 1.06 K/UL — SIGNIFICANT CHANGE UP (ref 1–3.3)
LYMPHOCYTES # BLD AUTO: 9.5 % — LOW (ref 13–44)
MAGNESIUM SERPL-MCNC: 2.3 MG/DL — SIGNIFICANT CHANGE UP (ref 1.6–2.6)
MCHC RBC-ENTMCNC: 26 PG — LOW (ref 27–34)
MCHC RBC-ENTMCNC: 29.2 GM/DL — LOW (ref 32–36)
MCV RBC AUTO: 88.9 FL — SIGNIFICANT CHANGE UP (ref 80–100)
MONOCYTES # BLD AUTO: 1.03 K/UL — HIGH (ref 0–0.9)
MONOCYTES NFR BLD AUTO: 9.3 % — SIGNIFICANT CHANGE UP (ref 2–14)
NEUTROPHILS # BLD AUTO: 8.92 K/UL — HIGH (ref 1.8–7.4)
NEUTROPHILS NFR BLD AUTO: 80.4 % — HIGH (ref 43–77)
NRBC # BLD: 0 /100 WBCS — SIGNIFICANT CHANGE UP (ref 0–0)
PLATELET # BLD AUTO: 247 K/UL — SIGNIFICANT CHANGE UP (ref 150–400)
POTASSIUM SERPL-MCNC: 3.5 MMOL/L — SIGNIFICANT CHANGE UP (ref 3.5–5.3)
POTASSIUM SERPL-SCNC: 3.5 MMOL/L — SIGNIFICANT CHANGE UP (ref 3.5–5.3)
PROT SERPL-MCNC: 7.2 G/DL — SIGNIFICANT CHANGE UP (ref 6–8.3)
RBC # BLD: 4.43 M/UL — SIGNIFICANT CHANGE UP (ref 3.8–5.2)
RBC # FLD: 16.5 % — HIGH (ref 10.3–14.5)
SARS-COV-2 RNA SPEC QL NAA+PROBE: SIGNIFICANT CHANGE UP
SODIUM SERPL-SCNC: 144 MMOL/L — SIGNIFICANT CHANGE UP (ref 135–145)
WBC # BLD: 11.1 K/UL — HIGH (ref 3.8–10.5)
WBC # FLD AUTO: 11.1 K/UL — HIGH (ref 3.8–10.5)

## 2021-05-29 PROCEDURE — 72070 X-RAY EXAM THORAC SPINE 2VWS: CPT | Mod: 26

## 2021-05-29 PROCEDURE — 70450 CT HEAD/BRAIN W/O DYE: CPT | Mod: 26,MA

## 2021-05-29 PROCEDURE — 99285 EMERGENCY DEPT VISIT HI MDM: CPT

## 2021-05-29 PROCEDURE — 73502 X-RAY EXAM HIP UNI 2-3 VIEWS: CPT | Mod: 26,LT

## 2021-05-29 PROCEDURE — 99223 1ST HOSP IP/OBS HIGH 75: CPT

## 2021-05-29 RX ORDER — METOPROLOL TARTRATE 50 MG
100 TABLET ORAL DAILY
Refills: 0 | Status: DISCONTINUED | OUTPATIENT
Start: 2021-05-29 | End: 2021-06-03

## 2021-05-29 RX ORDER — ACETAMINOPHEN 500 MG
650 TABLET ORAL EVERY 6 HOURS
Refills: 0 | Status: DISCONTINUED | OUTPATIENT
Start: 2021-05-29 | End: 2021-06-03

## 2021-05-29 RX ORDER — PANTOPRAZOLE SODIUM 20 MG/1
40 TABLET, DELAYED RELEASE ORAL
Refills: 0 | Status: DISCONTINUED | OUTPATIENT
Start: 2021-05-29 | End: 2021-06-03

## 2021-05-29 RX ORDER — METOPROLOL TARTRATE 50 MG
50 TABLET ORAL AT BEDTIME
Refills: 0 | Status: DISCONTINUED | OUTPATIENT
Start: 2021-05-29 | End: 2021-06-03

## 2021-05-29 RX ORDER — POLYETHYLENE GLYCOL 3350 17 G/17G
17 POWDER, FOR SOLUTION ORAL ONCE
Refills: 0 | Status: COMPLETED | OUTPATIENT
Start: 2021-05-29 | End: 2021-05-29

## 2021-05-29 RX ORDER — APIXABAN 2.5 MG/1
5 TABLET, FILM COATED ORAL EVERY 12 HOURS
Refills: 0 | Status: DISCONTINUED | OUTPATIENT
Start: 2021-05-29 | End: 2021-06-03

## 2021-05-29 RX ORDER — CLONAZEPAM 1 MG
1 TABLET ORAL AT BEDTIME
Refills: 0 | Status: DISCONTINUED | OUTPATIENT
Start: 2021-05-29 | End: 2021-06-03

## 2021-05-29 RX ORDER — SERTRALINE 25 MG/1
100 TABLET, FILM COATED ORAL DAILY
Refills: 0 | Status: DISCONTINUED | OUTPATIENT
Start: 2021-05-29 | End: 2021-06-03

## 2021-05-29 RX ORDER — METOPROLOL TARTRATE 50 MG
1.5 TABLET ORAL
Qty: 0 | Refills: 0 | DISCHARGE

## 2021-05-29 RX ORDER — AMIODARONE HYDROCHLORIDE 400 MG/1
1 TABLET ORAL
Qty: 0 | Refills: 0 | DISCHARGE

## 2021-05-29 RX ORDER — POTASSIUM CHLORIDE 20 MEQ
10 PACKET (EA) ORAL DAILY
Refills: 0 | Status: DISCONTINUED | OUTPATIENT
Start: 2021-05-29 | End: 2021-06-03

## 2021-05-29 RX ORDER — SERTRALINE 25 MG/1
25 TABLET, FILM COATED ORAL DAILY
Refills: 0 | Status: DISCONTINUED | OUTPATIENT
Start: 2021-05-29 | End: 2021-06-03

## 2021-05-29 RX ORDER — FUROSEMIDE 40 MG
20 TABLET ORAL DAILY
Refills: 0 | Status: DISCONTINUED | OUTPATIENT
Start: 2021-05-29 | End: 2021-06-03

## 2021-05-29 RX ORDER — AMIODARONE HYDROCHLORIDE 400 MG/1
100 TABLET ORAL DAILY
Refills: 0 | Status: DISCONTINUED | OUTPATIENT
Start: 2021-05-29 | End: 2021-06-03

## 2021-05-29 RX ORDER — GABAPENTIN 400 MG/1
100 CAPSULE ORAL AT BEDTIME
Refills: 0 | Status: DISCONTINUED | OUTPATIENT
Start: 2021-05-29 | End: 2021-06-03

## 2021-05-29 RX ORDER — AMLODIPINE BESYLATE 2.5 MG/1
10 TABLET ORAL DAILY
Refills: 0 | Status: DISCONTINUED | OUTPATIENT
Start: 2021-05-29 | End: 2021-06-03

## 2021-05-29 RX ORDER — LATANOPROST 0.05 MG/ML
1 SOLUTION/ DROPS OPHTHALMIC; TOPICAL
Qty: 0 | Refills: 0 | DISCHARGE

## 2021-05-29 RX ADMIN — POLYETHYLENE GLYCOL 3350 17 GRAM(S): 17 POWDER, FOR SOLUTION ORAL at 21:46

## 2021-05-29 RX ADMIN — Medication 50 MILLIGRAM(S): at 21:46

## 2021-05-29 RX ADMIN — Medication 10 MILLIEQUIVALENT(S): at 21:46

## 2021-05-29 RX ADMIN — Medication 1 MILLIGRAM(S): at 21:45

## 2021-05-29 RX ADMIN — Medication 650 MILLIGRAM(S): at 21:49

## 2021-05-29 RX ADMIN — GABAPENTIN 100 MILLIGRAM(S): 400 CAPSULE ORAL at 21:46

## 2021-05-29 RX ADMIN — APIXABAN 5 MILLIGRAM(S): 2.5 TABLET, FILM COATED ORAL at 21:47

## 2021-05-29 NOTE — H&P ADULT - NSICDXPASTMEDICALHX_GEN_ALL_CORE_FT
PAST MEDICAL HISTORY:  Anxiety     Aortic valve stenosis, etiology of cardiac valve disease unspecified     Atrial fibrillation     Atrial fibrillation, unspecified type     Colonic polyp     GERD (gastroesophageal reflux disease)     GERD (gastroesophageal reflux disease)     Glaucoma     Glaucoma     Hiatal hernia     Hyperlipidemia     Hypertension     Hypertension     Hypovitaminosis D     Leg pain, bilateral     Neuropathy

## 2021-05-29 NOTE — H&P ADULT - PROBLEM SELECTOR PLAN 2
Pain largely chronic in nature as per son and pt. Benign currently relatively benign. Note negative plain films. If pt still unable to bear weight then consider CT imaging  -Tylenol PRN mild pain for now  -Additional pain meds as needed  -Falls precautions  -See above Appears to be mechanical fall although pt has other risk factors. Son is onboard with discharging pt to rehab when ready. See below regarding leg pain  -Falls precautions  -Orthostatics  -Obtain UA and UCx  -SW consult  -PT consult

## 2021-05-29 NOTE — ED PROVIDER NOTE - OBJECTIVE STATEMENT
Attn - pt seen in Rm18 -  pt BIB EMS from home - pt had fall this am and reported on carpeted floor x 2 hours. pt seen in ER 3 days ago for left lower back pain. pt states was not lightheaded or dizzy and no cp, palp, fever, numbness or weakness. pt c/o pain left lower back pain with radiation to buttock and down left leg to knee.  pt may have fallen last night and hit head, but denies h/a or neck pain. Pt speaks Spanish primarily - will use . Attn - pt seen in Rm18 -  pt BIB EMS from home - pt had fall this am and reported on carpeted floor x 2 hours. pt seen in ER 3 days ago for left lower back pain. pt states was not lightheaded or dizzy and no cp, palp, fever, numbness or weakness. pt c/o pain left lower back pain with radiation to buttock and down left leg to knee.  pt may have fallen last night and hit head, but denies h/a or neck pain. Pt speaks Hungarian primarily - will use .    88 yo F PMHx AFib on Eliquis, HTN, GERD, presents to the ED after a reported fall at home. Per EMS, pt normally calls her son but this morning, did not and so pt's son went to her home and found her on the ground in a supine position. Per EMS, approximately was on the carpeted floor for about 2 hours. Pt has chronic lower back pain and currently, is complaining of left hip pain radiating down her leg. Pt states she may have fallen last night and may have hit her head but she denies LOC. she denies antecedent cp, sob, dizziness, palpitations, loc, abd pain, nausea, vomiting, dysuria, fever, chills.

## 2021-05-29 NOTE — ED ADULT NURSE NOTE - OBJECTIVE STATEMENT
89 y/o Female PMH AFib- on Eliquis, HTN, GERD, and HLD presenting to ED by EMS from home after being found down on the ground at home on carpeted rug in supine position for an unknown length of time. pt states that she was on ground for approx 2 hours but son at bedside states that it very well could have been more and she could have possibly even fell last night as pt lives home alone. pt denies any loc or feeling dizzy or lightheaded prior to falling, pt ambulates with a walker at baseline and states she lost her balance falling back onto floor with no head injury or loc after the fall- states that she was unable to crissy herself up d/t her left sided hip and back pain that radiates down the left leg. denies any cp, sob, weakness, nausea, sick contacts, urinary s/s or recent travel. son at the bedside with pt. labs obtained, results pending. safety and fall precautions maintained, call bell at the bedside and within reach, pt tba.

## 2021-05-29 NOTE — H&P ADULT - NSHPPHYSICALEXAM_GEN_ALL_CORE
Vital Signs Last 24 Hrs  T(C): 37.3 (05-29-21 @ 19:43), Max: 37.3 (05-29-21 @ 19:43)  T(F): 99.1 (05-29-21 @ 19:43), Max: 99.1 (05-29-21 @ 19:43)  HR: 86 (05-29-21 @ 19:43) (75 - 88)  BP: 147/69 (05-29-21 @ 19:43) (146/71 - 178/84)  BP(mean): --  RR: 18 (05-29-21 @ 19:43) (18 - 18)  SpO2: 95% (05-29-21 @ 19:43) (95% - 99%)

## 2021-05-29 NOTE — H&P ADULT - NSICDXFAMILYHX_GEN_ALL_CORE_FT
FAMILY HISTORY:  No pertinent family history in first degree relatives    Child  Still living? Unknown  Family history of diabetes mellitus, Age at diagnosis: Age Unknown

## 2021-05-29 NOTE — ED PROVIDER NOTE - ATTENDING CONTRIBUTION TO CARE
I performed a history and physical exam of the patient and discussed their management with the resident/ACP/medical or PA student. I reviewed the resident/ACP/medical or PA student's note and agree with the documented findings and plan of care.  attn =  MDM

## 2021-05-29 NOTE — H&P ADULT - HISTORY OF PRESENT ILLNESS
89 [place] Taiwanese speaking F w/ hx of AFib on Eliquis, moderate AS, HTN, GERD, sciatica p/w fall at home. Pt had fall 3 days ago when she bent over to  an object, lost her balance and fell to the floor. Pt had clean imaging and was then sent home. Last night, pt and son were unable to elaborate on details but seems to have had mechanical fall in her bedroom. Per ER per EMS, pt normally calls her son in the morning but this morning, did not and so pt's son went to her home and found her on the ground in a supine position. Per EMS, approximately was on the carpeted floor for about 2 hours. Son was unable to get her up to bear weight on her own. Eventually EMS was able to help pt sit on chair lift to go downstairs and come to hospital Pt has chronic lower back pain and currently, is complaining of left hip pain radiating down her leg. Pt denies antecedent cp, sob, dizziness, palpitations, loc, abd pain, nausea, vomiting, dysuria, fever, chills. As per son, pt has had worsening back pain with sciatica symptoms 2 years ago and was worked up extensively outpatient including with neurology and finding that there was nothing additional to do. Also, pt has had increased nocturnal urinary frequency chronically which has been followed outpatient.

## 2021-05-29 NOTE — ED PROVIDER NOTE - PHYSICAL EXAMINATION
Attn alert, nad, head - NC/AT, no facial tenderness, mandible and TMJ are NT, tongue - no trauma, PERRL 3 mm, nose - NT, no deformity or signs of epistaxis, neck/spine/back - tender left paralumbar/ SI/sciatic notch, min tenderness mid T spine. Chest/ribs - NT, Lungs - clear, good BS bilaterally without splinting, Cor - RR, no M, Abdo - soft, NT, ND, no HSM or tenderness, no ecchymosis or signs of trauma, no CVAT, Pelvis/hips - NT, and pain in left buttock, but not hip with PROM of hip.  UExt - FROM without pain, NT, LExt - FROM without pain, NT,  Neuro - CN, motor, sensory, cerebellar, speech intact and non focal Attn alert, nad, head - NC/AT, no facial tenderness, mandible and TMJ are NT, tongue - no trauma, PERRL 3 mm, nose - NT, no deformity or signs of epistaxis, neck/spine/back - tender left paralumbar/ SI/sciatic notch, min tenderness mid T spine. Chest/ribs - NT, Lungs - clear, good BS bilaterally without splinting, Cor - RR, no M, Abdo - soft, NT, ND, no HSM or tenderness, no ecchymosis or signs of trauma, no CVAT, Pelvis/hips - NT, and pain in left buttock, but not hip with PROM of hip.  UExt - FROM without pain, NT, LExt - FROM without pain, NT,  Neuro - CN, motor, sensory, cerebellar, speech intact and non focal      Joe More MD.   PHYSICAL EXAM:  GENERAL: non-toxic appearing; in no respiratory distress  HEAD Atraumatic, Normocephalic; no hematoma; no raccoon sign; no poe sign;   ENT:   NECK: No JVD; FROM; no cervical midline tenderness;   EYES: PERRL, EOMs intact b/l w/out deficits; normal conjunctiva  CHEST/LUNG: CTAB no wheezes/rhonchi/rales  HEART: RRR no murmur/gallops/rubs  ABDOMEN: +BS, soft, NT, ND  EXTREMITIES: No LE edema, +2 radial pulses b/l, +2 DP/PT pulses b/l  MUSCULOSKELETAL: FROM of all 4 extremities; mild left hip tenderness; midline thoracic spine tenderness ~T6 region without stepoffs or deformities; mild paralumbar tenderness;   NERVOUS SYSTEM:  A&Ox3, No motor deficits or sensory deficits; CNII-XII intact; no focal neurologic deficits  SKIN:  No new rashes; no hematoma or ecchymosis noted;

## 2021-05-29 NOTE — ED PROVIDER NOTE - CLINICAL SUMMARY MEDICAL DECISION MAKING FREE TEXT BOX
Attn - pt with fall this am - due to back pain or meds (rec'd rx for Tramadol 25 mg on last ED visit)  Xray of pelvis/hip, CT head, labs. Xray T spine.  reassess. Attn - pt with fall this am - due to back pain or meds (rec'd rx for Tramadol 25 mg on last ED visit)  Xray of pelvis/hip, CT head, labs. Xray T spine.  reassess.    Joe More MD. pt with afib on eliquis, reported fall today on gruond in supine position for about 2 hours prior to arrival, unclear if pt also fell last night and hit head. pt has chronic back pain. pt is now c/o left hip pain and back pain radiating down left leg. pt HD stable. no signs of external trauma on head. pt has mild paralumbar tendernss and midline t6 tenderness. will obtain labs, xray pelvis/hip, ct head, xray t spine, reassess the patient.

## 2021-05-29 NOTE — H&P ADULT - NSICDXPASTSURGICALHX_GEN_ALL_CORE_FT
PAST SURGICAL HISTORY:  Abnormal colonoscopy     Abnormal endoscopy finding     History of cardioversion     No significant past surgical history

## 2021-05-29 NOTE — H&P ADULT - PROBLEM SELECTOR PLAN 6
ISTOP reviewed Reference #: [id number]  No clonazepam noted on ISTOP but noted on outpt records. Discussed fall risk with pt and son. Chronic medication so will not stop abruptly  -Cont. clonazepam 1mg QHS PRN for how  -Cont. gabapentin QHS

## 2021-05-29 NOTE — H&P ADULT - PROBLEM SELECTOR PLAN 4
As per outpt cardiology note, has moderate AS. Murmur noted, last TTE 06/2020  -As per outpt notes, next TTE due ~08/2021  -Cont. to monitor, can consider TTE inpatient if suspicion increases

## 2021-05-29 NOTE — H&P ADULT - ASSESSMENT
89 [place] Kazakh speaking F w/ hx of AFib on Eliquis, moderate AS, HTN, GERD, sciatica p/w fall at home and continued L leg pain

## 2021-05-29 NOTE — H&P ADULT - PROBLEM SELECTOR PLAN 5
-Trend vital signs  -Cont. amlodipine  -Cont. metoprolol with hold parameters -Trend vital signs  -Cont. amlodipine  -Cont. metoprolol with hold parameters  -Cont furosemide daily

## 2021-05-29 NOTE — ED ADULT TRIAGE NOTE - CHIEF COMPLAINT QUOTE
mechanical fall while using walker, fell backwards landing on buttocks, c/o back pain. on Eliquis for afib

## 2021-05-29 NOTE — H&P ADULT - PROBLEM SELECTOR PLAN 3
-Trend vital signs  -Cont. eliquis BID  -Cont. lopressor BID with hold parameters  -Will cont. amiodarone 100mg daily given outpt notes but need to confirm this dosage as son endorses 1/2 tab at bedtime.

## 2021-05-29 NOTE — H&P ADULT - PROBLEM SELECTOR PLAN 1
Appears to be mechanical fall although pt has other risk factors. Son is onboard with discharging pt to rehab when ready. See below regarding leg pain  -Falls precautions  -Orthostatics  -Obtain UA and UCx  -SW consult  -PT consult Pain largely chronic in nature as per son and pt. Benign currently relatively benign. Note negative plain films. If pt still unable to bear weight then consider CT imaging  -Tylenol PRN mild pain for now  -Additional pain meds as needed  -Falls precautions  -See above

## 2021-05-30 LAB
ALBUMIN SERPL ELPH-MCNC: 3.8 G/DL — SIGNIFICANT CHANGE UP (ref 3.3–5)
ALP SERPL-CCNC: 76 U/L — SIGNIFICANT CHANGE UP (ref 40–120)
ALT FLD-CCNC: 10 U/L — SIGNIFICANT CHANGE UP (ref 10–45)
ANION GAP SERPL CALC-SCNC: 14 MMOL/L — SIGNIFICANT CHANGE UP (ref 5–17)
APPEARANCE UR: ABNORMAL
APTT BLD: 34.5 SEC — SIGNIFICANT CHANGE UP (ref 27.5–35.5)
AST SERPL-CCNC: 24 U/L — SIGNIFICANT CHANGE UP (ref 10–40)
BACTERIA # UR AUTO: NEGATIVE — SIGNIFICANT CHANGE UP
BILIRUB SERPL-MCNC: 0.4 MG/DL — SIGNIFICANT CHANGE UP (ref 0.2–1.2)
BILIRUB UR-MCNC: NEGATIVE — SIGNIFICANT CHANGE UP
BUN SERPL-MCNC: 10 MG/DL — SIGNIFICANT CHANGE UP (ref 7–23)
CALCIUM SERPL-MCNC: 9.3 MG/DL — SIGNIFICANT CHANGE UP (ref 8.4–10.5)
CHLORIDE SERPL-SCNC: 104 MMOL/L — SIGNIFICANT CHANGE UP (ref 96–108)
CO2 SERPL-SCNC: 22 MMOL/L — SIGNIFICANT CHANGE UP (ref 22–31)
COD CRY URNS QL: ABNORMAL
COLOR SPEC: YELLOW — SIGNIFICANT CHANGE UP
COVID-19 SPIKE DOMAIN AB INTERP: POSITIVE
COVID-19 SPIKE DOMAIN ANTIBODY RESULT: >250 U/ML — HIGH
CREAT SERPL-MCNC: 0.64 MG/DL — SIGNIFICANT CHANGE UP (ref 0.5–1.3)
DIFF PNL FLD: NEGATIVE — SIGNIFICANT CHANGE UP
EPI CELLS # UR: 1 /HPF — SIGNIFICANT CHANGE UP
GLUCOSE BLDC GLUCOMTR-MCNC: 98 MG/DL — SIGNIFICANT CHANGE UP (ref 70–99)
GLUCOSE SERPL-MCNC: 85 MG/DL — SIGNIFICANT CHANGE UP (ref 70–99)
GLUCOSE UR QL: NEGATIVE — SIGNIFICANT CHANGE UP
HCT VFR BLD CALC: 38.7 % — SIGNIFICANT CHANGE UP (ref 34.5–45)
HGB BLD-MCNC: 11.4 G/DL — LOW (ref 11.5–15.5)
HYALINE CASTS # UR AUTO: 0 /LPF — SIGNIFICANT CHANGE UP (ref 0–2)
INR BLD: 1.34 RATIO — HIGH (ref 0.88–1.16)
KETONES UR-MCNC: ABNORMAL
LEUKOCYTE ESTERASE UR-ACNC: NEGATIVE — SIGNIFICANT CHANGE UP
MAGNESIUM SERPL-MCNC: 2.5 MG/DL — SIGNIFICANT CHANGE UP (ref 1.6–2.6)
MCHC RBC-ENTMCNC: 26.3 PG — LOW (ref 27–34)
MCHC RBC-ENTMCNC: 29.5 GM/DL — LOW (ref 32–36)
MCV RBC AUTO: 89.2 FL — SIGNIFICANT CHANGE UP (ref 80–100)
NITRITE UR-MCNC: NEGATIVE — SIGNIFICANT CHANGE UP
NRBC # BLD: 0 /100 WBCS — SIGNIFICANT CHANGE UP (ref 0–0)
PH UR: 7 — SIGNIFICANT CHANGE UP (ref 5–8)
PLATELET # BLD AUTO: 236 K/UL — SIGNIFICANT CHANGE UP (ref 150–400)
POTASSIUM SERPL-MCNC: 3.2 MMOL/L — LOW (ref 3.5–5.3)
POTASSIUM SERPL-SCNC: 3.2 MMOL/L — LOW (ref 3.5–5.3)
PROT SERPL-MCNC: 6.8 G/DL — SIGNIFICANT CHANGE UP (ref 6–8.3)
PROT UR-MCNC: ABNORMAL
PROTHROM AB SERPL-ACNC: 15.9 SEC — HIGH (ref 10.6–13.6)
RBC # BLD: 4.34 M/UL — SIGNIFICANT CHANGE UP (ref 3.8–5.2)
RBC # FLD: 16.8 % — HIGH (ref 10.3–14.5)
RBC CASTS # UR COMP ASSIST: 2 /HPF — SIGNIFICANT CHANGE UP (ref 0–4)
SARS-COV-2 IGG+IGM SERPL QL IA: >250 U/ML — HIGH
SARS-COV-2 IGG+IGM SERPL QL IA: POSITIVE
SODIUM SERPL-SCNC: 140 MMOL/L — SIGNIFICANT CHANGE UP (ref 135–145)
SP GR SPEC: 1.03 — HIGH (ref 1.01–1.02)
UROBILINOGEN FLD QL: NEGATIVE — SIGNIFICANT CHANGE UP
VIT B12 SERPL-MCNC: 993 PG/ML — SIGNIFICANT CHANGE UP (ref 232–1245)
WBC # BLD: 7.62 K/UL — SIGNIFICANT CHANGE UP (ref 3.8–10.5)
WBC # FLD AUTO: 7.62 K/UL — SIGNIFICANT CHANGE UP (ref 3.8–10.5)
WBC UR QL: 3 /HPF — SIGNIFICANT CHANGE UP (ref 0–5)

## 2021-05-30 PROCEDURE — 99233 SBSQ HOSP IP/OBS HIGH 50: CPT

## 2021-05-30 RX ORDER — POTASSIUM CHLORIDE 20 MEQ
40 PACKET (EA) ORAL ONCE
Refills: 0 | Status: COMPLETED | OUTPATIENT
Start: 2021-05-30 | End: 2021-05-30

## 2021-05-30 RX ORDER — TRAMADOL HYDROCHLORIDE 50 MG/1
25 TABLET ORAL
Refills: 0 | Status: DISCONTINUED | OUTPATIENT
Start: 2021-05-30 | End: 2021-06-03

## 2021-05-30 RX ADMIN — Medication 650 MILLIGRAM(S): at 15:06

## 2021-05-30 RX ADMIN — APIXABAN 5 MILLIGRAM(S): 2.5 TABLET, FILM COATED ORAL at 08:40

## 2021-05-30 RX ADMIN — PANTOPRAZOLE SODIUM 40 MILLIGRAM(S): 20 TABLET, DELAYED RELEASE ORAL at 06:26

## 2021-05-30 RX ADMIN — Medication 20 MILLIGRAM(S): at 06:25

## 2021-05-30 RX ADMIN — AMLODIPINE BESYLATE 10 MILLIGRAM(S): 2.5 TABLET ORAL at 06:25

## 2021-05-30 RX ADMIN — Medication 50 MILLIGRAM(S): at 23:00

## 2021-05-30 RX ADMIN — APIXABAN 5 MILLIGRAM(S): 2.5 TABLET, FILM COATED ORAL at 23:00

## 2021-05-30 RX ADMIN — Medication 650 MILLIGRAM(S): at 23:30

## 2021-05-30 RX ADMIN — SERTRALINE 100 MILLIGRAM(S): 25 TABLET, FILM COATED ORAL at 11:06

## 2021-05-30 RX ADMIN — TRAMADOL HYDROCHLORIDE 25 MILLIGRAM(S): 50 TABLET ORAL at 17:09

## 2021-05-30 RX ADMIN — Medication 650 MILLIGRAM(S): at 23:02

## 2021-05-30 RX ADMIN — TRAMADOL HYDROCHLORIDE 25 MILLIGRAM(S): 50 TABLET ORAL at 17:40

## 2021-05-30 RX ADMIN — Medication 100 MILLIGRAM(S): at 06:26

## 2021-05-30 RX ADMIN — Medication 40 MILLIEQUIVALENT(S): at 09:36

## 2021-05-30 RX ADMIN — SERTRALINE 25 MILLIGRAM(S): 25 TABLET, FILM COATED ORAL at 11:06

## 2021-05-30 RX ADMIN — Medication 650 MILLIGRAM(S): at 15:36

## 2021-05-30 RX ADMIN — AMIODARONE HYDROCHLORIDE 100 MILLIGRAM(S): 400 TABLET ORAL at 06:25

## 2021-05-30 NOTE — PHYSICAL THERAPY INITIAL EVALUATION ADULT - PERTINENT HX OF CURRENT PROBLEM, REHAB EVAL
Pt is a 88 y/o [place] Maldivian speaking F PMH of AFib on Eliquis, moderate AS, HTN, GERD, sciatica p/w fall at home and continued L leg pain.

## 2021-05-30 NOTE — PHYSICAL THERAPY INITIAL EVALUATION ADULT - LIGHT TOUCH SENSATION, LLE, REHAB EVAL
difficulty performing assessment as pt not understanding instructions however, reported dec sensation distally to BLEs

## 2021-05-30 NOTE — PROGRESS NOTE ADULT - SUBJECTIVE AND OBJECTIVE BOX
Patient is a 89y old  Female who presents with a chief complaint of Fall and lower back pain (29 May 2021 20:16)      SUBJECTIVE / OVERNIGHT EVENTS:  89 [place] French speaking F w/ hx of AFib on Eliquis, moderate AS, HTN, GERD, sciatica p/w fall at home. Pt had fall 3 days ago when she bent over to  an object, lost her balance and fell to the floor. Pt had clean imaging and was then sent home. Last night, pt and son were unable to elaborate on details but seems to have had mechanical fall in her bedroom. Per ER per EMS, pt normally calls her son in the morning on that morning, did not and so pt's son went to her home and found her on the ground in a supine position. Per EMS, approximately was on the carpeted floor for about 2 hours. Son was unable to get her up to bear weight on her own. Eventually EMS was able to help pt sit on chair lift to go downstairs and come to hospital Pt has chronic lower back pain and currently, is complaining of left hip pain radiating down her leg. Pt denies antecedent cp, sob, dizziness, palpitations, loc, abd pain, nausea, vomiting, dysuria, fever, chills. As per son, pt has had worsening back pain with sciatica symptoms 2 years ago and was worked up extensively outpatient including with neurology and finding that there was nothing additional to do. Also, pt has had increased nocturnal urinary frequency chronically which has been followed outpatient.       Tele reviewed:       ADDITIONAL REVIEW OF SYSTEMS: Negative except for above    MEDICATIONS  (STANDING):  aMIOdarone    Tablet 100 milliGRAM(s) Oral daily  amLODIPine   Tablet 10 milliGRAM(s) Oral daily  apixaban 5 milliGRAM(s) Oral every 12 hours  furosemide    Tablet 20 milliGRAM(s) Oral daily  gabapentin 100 milliGRAM(s) Oral at bedtime  metoprolol tartrate 100 milliGRAM(s) Oral daily  metoprolol tartrate 50 milliGRAM(s) Oral at bedtime  pantoprazole    Tablet 40 milliGRAM(s) Oral before breakfast  potassium chloride    Tablet ER 10 milliEquivalent(s) Oral daily  sertraline 100 milliGRAM(s) Oral daily  sertraline 25 milliGRAM(s) Oral daily    MEDICATIONS  (PRN):  acetaminophen   Tablet .. 650 milliGRAM(s) Oral every 6 hours PRN Mild Pain (1 - 3)  clonazePAM  Tablet 1 milliGRAM(s) Oral at bedtime PRN anxiety      CAPILLARY BLOOD GLUCOSE      POCT Blood Glucose.: 98 mg/dL (30 May 2021 08:09)    I&O's Summary    29 May 2021 07:01  -  30 May 2021 07:00  --------------------------------------------------------  IN: 340 mL / OUT: 100 mL / NET: 240 mL    30 May 2021 07:01  -  30 May 2021 16:26  --------------------------------------------------------  IN: 480 mL / OUT: 300 mL / NET: 180 mL        PHYSICAL EXAM:  Vital Signs Last 24 Hrs  T(C): 37 (30 May 2021 12:40), Max: 37.3 (29 May 2021 19:43)  T(F): 98.6 (30 May 2021 12:40), Max: 99.1 (29 May 2021 19:43)  HR: 74 (30 May 2021 12:40) (67 - 88)  BP: 142/70 (30 May 2021 12:40) (123/61 - 147/69)  BP(mean): --  RR: 18 (30 May 2021 12:40) (18 - 18)  SpO2: 95% (30 May 2021 12:40) (93% - 97%)    PHYSICAL EXAM:  GENERAL: NAD, well-developed  HEAD:  Atraumatic, Normocephalic  EYES:  conjunctiva and sclera clear  NECK: Supple, No JVD  CHEST/LUNG: Clear to auscultation bilaterally; No wheeze  HEART: Regular rate and rhythm; No murmurs, rubs, or gallops  ABDOMEN: Soft, Nontender, Nondistended; Bowel sounds present  EXTREMITIES:  2+ Peripheral Pulses, No clubbing, cyanosis, or edema  PSYCH: AAOx3  NEUROLOGY: non-focal  SKIN: No rashes or lesions      LABS:                        11.4   7.62  )-----------( 236      ( 30 May 2021 07:29 )             38.7     05-    140  |  104  |  10  ----------------------------<  85  3.2<L>   |  22  |  0.64    Ca    9.3      30 May 2021 07:25  Mg     2.5     05-    TPro  6.8  /  Alb  3.8  /  TBili  0.4  /  DBili  x   /  AST  24  /  ALT  10  /  AlkPhos  76  05-30    PT/INR - ( 30 May 2021 07:29 )   PT: 15.9 sec;   INR: 1.34 ratio         PTT - ( 30 May 2021 07:29 )  PTT:34.5 sec  CARDIAC MARKERS ( 29 May 2021 14:59 )  x     / x     / 293 U/L / x     / x          Urinalysis Basic - ( 30 May 2021 08:20 )    Color: Yellow / Appearance: Slightly Turbid / S.033 / pH: x  Gluc: x / Ketone: Moderate  / Bili: Negative / Urobili: Negative   Blood: x / Protein: 100 mg/dL / Nitrite: Negative   Leuk Esterase: Negative / RBC: 2 /hpf / WBC 3 /HPF   Sq Epi: x / Non Sq Epi: 1 /hpf / Bacteria: Negative          RADIOLOGY & ADDITIONAL TESTS:    Imaging Personally Reviewed:    Electrocardiogram Personally Reviewed:    COORDINATION OF CARE:  Care Discussed with Consultants/Other Providers [Y/N]:  Prior or Outpatient Records Reviewed [Y/N]:     Patient is a 89y old  Female who presents with a chief complaint of Fall and lower back pain (29 May 2021 20:16)      SUBJECTIVE / OVERNIGHT EVENTS:  89 [place] Serbian speaking F w/ hx of AFib on Eliquis, moderate AS, HTN, GERD, sciatica p/w fall at home. Pt had fall 3 days ago when she bent over to  an object, lost her balance and fell to the floor. Pt had clean imaging and was then sent home. Last night, pt and son were unable to elaborate on details but seems to have had mechanical fall in her bedroom. Per ER per EMS, pt normally calls her son in the morning on that morning, did not and so pt's son went to her home and found her on the ground in a supine position. Per EMS, approximately was on the carpeted floor for about 2 hours. Son was unable to get her up to bear weight on her own. Eventually EMS was able to help pt sit on chair lift to go downstairs and come to hospital Pt has chronic lower back pain and currently, is complaining of left hip pain radiating down her leg. Pt denies antecedent cp, sob, dizziness, palpitations, loc, abd pain, nausea, vomiting, dysuria, fever, chills. As per son, pt has had worsening back pain with sciatica symptoms 2 years ago and was worked up extensively outpatient including with neurology and finding that there was nothing additional to do. Also, pt has had increased nocturnal urinary frequency chronically which has been followed outpatient.         ADDITIONAL REVIEW OF SYSTEMS: Negative except for above    MEDICATIONS  (STANDING):  aMIOdarone    Tablet 100 milliGRAM(s) Oral daily  amLODIPine   Tablet 10 milliGRAM(s) Oral daily  apixaban 5 milliGRAM(s) Oral every 12 hours  furosemide    Tablet 20 milliGRAM(s) Oral daily  gabapentin 100 milliGRAM(s) Oral at bedtime  metoprolol tartrate 100 milliGRAM(s) Oral daily  metoprolol tartrate 50 milliGRAM(s) Oral at bedtime  pantoprazole    Tablet 40 milliGRAM(s) Oral before breakfast  potassium chloride    Tablet ER 10 milliEquivalent(s) Oral daily  sertraline 100 milliGRAM(s) Oral daily  sertraline 25 milliGRAM(s) Oral daily    MEDICATIONS  (PRN):  acetaminophen   Tablet .. 650 milliGRAM(s) Oral every 6 hours PRN Mild Pain (1 - 3)  clonazePAM  Tablet 1 milliGRAM(s) Oral at bedtime PRN anxiety      CAPILLARY BLOOD GLUCOSE      POCT Blood Glucose.: 98 mg/dL (30 May 2021 08:09)    I&O's Summary    29 May 2021 07:01  -  30 May 2021 07:00  --------------------------------------------------------  IN: 340 mL / OUT: 100 mL / NET: 240 mL    30 May 2021 07:01  -  30 May 2021 16:26  --------------------------------------------------------  IN: 480 mL / OUT: 300 mL / NET: 180 mL        PHYSICAL EXAM:  Vital Signs Last 24 Hrs  T(C): 37 (30 May 2021 12:40), Max: 37.3 (29 May 2021 19:43)  T(F): 98.6 (30 May 2021 12:40), Max: 99.1 (29 May 2021 19:43)  HR: 74 (30 May 2021 12:40) (67 - 88)  BP: 142/70 (30 May 2021 12:40) (123/61 - 147/69)  BP(mean): --  RR: 18 (30 May 2021 12:40) (18 - 18)  SpO2: 95% (30 May 2021 12:40) (93% - 97%)    PHYSICAL EXAM:  GENERAL: NAD, well-developed  HEAD:  Atraumatic, Normocephalic  EYES:  conjunctiva and sclera clear  NECK: Supple, No JVD  CHEST/LUNG: Clear to auscultation bilaterally; No wheeze  HEART: Regular rate and rhythm; pos systolic heart  murmurs, rubs, or gallops  ABDOMEN: Soft, Nontender, Nondistended; Bowel sounds present  EXTREMITIES:  2+ Peripheral Pulses, No clubbing, cyanosis, or edema  NEUROLOGY: non-focal, AAOx3        LABS:                        11.4   7.62  )-----------( 236      ( 30 May 2021 07:29 )             38.7         140  |  104  |  10  ----------------------------<  85  3.2<L>   |  22  |  0.64    Ca    9.3      30 May 2021 07:25  Mg     2.5         TPro  6.8  /  Alb  3.8  /  TBili  0.4  /  DBili  x   /  AST  24  /  ALT  10  /  AlkPhos  76  05-30    PT/INR - ( 30 May 2021 07:29 )   PT: 15.9 sec;   INR: 1.34 ratio         PTT - ( 30 May 2021 07:29 )  PTT:34.5 sec  CARDIAC MARKERS ( 29 May 2021 14:59 )  x     / x     / 293 U/L / x     / x          Urinalysis Basic - ( 30 May 2021 08:20 )    Color: Yellow / Appearance: Slightly Turbid / S.033 / pH: x  Gluc: x / Ketone: Moderate  / Bili: Negative / Urobili: Negative   Blood: x / Protein: 100 mg/dL / Nitrite: Negative   Leuk Esterase: Negative / RBC: 2 /hpf / WBC 3 /HPF   Sq Epi: x / Non Sq Epi: 1 /hpf / Bacteria: Negative          RADIOLOGY & ADDITIONAL TESTS:    Imaging Personally Reviewed:    Electrocardiogram Personally Reviewed:    COORDINATION OF CARE:  Care Discussed with Consultants/Other Providers [Y/N]:  Prior or Outpatient Records Reviewed [Y/N]:

## 2021-05-30 NOTE — PROGRESS NOTE ADULT - PROBLEM SELECTOR PLAN 1
Pain largely chronic in nature as per son and pt. Benign currently relatively benign. Note negative plain films. If pt still unable to bear weight then consider CT imaging  -Tylenol PRN mild pain for now  -Additional pain meds as needed  -Falls precautions  -See above Pain largely chronic in nature as per son and pt. Benign currently relatively benign. Note negative plain films. If pt still unable to bear weight then consider CT imaging  -Tylenol PRN mild pain for now---> Tramadol started , monitor for response   -Additional pain meds as needed  -Falls precautions  -See above

## 2021-05-30 NOTE — PHYSICAL THERAPY INITIAL EVALUATION ADULT - ADDITIONAL COMMENTS
Pt lives alone in a private home, 5 steps to enter, 1 flight to the bedroom with chairlift. PTA pt was (I) with ADLs and functional mobility. Pt with 2 recent falls. Pt lives alone in a private home, 5 steps to enter, 1 flight to the bedroom with chairlift. PTA pt was (I) with ADLs and functional mobility. Son checks in on pt daily. Pt with 2 recent falls.

## 2021-05-31 ENCOUNTER — TRANSCRIPTION ENCOUNTER (OUTPATIENT)
Age: 86
End: 2021-05-31

## 2021-05-31 DIAGNOSIS — I48.20 CHRONIC ATRIAL FIBRILLATION, UNSPECIFIED: ICD-10-CM

## 2021-05-31 LAB
ANION GAP SERPL CALC-SCNC: 13 MMOL/L — SIGNIFICANT CHANGE UP (ref 5–17)
BUN SERPL-MCNC: 14 MG/DL — SIGNIFICANT CHANGE UP (ref 7–23)
CALCIUM SERPL-MCNC: 9.3 MG/DL — SIGNIFICANT CHANGE UP (ref 8.4–10.5)
CHLORIDE SERPL-SCNC: 103 MMOL/L — SIGNIFICANT CHANGE UP (ref 96–108)
CO2 SERPL-SCNC: 24 MMOL/L — SIGNIFICANT CHANGE UP (ref 22–31)
CREAT SERPL-MCNC: 0.64 MG/DL — SIGNIFICANT CHANGE UP (ref 0.5–1.3)
CULTURE RESULTS: SIGNIFICANT CHANGE UP
GLUCOSE SERPL-MCNC: 127 MG/DL — HIGH (ref 70–99)
HCT VFR BLD CALC: 36.6 % — SIGNIFICANT CHANGE UP (ref 34.5–45)
HGB BLD-MCNC: 11 G/DL — LOW (ref 11.5–15.5)
MCHC RBC-ENTMCNC: 26 PG — LOW (ref 27–34)
MCHC RBC-ENTMCNC: 30.1 GM/DL — LOW (ref 32–36)
MCV RBC AUTO: 86.5 FL — SIGNIFICANT CHANGE UP (ref 80–100)
NRBC # BLD: 0 /100 WBCS — SIGNIFICANT CHANGE UP (ref 0–0)
PLATELET # BLD AUTO: 240 K/UL — SIGNIFICANT CHANGE UP (ref 150–400)
POTASSIUM SERPL-MCNC: 3.6 MMOL/L — SIGNIFICANT CHANGE UP (ref 3.5–5.3)
POTASSIUM SERPL-SCNC: 3.6 MMOL/L — SIGNIFICANT CHANGE UP (ref 3.5–5.3)
RBC # BLD: 4.23 M/UL — SIGNIFICANT CHANGE UP (ref 3.8–5.2)
RBC # FLD: 16.6 % — HIGH (ref 10.3–14.5)
SODIUM SERPL-SCNC: 140 MMOL/L — SIGNIFICANT CHANGE UP (ref 135–145)
SPECIMEN SOURCE: SIGNIFICANT CHANGE UP
WBC # BLD: 7 K/UL — SIGNIFICANT CHANGE UP (ref 3.8–10.5)
WBC # FLD AUTO: 7 K/UL — SIGNIFICANT CHANGE UP (ref 3.8–10.5)

## 2021-05-31 PROCEDURE — 99233 SBSQ HOSP IP/OBS HIGH 50: CPT

## 2021-05-31 RX ADMIN — TRAMADOL HYDROCHLORIDE 25 MILLIGRAM(S): 50 TABLET ORAL at 17:22

## 2021-05-31 RX ADMIN — AMLODIPINE BESYLATE 10 MILLIGRAM(S): 2.5 TABLET ORAL at 05:23

## 2021-05-31 RX ADMIN — SERTRALINE 100 MILLIGRAM(S): 25 TABLET, FILM COATED ORAL at 12:01

## 2021-05-31 RX ADMIN — AMIODARONE HYDROCHLORIDE 100 MILLIGRAM(S): 400 TABLET ORAL at 05:23

## 2021-05-31 RX ADMIN — APIXABAN 5 MILLIGRAM(S): 2.5 TABLET, FILM COATED ORAL at 17:31

## 2021-05-31 RX ADMIN — Medication 20 MILLIGRAM(S): at 05:23

## 2021-05-31 RX ADMIN — TRAMADOL HYDROCHLORIDE 25 MILLIGRAM(S): 50 TABLET ORAL at 05:23

## 2021-05-31 RX ADMIN — APIXABAN 5 MILLIGRAM(S): 2.5 TABLET, FILM COATED ORAL at 05:23

## 2021-05-31 RX ADMIN — TRAMADOL HYDROCHLORIDE 25 MILLIGRAM(S): 50 TABLET ORAL at 16:22

## 2021-05-31 RX ADMIN — Medication 650 MILLIGRAM(S): at 08:20

## 2021-05-31 RX ADMIN — Medication 50 MILLIGRAM(S): at 21:25

## 2021-05-31 RX ADMIN — Medication 100 MILLIGRAM(S): at 05:23

## 2021-05-31 RX ADMIN — Medication 650 MILLIGRAM(S): at 21:25

## 2021-05-31 RX ADMIN — PANTOPRAZOLE SODIUM 40 MILLIGRAM(S): 20 TABLET, DELAYED RELEASE ORAL at 05:23

## 2021-05-31 RX ADMIN — SERTRALINE 25 MILLIGRAM(S): 25 TABLET, FILM COATED ORAL at 12:01

## 2021-05-31 RX ADMIN — TRAMADOL HYDROCHLORIDE 25 MILLIGRAM(S): 50 TABLET ORAL at 05:25

## 2021-05-31 RX ADMIN — Medication 650 MILLIGRAM(S): at 21:55

## 2021-05-31 RX ADMIN — Medication 10 MILLIEQUIVALENT(S): at 12:01

## 2021-05-31 RX ADMIN — Medication 650 MILLIGRAM(S): at 07:36

## 2021-05-31 RX ADMIN — GABAPENTIN 100 MILLIGRAM(S): 400 CAPSULE ORAL at 21:25

## 2021-05-31 NOTE — DISCUSSION/SUMMARY
[FreeTextEntry1] : IMPRESSION: Ms. GREWAL is an 89 year-old woman with a history of HTN, arthritis, aortic stenosis, mitral regurgitation, hyperlipidemia, anxiety, and paroxysmal atrial fibrillation status post cardioversion on 5/4/2018 who presents today for follow up of atrial fibrillation and HTN.\par \par PLAN:\par 1. She is currently in sinus rhythm on her ECG and is feeling well. She will continue on Amiodarone 100 mg daily for maintenance of sinus rhythm. She will also continue on metoprolol 100 mg in the AM and 50 mg in the evening for rate control. She will continue on Eliquis 5mg twice daily for systemic anticoagulation of her atrial fibrillation. We will need to continue to follow her TSH and LFTs. She will follow up with her Ophthalmologist and will need to see her Pulmonologist at least annually. Her most recent LFTs and TSH were normal. \par 2.  There are no signs of heart failure on exam and her blood pressure is acceptable. She will watch her sodium intake and continue on Lasix 20 mg daily, Metoprolol, and Amlodipine 10 mg daily.\par 3. She should schedule an echocardiogram in about 6 months to follow up her moderate aortic stenosis.\par 4. She will follow up with me in 4 months for follow up her blood pressure or sooner should she experience any symptoms in the interim.

## 2021-05-31 NOTE — PHYSICAL EXAM
[General Appearance - Well Developed] : well developed [Normal Appearance] : normal appearance [Well Groomed] : well groomed [General Appearance - Well Nourished] : well nourished [No Deformities] : no deformities [General Appearance - In No Acute Distress] : no acute distress [Normal Conjunctiva] : the conjunctiva exhibited no abnormalities [Normal Jugular Venous A Waves Present] : normal jugular venous A waves present [Normal Jugular Venous V Waves Present] : normal jugular venous V waves present [Respiration, Rhythm And Depth] : normal respiratory rhythm and effort [Exaggerated Use Of Accessory Muscles For Inspiration] : no accessory muscle use [Auscultation Breath Sounds / Voice Sounds] : lungs were clear to auscultation bilaterally [Normal Rate] : normal [Rhythm Regular] : regular [Normal S1] : normal S1 [Normal S2] : normal S2 [III] : a grade 3 [II] : a grade 2 [No Pitting Edema] : no pitting edema present [Bowel Sounds] : normal bowel sounds [Abdomen Soft] : soft [Abdomen Tenderness] : non-tender [Nail Clubbing] : no clubbing of the fingernails [Cyanosis, Localized] : no localized cyanosis [Petechial Hemorrhages (___cm)] : no petechial hemorrhages [Skin Color & Pigmentation] : normal skin color and pigmentation [] : no rash [No Skin Ulcers] : no skin ulcer [Oriented To Time, Place, And Person] : oriented to person, place, and time [Affect] : the affect was normal [Mood] : the mood was normal [No Anxiety] : not feeling anxious [S3] : no S3 [Right Carotid Bruit] : no bruit heard over the right carotid [Left Carotid Bruit] : no bruit heard over the left carotid [Bruit] : no bruit heard [FreeTextEntry1] : Her gait is slow.

## 2021-05-31 NOTE — DISCHARGE NOTE PROVIDER - CARE PROVIDERS DIRECT ADDRESSES
[email or web address]

## 2021-05-31 NOTE — PROGRESS NOTE ADULT - PROBLEM SELECTOR PLAN 3
-Trend vital signs  -Cont. eliquis BID  -Cont. lopressor BID with hold parameters  -Will cont. amiodarone 100mg daily -Trend vital signs  -Cont. eliquis BID  -Cont. lopressor BID with hold parameters  -Will cont. amiodarone 100mg daily  - rate controlled

## 2021-05-31 NOTE — DISCHARGE NOTE PROVIDER - HOSPITAL COURSE
89 [place] Kosovan speaking F w/ hx of AFib on Eliquis, moderate AS, HTN, GERD, sciatica p/w fall at home and continued L leg pain.   Pain largely chronic in nature as per son and pt. CT head negative. Pelvic xray/hip xray negative for fractures. Orthostatics---> stable BP. SW consult.    PT recommend DAVI.            88 yo F Bhutanese speaking F w/ hx of AFib on Eliquis, moderate AS, HTN, GERD, sciatica p/w fall at home and continued L leg pain.  Fall likely due to mechanical, orthostatic was stable. Leg pain largely chronic in nature as per son and pt. Benign currently relatively benign. Note negative plain films. Seen by PT, they recommend DAVI. Pt has h/o afib, but rate controlled. Pt has moderate AS. Murmur noted, last TTE 06/2020, as per outpt notes, next TTE due ~08/2021. Pt was c/o dysuria but UA was neg. Pt is medically stable and cleared by attending doctor to [facility]. Pt is s/p moderna x2 prior to hospitalization.

## 2021-05-31 NOTE — PROGRESS NOTE ADULT - PROBLEM SELECTOR PLAN 1
Pain largely chronic in nature as per son and pt. Benign currently relatively benign. Note negative plain films. If pt still unable to bear weight then consider CT imaging/most likely sciatica like pain   -Tylenol PRN mild pain for now---> Tramadol started , monitor for response   -Additional pain meds as needed  -Falls precautions  -See above  - PT recommend DAVI

## 2021-05-31 NOTE — HISTORY OF PRESENT ILLNESS
[FreeTextEntry1] : Patient is an 89 year old woman with a history of HTN, arthritis, hyperlipidemia, anxiety, paroxysmal atrial fibrillation, aortic stenosis, and mitral regurgitation who presents today for follow up of atrial fibrillation and HTN. She states that she has had back pain since Sunday, after bending over. She otherwise denies any chest pain, dyspnea at rest, palpitations, headaches, PND, lower extremity edema, abdominal bloating, and dizziness.

## 2021-05-31 NOTE — DISCHARGE NOTE PROVIDER - PROVIDER TOKENS
PROVIDER:[TOKEN:[[id number]]] PROVIDER:[TOKEN:[[id number]]],PROVIDER:[TOKEN:[[id number]],FOLLOWUP:[1 month]]

## 2021-05-31 NOTE — DISCHARGE NOTE PROVIDER - NSDCFUADDAPPT_GEN_ALL_CORE_FT
Please follow up with your PCP within 2 weeks after discharge.   Please follow up with your cardiology within 1 month after discharge.

## 2021-05-31 NOTE — DISCHARGE NOTE PROVIDER - CARE PROVIDER_API CALL
Jonathan Shelley)  [facility] at [facility]  [address], [address] [place] [address]  Phone: [phone]  Fax: [phone]  Follow Up Time:    Jonathan Shelley)  [facility] at [facility]  [address] [place] [address]  Phone: [phone]  Fax: [phone]  Follow Up Time:     Willie Shook)  Cardiovascular Disease; Nuclear Cardiology  [address]  [place] [address]  Phone: [phone]  Fax: [phone]  Follow Up Time: 1 month

## 2021-05-31 NOTE — PROGRESS NOTE ADULT - SUBJECTIVE AND OBJECTIVE BOX
Patient is a 89y old  Female who presents with a chief complaint of Fall and lower back pain (30 May 2021 16:26)      SUBJECTIVE / OVERNIGHT EVENTS:   Patient offers no complaints .  NO fever       ADDITIONAL REVIEW OF SYSTEMS: Negative except for above    MEDICATIONS  (STANDING):  aMIOdarone    Tablet 100 milliGRAM(s) Oral daily  amLODIPine   Tablet 10 milliGRAM(s) Oral daily  apixaban 5 milliGRAM(s) Oral every 12 hours  furosemide    Tablet 20 milliGRAM(s) Oral daily  gabapentin 100 milliGRAM(s) Oral at bedtime  metoprolol tartrate 100 milliGRAM(s) Oral daily  metoprolol tartrate 50 milliGRAM(s) Oral at bedtime  pantoprazole    Tablet 40 milliGRAM(s) Oral before breakfast  potassium chloride    Tablet ER 10 milliEquivalent(s) Oral daily  sertraline 100 milliGRAM(s) Oral daily  sertraline 25 milliGRAM(s) Oral daily    MEDICATIONS  (PRN):  acetaminophen   Tablet .. 650 milliGRAM(s) Oral every 6 hours PRN Mild Pain (1 - 3)  clonazePAM  Tablet 1 milliGRAM(s) Oral at bedtime PRN anxiety  traMADol 25 milliGRAM(s) Oral two times a day PRN Moderate Pain (4 - 6)      CAPILLARY BLOOD GLUCOSE        I&O's Summary    30 May 2021 07:01  -  31 May 2021 07:00  --------------------------------------------------------  IN: 940 mL / OUT: 300 mL / NET: 640 mL    31 May 2021 07:01  -  31 May 2021 16:44  --------------------------------------------------------  IN: 600 mL / OUT: 1800 mL / NET: -1200 mL        PHYSICAL EXAM:  Vital Signs Last 24 Hrs  T(C): 36.8 (31 May 2021 15:57), Max: 37.1 (31 May 2021 00:56)  T(F): 98.2 (31 May 2021 15:57), Max: 98.7 (31 May 2021 00:56)  HR: 60 (31 May 2021 15:57) (60 - 82)  BP: 143/84 (31 May 2021 15:57) (118/77 - 157/80)  BP(mean): --  RR: 20 (31 May 2021 15:57) (18 - 20)  SpO2: 96% (31 May 2021 15:57) (95% - 98%)    PHYSICAL EXAM:  GENERAL: NAD, well-developed  HEAD:  Atraumatic, Normocephalic  EYES:  conjunctiva and sclera clear  NECK: Supple, No JVD  CHEST/LUNG: Clear to auscultation bilaterally; No wheeze  HEART: Regular rate and rhythm; pos systolic heart  murmurs, rubs, or gallops  ABDOMEN: Soft, Nontender, Nondistended; Bowel sounds present  EXTREMITIES:  2+ Peripheral Pulses, No clubbing, cyanosis, or edema  NEUROLOGY: non-focal, AAOx3      LABS:                        11.0   7.00  )-----------( 240      ( 31 May 2021 07:08 )             36.6     05-31    140  |  103  |  14  ----------------------------<  127<H>  3.6   |  24  |  0.64    Ca    9.3      31 May 2021 07:02  Mg     2.5     05-30    TPro  6.8  /  Alb  3.8  /  TBili  0.4  /  DBili  x   /  AST  24  /  ALT  10  /  AlkPhos  76  05-30    PT/INR - ( 30 May 2021 07:29 )   PT: 15.9 sec;   INR: 1.34 ratio         PTT - ( 30 May 2021 07:29 )  PTT:34.5 sec      Urinalysis Basic - ( 30 May 2021 08:20 )    Color: Yellow / Appearance: Slightly Turbid / S.033 / pH: x  Gluc: x / Ketone: Moderate  / Bili: Negative / Urobili: Negative   Blood: x / Protein: 100 mg/dL / Nitrite: Negative   Leuk Esterase: Negative / RBC: 2 /hpf / WBC 3 /HPF   Sq Epi: x / Non Sq Epi: 1 /hpf / Bacteria: Negative        Culture - Urine (collected 30 May 2021 12:41)  Source: .Urine Clean Catch (Midstream)  Final Report (31 May 2021 07:51):    <10,000 CFU/mL Normal Urogenital Kate        RADIOLOGY & ADDITIONAL TESTS:    Imaging Personally Reviewed:    Electrocardiogram Personally Reviewed:    COORDINATION OF CARE:  Care Discussed with Consultants/Other Providers [Y/N]:  Prior or Outpatient Records Reviewed [Y/N]:

## 2021-05-31 NOTE — DISCHARGE NOTE PROVIDER - NSDCCPCAREPLAN_GEN_ALL_CORE_FT
PRINCIPAL DISCHARGE DIAGNOSIS  Diagnosis: Fall, initial encounter  Assessment and Plan of Treatment: Physical therapy in rehab  Follow up with your Primary care doctor in 1 week       PRINCIPAL DISCHARGE DIAGNOSIS  Diagnosis: Fall, initial encounter  Assessment and Plan of Treatment: Physical therapy in rehab  Follow up with your Primary care doctor in 1 week      SECONDARY DISCHARGE DIAGNOSES  Diagnosis: Chronic atrial fibrillation  Assessment and Plan of Treatment: Continue home medication  Follow up with your doctor     PRINCIPAL DISCHARGE DIAGNOSIS  Diagnosis: Fall, initial encounter  Assessment and Plan of Treatment: Likely mechanical   Seen by PT, recommends rehab  Physical therapy in rehab  Follow up with your Primary care doctor after discharge      SECONDARY DISCHARGE DIAGNOSES  Diagnosis: Leg pain, lateral, left  Assessment and Plan of Treatment: Pain largely chronic in nature   No fracture from plain films  Physical therapy   Pain control   Fall precautions      Diagnosis: Aortic valve stenosis, etiology of cardiac valve disease unspecified  Assessment and Plan of Treatment: - As per outpt cardiology note, has moderate AS. Llast TTE 06/2020  -As per outpt notes, next TTE due ~08/2021  - Outpatient cardiology follow up       Diagnosis: Chronic atrial fibrillation  Assessment and Plan of Treatment: Continue home medication  Follow up with your doctor    Diagnosis: Essential hypertension  Assessment and Plan of Treatment: - Continue home medication    Diagnosis: Anxiety  Assessment and Plan of Treatment: - Continue home medication    Diagnosis: Dysuria  Assessment and Plan of Treatment: - Urinalysis was negative for infection

## 2021-05-31 NOTE — DISCHARGE NOTE PROVIDER - NSDCMRMEDTOKEN_GEN_ALL_CORE_FT
amiodarone 100 mg oral tablet: 1 tab(s) orally once a day  amLODIPine 10 mg oral tablet: 1 tab(s) orally once a day  clonazePAM 1 mg oral tablet: 1 tab(s) orally 2 times a day, As Needed  Cosopt 2.23%-0.68% ophthalmic solution: 1 drop(s) to each affected eye 2 times a day  Eliquis 5 mg oral tablet: 1 tab(s) orally 2 times a day  furosemide 20 mg oral tablet: 1 tab(s) orally once a day  gabapentin 100 mg oral capsule: 1 cap(s) orally once a day (at bedtime)  Klor-Con M20 oral tablet, extended release: 1 tab(s) orally once a day  Metoprolol Tartrate 100 mg oral tablet: 1 tab(s) orally once a day  Metoprolol Tartrate 100 mg oral tablet: 0.5 tab(s) orally once a day (at bedtime)  pantoprazole 40 mg oral delayed release tablet: 1 tab(s) orally once a day (before a meal)  polyethylene glycol 3350 oral powder for reconstitution: 17 gram(s) orally once  Senna 8.6 mg oral tablet: 2 tab(s) orally once a day (at bedtime)  sertraline 100 mg oral tablet: 1 tab(s) orally once a day  sertraline 25 mg oral tablet: 1 tab(s) orally once a day  traMADol 50 mg oral tablet: 1/2 tab orally every 6 hours if needed MDD:4 tabs  trospium 60 mg oral capsule, extended release: 2 cap(s) orally once a day (in the evening)   acetaminophen 325 mg oral tablet: 2 tab(s) orally every 6 hours, As needed, Mild Pain (1 - 3)  amiodarone 100 mg oral tablet: 1 tab(s) orally once a day  amLODIPine 10 mg oral tablet: 1 tab(s) orally once a day  clonazePAM 1 mg oral tablet: 1 tab(s) orally once a day (at bedtime), As needed, anxiety  Cosopt 2.23%-0.68% ophthalmic solution: 1 drop(s) to each affected eye 2 times a day  Eliquis 5 mg oral tablet: 1 tab(s) orally 2 times a day  furosemide 20 mg oral tablet: 1 tab(s) orally once a day  gabapentin 100 mg oral capsule: 1 cap(s) orally once a day (at bedtime)  Klor-Con M20 oral tablet, extended release: 1 tab(s) orally once a day  Metoprolol Tartrate 100 mg oral tablet: 1 tab(s) orally once a day  Metoprolol Tartrate 100 mg oral tablet: 0.5 tab(s) orally once a day (at bedtime)  pantoprazole 40 mg oral delayed release tablet: 1 tab(s) orally once a day (before a meal)  polyethylene glycol 3350 oral powder for reconstitution: 17 gram(s) orally once  Senna 8.6 mg oral tablet: 2 tab(s) orally once a day (at bedtime)  sertraline 100 mg oral tablet: 1 tab(s) orally once a day  sertraline 25 mg oral tablet: 1 tab(s) orally once a day  traMADol 50 mg oral tablet: 0.5 tab(s) orally 2 times a day, As needed, Moderate Pain (4 - 6)  trospium 60 mg oral capsule, extended release: 2 cap(s) orally once a day (in the evening)   acetaminophen 325 mg oral tablet: 2 tab(s) orally every 6 hours, As needed, Mild Pain (1 - 3)  amiodarone 100 mg oral tablet: 1 tab(s) orally once a day  amLODIPine 10 mg oral tablet: 1 tab(s) orally once a day  clonazePAM 1 mg oral tablet: 1 tab(s) orally once a day (at bedtime), As needed, anxiety  Cosopt 2.23%-0.68% ophthalmic solution: 1 drop(s) to each affected eye 2 times a day  Eliquis 5 mg oral tablet: 1 tab(s) orally 2 times a day  furosemide 20 mg oral tablet: 1 tab(s) orally once a day  gabapentin 100 mg oral capsule: 1 cap(s) orally once a day (at bedtime)  Klor-Con M20 oral tablet, extended release: 1 tab(s) orally once a day  latanoprost 0.005% ophthalmic solution: 1 drop(s) to each affected eye once a day (in the evening)  Metoprolol Tartrate 100 mg oral tablet: 1 tab(s) orally once a day  Metoprolol Tartrate 100 mg oral tablet: 0.5 tab(s) orally once a day (at bedtime)  pantoprazole 40 mg oral delayed release tablet: 1 tab(s) orally once a day (before a meal)  polyethylene glycol 3350 oral powder for reconstitution: 17 gram(s) orally once  Senna 8.6 mg oral tablet: 2 tab(s) orally once a day (at bedtime)  sertraline 100 mg oral tablet: 1 tab(s) orally once a day  sertraline 25 mg oral tablet: 1 tab(s) orally once a day

## 2021-06-01 ENCOUNTER — RX RENEWAL (OUTPATIENT)
Age: 86
End: 2021-06-01

## 2021-06-01 LAB — SARS-COV-2 RNA SPEC QL NAA+PROBE: SIGNIFICANT CHANGE UP

## 2021-06-01 PROCEDURE — 99232 SBSQ HOSP IP/OBS MODERATE 35: CPT

## 2021-06-01 RX ORDER — LANOLIN ALCOHOL/MO/W.PET/CERES
3 CREAM (GRAM) TOPICAL AT BEDTIME
Refills: 0 | Status: DISCONTINUED | OUTPATIENT
Start: 2021-06-01 | End: 2021-06-01

## 2021-06-01 RX ORDER — SERTRALINE HYDROCHLORIDE 100 MG/1
100 TABLET, FILM COATED ORAL
Qty: 90 | Refills: 0 | Status: ACTIVE | COMMUNITY
Start: 2018-05-16 | End: 1900-01-01

## 2021-06-01 RX ORDER — POLYETHYLENE GLYCOL 3350 17 G/17G
17 POWDER, FOR SOLUTION ORAL DAILY
Refills: 0 | Status: DISCONTINUED | OUTPATIENT
Start: 2021-06-01 | End: 2021-06-03

## 2021-06-01 RX ADMIN — Medication 1 MILLIGRAM(S): at 00:43

## 2021-06-01 RX ADMIN — Medication 650 MILLIGRAM(S): at 06:01

## 2021-06-01 RX ADMIN — APIXABAN 5 MILLIGRAM(S): 2.5 TABLET, FILM COATED ORAL at 05:56

## 2021-06-01 RX ADMIN — APIXABAN 5 MILLIGRAM(S): 2.5 TABLET, FILM COATED ORAL at 17:00

## 2021-06-01 RX ADMIN — Medication 20 MILLIGRAM(S): at 05:56

## 2021-06-01 RX ADMIN — AMLODIPINE BESYLATE 10 MILLIGRAM(S): 2.5 TABLET ORAL at 05:56

## 2021-06-01 RX ADMIN — Medication 100 MILLIGRAM(S): at 05:56

## 2021-06-01 RX ADMIN — TRAMADOL HYDROCHLORIDE 25 MILLIGRAM(S): 50 TABLET ORAL at 12:03

## 2021-06-01 RX ADMIN — POLYETHYLENE GLYCOL 3350 17 GRAM(S): 17 POWDER, FOR SOLUTION ORAL at 12:03

## 2021-06-01 RX ADMIN — Medication 650 MILLIGRAM(S): at 06:31

## 2021-06-01 RX ADMIN — AMIODARONE HYDROCHLORIDE 100 MILLIGRAM(S): 400 TABLET ORAL at 05:56

## 2021-06-01 RX ADMIN — SERTRALINE 100 MILLIGRAM(S): 25 TABLET, FILM COATED ORAL at 12:03

## 2021-06-01 RX ADMIN — SERTRALINE 25 MILLIGRAM(S): 25 TABLET, FILM COATED ORAL at 12:03

## 2021-06-01 RX ADMIN — PANTOPRAZOLE SODIUM 40 MILLIGRAM(S): 20 TABLET, DELAYED RELEASE ORAL at 05:57

## 2021-06-01 RX ADMIN — Medication 10 MILLIEQUIVALENT(S): at 12:03

## 2021-06-01 RX ADMIN — Medication 650 MILLIGRAM(S): at 20:40

## 2021-06-01 RX ADMIN — Medication 650 MILLIGRAM(S): at 21:10

## 2021-06-01 NOTE — PROGRESS NOTE ADULT - PROBLEM SELECTOR PLAN 1
Pain largely chronic in nature as per son and pt. Benign currently relatively benign. Note negative plain films.   -Tylenol PRN mild pain for now---> Tramadol started , monitor for response   -Additional pain meds as needed  -Falls precautions  -See above  - PT recommend [facility]

## 2021-06-01 NOTE — PROGRESS NOTE ADULT - SUBJECTIVE AND OBJECTIVE BOX
Patient is a 89y old  Female who presents with a chief complaint of Fall and lower back pain (31 May 2021 17:20)      SUBJECTIVE / OVERNIGHT EVENTS: Patient seen and examined at bedside. states that she feels well, denies any CP, SOB, abd pain and n/v. No acute events overnight.     ROS:  All other review of systems negative    Allergies    No Known Allergies    Intolerances        MEDICATIONS  (STANDING):  aMIOdarone    Tablet 100 milliGRAM(s) Oral daily  amLODIPine   Tablet 10 milliGRAM(s) Oral daily  apixaban 5 milliGRAM(s) Oral every 12 hours  furosemide    Tablet 20 milliGRAM(s) Oral daily  gabapentin 100 milliGRAM(s) Oral at bedtime  metoprolol tartrate 100 milliGRAM(s) Oral daily  metoprolol tartrate 50 milliGRAM(s) Oral at bedtime  pantoprazole    Tablet 40 milliGRAM(s) Oral before breakfast  polyethylene glycol 3350 17 Gram(s) Oral daily  potassium chloride    Tablet ER 10 milliEquivalent(s) Oral daily  sertraline 100 milliGRAM(s) Oral daily  sertraline 25 milliGRAM(s) Oral daily    MEDICATIONS  (PRN):  acetaminophen   Tablet .. 650 milliGRAM(s) Oral every 6 hours PRN Mild Pain (1 - 3)  clonazePAM  Tablet 1 milliGRAM(s) Oral at bedtime PRN anxiety  traMADol 25 milliGRAM(s) Oral two times a day PRN Moderate Pain (4 - 6)      Vital Signs Last 24 Hrs  T(C): 37.1 (01 Jun 2021 09:06), Max: 37.3 (31 May 2021 20:23)  T(F): 98.7 (01 Jun 2021 09:06), Max: 99.1 (31 May 2021 20:23)  HR: 63 (01 Jun 2021 09:06) (60 - 84)  BP: 110/69 (01 Jun 2021 09:06) (110/69 - 150/84)  BP(mean): --  RR: 17 (01 Jun 2021 09:06) (17 - 20)  SpO2: 96% (01 Jun 2021 09:06) (95% - 97%)  CAPILLARY BLOOD GLUCOSE        I&O's Summary    31 May 2021 07:01  -  01 Jun 2021 07:00  --------------------------------------------------------  IN: 1080 mL / OUT: 2200 mL / NET: -1120 mL    01 Jun 2021 07:01  -  01 Jun 2021 13:22  --------------------------------------------------------  IN: 240 mL / OUT: 200 mL / NET: 40 mL        PHYSICAL EXAM:  GENERAL: NAD, well-developed  HEAD:  Atraumatic, Normocephalic  EYES: EOMI, PERRLA, conjunctiva and sclera clear  NECK: Supple, No JVD  CHEST/LUNG: Clear to auscultation bilaterally; No wheeze  HEART: Regular rate and rhythm; No murmurs, rubs, or gallops  ABDOMEN: Soft, Nontender, Nondistended; Bowel sounds present  EXTREMITIES:  2+ Peripheral Pulses, No clubbing, cyanosis, or edema  NEUROLOGY: AAOx3, non-focal  PSYCH: calm  SKIN: No rashes or lesions    LABS:                        11.0   7.00  )-----------( 240      ( 31 May 2021 07:08 )             36.6     05-31    140  |  103  |  14  ----------------------------<  127<H>  3.6   |  24  |  0.64    Ca    9.3      31 May 2021 07:02                RADIOLOGY & ADDITIONAL TESTS:    Care Discussed with Consultants/Other Providers: Medicine NP

## 2021-06-01 NOTE — PROGRESS NOTE ADULT - PROBLEM SELECTOR PLAN 3
-Trend vital signs  -Cont. eliquis BID  -Cont. lopressor BID with hold parameters  -Will cont. amiodarone 100mg daily  - rate controlled

## 2021-06-02 PROCEDURE — 99232 SBSQ HOSP IP/OBS MODERATE 35: CPT

## 2021-06-02 RX ORDER — CLONAZEPAM 1 MG
1 TABLET ORAL
Qty: 0 | Refills: 0 | DISCHARGE
Start: 2021-06-02

## 2021-06-02 RX ORDER — ACETAMINOPHEN 500 MG
2 TABLET ORAL
Qty: 0 | Refills: 0 | DISCHARGE
Start: 2021-06-02

## 2021-06-02 RX ORDER — LATANOPROST 0.05 MG/ML
1 SOLUTION/ DROPS OPHTHALMIC; TOPICAL AT BEDTIME
Refills: 0 | Status: DISCONTINUED | OUTPATIENT
Start: 2021-06-02 | End: 2021-06-03

## 2021-06-02 RX ORDER — DORZOLAMIDE HYDROCHLORIDE TIMOLOL MALEATE 20; 5 MG/ML; MG/ML
1 SOLUTION/ DROPS OPHTHALMIC
Qty: 0 | Refills: 0 | DISCHARGE

## 2021-06-02 RX ORDER — DORZOLAMIDE HYDROCHLORIDE TIMOLOL MALEATE 20; 5 MG/ML; MG/ML
1 SOLUTION/ DROPS OPHTHALMIC
Refills: 0 | Status: DISCONTINUED | OUTPATIENT
Start: 2021-06-02 | End: 2021-06-03

## 2021-06-02 RX ORDER — CLONAZEPAM 1 MG
1 TABLET ORAL
Qty: 0 | Refills: 0 | DISCHARGE

## 2021-06-02 RX ORDER — TRAMADOL HYDROCHLORIDE 50 MG/1
0.5 TABLET ORAL
Qty: 0 | Refills: 0 | DISCHARGE
Start: 2021-06-02

## 2021-06-02 RX ADMIN — PANTOPRAZOLE SODIUM 40 MILLIGRAM(S): 20 TABLET, DELAYED RELEASE ORAL at 06:37

## 2021-06-02 RX ADMIN — GABAPENTIN 100 MILLIGRAM(S): 400 CAPSULE ORAL at 21:22

## 2021-06-02 RX ADMIN — LATANOPROST 1 DROP(S): 0.05 SOLUTION/ DROPS OPHTHALMIC; TOPICAL at 21:22

## 2021-06-02 RX ADMIN — Medication 650 MILLIGRAM(S): at 02:31

## 2021-06-02 RX ADMIN — APIXABAN 5 MILLIGRAM(S): 2.5 TABLET, FILM COATED ORAL at 06:37

## 2021-06-02 RX ADMIN — Medication 50 MILLIGRAM(S): at 21:21

## 2021-06-02 RX ADMIN — Medication 650 MILLIGRAM(S): at 20:49

## 2021-06-02 RX ADMIN — Medication 650 MILLIGRAM(S): at 21:19

## 2021-06-02 RX ADMIN — DORZOLAMIDE HYDROCHLORIDE TIMOLOL MALEATE 1 DROP(S): 20; 5 SOLUTION/ DROPS OPHTHALMIC at 19:36

## 2021-06-02 RX ADMIN — POLYETHYLENE GLYCOL 3350 17 GRAM(S): 17 POWDER, FOR SOLUTION ORAL at 12:17

## 2021-06-02 RX ADMIN — Medication 650 MILLIGRAM(S): at 03:01

## 2021-06-02 RX ADMIN — APIXABAN 5 MILLIGRAM(S): 2.5 TABLET, FILM COATED ORAL at 17:31

## 2021-06-02 RX ADMIN — AMIODARONE HYDROCHLORIDE 100 MILLIGRAM(S): 400 TABLET ORAL at 06:37

## 2021-06-02 RX ADMIN — AMLODIPINE BESYLATE 10 MILLIGRAM(S): 2.5 TABLET ORAL at 06:37

## 2021-06-02 RX ADMIN — Medication 10 MILLIEQUIVALENT(S): at 12:20

## 2021-06-02 RX ADMIN — Medication 100 MILLIGRAM(S): at 06:37

## 2021-06-02 RX ADMIN — Medication 20 MILLIGRAM(S): at 06:37

## 2021-06-02 RX ADMIN — SERTRALINE 25 MILLIGRAM(S): 25 TABLET, FILM COATED ORAL at 12:18

## 2021-06-02 RX ADMIN — Medication 1 MILLIGRAM(S): at 21:21

## 2021-06-02 RX ADMIN — SERTRALINE 100 MILLIGRAM(S): 25 TABLET, FILM COATED ORAL at 12:18

## 2021-06-02 NOTE — PROVIDER CONTACT NOTE (OTHER) - RECOMMENDATIONS
Notify NP Tamara Leon patient disorientated, refused gabapentin and metoprolol and all other medications until could receive Tylenol. Recommend speaking to son on phone to orientated pt.

## 2021-06-02 NOTE — PROVIDER CONTACT NOTE (OTHER) - ACTION/TREATMENT ORDERED:
NP Tamara Leon notified and aware patient disorientated, refused gabapentin and metoprolol and all other medications until could receive Tylenol. Spoke to son, patient will take Tylenol when due.

## 2021-06-02 NOTE — PROGRESS NOTE ADULT - SUBJECTIVE AND OBJECTIVE BOX
Patient is a 89y old  Female who presents with a chief complaint of Fall and lower back pain (01 Jun 2021 13:21)      SUBJECTIVE / OVERNIGHT EVENTS: Patient seen and examined at bedside. States that she feels ok just tired this morning, denies any CP, SOB, abd pain and n/v. No acute events overnight      ROS:  All other review of systems negative    Allergies    No Known Allergies    Intolerances        MEDICATIONS  (STANDING):  aMIOdarone    Tablet 100 milliGRAM(s) Oral daily  amLODIPine   Tablet 10 milliGRAM(s) Oral daily  apixaban 5 milliGRAM(s) Oral every 12 hours  furosemide    Tablet 20 milliGRAM(s) Oral daily  gabapentin 100 milliGRAM(s) Oral at bedtime  metoprolol tartrate 100 milliGRAM(s) Oral daily  metoprolol tartrate 50 milliGRAM(s) Oral at bedtime  pantoprazole    Tablet 40 milliGRAM(s) Oral before breakfast  polyethylene glycol 3350 17 Gram(s) Oral daily  potassium chloride    Tablet ER 10 milliEquivalent(s) Oral daily  sertraline 100 milliGRAM(s) Oral daily  sertraline 25 milliGRAM(s) Oral daily    MEDICATIONS  (PRN):  acetaminophen   Tablet .. 650 milliGRAM(s) Oral every 6 hours PRN Mild Pain (1 - 3)  clonazePAM  Tablet 1 milliGRAM(s) Oral at bedtime PRN anxiety  traMADol 25 milliGRAM(s) Oral two times a day PRN Moderate Pain (4 - 6)      Vital Signs Last 24 Hrs  T(C): 37.2 (02 Jun 2021 11:58), Max: 37.2 (02 Jun 2021 00:01)  T(F): 99 (02 Jun 2021 11:58), Max: 99 (02 Jun 2021 11:58)  HR: 63 (02 Jun 2021 11:58) (61 - 84)  BP: 137/84 (02 Jun 2021 11:58) (111/70 - 159/72)  BP(mean): --  RR: 18 (02 Jun 2021 11:58) (17 - 20)  SpO2: 97% (02 Jun 2021 11:58) (94% - 98%)  CAPILLARY BLOOD GLUCOSE        I&O's Summary    01 Jun 2021 07:01  -  02 Jun 2021 07:00  --------------------------------------------------------  IN: 1200 mL / OUT: 700 mL / NET: 500 mL        PHYSICAL EXAM:  GENERAL: NAD, well-developed  HEAD:  Atraumatic, Normocephalic  EYES: EOMI, PERRLA, conjunctiva and sclera clear  NECK: Supple, No JVD  CHEST/LUNG: Clear to auscultation bilaterally; No wheeze  HEART: Regular rate and rhythm; +systolic murmurs, rubs, or gallops  ABDOMEN: Soft, Nontender, Nondistended; Bowel sounds present  EXTREMITIES:  2+ Peripheral Pulses, No clubbing, cyanosis, or edema  NEUROLOGY: AAOx3, non-focal  PSYCH: calm  SKIN: No rashes or lesions    LABS:                    RADIOLOGY & ADDITIONAL TESTS:    Care Discussed with Consultants/Other Providers: Medicine NP

## 2021-06-02 NOTE — PROVIDER CONTACT NOTE (OTHER) - ASSESSMENT
Patient A&Ox 2, [place] speaking but able to make needs known in English, Disorientated and refused gabapentin and metoprolol, only wanted to take Tylenol, which was not due.

## 2021-06-03 ENCOUNTER — TRANSCRIPTION ENCOUNTER (OUTPATIENT)
Age: 86
End: 2021-06-03

## 2021-06-03 VITALS
DIASTOLIC BLOOD PRESSURE: 64 MMHG | RESPIRATION RATE: 18 BRPM | HEART RATE: 68 BPM | OXYGEN SATURATION: 95 % | SYSTOLIC BLOOD PRESSURE: 102 MMHG | TEMPERATURE: 98 F

## 2021-06-03 DIAGNOSIS — R30.0 DYSURIA: ICD-10-CM

## 2021-06-03 LAB
APPEARANCE UR: CLEAR — SIGNIFICANT CHANGE UP
BACTERIA # UR AUTO: ABNORMAL
BILIRUB UR-MCNC: NEGATIVE — SIGNIFICANT CHANGE UP
COD CRY URNS QL: ABNORMAL
COLOR SPEC: SIGNIFICANT CHANGE UP
DIFF PNL FLD: NEGATIVE — SIGNIFICANT CHANGE UP
EPI CELLS # UR: 0 /HPF — SIGNIFICANT CHANGE UP
GLUCOSE UR QL: NEGATIVE — SIGNIFICANT CHANGE UP
KETONES UR-MCNC: NEGATIVE — SIGNIFICANT CHANGE UP
LEUKOCYTE ESTERASE UR-ACNC: NEGATIVE — SIGNIFICANT CHANGE UP
NITRITE UR-MCNC: NEGATIVE — SIGNIFICANT CHANGE UP
PH UR: 7 — SIGNIFICANT CHANGE UP (ref 5–8)
PROT UR-MCNC: NEGATIVE — SIGNIFICANT CHANGE UP
RBC CASTS # UR COMP ASSIST: 1 /HPF — SIGNIFICANT CHANGE UP (ref 0–4)
SP GR SPEC: 1.01 — SIGNIFICANT CHANGE UP (ref 1.01–1.02)
UROBILINOGEN FLD QL: NEGATIVE — SIGNIFICANT CHANGE UP
WBC UR QL: 1 /HPF — SIGNIFICANT CHANGE UP (ref 0–5)

## 2021-06-03 PROCEDURE — 99285 EMERGENCY DEPT VISIT HI MDM: CPT

## 2021-06-03 PROCEDURE — 86769 SARS-COV-2 COVID-19 ANTIBODY: CPT

## 2021-06-03 PROCEDURE — 85730 THROMBOPLASTIN TIME PARTIAL: CPT

## 2021-06-03 PROCEDURE — U0003: CPT

## 2021-06-03 PROCEDURE — 72070 X-RAY EXAM THORAC SPINE 2VWS: CPT

## 2021-06-03 PROCEDURE — 72170 X-RAY EXAM OF PELVIS: CPT

## 2021-06-03 PROCEDURE — 97161 PT EVAL LOW COMPLEX 20 MIN: CPT

## 2021-06-03 PROCEDURE — 97110 THERAPEUTIC EXERCISES: CPT

## 2021-06-03 PROCEDURE — 82962 GLUCOSE BLOOD TEST: CPT

## 2021-06-03 PROCEDURE — 80048 BASIC METABOLIC PNL TOTAL CA: CPT

## 2021-06-03 PROCEDURE — 85027 COMPLETE CBC AUTOMATED: CPT

## 2021-06-03 PROCEDURE — 97116 GAIT TRAINING THERAPY: CPT

## 2021-06-03 PROCEDURE — 85610 PROTHROMBIN TIME: CPT

## 2021-06-03 PROCEDURE — 99239 HOSP IP/OBS DSCHRG MGMT >30: CPT

## 2021-06-03 PROCEDURE — 70450 CT HEAD/BRAIN W/O DYE: CPT

## 2021-06-03 PROCEDURE — 97530 THERAPEUTIC ACTIVITIES: CPT

## 2021-06-03 PROCEDURE — 73502 X-RAY EXAM HIP UNI 2-3 VIEWS: CPT

## 2021-06-03 PROCEDURE — U0005: CPT

## 2021-06-03 PROCEDURE — 83735 ASSAY OF MAGNESIUM: CPT

## 2021-06-03 PROCEDURE — 80053 COMPREHEN METABOLIC PANEL: CPT

## 2021-06-03 PROCEDURE — 82607 VITAMIN B-12: CPT

## 2021-06-03 PROCEDURE — 87635 SARS-COV-2 COVID-19 AMP PRB: CPT

## 2021-06-03 PROCEDURE — 81001 URINALYSIS AUTO W/SCOPE: CPT

## 2021-06-03 PROCEDURE — 82550 ASSAY OF CK (CPK): CPT

## 2021-06-03 PROCEDURE — 87086 URINE CULTURE/COLONY COUNT: CPT

## 2021-06-03 PROCEDURE — 85025 COMPLETE CBC W/AUTO DIFF WBC: CPT

## 2021-06-03 RX ORDER — TROSPIUM CHLORIDE 20 MG/1
2 TABLET, FILM COATED ORAL
Qty: 0 | Refills: 0 | DISCHARGE

## 2021-06-03 RX ADMIN — Medication 20 MILLIGRAM(S): at 05:52

## 2021-06-03 RX ADMIN — POLYETHYLENE GLYCOL 3350 17 GRAM(S): 17 POWDER, FOR SOLUTION ORAL at 12:04

## 2021-06-03 RX ADMIN — SERTRALINE 25 MILLIGRAM(S): 25 TABLET, FILM COATED ORAL at 12:04

## 2021-06-03 RX ADMIN — Medication 100 MILLIGRAM(S): at 05:51

## 2021-06-03 RX ADMIN — Medication 10 MILLIEQUIVALENT(S): at 12:04

## 2021-06-03 RX ADMIN — DORZOLAMIDE HYDROCHLORIDE TIMOLOL MALEATE 1 DROP(S): 20; 5 SOLUTION/ DROPS OPHTHALMIC at 18:34

## 2021-06-03 RX ADMIN — TRAMADOL HYDROCHLORIDE 25 MILLIGRAM(S): 50 TABLET ORAL at 07:50

## 2021-06-03 RX ADMIN — APIXABAN 5 MILLIGRAM(S): 2.5 TABLET, FILM COATED ORAL at 05:51

## 2021-06-03 RX ADMIN — Medication 650 MILLIGRAM(S): at 12:45

## 2021-06-03 RX ADMIN — APIXABAN 5 MILLIGRAM(S): 2.5 TABLET, FILM COATED ORAL at 18:34

## 2021-06-03 RX ADMIN — DORZOLAMIDE HYDROCHLORIDE TIMOLOL MALEATE 1 DROP(S): 20; 5 SOLUTION/ DROPS OPHTHALMIC at 05:52

## 2021-06-03 RX ADMIN — Medication 650 MILLIGRAM(S): at 12:07

## 2021-06-03 RX ADMIN — SERTRALINE 100 MILLIGRAM(S): 25 TABLET, FILM COATED ORAL at 12:04

## 2021-06-03 RX ADMIN — TRAMADOL HYDROCHLORIDE 25 MILLIGRAM(S): 50 TABLET ORAL at 08:20

## 2021-06-03 RX ADMIN — Medication 650 MILLIGRAM(S): at 19:29

## 2021-06-03 RX ADMIN — AMLODIPINE BESYLATE 10 MILLIGRAM(S): 2.5 TABLET ORAL at 05:51

## 2021-06-03 RX ADMIN — Medication 650 MILLIGRAM(S): at 18:34

## 2021-06-03 RX ADMIN — AMIODARONE HYDROCHLORIDE 100 MILLIGRAM(S): 400 TABLET ORAL at 05:51

## 2021-06-03 RX ADMIN — PANTOPRAZOLE SODIUM 40 MILLIGRAM(S): 20 TABLET, DELAYED RELEASE ORAL at 06:00

## 2021-06-03 NOTE — PROGRESS NOTE ADULT - PROBLEM SELECTOR PLAN 6
-Trend vital signs  -Cont. amlodipine  -Cont. metoprolol with hold parameters  -Cont furosemide daily
ISTOP reviewed Reference #: [id number]  No clonazepam noted on ISTOP but noted on outpt records. Discussed fall risk with pt and son. Chronic medication so will not stop abruptly  -Cont. clonazepam 1mg QHS PRN for how  -Cont. gabapentin QHS
ISTOP reviewed Reference #: [id number]  No clonazepam noted on ISTOP but noted on outpt records as reported. Discussed fall risk with pt and son. Chronic medication so will not stop abruptly  -Cont. clonazepam 1mg QHS PRN for how  -Cont. gabapentin QHS
ISTOP reviewed Reference #: [id number]  No clonazepam noted on ISTOP but noted on outpt records as reported. Discussed fall risk with pt and son. Chronic medication so will not stop abruptly  -Cont. clonazepam 1mg QHS PRN for how  -Cont. gabapentin QHS
ISTOP reviewed Reference #: [id number]  No clonazepam noted on ISTOP but noted on outpt records as reported. Discussed fall risk with pt and son. Chronic medication so will not stop abruptly  -Cont. clonazepam 1mg QHS PRN for how  -Cont. gabapentin QHS

## 2021-06-03 NOTE — PROGRESS NOTE ADULT - PROBLEM SELECTOR PROBLEM 7
Gastroesophageal reflux disease, unspecified whether esophagitis present
Anxiety

## 2021-06-03 NOTE — PROGRESS NOTE ADULT - PROBLEM SELECTOR PROBLEM 8
Prophylactic measure
Prophylactic measure
Gastroesophageal reflux disease, unspecified whether esophagitis present
Prophylactic measure
Prophylactic measure

## 2021-06-03 NOTE — DISCHARGE NOTE NURSING/CASE MANAGEMENT/SOCIAL WORK - PATIENT PORTAL LINK FT
You can access the FollowMyHealth Patient Portal offered by [facility] by registering at the following website: [email or web address]. By joining [facility]’s FollowMyHealth portal, you will also be able to view your health information using other applications (apps) compatible with our system.

## 2021-06-03 NOTE — PROGRESS NOTE ADULT - ASSESSMENT
89 [place]ian speaking F w/ hx of AFib on Eliquis, moderate AS, HTN, GERD, sciatica p/w fall at home and continued L leg pain
89 [place] Ugandan speaking F w/ hx of AFib on Eliquis, moderate AS, HTN, GERD, sciatica p/w fall at home and continued L leg pain
89 [place] Surinamese speaking F w/ hx of AFib on Eliquis, moderate AS, HTN, GERD, sciatica p/w fall at home and continued L leg pain
89 [place] Kosovan speaking F w/ hx of AFib on Eliquis, moderate AS, HTN, GERD, sciatica p/w fall at home and continued L leg pain
89 [place] Argentine speaking F w/ hx of AFib on Eliquis, moderate AS, HTN, GERD, sciatica p/w fall at home and continued L leg pain

## 2021-06-03 NOTE — PROGRESS NOTE ADULT - SUBJECTIVE AND OBJECTIVE BOX
Patient is a 89y old  Female who presents with a chief complaint of Fall and lower back pain (02 Jun 2021 12:06)      SUBJECTIVE / OVERNIGHT EVENTS: pain is better, has not had bm in a few days, reports some dysuria     MEDICATIONS  (STANDING):  aMIOdarone    Tablet 100 milliGRAM(s) Oral daily  amLODIPine   Tablet 10 milliGRAM(s) Oral daily  apixaban 5 milliGRAM(s) Oral every 12 hours  bisacodyl Suppository 10 milliGRAM(s) Rectal once  bisacodyl Suppository 10 milliGRAM(s) Rectal once  dorzolamide 2%/timolol 0.5% Ophthalmic Solution 1 Drop(s) Both EYES two times a day  furosemide    Tablet 20 milliGRAM(s) Oral daily  gabapentin 100 milliGRAM(s) Oral at bedtime  latanoprost 0.005% Ophthalmic Solution 1 Drop(s) Both EYES at bedtime  metoprolol tartrate 100 milliGRAM(s) Oral daily  metoprolol tartrate 50 milliGRAM(s) Oral at bedtime  pantoprazole    Tablet 40 milliGRAM(s) Oral before breakfast  polyethylene glycol 3350 17 Gram(s) Oral daily  potassium chloride    Tablet ER 10 milliEquivalent(s) Oral daily  sertraline 100 milliGRAM(s) Oral daily  sertraline 25 milliGRAM(s) Oral daily    MEDICATIONS  (PRN):  acetaminophen   Tablet .. 650 milliGRAM(s) Oral every 6 hours PRN Mild Pain (1 - 3)  clonazePAM  Tablet 1 milliGRAM(s) Oral at bedtime PRN anxiety  traMADol 25 milliGRAM(s) Oral two times a day PRN Moderate Pain (4 - 6)        CAPILLARY BLOOD GLUCOSE        I&O's Summary    02 Jun 2021 07:01  -  03 Jun 2021 07:00  --------------------------------------------------------  IN: 1070 mL / OUT: 1325 mL / NET: -255 mL        PHYSICAL EXAM:  GENERAL: NAD, well-developed  HEAD:  Atraumatic, Normocephalic  EYES: EOMI, PERRLA, conjunctiva and sclera clear  NECK: Supple, No JVD  CHEST/LUNG: Clear to auscultation bilaterally; No wheeze  HEART: Regular rate and rhythm; No murmurs, rubs, or gallops  ABDOMEN: Soft, Nontender, Nondistended; Bowel sounds present  EXTREMITIES:  2+ Peripheral Pulses, No clubbing, cyanosis, or edema  PSYCH: AAOx3  NEUROLOGY: non-focal  SKIN: No rashes or lesions    LABS:                    RADIOLOGY & ADDITIONAL TESTS:    Imaging Personally Reviewed:    Consultant(s) Notes Reviewed:      Care Discussed with Consultants/Other Providers:

## 2021-06-03 NOTE — PROGRESS NOTE ADULT - PROBLEM SELECTOR PROBLEM 5
Aortic valve stenosis, etiology of cardiac valve disease unspecified
Essential hypertension

## 2021-06-03 NOTE — PROGRESS NOTE ADULT - PROBLEM SELECTOR PLAN 2
Appears to be mechanical fall although pt has other risk factors. Son is onboard with discharging pt to rehab when ready. See below regarding leg pain  -Falls precautions  -Orthostatics---> stable BP
Appears to be mechanical fall although pt has other risk factors. Son is onboard with discharging pt to rehab when ready. See below regarding leg pain  -Falls precautions  -Orthostatics---> stable BP   -SW consult  -PT consult
Appears to be mechanical fall although pt has other risk factors. Son is onboard with discharging pt to rehab when ready. See below regarding leg pain  -Falls precautions  -Orthostatics---> stable BP
Appears to be mechanical fall although pt has other risk factors. Son is onboard with discharging pt to rehab when ready. See below regarding leg pain  -Falls precautions  -Orthostatics  -SW consult  -PT consult
Appears to be mechanical fall although pt has other risk factors. Son is onboard with discharging pt to rehab when ready. See below regarding leg pain  -Falls precautions  -Orthostatics---> stable BP

## 2021-06-03 NOTE — PROGRESS NOTE ADULT - PROBLEM SELECTOR PROBLEM 1
Leg pain, lateral, left

## 2021-06-03 NOTE — PROGRESS NOTE ADULT - PROBLEM SELECTOR PLAN 8
DVT PPx  -Pt on eliquis    Dispo: Medically stable for d/c to [facility], COVID PCR today. time spent 44 min     d/w Medicine SILAS Pleitez
Hx of GERD, H. Pylori negative  -Cont. PPI
DVT PPx  -Pt on eliquis
DVT PPx  -Pt on eliquis
DVT PPx  -Pt on eliquis    Dispo: Medically stable for d/c to [facility], awaiting auth. time spent 44 min     d/w Medicine SILAS Pleitez

## 2021-06-03 NOTE — PROGRESS NOTE ADULT - PROBLEM SELECTOR PLAN 4
- check ua
As per outpt cardiology note, has moderate AS. Murmur noted, last TTE 06/2020  -As per outpt notes, next TTE due ~08/2021  -Cont. to monitor, can consider TTE inpatient if suspicion increases

## 2021-06-03 NOTE — PROGRESS NOTE ADULT - PROBLEM SELECTOR PROBLEM 4
Aortic valve stenosis, etiology of cardiac valve disease unspecified
Dysuria
Aortic valve stenosis, etiology of cardiac valve disease unspecified

## 2021-06-03 NOTE — PROGRESS NOTE ADULT - PROBLEM SELECTOR PLAN 7
Hx of GERD, H. Pylori negative  -Cont. PPI
ISTOP reviewed Reference #: [id number]  No clonazepam noted on ISTOP but noted on outpt records as reported. Discussed fall risk with pt and son. Chronic medication so will not stop abruptly  -Cont. clonazepam 1mg QHS PRN for how  -Cont. gabapentin QHS
Hx of GERD, H. Pylori negative  -Cont. PPI

## 2021-06-03 NOTE — PROGRESS NOTE ADULT - PROBLEM SELECTOR PLAN 5
-Trend vital signs  -Cont. amlodipine  -Cont. metoprolol with hold parameters  -Cont furosemide daily
-Trend vital signs  -Cont. amlodipine  -Cont. metoprolol with hold parameters  -Cont furosemide daily
As per outpt cardiology note, has moderate AS. Murmur noted, last TTE 06/2020  -As per outpt notes, next TTE due ~08/2021  -Cont. to monitor, can consider TTE inpatient if suspicion increases
-Trend vital signs  -Cont. amlodipine  -Cont. metoprolol with hold parameters  -Cont furosemide daily
-Trend vital signs  -Cont. amlodipine  -Cont. metoprolol with hold parameters  -Cont furosemide daily

## 2021-06-07 ENCOUNTER — NON-APPOINTMENT (OUTPATIENT)
Age: 86
End: 2021-06-07

## 2021-06-18 NOTE — H&P ADULT - PROBLEM SELECTOR PLAN 4
DISPLAY PLAN FREE TEXT
- In the setting of GIB  - No history of liver disease  - Hold AC  - Monitor

## 2021-06-29 DIAGNOSIS — M48.062 SPINAL STENOSIS, LUMBAR REGION WITH NEUROGENIC CLAUDICATION: ICD-10-CM

## 2021-06-29 DIAGNOSIS — G57.93 UNSPECIFIED MONONEUROPATHY OF BILATERAL LOWER LIMBS: ICD-10-CM

## 2021-06-29 DIAGNOSIS — R53.1 WEAKNESS: ICD-10-CM

## 2021-07-02 ENCOUNTER — NON-APPOINTMENT (OUTPATIENT)
Age: 86
End: 2021-07-02

## 2021-07-15 ENCOUNTER — APPOINTMENT (OUTPATIENT)
Dept: UROLOGY | Facility: CLINIC | Age: 86
End: 2021-07-15

## 2021-07-30 ENCOUNTER — APPOINTMENT (OUTPATIENT)
Dept: INTERNAL MEDICINE | Facility: CLINIC | Age: 86
End: 2021-07-30
Payer: MEDICARE

## 2021-07-30 VITALS
SYSTOLIC BLOOD PRESSURE: 131 MMHG | DIASTOLIC BLOOD PRESSURE: 71 MMHG | HEART RATE: 61 BPM | BODY MASS INDEX: 28.08 KG/M2 | OXYGEN SATURATION: 98 % | TEMPERATURE: 96.6 F | HEIGHT: 60.63 IN | WEIGHT: 146.81 LBS

## 2021-07-30 DIAGNOSIS — D64.9 ANEMIA, UNSPECIFIED: ICD-10-CM

## 2021-07-30 DIAGNOSIS — E55.9 VITAMIN D DEFICIENCY, UNSPECIFIED: ICD-10-CM

## 2021-07-30 DIAGNOSIS — K64.4 RESIDUAL HEMORRHOIDAL SKIN TAGS: ICD-10-CM

## 2021-07-30 DIAGNOSIS — E78.2 MIXED HYPERLIPIDEMIA: ICD-10-CM

## 2021-07-30 DIAGNOSIS — I48.0 PAROXYSMAL ATRIAL FIBRILLATION: ICD-10-CM

## 2021-07-30 PROCEDURE — 99214 OFFICE O/P EST MOD 30 MIN: CPT

## 2021-08-08 NOTE — HISTORY OF PRESENT ILLNESS
[FreeTextEntry1] : Patient presents for follow-up [de-identified] : Presents for follow-up after having fallen back pain.  Has improved overall has been doing physical therapy.\par Denies any chest pain chest tightness denies any lower extremity swelling\par Continues to have some urinary incontinence declined botulinum toxin.

## 2021-08-08 NOTE — ASSESSMENT
7/3/2020      Rosita M Jeffrey  [address] [place] [id number]    Dear Ms. Murphy,    Your procedure is scheduled with Dr. Maninder Forbes on July 21, 2020. Please register at [facility] at 7:30 am.    [address] (Please check in on the 2nd floor)  [place], [place]  [id number] [phone]    You can expect to be contacted 1 to 3 days prior to the surgery to confirm arrival and surgery time. These times may change due to various OR schedule needs. We will call you ASAP if this happens.    The following appointment(s) have been scheduled for you:     · Post-op with Kaleigh Carson at the [facility] on August 4, 2020 at 9:00 am.    To better prepare for your surgery, please follow these instructions:    If you become sick, are having fevers, are on antibiotics, or are experiencing illness of any type, please call and alert us as your procedure will need to be rescheduled.    Special considerations for your upcoming procedure due to Covid-19:  -Day of your procedure when you arrive you will have a screening done that includes taking your temperature.   -we suggest self quarantine prior to your procedure.    You may eat a light breakfast or lunch before procedure.    You may drive yourself to procedure if you wish.    Per Dr. Forbes, you do not need to hold any medications prior to the procedure.      If you have any work related and/or disability forms that need to be completed, please contact the Forms Completion Department at [phone]. Forms can be dropped off at any of our [facility] locations. Please be advised that it can take 7 to 10 business days to complete these requests.    If you have questions regarding the procedure, medications, rehab, etc., please contact the nursing staff at [facility] - central scheduling line at [phone].    If you have any scheduling questions or need to reschedule, please contact me at the  [FreeTextEntry1] : Patient declined blood work today,\par Will require thyroid function tests and also follow-up with pulmonology however symptoms did not declined in the past.\par A. fib is currently rate controlled\par Blood pressure is stable\par CHF patient is currently euvolemic,\par Patient does have external hemorrhoids advised high-fiber diet Colace and cortisone cream with flareup. telephone number listed below.       Thank you,        Stefany  - [phone]  [facility] - [phone]  Surgery Scheduler for [facility]        \"Help us grow our quality of service. We want to improve - and you can help us. You may receive a survey either in the mail or via e-mail. This is your opportunity to tell us what we did well, and where we could use some improvement. We value your input.\"                                                                Insurance Authorization Need to Know’s    Prior to your surgical procedure, our team will contact your Insurance Company to initiate a PreAuthorization request.      This is not a guarantee of payment from your insurance company, but rather a step taken to ensure that we have all of the information and documentation for them to confirm the procedure is one that is eligible for coverage under your plan.    We will contact you if we either need more information from you to fulfill the requirements of your insurance company, or if we need to discuss any concerns that may lead to postponement or cancellation of your procedure. If you have any questions regarding your surgery authorization, please check with your insurance company or call our office for an update.    If you need information regarding your level of benefits or out-of-pocket expenses, please contact your insurance company directly.  They can also confirm for you whether or not we (the surgeon and the hospital/surgery center) are in your plan’s preferred network (aka ‘in-network’).    What to do if… My Insurance Changes:  If, at any time, your insurance company, plan or even card changes… Please call our office so that our team can be sure to update your records.  We will need to make sure to submit any PreAuth or quita to the correct, up-to-date insurance plan.      What to do if… My Insurance Requires A Referral:  If your insurance company requires a Referral for Specialty  Care or to see a Specialist, you will need to confirm with them if you have one on file.    - If your insurance carrier does not have a referral, then you will need to contact your Primary Care Physician to have one directly submitted to your insurance company ASAP.    - Without a referral on file, your insurance company will not Pre-Authorize your surgery and may not cover any of your care with our specialty.    What to do if… I have a Workers Compensation (W/C) Claim:  If you have a W/C claim, please be sure to provide our reception team with the information you have regarding your claim ASAP.  We will contact your W/C carrier/adjustor to inform them of your upcoming surgery and check the status of your claim (open vs closed).  We will let you know if they advise of any concerns or issues with your claim.  - Even if you have an open W/C claim, please also provide us with your personal/family insurance.  We will want to be sure this plan is loaded into your account.  We always PreAuthorize with personal insurance as a back-up to W/C.  Otherwise, if W/C doesn’t cover something along the way, you will receive a bill for the services.    What to do if… I have Month-to-Month Coverage/Premiums:  If you have an insurance plan that is paid for month to month, or is subject to plan change on a monthly basis, please be aware we cannot initiate PreAuthorization until just before the month of your surgery, as your insurance company will need to verify your premium payments/eligibility first.    What to do if… I Do Not Have Insurance Coverage or Have other Insurance/Billing Questions:  Please call our Patient Contact Center:  [phone] to speak with a team member about your billing needs, including possible coverage options, setting up payment plans, our fee schedule, etc.

## 2021-08-08 NOTE — PHYSICAL EXAM
[Normal] : normal gait, coordination grossly intact, no focal deficits and deep tendon reflexes were 2+ and symmetric [FreeTextEntry1] : external hemorrhoids. non thrombosed

## 2021-09-03 NOTE — PATIENT PROFILE ADULT. - PAIN CHRONIC, PROFILE
2 day history of tenderness on her right buttock. No trauma or history of perianal abscesses. Visualized 3-4 pustular lesions located just lateral to the rectum on the right buttock. They appear superficial with no significant induration or fluctuance. No additional lesions noted. I&D performed today with no purulent drainage. Serosanguinous fluid cultured for HSV given appearance of the lesions. Will treat with Bactrim for 5 days. Follow up next week for re-evaluation.   
no

## 2021-09-08 ENCOUNTER — LABORATORY RESULT (OUTPATIENT)
Age: 86
End: 2021-09-08

## 2021-09-08 NOTE — DIETITIAN INITIAL EVALUATION ADULT. - PROBLEM/PLAN-7
----- Message from Vasquez Burgos MD sent at 9/8/2021  8:22 AM CDT -----  One month   DISPLAY PLAN FREE TEXT

## 2021-09-21 RX ORDER — APIXABAN 5 MG/1
5 TABLET, FILM COATED ORAL
Qty: 180 | Refills: 3 | Status: ACTIVE | COMMUNITY
Start: 2018-04-19 | End: 1900-01-01

## 2021-09-27 ENCOUNTER — APPOINTMENT (OUTPATIENT)
Dept: CARDIOLOGY | Facility: CLINIC | Age: 86
End: 2021-09-27
Payer: MEDICARE

## 2021-09-27 ENCOUNTER — NON-APPOINTMENT (OUTPATIENT)
Age: 86
End: 2021-09-27

## 2021-09-27 VITALS
WEIGHT: 149 LBS | RESPIRATION RATE: 14 BRPM | BODY MASS INDEX: 28.5 KG/M2 | OXYGEN SATURATION: 97 % | DIASTOLIC BLOOD PRESSURE: 73 MMHG | HEIGHT: 60.63 IN | HEART RATE: 62 BPM | SYSTOLIC BLOOD PRESSURE: 152 MMHG | TEMPERATURE: 97.1 F

## 2021-09-27 VITALS — SYSTOLIC BLOOD PRESSURE: 136 MMHG | DIASTOLIC BLOOD PRESSURE: 68 MMHG

## 2021-09-27 PROCEDURE — 99214 OFFICE O/P EST MOD 30 MIN: CPT | Mod: 25

## 2021-09-27 PROCEDURE — 93000 ELECTROCARDIOGRAM COMPLETE: CPT

## 2021-09-27 NOTE — CARDIOLOGY SUMMARY
[___] : [unfilled] [LVEF ___%] : LVEF [unfilled]% [___] : [unfilled] [None] : no pulmonary hypertension [Mild] : mild mitral regurgitation

## 2021-09-28 ENCOUNTER — APPOINTMENT (OUTPATIENT)
Dept: ORTHOPEDIC SURGERY | Facility: CLINIC | Age: 86
End: 2021-09-28
Payer: MEDICARE

## 2021-09-28 VITALS
DIASTOLIC BLOOD PRESSURE: 71 MMHG | HEIGHT: 60 IN | WEIGHT: 149 LBS | BODY MASS INDEX: 29.25 KG/M2 | HEART RATE: 64 BPM | SYSTOLIC BLOOD PRESSURE: 143 MMHG

## 2021-09-28 DIAGNOSIS — M54.16 RADICULOPATHY, LUMBAR REGION: ICD-10-CM

## 2021-09-28 DIAGNOSIS — M47.816 SPONDYLOSIS W/OUT MYELOPATHY OR RADICULOPATHY, LUMBAR REGION: ICD-10-CM

## 2021-09-28 PROCEDURE — 99204 OFFICE O/P NEW MOD 45 MIN: CPT

## 2021-09-28 NOTE — DISCUSSION/SUMMARY
[FreeTextEntry1] : IMPRESSION: Ms. GREWAL is an 89 year-old woman with a history of HTN, arthritis, aortic stenosis, mitral regurgitation, hyperlipidemia, anxiety, and paroxysmal atrial fibrillation status post cardioversion on 5/4/2018 who presents today for follow up of atrial fibrillation and HTN.\par \par PLAN:\par 1. She is currently in sinus rhythm on her ECG and is feeling well. She will continue on Amiodarone 100 mg daily for maintenance of sinus rhythm. She will also continue on metoprolol 100 mg in the AM and 50 mg in the evening for rate control. She will continue on Eliquis 5mg twice daily for systemic anticoagulation of her atrial fibrillation. We will need to continue to follow her TSH and LFTs. She will follow up with her Ophthalmologist and will need to see her Pulmonologist at least annually. She states that she will have blood work when she sees you in November.  \par 2.  There are no signs of heart failure on exam and her blood pressure is acceptable. She will watch her sodium intake and continue on Lasix 20 mg daily, Metoprolol, and Amlodipine 10 mg daily.\par 3. I have asked her to schedule an echocardiogram at the time of her next visit to follow up her moderate aortic stenosis.\par 4. She will follow up with me in 3 months for follow up her blood pressure or sooner should she experience any symptoms in the interim.

## 2021-09-28 NOTE — HISTORY OF PRESENT ILLNESS
[de-identified] : This 89-year-old woman is seen in the office today along with her son.  She has a history of multiple falls in the past.  She had a fall at home May of this year and was hospitalized for a week or so at [facility] and then spent 3 weeks in rehab.  When she was admitted to the hospital she had back pain and then develop symptoms of a left-sided lumbar radiculopathy.  She had multiple x-rays on admission to the hospital including x-rays of the thoracic and lumbar spine and the pelvis and her hips and there was no evidence of a fracture.  She is now at home and taking extra strength Tylenol 4 to 5 pills a day.  She has a history of atrial fibrillation and is on Eliquis.  She has had congestive heart failure in the past.

## 2021-09-28 NOTE — PHYSICAL EXAM
[General Appearance - Well Developed] : well developed [Normal Appearance] : normal appearance [Well Groomed] : well groomed [General Appearance - Well Nourished] : well nourished [No Deformities] : no deformities [General Appearance - In No Acute Distress] : no acute distress [Normal Conjunctiva] : the conjunctiva exhibited no abnormalities [Normal Jugular Venous A Waves Present] : normal jugular venous A waves present [Normal Jugular Venous V Waves Present] : normal jugular venous V waves present [Respiration, Rhythm And Depth] : normal respiratory rhythm and effort [Exaggerated Use Of Accessory Muscles For Inspiration] : no accessory muscle use [Auscultation Breath Sounds / Voice Sounds] : lungs were clear to auscultation bilaterally [Normal Rate] : normal [Rhythm Regular] : regular [Normal S1] : normal S1 [Normal S2] : normal S2 [III] : a grade 3 [II] : a grade 2 [No Pitting Edema] : no pitting edema present [Bowel Sounds] : normal bowel sounds [Abdomen Soft] : soft [Abdomen Tenderness] : non-tender [Nail Clubbing] : no clubbing of the fingernails [Cyanosis, Localized] : no localized cyanosis [Petechial Hemorrhages (___cm)] : no petechial hemorrhages [Skin Color & Pigmentation] : normal skin color and pigmentation [] : no rash [No Skin Ulcers] : no skin ulcer [Oriented To Time, Place, And Person] : oriented to person, place, and time [Affect] : the affect was normal [Mood] : the mood was normal [No Anxiety] : not feeling anxious [S3] : no S3 [Right Carotid Bruit] : no bruit heard over the right carotid [Left Carotid Bruit] : no bruit heard over the left carotid [Bruit] : no bruit heard [FreeTextEntry1] : Her gait is slow and she uses a cane to assist with her ambulation.

## 2021-09-28 NOTE — PHYSICAL EXAM
[de-identified] : Her son serves as an .  She has some English and can understand some things and can convey her symptoms to a degree.  She moves very slowly from an examining chair to the examining table.  There is no pain with range of motion of her left hip or knee.  Straight leg raising is negative to 90 degrees and motor power seems normal in all lower extremity groups.

## 2021-09-28 NOTE — REASON FOR VISIT
[Degenerative Joint Disease] : degenerative joint disease [Radiculopathy] : radiculopathy [Family Member] : family member

## 2021-09-28 NOTE — HISTORY OF PRESENT ILLNESS
[FreeTextEntry1] : Patient is an 89 year old woman with a history of HTN, arthritis, hyperlipidemia, anxiety, paroxysmal atrial fibrillation, aortic stenosis, and mitral regurgitation who presents today for follow up of atrial fibrillation and HTN. She states that she had a mechanical fall during the summer and went to the hospital and was subsequently sent to rehab. She denies any syncope as a cause of her fall. She mentions experiencing joint pain and states that she uses a cane to assist with her ambulation. She otherwise denies any chest pain, dyspnea at rest, palpitations, headaches, PND, lower extremity edema, and dizziness.

## 2021-09-28 NOTE — DISCUSSION/SUMMARY
[Medication Risks Reviewed] : Medication risks reviewed [de-identified] : I reviewed x-rays from the hospital of the thoracic and lumbar spine the pelvis and hips and I also reviewed an MRI of the lumbar spine from 2018.  She has mild multilevel degenerative changes that are less than would be normal for her age.  The MRI in 2018 showed no evidence of any significant nerve root compression.  Currently on examination she is in sinus rhythm.  She is on Eliquis for her atrial fibrillation.  She will use moist heat and I prescribed a Medrol dose pack.  She will change her Tylenol to 2 arthritis strength 3 times a day.  If she is no better at the end of the Medrol Dosepak they will call me and it can be repeated.  I discussed that with the full dose of prednisone I normally use but not with the Medrol Dosepak I have seen a significantly elevated heart rate in the past.

## 2021-10-04 ENCOUNTER — RX RENEWAL (OUTPATIENT)
Age: 86
End: 2021-10-04

## 2021-10-06 ENCOUNTER — LABORATORY RESULT (OUTPATIENT)
Age: 86
End: 2021-10-06

## 2021-10-08 LAB
APPEARANCE: ABNORMAL
BILIRUBIN URINE: NEGATIVE
BLOOD URINE: NEGATIVE
COLOR: YELLOW
GLUCOSE QUALITATIVE U: NEGATIVE
KETONES URINE: NEGATIVE
LEUKOCYTE ESTERASE URINE: ABNORMAL
NITRITE URINE: NEGATIVE
PH URINE: 6
PROTEIN URINE: NORMAL
SPECIFIC GRAVITY URINE: 1.02
UROBILINOGEN URINE: NORMAL

## 2021-10-21 ENCOUNTER — APPOINTMENT (OUTPATIENT)
Dept: UROLOGY | Facility: CLINIC | Age: 86
End: 2021-10-21
Payer: MEDICARE

## 2021-10-21 ENCOUNTER — OUTPATIENT (OUTPATIENT)
Dept: OUTPATIENT SERVICES | Facility: HOSPITAL | Age: 86
LOS: 1 days | End: 2021-10-21
Payer: MEDICARE

## 2021-10-21 VITALS
BODY MASS INDEX: 29.25 KG/M2 | DIASTOLIC BLOOD PRESSURE: 72 MMHG | HEART RATE: 65 BPM | OXYGEN SATURATION: 100 % | HEIGHT: 60 IN | SYSTOLIC BLOOD PRESSURE: 137 MMHG | TEMPERATURE: 98 F | WEIGHT: 149 LBS | RESPIRATION RATE: 17 BRPM

## 2021-10-21 DIAGNOSIS — N39.41 URGE INCONTINENCE: ICD-10-CM

## 2021-10-21 DIAGNOSIS — Z98.890 OTHER SPECIFIED POSTPROCEDURAL STATES: Chronic | ICD-10-CM

## 2021-10-21 DIAGNOSIS — R35.0 FREQUENCY OF MICTURITION: ICD-10-CM

## 2021-10-21 DIAGNOSIS — R19.8 OTHER SPECIFIED SYMPTOMS AND SIGNS INVOLVING THE DIGESTIVE SYSTEM AND ABDOMEN: Chronic | ICD-10-CM

## 2021-10-21 DIAGNOSIS — R93.3 ABNORMAL FINDINGS ON DIAGNOSTIC IMAGING OF OTHER PARTS OF DIGESTIVE TRACT: Chronic | ICD-10-CM

## 2021-10-21 PROCEDURE — 52287 CYSTOSCOPY CHEMODENERVATION: CPT

## 2021-10-21 RX ORDER — ONABOTULINUMTOXINA 100 [USP'U]/1
100 INJECTION, POWDER, LYOPHILIZED, FOR SOLUTION INTRADERMAL; INTRAMUSCULAR
Qty: 1 | Refills: 0 | Status: COMPLETED | OUTPATIENT
Start: 2021-10-21

## 2021-10-21 RX ORDER — ONABOTULINUMTOXINA 100 [USP'U]/1
100 INJECTION, POWDER, LYOPHILIZED, FOR SOLUTION INTRADERMAL; INTRAMUSCULAR
Qty: 1 | Refills: 0 | Status: COMPLETED | OUTPATIENT
Start: 2021-10-21 | End: 2021-10-21

## 2021-10-21 RX ADMIN — ONABOTULINUMTOXINA 0 UNIT: 100 INJECTION, POWDER, LYOPHILIZED, FOR SOLUTION INTRADERMAL; INTRAMUSCULAR at 00:00

## 2021-11-01 ENCOUNTER — NON-APPOINTMENT (OUTPATIENT)
Age: 86
End: 2021-11-01

## 2021-11-01 DIAGNOSIS — M54.6 PAIN IN THORACIC SPINE: ICD-10-CM

## 2021-11-01 DIAGNOSIS — G89.29 PAIN IN THORACIC SPINE: ICD-10-CM

## 2021-11-02 ENCOUNTER — INPATIENT (INPATIENT)
Facility: HOSPITAL | Age: 86
LOS: 4 days | Discharge: NURSING, REHAB OR LONG-TERM CARE | DRG: 551 | End: 2021-11-07
Attending: INTERNAL MEDICINE | Admitting: HOSPITALIST
Payer: MEDICARE

## 2021-11-02 VITALS
OXYGEN SATURATION: 95 % | SYSTOLIC BLOOD PRESSURE: 136 MMHG | HEART RATE: 70 BPM | HEIGHT: 63 IN | RESPIRATION RATE: 16 BRPM | WEIGHT: 147.93 LBS | DIASTOLIC BLOOD PRESSURE: 78 MMHG | TEMPERATURE: 98 F

## 2021-11-02 DIAGNOSIS — I10 ESSENTIAL (PRIMARY) HYPERTENSION: ICD-10-CM
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
## 2021-11-02 DIAGNOSIS — R19.8 OTHER SPECIFIED SYMPTOMS AND SIGNS INVOLVING THE DIGESTIVE SYSTEM AND ABDOMEN: Chronic | ICD-10-CM

## 2021-11-02 DIAGNOSIS — Z98.890 OTHER SPECIFIED POSTPROCEDURAL STATES: Chronic | ICD-10-CM

## 2021-11-02 DIAGNOSIS — I48.20 CHRONIC ATRIAL FIBRILLATION, UNSPECIFIED: ICD-10-CM

## 2021-11-02 DIAGNOSIS — M54.9 DORSALGIA, UNSPECIFIED: ICD-10-CM

## 2021-11-02 DIAGNOSIS — S32.019A UNSPECIFIED FRACTURE OF FIRST LUMBAR VERTEBRA, INITIAL ENCOUNTER FOR CLOSED FRACTURE: ICD-10-CM

## 2021-11-02 DIAGNOSIS — N28.9 DISORDER OF KIDNEY AND URETER, UNSPECIFIED: ICD-10-CM

## 2021-11-02 DIAGNOSIS — K86.9 DISEASE OF PANCREAS, UNSPECIFIED: ICD-10-CM

## 2021-11-02 DIAGNOSIS — R93.3 ABNORMAL FINDINGS ON DIAGNOSTIC IMAGING OF OTHER PARTS OF DIGESTIVE TRACT: Chronic | ICD-10-CM

## 2021-11-02 DIAGNOSIS — H40.9 UNSPECIFIED GLAUCOMA: ICD-10-CM

## 2021-11-02 DIAGNOSIS — R82.81 PYURIA: ICD-10-CM

## 2021-11-02 LAB
ALBUMIN SERPL ELPH-MCNC: 4.5 G/DL — SIGNIFICANT CHANGE UP (ref 3.3–5)
ALP SERPL-CCNC: 112 U/L — SIGNIFICANT CHANGE UP (ref 40–120)
ALT FLD-CCNC: 11 U/L — SIGNIFICANT CHANGE UP (ref 10–45)
ANION GAP SERPL CALC-SCNC: 12 MMOL/L — SIGNIFICANT CHANGE UP (ref 5–17)
APPEARANCE UR: ABNORMAL
APTT BLD: 40.5 SEC — HIGH (ref 27.5–35.5)
AST SERPL-CCNC: 17 U/L — SIGNIFICANT CHANGE UP (ref 10–40)
BACTERIA # UR AUTO: ABNORMAL
BASOPHILS # BLD AUTO: 0.03 K/UL — SIGNIFICANT CHANGE UP (ref 0–0.2)
BASOPHILS NFR BLD AUTO: 0.4 % — SIGNIFICANT CHANGE UP (ref 0–2)
BILIRUB SERPL-MCNC: 0.2 MG/DL — SIGNIFICANT CHANGE UP (ref 0.2–1.2)
BILIRUB UR-MCNC: NEGATIVE — SIGNIFICANT CHANGE UP
BUN SERPL-MCNC: 13 MG/DL — SIGNIFICANT CHANGE UP (ref 7–23)
CALCIUM SERPL-MCNC: 9.5 MG/DL — SIGNIFICANT CHANGE UP (ref 8.4–10.5)
CHLORIDE SERPL-SCNC: 103 MMOL/L — SIGNIFICANT CHANGE UP (ref 96–108)
CO2 SERPL-SCNC: 27 MMOL/L — SIGNIFICANT CHANGE UP (ref 22–31)
COLOR SPEC: SIGNIFICANT CHANGE UP
CREAT SERPL-MCNC: 0.72 MG/DL — SIGNIFICANT CHANGE UP (ref 0.5–1.3)
DIFF PNL FLD: ABNORMAL
EOSINOPHIL # BLD AUTO: 0.06 K/UL — SIGNIFICANT CHANGE UP (ref 0–0.5)
EOSINOPHIL NFR BLD AUTO: 0.8 % — SIGNIFICANT CHANGE UP (ref 0–6)
EPI CELLS # UR: 0 /HPF — SIGNIFICANT CHANGE UP
GLUCOSE SERPL-MCNC: 131 MG/DL — HIGH (ref 70–99)
GLUCOSE UR QL: NEGATIVE — SIGNIFICANT CHANGE UP
HCT VFR BLD CALC: 36.5 % — SIGNIFICANT CHANGE UP (ref 34.5–45)
HGB BLD-MCNC: 10.6 G/DL — LOW (ref 11.5–15.5)
HYALINE CASTS # UR AUTO: 0 /LPF — SIGNIFICANT CHANGE UP (ref 0–2)
IMM GRANULOCYTES NFR BLD AUTO: 0.4 % — SIGNIFICANT CHANGE UP (ref 0–1.5)
INR BLD: 1.65 RATIO — HIGH (ref 0.88–1.16)
KETONES UR-MCNC: NEGATIVE — SIGNIFICANT CHANGE UP
LEUKOCYTE ESTERASE UR-ACNC: ABNORMAL
LYMPHOCYTES # BLD AUTO: 1.3 K/UL — SIGNIFICANT CHANGE UP (ref 1–3.3)
LYMPHOCYTES # BLD AUTO: 17 % — SIGNIFICANT CHANGE UP (ref 13–44)
MCHC RBC-ENTMCNC: 24.4 PG — LOW (ref 27–34)
MCHC RBC-ENTMCNC: 29 GM/DL — LOW (ref 32–36)
MCV RBC AUTO: 83.9 FL — SIGNIFICANT CHANGE UP (ref 80–100)
MONOCYTES # BLD AUTO: 0.86 K/UL — SIGNIFICANT CHANGE UP (ref 0–0.9)
MONOCYTES NFR BLD AUTO: 11.3 % — SIGNIFICANT CHANGE UP (ref 2–14)
NEUTROPHILS # BLD AUTO: 5.36 K/UL — SIGNIFICANT CHANGE UP (ref 1.8–7.4)
NEUTROPHILS NFR BLD AUTO: 70.1 % — SIGNIFICANT CHANGE UP (ref 43–77)
NITRITE UR-MCNC: POSITIVE
NRBC # BLD: 0 /100 WBCS — SIGNIFICANT CHANGE UP (ref 0–0)
PH UR: 7 — SIGNIFICANT CHANGE UP (ref 5–8)
PLATELET # BLD AUTO: 237 K/UL — SIGNIFICANT CHANGE UP (ref 150–400)
POTASSIUM SERPL-MCNC: 3.5 MMOL/L — SIGNIFICANT CHANGE UP (ref 3.5–5.3)
POTASSIUM SERPL-SCNC: 3.5 MMOL/L — SIGNIFICANT CHANGE UP (ref 3.5–5.3)
PROT SERPL-MCNC: 7.5 G/DL — SIGNIFICANT CHANGE UP (ref 6–8.3)
PROT UR-MCNC: SIGNIFICANT CHANGE UP
PROTHROM AB SERPL-ACNC: 19.3 SEC — HIGH (ref 10.6–13.6)
RBC # BLD: 4.35 M/UL — SIGNIFICANT CHANGE UP (ref 3.8–5.2)
RBC # FLD: 16.9 % — HIGH (ref 10.3–14.5)
RBC CASTS # UR COMP ASSIST: 32 /HPF — HIGH (ref 0–4)
SARS-COV-2 RNA SPEC QL NAA+PROBE: SIGNIFICANT CHANGE UP
SODIUM SERPL-SCNC: 142 MMOL/L — SIGNIFICANT CHANGE UP (ref 135–145)
SP GR SPEC: 1.01 — SIGNIFICANT CHANGE UP (ref 1.01–1.02)
UROBILINOGEN FLD QL: NEGATIVE — SIGNIFICANT CHANGE UP
WBC # BLD: 7.64 K/UL — SIGNIFICANT CHANGE UP (ref 3.8–10.5)
WBC # FLD AUTO: 7.64 K/UL — SIGNIFICANT CHANGE UP (ref 3.8–10.5)
WBC UR QL: 275 /HPF — HIGH (ref 0–5)

## 2021-11-02 PROCEDURE — 72128 CT CHEST SPINE W/O DYE: CPT | Mod: 26,MA

## 2021-11-02 PROCEDURE — 99285 EMERGENCY DEPT VISIT HI MDM: CPT

## 2021-11-02 PROCEDURE — 71250 CT THORAX DX C-: CPT | Mod: 26,MA

## 2021-11-02 PROCEDURE — 72125 CT NECK SPINE W/O DYE: CPT | Mod: 26,MA

## 2021-11-02 PROCEDURE — 74176 CT ABD & PELVIS W/O CONTRAST: CPT | Mod: 26,MA

## 2021-11-02 PROCEDURE — 99223 1ST HOSP IP/OBS HIGH 75: CPT

## 2021-11-02 PROCEDURE — 70450 CT HEAD/BRAIN W/O DYE: CPT | Mod: 26,MA

## 2021-11-02 PROCEDURE — 72131 CT LUMBAR SPINE W/O DYE: CPT | Mod: 26,MA

## 2021-11-02 RX ORDER — CLONAZEPAM 1 MG
1 TABLET ORAL AT BEDTIME
Refills: 0 | Status: DISCONTINUED | OUTPATIENT
Start: 2021-11-02 | End: 2021-11-03

## 2021-11-02 RX ORDER — FUROSEMIDE 40 MG
40 TABLET ORAL DAILY
Refills: 0 | Status: DISCONTINUED | OUTPATIENT
Start: 2021-11-02 | End: 2021-11-02

## 2021-11-02 RX ORDER — ACETAMINOPHEN 500 MG
975 TABLET ORAL ONCE
Refills: 0 | Status: COMPLETED | OUTPATIENT
Start: 2021-11-02 | End: 2021-11-02

## 2021-11-02 RX ORDER — METOPROLOL TARTRATE 50 MG
0.5 TABLET ORAL
Qty: 0 | Refills: 0 | DISCHARGE

## 2021-11-02 RX ORDER — GABAPENTIN 400 MG/1
100 CAPSULE ORAL AT BEDTIME
Refills: 0 | Status: DISCONTINUED | OUTPATIENT
Start: 2021-11-02 | End: 2021-11-07

## 2021-11-02 RX ORDER — CEFTRIAXONE 500 MG/1
1000 INJECTION, POWDER, FOR SOLUTION INTRAMUSCULAR; INTRAVENOUS EVERY 24 HOURS
Refills: 0 | Status: COMPLETED | OUTPATIENT
Start: 2021-11-03 | End: 2021-11-04

## 2021-11-02 RX ORDER — METOPROLOL TARTRATE 50 MG
100 TABLET ORAL
Refills: 0 | Status: DISCONTINUED | OUTPATIENT
Start: 2021-11-02 | End: 2021-11-07

## 2021-11-02 RX ORDER — SENNA PLUS 8.6 MG/1
2 TABLET ORAL
Qty: 0 | Refills: 0 | DISCHARGE

## 2021-11-02 RX ORDER — APIXABAN 2.5 MG/1
5 TABLET, FILM COATED ORAL
Refills: 0 | Status: DISCONTINUED | OUTPATIENT
Start: 2021-11-02 | End: 2021-11-07

## 2021-11-02 RX ORDER — DORZOLAMIDE HYDROCHLORIDE TIMOLOL MALEATE 20; 5 MG/ML; MG/ML
1 SOLUTION/ DROPS OPHTHALMIC
Refills: 0 | Status: DISCONTINUED | OUTPATIENT
Start: 2021-11-02 | End: 2021-11-07

## 2021-11-02 RX ORDER — LATANOPROST 0.05 MG/ML
1 SOLUTION/ DROPS OPHTHALMIC; TOPICAL AT BEDTIME
Refills: 0 | Status: DISCONTINUED | OUTPATIENT
Start: 2021-11-02 | End: 2021-11-07

## 2021-11-02 RX ORDER — CHOLECALCIFEROL (VITAMIN D3) 125 MCG
1000 CAPSULE ORAL DAILY
Refills: 0 | Status: DISCONTINUED | OUTPATIENT
Start: 2021-11-02 | End: 2021-11-07

## 2021-11-02 RX ORDER — CEFTRIAXONE 500 MG/1
1000 INJECTION, POWDER, FOR SOLUTION INTRAMUSCULAR; INTRAVENOUS ONCE
Refills: 0 | Status: COMPLETED | OUTPATIENT
Start: 2021-11-02 | End: 2021-11-02

## 2021-11-02 RX ORDER — SERTRALINE 25 MG/1
125 TABLET, FILM COATED ORAL DAILY
Refills: 0 | Status: DISCONTINUED | OUTPATIENT
Start: 2021-11-02 | End: 2021-11-07

## 2021-11-02 RX ORDER — SERTRALINE 25 MG/1
1 TABLET, FILM COATED ORAL
Qty: 0 | Refills: 0 | DISCHARGE

## 2021-11-02 RX ORDER — METOPROLOL TARTRATE 50 MG
1 TABLET ORAL
Qty: 0 | Refills: 0 | DISCHARGE

## 2021-11-02 RX ORDER — CALCIUM CARBONATE 500(1250)
1 TABLET ORAL DAILY
Refills: 0 | Status: DISCONTINUED | OUTPATIENT
Start: 2021-11-02 | End: 2021-11-07

## 2021-11-02 RX ORDER — FUROSEMIDE 40 MG
1 TABLET ORAL
Qty: 0 | Refills: 0 | DISCHARGE

## 2021-11-02 RX ORDER — OXYCODONE HYDROCHLORIDE 5 MG/1
2.5 TABLET ORAL EVERY 6 HOURS
Refills: 0 | Status: DISCONTINUED | OUTPATIENT
Start: 2021-11-02 | End: 2021-11-03

## 2021-11-02 RX ORDER — AMLODIPINE BESYLATE 2.5 MG/1
1 TABLET ORAL
Qty: 0 | Refills: 0 | DISCHARGE

## 2021-11-02 RX ORDER — ACETAMINOPHEN 500 MG
650 TABLET ORAL EVERY 6 HOURS
Refills: 0 | Status: DISCONTINUED | OUTPATIENT
Start: 2021-11-02 | End: 2021-11-04

## 2021-11-02 RX ORDER — AMIODARONE HYDROCHLORIDE 400 MG/1
100 TABLET ORAL DAILY
Refills: 0 | Status: DISCONTINUED | OUTPATIENT
Start: 2021-11-02 | End: 2021-11-07

## 2021-11-02 RX ORDER — LIDOCAINE 4 G/100G
1 CREAM TOPICAL DAILY
Refills: 0 | Status: DISCONTINUED | OUTPATIENT
Start: 2021-11-02 | End: 2021-11-07

## 2021-11-02 RX ADMIN — GABAPENTIN 100 MILLIGRAM(S): 400 CAPSULE ORAL at 23:22

## 2021-11-02 RX ADMIN — APIXABAN 5 MILLIGRAM(S): 2.5 TABLET, FILM COATED ORAL at 19:00

## 2021-11-02 RX ADMIN — Medication 100 MILLIGRAM(S): at 19:00

## 2021-11-02 RX ADMIN — CEFTRIAXONE 100 MILLIGRAM(S): 500 INJECTION, POWDER, FOR SOLUTION INTRAMUSCULAR; INTRAVENOUS at 14:34

## 2021-11-02 RX ADMIN — Medication 650 MILLIGRAM(S): at 19:11

## 2021-11-02 RX ADMIN — Medication 650 MILLIGRAM(S): at 19:01

## 2021-11-02 NOTE — CONSULT NOTE ADULT - SUBJECTIVE AND OBJECTIVE BOX
p (1480)     HPI:  89 y/o female Iranian speaking PMHx AFib on Eliquis, moderate AS, HTN, GERD, sciatica presented to the ED with son who is translating at bedside (pt refused ) for lower back pain s/p fall 2 days ago. Patient fell in her bedroom which was carpeted and unable to stand indepnedently after the fall. Pt called her son who came to her house shortly after. Patient denied headstrike or LOC. Patient ambulates with walker at baseline and is now c/o lower back pain with movement. Patient has been taking tylenol with some improvement of symptoms. Last dose at 9:30am. Denied CP, SOB presyncopal symptoms, abdominal pain, LOC, headache, N/V/D, fever chills, cough, dizziness, palpitations, syncope, weakness    Imaging:  Lumbar spine CT: Acute horizontal fracture extending from the L1 superior endplate and anterior cortex to the posterior cortex without significant retropulsion.    Exam:  AOx3, LR 5/5, SILT, no leroy, no clonus    --Anticoagulation:    =====================  PAST MEDICAL HISTORY   Hypertension    Glaucoma    Leg pain, bilateral    Hyperlipidemia    Hypovitaminosis D    Colonic polyp    Hiatal hernia    GERD (gastroesophageal reflux disease)    Atrial fibrillation, unspecified type    Aortic valve stenosis, etiology of cardiac valve disease unspecified    Atrial fibrillation    Hypertension    Glaucoma    Anxiety    GERD (gastroesophageal reflux disease)    Neuropathy      PAST SURGICAL HISTORY   Abnormal colonoscopy    Abnormal endoscopy finding    History of cardioversion    No significant past surgical history          MEDICATIONS:  Antibiotics:    Neuro:    Other:      SOCIAL HISTORY:   Occupation:   Marital Status:     FAMILY HISTORY:  Family history of diabetes mellitus (Child)    No pertinent family history in first degree relatives        ROS: Negative except per HPI    LABS:  PT/INR - ( 02 Nov 2021 12:35 )   PT: 19.3 sec;   INR: 1.65 ratio         PTT - ( 02 Nov 2021 12:35 )  PTT:40.5 sec                        10.6   7.64  )-----------( 237      ( 02 Nov 2021 12:35 )             36.5     11-02    142  |  103  |  13  ----------------------------<  131<H>  3.5   |  27  |  0.72    Ca    9.5      02 Nov 2021 12:35    TPro  7.5  /  Alb  4.5  /  TBili  0.2  /  DBili  x   /  AST  17  /  ALT  11  /  AlkPhos  112  11-02
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
## 2021-11-02 NOTE — ED PROVIDER NOTE - ATTENDING CONTRIBUTION TO CARE
RGUJRAL 88yo f Omani speaking BIB son s/p fall 2 days ago with back pain. States pt tripped on carpet, denies trauma to the head or loc, called son. Pt has been co pain not relieved w tylenol. No chest/abd/HA.  On exam, Patient is awake, alert x 3.  GCS15. NCAT  Neck: No Posterior midline cervical spine tenderness. Full ROM and neuro intact.  Chest is clear to auscultation. +S1S2.  Abdomen is soft nondistended/nontender +BS. No rebound or guarding.   Pelvis is stable. Full ROM B/L hips.   Back + diffuse tender midline L spine.  Skin intact   Check labs, CT to eval for injury, pain control.

## 2021-11-02 NOTE — ED PROVIDER NOTE - OBJECTIVE STATEMENT
88 y/o female Yakut speaking PMHx AFib on Eliquis, moderate AS, HTN, GERD, sciatica presented to the ED with son who is translating at bedside (pt refused ) for lower back pain s/p fall 2 days ago. Patient fell in her bedroom which was carpetted 89 y/o female Azeri speaking PMHx AFib on Eliquis, moderate AS, HTN, GERD, sciatica presented to the ED with son who is translating at bedside (pt refused ) for lower back pain s/p fall 2 days ago. Patient fell in her bedroom which was carpeted and unable to stand indepnedently after the fall. Pt called her son who came to her house shortly after. Patient denied headstrike or LOC. Patient ambulates with walker at baseline and is now c/o lower back pain with movement. Patient has been taking tylenol with some improvement of symptoms. Last dose at 9:30am. Denied CP, SOB presyncopal symptoms, abdominal pain, LOC, headache, N/V/D, fever chills, cough, dizziness, palpitations, syncope, weakness

## 2021-11-02 NOTE — ED ADULT NURSE NOTE - OBJECTIVE STATEMENT
89y f pt with son at bedside c/o back pain that has gotten worse today; pt fell on sunday and has been taking tylenol with little relief; pt has had a few falls recently; son states pt house is carpeted and this fall looks like she might have missed the chair while attempting to sit; today pain was worse; pt is on eliquis; son states need to get her checked; pt speaks Kazakh but answered my questions in english; aox3; no vision changes; no head pain; denies loc; no resp distress; no chest pain; no abd pain; no n/v; denies numbness/tingling; son states pt lives alone; he does her shopping and she cooks for him; iv placed; labs drawn; pt states took 1000mg of tylenol around 9am this morning; safety/comfort maintained

## 2021-11-02 NOTE — PROGRESS NOTE ADULT - SUBJECTIVE AND OBJECTIVE BOX
Patient evaluated measured and fitted for rigid TLSO spinal orthosis   to treat post L1 compression Fx delivered by [facility]   delivered as ordered [phone]

## 2021-11-02 NOTE — CONSULT NOTE ADULT - ASSESSMENT
DEANDRE GREWAL  89F Slovenian speaking on Eliquis for Afib p/w mid back pain x2d s/p mech fall. Per son, able to ambulate w/ walker after fall. Pain worse w/ mvmt. CT L: acute horizontal L1 fx ext from ant/sup endplate posteriorly w/o violating post cortex, no retropulsion. Exam: AOx3, LR 5/5, SILT, no leroy, no clonus.  - No acute neurosurgical intervention  - Pain control  - TLSO brace for comfort (can call [facility] [phone] for fitting prn)  - f/u outpatient in 1-2 w/ with Dr. Davis

## 2021-11-02 NOTE — PROGRESS NOTE ADULT - SUBJECTIVE AND OBJECTIVE BOX
Internal Medicine Team Progress Note    DEANDRE GREWAL  Patient is a 89y old  Female who presents with a chief complaint of Fall & Back Pain (02 Nov 2021 19:51)      INTERVAL HPI / SUBJECTIVE:  Patient admitted overnight. Patient with current complaint of bifrontal headache, no photophobia or phonophobia. No f/c, n/v, SOB, chest pain.     REVIEW OF SYSTEMS:   Constitutional: no fatigue, fever, chills, generalized weakness  HEENT: no eye pain, vision changes, rhinorrhea, sore throat  Respiratory: no cough, wheezing, shortness of breath  CV: no chest pain, palpitations, dyspnea on exertion, orthopnea, PND  GI: no decreased appetite, nausea, vomiting, abdominal pain, diarrhea, constipation, melena  : + urinary frequency, no dysuria, hematuria, incontinence, nocturia  MSK: + pain in LLE. No muscle weakness, joint swelling  Skin: no rashes, bruising, hair loss, color change  Neuro: no headache, dizziness, fainting, paresthesias, memory loss  Endo: no heat or cold intolerance, increased thirst, hot flashes  Psych: no changes in mood      MEDICATIONS:   MEDICATIONS  (STANDING):  aMIOdarone    Tablet 100 milliGRAM(s) Oral daily  apixaban 5 milliGRAM(s) Oral two times a day  calcium carbonate   1250 mG (OsCal) 1 Tablet(s) Oral daily  cholecalciferol 1000 Unit(s) Oral daily  dorzolamide 2%/timolol 0.5% Ophthalmic Solution 1 Drop(s) Both EYES two times a day  gabapentin 100 milliGRAM(s) Oral at bedtime  latanoprost 0.005% Ophthalmic Solution 1 Drop(s) Both EYES at bedtime  lidocaine   4% Patch 1 Patch Transdermal daily  metoprolol tartrate 100 milliGRAM(s) Oral two times a day  sertraline 125 milliGRAM(s) Oral daily    MEDICATIONS  (PRN):  acetaminophen     Tablet .. 650 milliGRAM(s) Oral every 6 hours PRN Temp greater or equal to 38C (100.4F), Mild Pain (1 - 3), Moderate Pain (4 - 6)  clonazePAM  Tablet 1 milliGRAM(s) Oral at bedtime PRN anxiety  oxyCODONE    IR 2.5 milliGRAM(s) Oral every 6 hours PRN Severe Pain (7 - 10)      ALLERGIES:   Allergies  No Known Allergies  Intolerances      OBJECTIVE:  Vital Signs Last 24 Hrs  T(C): 37 (02 Nov 2021 19:35), Max: 37 (02 Nov 2021 17:40)  T(F): 98.6 (02 Nov 2021 19:35), Max: 98.6 (02 Nov 2021 17:40)  HR: 72 (02 Nov 2021 19:35) (70 - 80)  BP: 141/72 (02 Nov 2021 19:35) (132/82 - 141/72)  BP(mean): --  RR: 17 (02 Nov 2021 19:35) (16 - 18)  SpO2: 94% (02 Nov 2021 19:35) (94% - 97%)      PHYSICAL EXAM:  General: Well-appearing, NAD  HEENT:  EOMI, PERRL, conjunctiva and sclera clear, normal oropharynx  Neck:  Supple, no JVD, normal thyroid  Chest/Lungs: CTA B/L, no rales, rhonchi, rub or wheezing  Heart:  Grade 3 crescendo/decrescendo murmur, best heard at MARINO border but can be appreciated in all 4 areas. No s3, s4. no murmurs or gallops  Abdomen:  Lumbar brace going across abdomen. No TTP above or below brace.   Extremities: Peripheral pulses 2+ B/L, no edema, cyanosis or clubbing  Skin: Warm, well-perfused, no rashes or lesions  Neurological: A&Ox3, Sensation intact in all extremities. No FND.       LABS:                      10.6   7.64  )-----------( 237      ( 02 Nov 2021 12:35 )             36.5     142  |  103  |  13  ----------------------------<  131<H>   (11-02)  3.5   |  27  |  0.72    Ca    9.5      02 Nov 2021 12:35    TPro  7.5  /  Alb  4.5  /  TBili  0.2  /  DBili  x   /  AST  17  /  ALT  11  /  AlkPhos  112  11-02      IMAGING:   CT Head No Cont (11.02.21 @ 13:30)   CT brain:  No acute intracranial hemorrhage, brain edema, or mass effect.  No displaced calvarial fracture.    CT cervical spine:  No acute fracture or traumatic subluxation.  No prevertebral soft tissue swelling.  Degenerative changes.      CT Thoracic Spine Reform No Cont (11.02.21 @ 13:31)   IMPRESSION:  Thoracic spine CT: No acute fracture traumatic subluxation. Multilevel degenerative changes.    Lumbar spine CT: Acute horizontal fracture extending from the L1 superior endplate and anterior cortex to theposterior cortex without significant retropulsion.    Please see abdominal pelvic CT of the same day for detailed information on the intrathoracic and pelvic organs.    There is a of this examination were discussed with MARY JANE Montano in the emergency department at 1:48 PM on 11/2/2021      CT Lumbar Spine Reform No Cont (11.02.21 @ 13:31)  IMPRESSION:    Thoracic spine CT: No acute fracture traumatic subluxation. Multilevel degenerative changes.    Lumbar spine CT: Acute horizontal fracture extending from the L1 superior endplate and anterior cortex to theposterior cortex without significant retropulsion.    Please see abdominal pelvic CT of the same day for detailed information on the intrathoracic and pelvic organs.      CT Abdomen and Pelvis No Cont (11.02.21 @ 13:31)   IMPRESSION:  Limited study without intravenous contrast.    Acute horizontal fracture of the superior endplate of the L1 vertebral body. Please refer to dedicated CT of the thoracic and lumbar spine for further details.    A 3.2 cm soft tissue density lesion projecting from the lower pole of the right kidney has increased in size since 1/5/2017, concerning for neoplasm. Ultrasound or dedicated renal MRI with and without contrast is recommended for further evaluation.    A 1.1 cm cystic lesion is noted in the pancreatic head. This could be further evaluated with MRI as indicated.      Labs and imaging personally reviewed and interpreted [x ]  Consult notes reviewed [x ]  Case discussed with consultants [ x]

## 2021-11-02 NOTE — H&P ADULT - HISTORY OF PRESENT ILLNESS
89F with PMHx of chronic atrial fibrillation, HTN and glaucoma presents with back pain (lumbar) and fall two days prior to admission. Fall was unwitnessed, but per son at bedside was mechanical as he believed she slipped. She may have hit the back of her head. She denies syncope. Has been having 10/10 lower lumbar back pain. Spoke with her PMD Karsten who advised to take standing Tylenol and sent Baclofen. Tylenol helped slightly with pain, patient did not start baclofen yet. In the ED patient with negative CT head and CT CSpine. CT lumbar region showing acute horizontal fracture of L1. Patient incidentally found to have right kidney lesion and pancreatic lesion for which son was told. Patient with no neurological alarm symptoms such as saddle anesthesia, incontinence or weakness. Patient seen by neurosurgery, but no intervention.       89F with PMHx of chronic atrial fibrillation, HTN and glaucoma presents with back pain (lumbar) and fall two days prior to admission. Fall was unwitnessed, but per son at bedside was mechanical as he believed she slipped. She may have hit the back of her head. She denies syncope. Has been having 10/10 lower lumbar back pain. Spoke with her PMD Karsten who advised to take standing Tylenol and sent Baclofen. Tylenol helped slightly with pain, patient did not start baclofen yet. In the ED patient with negative CT head and CT CSpine. CT lumbar region showing acute horizontal fracture of L1. Patient incidentally found to have right kidney lesion and pancreatic lesion for which son was told. Patient with no neurological alarm symptoms such as saddle anesthesia, incontinence or weakness. Patient seen by neurosurgery, but no intervention.

## 2021-11-02 NOTE — ED PROVIDER NOTE - PROGRESS NOTE DETAILS
MARY JANE Cha: made son aware of L1 fracture on CT and lesions seen on kidney and pancreas. provided copy of results. spoke to BURT Theodore for brace and neurosurgery to consult

## 2021-11-02 NOTE — PROGRESS NOTE ADULT - PROBLEM SELECTOR PLAN 1
- TLSO brace in place  - PT consult  - Ambulate only with brace on  - Ortho recs appreciated: outpatient f/u  - Pain control with PRN Tylenol and low dose oxy, start lidocaine patch  - Supplement with VitD and Calcium

## 2021-11-02 NOTE — ED ADULT NURSE NOTE - NSIMPLEMENTINTERV_GEN_ALL_ED
Implemented All Fall with Harm Risk Interventions:  [place] to call system. Call bell, personal items and telephone within reach. Instruct patient to call for assistance. Room bathroom lighting operational. Non-slip footwear when patient is off stretcher. Physically safe environment: no spills, clutter or unnecessary equipment. Stretcher in lowest position, wheels locked, appropriate side rails in place. Provide visual cue, wrist band, yellow gown, etc. Monitor gait and stability. Monitor for mental status changes and reorient to person, place, and time. Review medications for side effects contributing to fall risk. Reinforce activity limits and safety measures with patient and family. Provide visual clues: red socks.

## 2021-11-02 NOTE — H&P ADULT - NSHPADDITIONALINFOADULT_GEN_ALL_CORE
Faheem Fernandes  Pager: [phone]  Off-Hours: [phone]  Available on MS Teams Prepare for discharge.    Faheem Fernandes  Pager: [phone]  Off-Hours: [phone]  Available on MS Teams

## 2021-11-02 NOTE — PATIENT PROFILE ADULT - NSPROHMSYMPCOND_GEN_A_NUR
-Instructed patient to make daily HA log  -Patient needs to re-establish care with neurology, referral placed  -Given chronicity of migraines and slightly abnormal exam, MRI brain ordered      
-Urinalysis and urine culture ordered to r/o any acute UTI  -As patient has completed course of pelvic PT, will send patient to uro-gynecology for further evaluation  
cardiovascular

## 2021-11-02 NOTE — H&P ADULT - PROBLEM SELECTOR PLAN 6
Patient's and son explicitly told of this incidental finding. He will follow up with [facility] for further work up if in line with GOC

## 2021-11-02 NOTE — H&P ADULT - NSICDXPASTMEDICALHX_GEN_ALL_CORE_FT
PAST MEDICAL HISTORY:  Anxiety     Aortic valve stenosis, etiology of cardiac valve disease unspecified     Colonic polyp     GERD (gastroesophageal reflux disease)     Glaucoma     Hiatal hernia     Hyperlipidemia     Hypertension     Hypertension     Hypovitaminosis D     Leg pain, bilateral     Neuropathy

## 2021-11-02 NOTE — H&P ADULT - NSICDXFAMILYHX_GEN_ALL_CORE_FT
FAMILY HISTORY:  Child  Still living? Unknown  Family history of diabetes mellitus, Age at diagnosis: Age Unknown

## 2021-11-02 NOTE — PROGRESS NOTE ADULT - PROBLEM SELECTOR PLAN 5
- Patient's and son explicitly told of this incidental finding. He will follow up with [facility] for further work up if in line with GOC

## 2021-11-02 NOTE — ED ADULT NURSE NOTE - NS ED NURSE LEVEL OF CONSCIOUSNESS ORIENTATION
1) Restart just 10 units of lantus at bedtime. Give this 4 days and if your fasting sugar is between 100-130 then stay on this dose. If over 130, then go up by 2 units every 4 days to get to the dose that keeps you under 130. If under 100, go down by 2 units every 4 days. 2) Your A1c is 8% down from 9.9% and I hope if we can keep your fasting sugar under 130, then your next A1c will be back under 7%. 3) Your cholesterol is much better and your liver and kidney and urine and thyroid are normal. 
 
4) Your blood pressure is controlled. 5) You have lost 14 lbs on our scales. Keep up the good work.
Oriented - self; Oriented - place; Oriented - time

## 2021-11-02 NOTE — H&P ADULT - ASSESSMENT
89F with PMHx of chronic atrial fibrillation, HTN and glaucoma presents with back pain (lumbar) and fall two days prior to admission. CT lumbar region showing acute horizontal fracture of L1. Patient incidentally found to have right kidney lesion and pancreatic lesion for which son was told.

## 2021-11-02 NOTE — ED ADULT NURSE NOTE - NSICDXFAMILYHX_GEN_ALL_CORE_FT
no night sweats/no chills/no weight loss
FAMILY HISTORY:  No pertinent family history in first degree relatives    Child  Still living? Unknown  Family history of diabetes mellitus, Age at diagnosis: Age Unknown

## 2021-11-02 NOTE — H&P ADULT - PROBLEM SELECTOR PLAN 1
-Obtain TLSO brace (apparently ED called, will f/u)  -PT consult  -Ambulate only with brace on  -Ortho recs appreciated: outpatient f/u  -Pain control with PRN Tylenol and low dose oxy, start lidocaine patch  -Supplement with VitD and Calcium

## 2021-11-03 DIAGNOSIS — Z29.9 ENCOUNTER FOR PROPHYLACTIC MEASURES, UNSPECIFIED: ICD-10-CM

## 2021-11-03 DIAGNOSIS — K59.00 CONSTIPATION, UNSPECIFIED: ICD-10-CM

## 2021-11-03 LAB
ANION GAP SERPL CALC-SCNC: 12 MMOL/L — SIGNIFICANT CHANGE UP (ref 5–17)
BLD GP AB SCN SERPL QL: NEGATIVE — SIGNIFICANT CHANGE UP
BUN SERPL-MCNC: 10 MG/DL — SIGNIFICANT CHANGE UP (ref 7–23)
CALCIUM SERPL-MCNC: 9 MG/DL — SIGNIFICANT CHANGE UP (ref 8.4–10.5)
CHLORIDE SERPL-SCNC: 102 MMOL/L — SIGNIFICANT CHANGE UP (ref 96–108)
CO2 SERPL-SCNC: 25 MMOL/L — SIGNIFICANT CHANGE UP (ref 22–31)
COVID-19 NUCLEOCAPSID GAM AB INTERP: NEGATIVE — SIGNIFICANT CHANGE UP
COVID-19 NUCLEOCAPSID TOTAL GAM ANTIBODY RESULT: 0.09 INDEX — SIGNIFICANT CHANGE UP
COVID-19 SPIKE DOMAIN AB INTERP: POSITIVE
COVID-19 SPIKE DOMAIN ANTIBODY RESULT: 42 U/ML — HIGH
CREAT SERPL-MCNC: 0.58 MG/DL — SIGNIFICANT CHANGE UP (ref 0.5–1.3)
GLUCOSE SERPL-MCNC: 101 MG/DL — HIGH (ref 70–99)
HCT VFR BLD CALC: 33.5 % — LOW (ref 34.5–45)
HGB BLD-MCNC: 9.8 G/DL — LOW (ref 11.5–15.5)
MAGNESIUM SERPL-MCNC: 2.1 MG/DL — SIGNIFICANT CHANGE UP (ref 1.6–2.6)
MCHC RBC-ENTMCNC: 24.1 PG — LOW (ref 27–34)
MCHC RBC-ENTMCNC: 29.3 GM/DL — LOW (ref 32–36)
MCV RBC AUTO: 82.3 FL — SIGNIFICANT CHANGE UP (ref 80–100)
NRBC # BLD: 0 /100 WBCS — SIGNIFICANT CHANGE UP (ref 0–0)
PHOSPHATE SERPL-MCNC: 2.9 MG/DL — SIGNIFICANT CHANGE UP (ref 2.5–4.5)
PLATELET # BLD AUTO: 230 K/UL — SIGNIFICANT CHANGE UP (ref 150–400)
POTASSIUM SERPL-MCNC: 3.1 MMOL/L — LOW (ref 3.5–5.3)
POTASSIUM SERPL-SCNC: 3.1 MMOL/L — LOW (ref 3.5–5.3)
RBC # BLD: 4.07 M/UL — SIGNIFICANT CHANGE UP (ref 3.8–5.2)
RBC # FLD: 16.8 % — HIGH (ref 10.3–14.5)
RH IG SCN BLD-IMP: POSITIVE — SIGNIFICANT CHANGE UP
SARS-COV-2 IGG+IGM SERPL QL IA: 0.09 INDEX — SIGNIFICANT CHANGE UP
SARS-COV-2 IGG+IGM SERPL QL IA: 42 U/ML — HIGH
SARS-COV-2 IGG+IGM SERPL QL IA: NEGATIVE — SIGNIFICANT CHANGE UP
SARS-COV-2 IGG+IGM SERPL QL IA: POSITIVE
SODIUM SERPL-SCNC: 139 MMOL/L — SIGNIFICANT CHANGE UP (ref 135–145)
WBC # BLD: 8.8 K/UL — SIGNIFICANT CHANGE UP (ref 3.8–10.5)
WBC # FLD AUTO: 8.8 K/UL — SIGNIFICANT CHANGE UP (ref 3.8–10.5)

## 2021-11-03 PROCEDURE — 99233 SBSQ HOSP IP/OBS HIGH 50: CPT | Mod: GC

## 2021-11-03 PROCEDURE — 93306 TTE W/DOPPLER COMPLETE: CPT | Mod: 26

## 2021-11-03 RX ORDER — CLONAZEPAM 1 MG
0.5 TABLET ORAL AT BEDTIME
Refills: 0 | Status: DISCONTINUED | OUTPATIENT
Start: 2021-11-03 | End: 2021-11-04

## 2021-11-03 RX ORDER — POLYETHYLENE GLYCOL 3350 17 G/17G
17 POWDER, FOR SOLUTION ORAL DAILY
Refills: 0 | Status: DISCONTINUED | OUTPATIENT
Start: 2021-11-03 | End: 2021-11-07

## 2021-11-03 RX ORDER — POTASSIUM CHLORIDE 20 MEQ
40 PACKET (EA) ORAL EVERY 4 HOURS
Refills: 0 | Status: COMPLETED | OUTPATIENT
Start: 2021-11-03 | End: 2021-11-03

## 2021-11-03 RX ORDER — SENNA PLUS 8.6 MG/1
2 TABLET ORAL AT BEDTIME
Refills: 0 | Status: DISCONTINUED | OUTPATIENT
Start: 2021-11-03 | End: 2021-11-07

## 2021-11-03 RX ADMIN — GABAPENTIN 100 MILLIGRAM(S): 400 CAPSULE ORAL at 22:00

## 2021-11-03 RX ADMIN — LIDOCAINE 1 PATCH: 4 CREAM TOPICAL at 19:33

## 2021-11-03 RX ADMIN — LIDOCAINE 1 PATCH: 4 CREAM TOPICAL at 11:48

## 2021-11-03 RX ADMIN — Medication 1000 UNIT(S): at 11:47

## 2021-11-03 RX ADMIN — Medication 100 MILLIGRAM(S): at 17:12

## 2021-11-03 RX ADMIN — Medication 40 MILLIEQUIVALENT(S): at 17:12

## 2021-11-03 RX ADMIN — Medication 0.5 MILLIGRAM(S): at 21:59

## 2021-11-03 RX ADMIN — DORZOLAMIDE HYDROCHLORIDE TIMOLOL MALEATE 1 DROP(S): 20; 5 SOLUTION/ DROPS OPHTHALMIC at 05:23

## 2021-11-03 RX ADMIN — AMIODARONE HYDROCHLORIDE 100 MILLIGRAM(S): 400 TABLET ORAL at 05:21

## 2021-11-03 RX ADMIN — LATANOPROST 1 DROP(S): 0.05 SOLUTION/ DROPS OPHTHALMIC; TOPICAL at 22:00

## 2021-11-03 RX ADMIN — SERTRALINE 125 MILLIGRAM(S): 25 TABLET, FILM COATED ORAL at 11:48

## 2021-11-03 RX ADMIN — APIXABAN 5 MILLIGRAM(S): 2.5 TABLET, FILM COATED ORAL at 17:12

## 2021-11-03 RX ADMIN — SENNA PLUS 2 TABLET(S): 8.6 TABLET ORAL at 21:59

## 2021-11-03 RX ADMIN — Medication 1 TABLET(S): at 11:47

## 2021-11-03 RX ADMIN — DORZOLAMIDE HYDROCHLORIDE TIMOLOL MALEATE 1 DROP(S): 20; 5 SOLUTION/ DROPS OPHTHALMIC at 17:13

## 2021-11-03 RX ADMIN — LATANOPROST 1 DROP(S): 0.05 SOLUTION/ DROPS OPHTHALMIC; TOPICAL at 00:20

## 2021-11-03 RX ADMIN — OXYCODONE HYDROCHLORIDE 2.5 MILLIGRAM(S): 5 TABLET ORAL at 05:22

## 2021-11-03 RX ADMIN — DORZOLAMIDE HYDROCHLORIDE TIMOLOL MALEATE 1 DROP(S): 20; 5 SOLUTION/ DROPS OPHTHALMIC at 00:21

## 2021-11-03 RX ADMIN — Medication 100 MILLIGRAM(S): at 05:22

## 2021-11-03 RX ADMIN — CEFTRIAXONE 100 MILLIGRAM(S): 500 INJECTION, POWDER, FOR SOLUTION INTRAMUSCULAR; INTRAVENOUS at 13:26

## 2021-11-03 RX ADMIN — APIXABAN 5 MILLIGRAM(S): 2.5 TABLET, FILM COATED ORAL at 05:22

## 2021-11-03 RX ADMIN — Medication 40 MILLIEQUIVALENT(S): at 11:47

## 2021-11-03 RX ADMIN — LIDOCAINE 1 PATCH: 4 CREAM TOPICAL at 23:43

## 2021-11-03 RX ADMIN — Medication 650 MILLIGRAM(S): at 18:39

## 2021-11-03 NOTE — PHYSICAL THERAPY INITIAL EVALUATION ADULT - RISK REDUCTION/PREVENTION, PT EVAL
Xray & urinalysis today    [facility] Phone [phone]   Mon - Thurs 6:30am - 7:30pm   Fri 6:30am - 5:30pm   Saturday  6:30am - 12:00 noon     [facility] [phone]    
risk factors

## 2021-11-03 NOTE — PHYSICAL THERAPY INITIAL EVALUATION ADULT - BED MOBILITY LIMITATIONS, REHAB EVAL
Called patient's daughter and patient this morning.  He is instructed to proceed to the ER  
PTS DAUGHTER CALLED REQUESTING CHEST XRAY RESULTS    PLEASE ADVISE AT: [phone]     
decreased ability to use arms for pushing/pulling/decreased ability to use legs for bridging/pushing/impaired ability to control trunk for mobility

## 2021-11-03 NOTE — PHYSICAL THERAPY INITIAL EVALUATION ADULT - ADDITIONAL COMMENTS
Pt lives alone in private home, 4 steps to enter, stair lift to second floor bedroom. Prior to admission pt was independent with all functional mobility, son states he checks on patient frequently. Has HHA 4h/day for 2 days/week. Pt owns RW, SC and shower chair.

## 2021-11-03 NOTE — PROGRESS NOTE ADULT - SUBJECTIVE AND OBJECTIVE BOX
Internal Medicine Team Progress Note    DEANDRE GREWAL  Patient is a 89y old  Female who presents with a chief complaint of Fall & Back Pain (02 Nov 2021 17:23)      INTERVAL HPI / SUBJECTIVE:      REVIEW OF SYSTEMS:   Constitutional: no fatigue, fever, chills, generalized weakness  HEENT: no eye pain, vision changes, rhinorrhea, sore throat  Respiratory: no cough, wheezing, shortness of breath  CV: no chest pain, palpitations, dyspnea on exertion, orthopnea, PND  GI: no decreased appetite, nausea, vomiting, abdominal pain, diarrhea, constipation, melena  : no dysuria, hematuria, urinary frequency, incontinence, nocturia  MSK: no joint or muscle pain, muscle weakness, joint swelling  Skin: no rashes, bruising, hair loss, color change  Neuro: no headache, dizziness, fainting, paresthesias, memory loss  Endo: no heat or cold intolerance, increased thirst, hot flashes  Psych: no changes in mood      MEDICATIONS:   MEDICATIONS  (STANDING):  aMIOdarone    Tablet 100 milliGRAM(s) Oral daily  apixaban 5 milliGRAM(s) Oral two times a day  calcium carbonate   1250 mG (OsCal) 1 Tablet(s) Oral daily  cefTRIAXone   IVPB 1000 milliGRAM(s) IV Intermittent every 24 hours  cholecalciferol 1000 Unit(s) Oral daily  dorzolamide 2%/timolol 0.5% Ophthalmic Solution 1 Drop(s) Both EYES two times a day  gabapentin 100 milliGRAM(s) Oral at bedtime  latanoprost 0.005% Ophthalmic Solution 1 Drop(s) Both EYES at bedtime  lidocaine   4% Patch 1 Patch Transdermal daily  metoprolol tartrate 100 milliGRAM(s) Oral two times a day  sertraline 125 milliGRAM(s) Oral daily    MEDICATIONS  (PRN):  acetaminophen     Tablet .. 650 milliGRAM(s) Oral every 6 hours PRN Temp greater or equal to 38C (100.4F), Mild Pain (1 - 3), Moderate Pain (4 - 6)  clonazePAM  Tablet 1 milliGRAM(s) Oral at bedtime PRN anxiety  oxyCODONE    IR 2.5 milliGRAM(s) Oral every 6 hours PRN Severe Pain (7 - 10)      ALLERGIES:   Allergies    No Known Allergies    Intolerances        OBJECTIVE:  Vital Signs Last 24 Hrs  T(C): 37.1 (03 Nov 2021 04:06), Max: 37.1 (03 Nov 2021 04:06)  T(F): 98.8 (03 Nov 2021 04:06), Max: 98.8 (03 Nov 2021 04:06)  HR: 83 (03 Nov 2021 04:06) (66 - 83)  BP: 148/76 (03 Nov 2021 04:06) (132/82 - 152/85)  BP(mean): --  RR: 18 (03 Nov 2021 04:06) (16 - 18)  SpO2: 95% (03 Nov 2021 04:06) (94% - 97%)    I&O's Summary      PHYSICAL EXAM:  General: Well-appearing, NAD  HEENT:  EOMI, PERRL, conjunctiva and sclera clear, normal oropharynx  Neck:  Supple, no JVD, normal thyroid  Chest/Lungs: CTA B/L, no rales, rhonchi, rub or wheezing  Heart: RRR, normal S1, S2, no murmurs or gallops  Abdomen: Soft, nondistended, NTTP, normoactive bowel sounds  Extremities: Peripheral pulses 2+ B/L, no edema, cyanosis or clubbing  Skin: Warm, well-perfused, no rashes or lesions  Neurological: A&Ox3, no focal deficits      LABS:                      9.8    8.80  )-----------( 230      ( 03 Nov 2021 07:44 )             33.5     142  |  103  |  13  ----------------------------<  131<H>  (11-02)  3.5   |  27  |  0.72    Ca    9.5      02 Nov 2021 12:35    TPro  7.5  /  Alb  4.5  /  TBili  0.2  /  DBili  x   /  AST  17  /  ALT  11  /  AlkPhos  112  11-02      IMAGING:       Labs and imaging personally reviewed and interpreted [  ]  Consult notes reviewed [  ]  Case discussed with consultants [  ]

## 2021-11-03 NOTE — PHYSICAL THERAPY INITIAL EVALUATION ADULT - BED MOBILITY TRAINING, PT EVAL
December 12, 2018      Mariela JOSEPH Clive  [id number] [facility] [address]      Kendall Geiger,    Your lab results are as below:  1)  TSH (thyroid level) 1.78 which is normal (range 0.4-4)  2)  Your LDL (bad cholesterol) is elevated at 163, I would  Like you to return in 3 months fasting to recheck your cholesterol.   3)  Glucose is normal at 100 (normal fasting is <100).  4)  Your vitamin d level was low at 18, normal is 20-75. Having a low vitamin D level can cause body aches and fatigue.  I would like you to take vitamin D 50,000 iu every week for the next 12 weeks.  I have sent in a prescriptions for vitamin D to your pharmacy.  After 12 weeks I would like you to come in for a lab only appointment to recheck your vitamin D level, you do not have to be fasting for that lab appointment.    Resulted Orders   CBC with platelets differential   Result Value Ref Range    WBC 6.0 4.0 - 11.0 10e9/L    RBC Count 4.70 3.8 - 5.2 10e12/L    Hemoglobin 13.6 11.7 - 15.7 g/dL    Hematocrit 41.2 35.0 - 47.0 %    MCV 88 78 - 100 fl    MCH 28.9 26.5 - 33.0 pg    MCHC 33.0 31.5 - 36.5 g/dL    RDW 13.7 10.0 - 15.0 %    Platelet Count 272 150 - 450 10e9/L    % Neutrophils 62.2 %    % Lymphocytes 24.2 %    % Monocytes 6.5 %    % Eosinophils 6.8 %    % Basophils 0.3 %    Absolute Neutrophil 3.7 1.6 - 8.3 10e9/L    Absolute Lymphocytes 1.5 0.8 - 5.3 10e9/L    Absolute Monocytes 0.4 0.0 - 1.3 10e9/L    Absolute Eosinophils 0.4 0.0 - 0.7 10e9/L    Absolute Basophils 0.0 0.0 - 0.2 10e9/L    Diff Method Automated Method    Comprehensive metabolic panel   Result Value Ref Range    Sodium 140 133 - 144 mmol/L    Potassium 3.8 3.4 - 5.3 mmol/L    Chloride 106 94 - 109 mmol/L    Carbon Dioxide 26 20 - 32 mmol/L    Anion Gap 8 3 - 14 mmol/L    Glucose 100 (H) 70 - 99 mg/dL      Comment:      Non Fasting    Urea Nitrogen 8 7 - 30 mg/dL    Creatinine 0.89 0.52 - 1.04 mg/dL    GFR Estimate 64 >60 mL/min/1.7m2      Comment:      Non   American GFR Calc    GFR Estimate If Black 78 >60 mL/min/1.7m2      Comment:       GFR Calc    Calcium 9.6 8.5 - 10.1 mg/dL    Bilirubin Total 0.7 0.2 - 1.3 mg/dL    Albumin 4.0 3.4 - 5.0 g/dL    Protein Total 7.5 6.8 - 8.8 g/dL    Alkaline Phosphatase 107 40 - 150 U/L    ALT 24 0 - 50 U/L    AST 19 0 - 45 U/L   TSH with free T4 reflex   Result Value Ref Range    TSH 1.78 0.40 - 4.00 mU/L   Vitamin D Deficiency   Result Value Ref Range    Vitamin D Deficiency screening 18 (L) 20 - 75 ug/L      Comment:      Season, race, dietary intake, and treatment affect the concentration of   25-hydroxy-Vitamin D. Values may decrease during winter months and increase   during summer months. Values 20-29 ug/L may indicate Vitamin D insufficiency   and values <20 ug/L may indicate Vitamin D deficiency.  Vitamin D determination is routinely performed by an immunoassay specific for   25 hydroxyvitamin D3.  If an individual is on vitamin D2 (ergocalciferol)   supplementation, please specify 25 OH vitamin D2 and D3 level determination by   LCMSMS test [id number].     LDL cholesterol direct   Result Value Ref Range    LDL Cholesterol Direct 163 (H) <100 mg/dL      Comment:      Above desirable:  100-129 mg/dl  Borderline High:  130-159 mg/dL  High:             160-189 mg/dL  Very high:       >189 mg/dl         If you have any questions or concerns, please call the clinic at the number listed above.       Sincerely,        Susan Haase, APRN CNP                 GOAL: Pt will be independent with bed mobility in 2wks.

## 2021-11-03 NOTE — PHYSICAL THERAPY INITIAL EVALUATION ADULT - GENERAL OBSERVATIONS, REHAB EVAL
pt received semi-supine in bed in NAD, primarily Nepali speaking, son translating at bedside, IV lock, TLSO

## 2021-11-03 NOTE — PROGRESS NOTE ADULT - PROBLEM SELECTOR PLAN 1
-Obtain TLSO brace (apparently ED called, will f/u)  -PT consulted, recommend DAVI. Pt and family amenable  -Ambulate only with brace on  -Pain control with PRN Tylenol, lidocaine patch. Oxycodone d/luis enrique due to fall risk.   -Supplement with VitD and Calcium  - Decrease Clonazepam from 1.0mg qHS to 0.5mg qHS to mitigate fall risk.  -Ortho recs appreciated: outpatient f/u

## 2021-11-03 NOTE — PROGRESS NOTE ADULT - PROBLEM SELECTOR PLAN 9
- DVT: Patient on eliqiuis 5mg BID  - Diet: DASH diet  - Dispo: PT recommends DAVI, family and patient amenable

## 2021-11-03 NOTE — PHYSICAL THERAPY INITIAL EVALUATION ADULT - PRECAUTIONS/LIMITATIONS, REHAB EVAL
Spoke with her PMD Karsten who advised to take standing Tylenol and sent Baclofen. Tylenol helped slightly with pain, patient did not start baclofen yet. In the ED patient with negative CT head and CT CSpine. CT lumbar region showing acute horizontal fracture of L1. Pt incidentally found to have right kidney lesion and pancreatic lesion for which son was told. Pt with no neurological alarm symptoms such as saddle anesthesia, incontinence or weakness. Pt seen by neurosurgery, but no intervention.CTHead/CSpine: (-)/fall precautions

## 2021-11-03 NOTE — PROGRESS NOTE ADULT - PROBLEM SELECTOR PLAN 7
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

## 2021-11-04 DIAGNOSIS — I35.0 NONRHEUMATIC AORTIC (VALVE) STENOSIS: ICD-10-CM

## 2021-11-04 DIAGNOSIS — R41.0 DISORIENTATION, UNSPECIFIED: ICD-10-CM

## 2021-11-04 LAB
ANION GAP SERPL CALC-SCNC: 12 MMOL/L — SIGNIFICANT CHANGE UP (ref 5–17)
BASOPHILS # BLD AUTO: 0.03 K/UL — SIGNIFICANT CHANGE UP (ref 0–0.2)
BASOPHILS NFR BLD AUTO: 0.3 % — SIGNIFICANT CHANGE UP (ref 0–2)
BUN SERPL-MCNC: 12 MG/DL — SIGNIFICANT CHANGE UP (ref 7–23)
CALCIUM SERPL-MCNC: 9 MG/DL — SIGNIFICANT CHANGE UP (ref 8.4–10.5)
CHLORIDE SERPL-SCNC: 104 MMOL/L — SIGNIFICANT CHANGE UP (ref 96–108)
CO2 SERPL-SCNC: 23 MMOL/L — SIGNIFICANT CHANGE UP (ref 22–31)
CREAT SERPL-MCNC: 0.56 MG/DL — SIGNIFICANT CHANGE UP (ref 0.5–1.3)
EOSINOPHIL # BLD AUTO: 0.04 K/UL — SIGNIFICANT CHANGE UP (ref 0–0.5)
EOSINOPHIL NFR BLD AUTO: 0.4 % — SIGNIFICANT CHANGE UP (ref 0–6)
GLUCOSE SERPL-MCNC: 101 MG/DL — HIGH (ref 70–99)
HCT VFR BLD CALC: 34 % — LOW (ref 34.5–45)
HGB BLD-MCNC: 10.3 G/DL — LOW (ref 11.5–15.5)
IMM GRANULOCYTES NFR BLD AUTO: 0.9 % — SIGNIFICANT CHANGE UP (ref 0–1.5)
LYMPHOCYTES # BLD AUTO: 1.46 K/UL — SIGNIFICANT CHANGE UP (ref 1–3.3)
LYMPHOCYTES # BLD AUTO: 16 % — SIGNIFICANT CHANGE UP (ref 13–44)
MAGNESIUM SERPL-MCNC: 2.1 MG/DL — SIGNIFICANT CHANGE UP (ref 1.6–2.6)
MCHC RBC-ENTMCNC: 24.9 PG — LOW (ref 27–34)
MCHC RBC-ENTMCNC: 30.3 GM/DL — LOW (ref 32–36)
MCV RBC AUTO: 82.1 FL — SIGNIFICANT CHANGE UP (ref 80–100)
MONOCYTES # BLD AUTO: 1.09 K/UL — HIGH (ref 0–0.9)
MONOCYTES NFR BLD AUTO: 11.9 % — SIGNIFICANT CHANGE UP (ref 2–14)
NEUTROPHILS # BLD AUTO: 6.44 K/UL — SIGNIFICANT CHANGE UP (ref 1.8–7.4)
NEUTROPHILS NFR BLD AUTO: 70.5 % — SIGNIFICANT CHANGE UP (ref 43–77)
NRBC # BLD: 0 /100 WBCS — SIGNIFICANT CHANGE UP (ref 0–0)
PHOSPHATE SERPL-MCNC: 2.2 MG/DL — LOW (ref 2.5–4.5)
PLATELET # BLD AUTO: 236 K/UL — SIGNIFICANT CHANGE UP (ref 150–400)
POTASSIUM SERPL-MCNC: 3.7 MMOL/L — SIGNIFICANT CHANGE UP (ref 3.5–5.3)
POTASSIUM SERPL-SCNC: 3.7 MMOL/L — SIGNIFICANT CHANGE UP (ref 3.5–5.3)
RBC # BLD: 4.14 M/UL — SIGNIFICANT CHANGE UP (ref 3.8–5.2)
RBC # FLD: 16.6 % — HIGH (ref 10.3–14.5)
SODIUM SERPL-SCNC: 139 MMOL/L — SIGNIFICANT CHANGE UP (ref 135–145)
WBC # BLD: 9.14 K/UL — SIGNIFICANT CHANGE UP (ref 3.8–10.5)
WBC # FLD AUTO: 9.14 K/UL — SIGNIFICANT CHANGE UP (ref 3.8–10.5)

## 2021-11-04 PROCEDURE — 99233 SBSQ HOSP IP/OBS HIGH 50: CPT | Mod: GC

## 2021-11-04 RX ORDER — OLANZAPINE 15 MG/1
2.5 TABLET, FILM COATED ORAL ONCE
Refills: 0 | Status: COMPLETED | OUTPATIENT
Start: 2021-11-04 | End: 2021-11-04

## 2021-11-04 RX ORDER — HALOPERIDOL DECANOATE 100 MG/ML
2.5 INJECTION INTRAMUSCULAR ONCE
Refills: 0 | Status: DISCONTINUED | OUTPATIENT
Start: 2021-11-04 | End: 2021-11-04

## 2021-11-04 RX ORDER — INFLUENZA VIRUS VACCINE 15; 15; 15; 15 UG/.5ML; UG/.5ML; UG/.5ML; UG/.5ML
0.5 SUSPENSION INTRAMUSCULAR ONCE
Refills: 0 | Status: COMPLETED | OUTPATIENT
Start: 2021-11-05 | End: 2021-11-05

## 2021-11-04 RX ORDER — POTASSIUM PHOSPHATE, MONOBASIC POTASSIUM PHOSPHATE, DIBASIC 236; 224 MG/ML; MG/ML
15 INJECTION, SOLUTION INTRAVENOUS ONCE
Refills: 0 | Status: COMPLETED | OUTPATIENT
Start: 2021-11-04 | End: 2021-11-04

## 2021-11-04 RX ORDER — CLONAZEPAM 1 MG
1 TABLET ORAL AT BEDTIME
Refills: 0 | Status: DISCONTINUED | OUTPATIENT
Start: 2021-11-04 | End: 2021-11-07

## 2021-11-04 RX ORDER — ACETAMINOPHEN 500 MG
650 TABLET ORAL EVERY 6 HOURS
Refills: 0 | Status: DISCONTINUED | OUTPATIENT
Start: 2021-11-04 | End: 2021-11-07

## 2021-11-04 RX ORDER — CX-024414 0.2 MG/ML
0.25 INJECTION, SUSPENSION INTRAMUSCULAR ONCE
Refills: 0 | Status: COMPLETED | OUTPATIENT
Start: 2021-11-05 | End: 2021-11-07

## 2021-11-04 RX ORDER — QUETIAPINE FUMARATE 200 MG/1
25 TABLET, FILM COATED ORAL ONCE
Refills: 0 | Status: COMPLETED | OUTPATIENT
Start: 2021-11-04 | End: 2021-11-04

## 2021-11-04 RX ADMIN — Medication 650 MILLIGRAM(S): at 03:34

## 2021-11-04 RX ADMIN — Medication 0.5 MILLIGRAM(S): at 09:12

## 2021-11-04 RX ADMIN — Medication 0.5 MILLIGRAM(S): at 20:15

## 2021-11-04 RX ADMIN — APIXABAN 5 MILLIGRAM(S): 2.5 TABLET, FILM COATED ORAL at 12:39

## 2021-11-04 RX ADMIN — SENNA PLUS 2 TABLET(S): 8.6 TABLET ORAL at 22:00

## 2021-11-04 RX ADMIN — Medication 650 MILLIGRAM(S): at 12:44

## 2021-11-04 RX ADMIN — Medication 1000 UNIT(S): at 12:38

## 2021-11-04 RX ADMIN — QUETIAPINE FUMARATE 25 MILLIGRAM(S): 200 TABLET, FILM COATED ORAL at 20:09

## 2021-11-04 RX ADMIN — Medication 1 TABLET(S): at 12:38

## 2021-11-04 RX ADMIN — CEFTRIAXONE 100 MILLIGRAM(S): 500 INJECTION, POWDER, FOR SOLUTION INTRAMUSCULAR; INTRAVENOUS at 12:38

## 2021-11-04 RX ADMIN — AMIODARONE HYDROCHLORIDE 100 MILLIGRAM(S): 400 TABLET ORAL at 12:39

## 2021-11-04 RX ADMIN — SERTRALINE 125 MILLIGRAM(S): 25 TABLET, FILM COATED ORAL at 12:38

## 2021-11-04 RX ADMIN — OLANZAPINE 2.5 MILLIGRAM(S): 15 TABLET, FILM COATED ORAL at 00:50

## 2021-11-04 RX ADMIN — Medication 100 MILLIGRAM(S): at 17:25

## 2021-11-04 RX ADMIN — Medication 650 MILLIGRAM(S): at 03:04

## 2021-11-04 RX ADMIN — GABAPENTIN 100 MILLIGRAM(S): 400 CAPSULE ORAL at 20:11

## 2021-11-04 RX ADMIN — DORZOLAMIDE HYDROCHLORIDE TIMOLOL MALEATE 1 DROP(S): 20; 5 SOLUTION/ DROPS OPHTHALMIC at 17:25

## 2021-11-04 RX ADMIN — APIXABAN 5 MILLIGRAM(S): 2.5 TABLET, FILM COATED ORAL at 17:25

## 2021-11-04 RX ADMIN — LATANOPROST 1 DROP(S): 0.05 SOLUTION/ DROPS OPHTHALMIC; TOPICAL at 22:00

## 2021-11-04 RX ADMIN — OLANZAPINE 2.5 MILLIGRAM(S): 15 TABLET, FILM COATED ORAL at 02:37

## 2021-11-04 RX ADMIN — POTASSIUM PHOSPHATE, MONOBASIC POTASSIUM PHOSPHATE, DIBASIC 62.5 MILLIMOLE(S): 236; 224 INJECTION, SOLUTION INTRAVENOUS at 15:58

## 2021-11-04 RX ADMIN — Medication 100 MILLIGRAM(S): at 12:39

## 2021-11-04 RX ADMIN — Medication 1 MILLIGRAM(S): at 20:11

## 2021-11-04 RX ADMIN — DORZOLAMIDE HYDROCHLORIDE TIMOLOL MALEATE 1 DROP(S): 20; 5 SOLUTION/ DROPS OPHTHALMIC at 12:44

## 2021-11-04 NOTE — PROGRESS NOTE ADULT - PROBLEM SELECTOR PLAN 6
Patient's and son explicitly told of this incidental finding. He will follow up with [facility] for further work up if in line with GOC -Continue with Lasix

## 2021-11-04 NOTE — PROGRESS NOTE ADULT - PROBLEM SELECTOR PLAN 3
-Continue with Latanoprost, Dorzolamide, and Timolol - Pt with known hx of moderate AS, follows with Dr. Shook (last seen on 9/27/21)  - Echo shows interval progression of AS, now with severe aortic stenosis  - Spoke with Dr. Shook's NP, recs FU with cardiology as OP re: TAVR (consider IP consult if patient not DC to [facility] tmrw)

## 2021-11-04 NOTE — OCCUPATIONAL THERAPY INITIAL EVALUATION ADULT - PHYSICAL ASSIST/NONPHYSICAL ASSIST:DRESS LOWER BODY, OT EVAL
Pt attempted to doff B/L socks using crossed-leg technique, however pt was unable to cross legs/verbal cues/nonverbal cues (demo/gestures)/1 person assist

## 2021-11-04 NOTE — OCCUPATIONAL THERAPY INITIAL EVALUATION ADULT - DIAGNOSIS, OT EVAL
Pt p/w decreased strength, ROM, balance, and flexibility impacting mobility & ADL. Pt p/w decreased strength, ROM, balance, cognition, and flexibility impacting mobility & ADL.

## 2021-11-04 NOTE — PROVIDER CONTACT NOTE (OTHER) - SITUATION
according to son at bedside, patient is experiencing hallucinations: telling non-existent people to leave her room

## 2021-11-04 NOTE — PROGRESS NOTE ADULT - PROBLEM SELECTOR PLAN 1
-Obtain TLSO brace (apparently ED called, will f/u)  -PT consulted, recommend DAVI. Pt and family amenable  -Ambulate only with brace on  -Pain control with PRN Tylenol, lidocaine patch. Oxycodone d/luis enrique due to fall risk.   -Supplement with VitD and Calcium  - Decrease Clonazepam from 1.0mg qHS to 0.5mg qHS to mitigate fall risk.  -Ortho recs appreciated: outpatient f/u -Obtain TLSO brace (apparently ED called, will f/u)  -PT consulted, recommend DAVI. Pt and family amenable  -Ambulate only with brace on  -Pain control with PRN Tylenol, lidocaine patch. Oxycodone d/luis enrique due to fall risk.   -Supplement with VitD and Calcium  -Ortho recs appreciated: outpatient f/u

## 2021-11-04 NOTE — OCCUPATIONAL THERAPY INITIAL EVALUATION ADULT - PERTINENT HX OF CURRENT PROBLEM, REHAB EVAL
89F with PMHx of chronic atrial fibrillation, HTN and glaucoma presents with back pain (lumbar) and unwitnessed fall two days prior to admission. She denies syncope. Has been having 10/10 lower lumbar back pain. Patient seen by neurosurgery, but no intervention. Pt incindentally Patient incidentally found to have right kidney lesion and pancreatic lesion.

## 2021-11-04 NOTE — PROGRESS NOTE ADULT - PROBLEM SELECTOR PLAN 7
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 3 weeks prior  - UA shows many bacteria, large LE, + nitrites  - Patient asymptomatic, denies dysuria, increased frequency, urgency, hematuria (though symptoms may be confounded by effect of recent botox)  - Afebrile with No CVAT on exam  - c/w Ceftriaxone 1g IV daily x3 days (stop 11/4/21) for UTI Patient's and son explicitly told of this incidental finding. He will follow up with [facility] for further work up if in line with GOC

## 2021-11-04 NOTE — PROGRESS NOTE ADULT - PROBLEM SELECTOR PLAN 5
Patient's and son explicitly told of this incidental finding. He will follow up with [facility] for further work up if in line with GOC -Continue with Latanoprost, Dorzolamide, and Timolol

## 2021-11-04 NOTE — PROGRESS NOTE ADULT - PROBLEM SELECTOR PLAN 9
- DVT: Patient on eliqiuis 5mg BID  - Diet: DASH diet  - Dispo: PT recommends [facility], family and patient amenable 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 3 weeks prior  - UA shows many bacteria, large LE, + nitrites  - Patient asymptomatic, denies dysuria, increased frequency, urgency, hematuria (though symptoms may be confounded by effect of recent botox)  - Afebrile with No CVAT on exam  - c/w Ceftriaxone 1g IV daily x3 days (stop 11/4/21) for UTI

## 2021-11-04 NOTE — OCCUPATIONAL THERAPY INITIAL EVALUATION ADULT - BALANCE TRAINING, PT EVAL
Pt will demonstrate fair dynamic standing balance during grooming task at sink level with RW and TLSO within 4 weeks.

## 2021-11-04 NOTE — PROGRESS NOTE ADULT - PROBLEM SELECTOR PLAN 4
-Continue with Lasix - Patient with agitation overnight, oriented only to self, presentation consistent with delirium  - Yesterday decreased Clonazepam from 1.0mg qHS to 0.5mg qHS to mitigate fall risk, may have contributed to delirium so increased back to 1.0mg qHS. Will recommend tapering as OP.   - Seroquel 25mg PO qHS tonight for delirium  - Zyprexa 2.5mg IM q6 hrs PRN for agitation overnight  - Bed alarm placed  - Minimize nighttime disruptions

## 2021-11-04 NOTE — OCCUPATIONAL THERAPY INITIAL EVALUATION ADULT - PRECAUTIONS/LIMITATIONS, REHAB EVAL
IMAGING: CT Chest/Abdomen/Pelvis 11/2/21 revealed  Acute horizontal fracture of the superior endplate of the L1 vertebral body. A 3.2 cm soft tissue density lesion projecting from the lower pole of the right kidney has increased in size since 1/5/2017, concerning for neoplasm. A 1.1 cm cystic lesion is noted in the pancreatic head. No acute CT brain or CT c-spine results 11/2/21. CT T-spine 11/2/21 revealed Multilevel degenerative changes, otherwise no acute results. CT L-spine 11/2/21 revealed Acute horizontal fracture extending from the L1 superior endplate and anterior cortex to the posterior cortex without significant retropulsion./fall precautions/spinal precautions

## 2021-11-04 NOTE — PROGRESS NOTE ADULT - PROBLEM SELECTOR PLAN 8
- Pt with no BM for 3-4 days  - Miralax, senna started Patient's and son explicitly told of this incidental finding. He will follow up with [facility] for further work up if in line with GOC

## 2021-11-04 NOTE — OCCUPATIONAL THERAPY INITIAL EVALUATION ADULT - LEVEL OF INDEPENDENCE: BED TO CHAIR, REHAB EVAL
Pt ambulated from bed to chair with MOD Ax1 and RW, as well as max verbal, visual, and tactile cues to maintain safety; TLSO donned prior to transfer/moderate assist (50% patients effort)

## 2021-11-04 NOTE — OCCUPATIONAL THERAPY INITIAL EVALUATION ADULT - LIVES WITH, PROFILE
Hx confirmed with pt's son at bedside. Pt lives alone in a private home, 4 steps to enter, 2-floor setup. Son reports that he visits her 7 days/week for "several hours". Son also reports that pt has a HHA for 4 hours/day, 2 days/week to assist with showering and home management. Son reports that pt owns RW, shower chair, bedside commode, and cane, as well as a stair lift to access 2nd floor, which contains her bedroom and bathroom.

## 2021-11-04 NOTE — OCCUPATIONAL THERAPY INITIAL EVALUATION ADULT - GENERAL OBSERVATIONS, REHAB EVAL
Pt received semi-supine in bed, bed alarm on, +IVL, son in room. Pt Welsh-speaking, son at bedside to translate.

## 2021-11-04 NOTE — OCCUPATIONAL THERAPY INITIAL EVALUATION ADULT - PHYSICAL ASSIST/NONPHYSICAL ASSIST: SUPINE/SIT, REHAB EVAL
Log roll technique used to adhere to spinal precautions/verbal cues/nonverbal cues (demo/gestures)/1 person assist

## 2021-11-04 NOTE — PROGRESS NOTE ADULT - SUBJECTIVE AND OBJECTIVE BOX
Internal Medicine Team Progress Note    DEANDRE GREWAL  Patient is a 89y old  Female who presents with a chief complaint of Fall & Back Pain (03 Nov 2021 08:39)      INTERVAL HPI / SUBJECTIVE:  Overnight, patient delirious and anxious, unable to be redirected. Patient given Zyprexa 2.5mg IM at 00:00 and 02:00.     REVIEW OF SYSTEMS:   Constitutional: no fatigue, fever, chills, generalized weakness  HEENT: no eye pain, vision changes, rhinorrhea, sore throat  Respiratory: no cough, wheezing, shortness of breath  CV: no chest pain, palpitations, dyspnea on exertion, orthopnea, PND  GI: no decreased appetite, nausea, vomiting, abdominal pain, diarrhea, constipation, melena  : no dysuria, hematuria, urinary frequency, incontinence, nocturia  MSK: no joint or muscle pain, muscle weakness, joint swelling  Skin: no rashes, bruising, hair loss, color change  Neuro: no headache, dizziness, fainting, paresthesias, memory loss  Endo: no heat or cold intolerance, increased thirst, hot flashes  Psych: no changes in mood      MEDICATIONS:   MEDICATIONS  (STANDING):  aMIOdarone    Tablet 100 milliGRAM(s) Oral daily  apixaban 5 milliGRAM(s) Oral two times a day  calcium carbonate   1250 mG (OsCal) 1 Tablet(s) Oral daily  cefTRIAXone   IVPB 1000 milliGRAM(s) IV Intermittent every 24 hours  cholecalciferol 1000 Unit(s) Oral daily  clonazePAM  Tablet 0.5 milliGRAM(s) Oral at bedtime  dorzolamide 2%/timolol 0.5% Ophthalmic Solution 1 Drop(s) Both EYES two times a day  gabapentin 100 milliGRAM(s) Oral at bedtime  latanoprost 0.005% Ophthalmic Solution 1 Drop(s) Both EYES at bedtime  lidocaine   4% Patch 1 Patch Transdermal daily  metoprolol tartrate 100 milliGRAM(s) Oral two times a day  polyethylene glycol 3350 17 Gram(s) Oral daily  senna 2 Tablet(s) Oral at bedtime  sertraline 125 milliGRAM(s) Oral daily    MEDICATIONS  (PRN):  acetaminophen     Tablet .. 650 milliGRAM(s) Oral every 6 hours PRN Temp greater or equal to 38C (100.4F), Mild Pain (1 - 3), Moderate Pain (4 - 6)      ALLERGIES:   Allergies    No Known Allergies    Intolerances        OBJECTIVE:  Vital Signs Last 24 Hrs  T(C): 36.8 (04 Nov 2021 04:33), Max: 36.8 (03 Nov 2021 21:42)  T(F): 98.2 (04 Nov 2021 04:33), Max: 98.2 (03 Nov 2021 21:42)  HR: 71 (04 Nov 2021 04:33) (67 - 74)  BP: 137/69 (04 Nov 2021 04:33) (129/83 - 149/89)  BP(mean): --  RR: 18 (04 Nov 2021 04:33) (18 - 18)  SpO2: 94% (04 Nov 2021 04:33) (93% - 96%)    I&O's Summary    03 Nov 2021 07:01  -  04 Nov 2021 07:00  --------------------------------------------------------  IN: 0 mL / OUT: 350 mL / NET: -350 mL        PHYSICAL EXAM:  General: Well-appearing, NAD  HEENT:  EOMI, PERRL, conjunctiva and sclera clear, normal oropharynx  Neck:  Supple, no JVD, normal thyroid  Chest/Lungs: CTA B/L, no rales, rhonchi, rub or wheezing  Heart: RRR, normal S1, S2, no murmurs or gallops  Abdomen: Soft, nondistended, NTTP, normoactive bowel sounds  Extremities: Peripheral pulses 2+ B/L, no edema, cyanosis or clubbing  Skin: Warm, well-perfused, no rashes or lesions  Neurological: A&Ox3, no focal deficits      LABS:      IMAGING:       Labs and imaging personally reviewed and interpreted [  ]  Consult notes reviewed [  ]  Case discussed with consultants [  ]   Internal Medicine Team Progress Note    DEANDRE GREWAL  Patient is a 89y old  Female who presents with a chief complaint of Fall & Back Pain (03 Nov 2021 08:39)      INTERVAL HPI / SUBJECTIVE:  Overnight, patient delirious and anxious, unable to be redirected. Patient given Zyprexa 2.5mg IM at 00:00 and 02:00. This morning, patient interviewed with  Lorna, ID #[id number]. Pt remains delirious, only oriented to self, attempting to get out of chair, not responding to redirection. Patient with decreased anxiety and agitation after arrival of son, remains disoriented. No BM today. Patient unable to answer ROS, answers questions inappropriately.     REVIEW OF SYSTEMS:   Constitutional: unable to answer  HEENT: unable to answer  Respiratory: unable to answer  CV: unable to answer  GI: unable to answer  : unable to answer  MSK: + back pain  Skin: unable to answer  Neuro: unable to answer  Endo: unable to answer  Psych: + agitation and anxiety      MEDICATIONS:   MEDICATIONS  (STANDING):  aMIOdarone    Tablet 100 milliGRAM(s) Oral daily  apixaban 5 milliGRAM(s) Oral two times a day  calcium carbonate   1250 mG (OsCal) 1 Tablet(s) Oral daily  cefTRIAXone   IVPB 1000 milliGRAM(s) IV Intermittent every 24 hours  cholecalciferol 1000 Unit(s) Oral daily  clonazePAM  Tablet 0.5 milliGRAM(s) Oral at bedtime  dorzolamide 2%/timolol 0.5% Ophthalmic Solution 1 Drop(s) Both EYES two times a day  gabapentin 100 milliGRAM(s) Oral at bedtime  latanoprost 0.005% Ophthalmic Solution 1 Drop(s) Both EYES at bedtime  lidocaine   4% Patch 1 Patch Transdermal daily  metoprolol tartrate 100 milliGRAM(s) Oral two times a day  polyethylene glycol 3350 17 Gram(s) Oral daily  senna 2 Tablet(s) Oral at bedtime  sertraline 125 milliGRAM(s) Oral daily    MEDICATIONS  (PRN):  acetaminophen     Tablet .. 650 milliGRAM(s) Oral every 6 hours PRN Temp greater or equal to 38C (100.4F), Mild Pain (1 - 3), Moderate Pain (4 - 6)      ALLERGIES:   Allergies    No Known Allergies    Intolerances        OBJECTIVE:  Vital Signs Last 24 Hrs  T(C): 36.8 (04 Nov 2021 04:33), Max: 36.8 (03 Nov 2021 21:42)  T(F): 98.2 (04 Nov 2021 04:33), Max: 98.2 (03 Nov 2021 21:42)  HR: 71 (04 Nov 2021 04:33) (67 - 74)  BP: 137/69 (04 Nov 2021 04:33) (129/83 - 149/89)  BP(mean): --  RR: 18 (04 Nov 2021 04:33) (18 - 18)  SpO2: 94% (04 Nov 2021 04:33) (93% - 96%)    I&O's Summary    03 Nov 2021 07:01  -  04 Nov 2021 07:00  --------------------------------------------------------  IN: 0 mL / OUT: 350 mL / NET: -350 mL        PHYSICAL EXAM:  General: Well-appearing, NAD  HEENT:  EOMI, PERRL, conjunctiva and sclera clear, normal oropharynx  Neck:  Supple, no JVD, normal thyroid  Chest/Lungs: CTA B/L, no rales, rhonchi, rub or wheezing  Heart: RRR, normal S1, S2, no murmurs or gallops  Abdomen: Soft, nondistended, NTTP, normoactive bowel sounds  Extremities: Peripheral pulses 2+ B/L, no edema, cyanosis or clubbing  Skin: Warm, well-perfused, no rashes or lesions  Neurological: A&Ox3, no focal deficits      LABS:                        10.3   9.14  )-----------( 236      ( 04 Nov 2021 07:05 )             34.0   11-04    139  |  104  |  12  ----------------------------<  101<H>  3.7   |  23  |  0.56    Ca    9.0      04 Nov 2021 07:08  Phos  2.2     11-04  Mg     2.1     11-04        IMAGING:   < from: Transthoracic Echocardiogram (11.03.21 @ 06:56) >  ------------------------------------------------------------------------  Conclusions:  1. Mitral annular calcification and calcified mitral leaflets with normal diastolic opening. Mild-moderate mitral regurgitation.  2. Calcified aortic valve with decreased opening. Peak transaortic valve gradient equals 50 mm Hg, mean  transaortic valve gradient equals 31 mm Hg, estimated aortic valve area equals 0.9 sqcm (by continuity equation),  aortic valve velocity time integral equals 87 cm, consistent with severe aortic stenosis. Minimal aortic  regurgitation.   3. Endocardium not well visualized; grossly normal left ventricular systolic function.  4. Normal right ventricular size and function. Exam terminated prior to the subcostal images being  obtained at the request of the patient.  *** Compared with echocardiogram of 4/17/2018, the gradients measured across the aortic valve have increased. The LV has increased in size.      Labs and imaging personally reviewed and interpreted [ x ]  Consult notes reviewed [ x ]  Case discussed with consultants [ x ]

## 2021-11-05 LAB
-  AMIKACIN: SIGNIFICANT CHANGE UP
-  AMOXICILLIN/CLAVULANIC ACID: SIGNIFICANT CHANGE UP
-  AMPICILLIN/SULBACTAM: SIGNIFICANT CHANGE UP
-  AMPICILLIN: SIGNIFICANT CHANGE UP
-  AZTREONAM: SIGNIFICANT CHANGE UP
-  CEFAZOLIN: SIGNIFICANT CHANGE UP
-  CEFEPIME: SIGNIFICANT CHANGE UP
-  CEFOXITIN: SIGNIFICANT CHANGE UP
-  CEFTRIAXONE: SIGNIFICANT CHANGE UP
-  CIPROFLOXACIN: SIGNIFICANT CHANGE UP
-  ERTAPENEM: SIGNIFICANT CHANGE UP
-  GENTAMICIN: SIGNIFICANT CHANGE UP
-  IMIPENEM: SIGNIFICANT CHANGE UP
-  LEVOFLOXACIN: SIGNIFICANT CHANGE UP
-  MEROPENEM: SIGNIFICANT CHANGE UP
-  NITROFURANTOIN: SIGNIFICANT CHANGE UP
-  PIPERACILLIN/TAZOBACTAM: SIGNIFICANT CHANGE UP
-  TIGECYCLINE: SIGNIFICANT CHANGE UP
-  TOBRAMYCIN: SIGNIFICANT CHANGE UP
-  TRIMETHOPRIM/SULFAMETHOXAZOLE: SIGNIFICANT CHANGE UP
ANION GAP SERPL CALC-SCNC: 16 MMOL/L — SIGNIFICANT CHANGE UP (ref 5–17)
BASOPHILS # BLD AUTO: 0.04 K/UL — SIGNIFICANT CHANGE UP (ref 0–0.2)
BASOPHILS NFR BLD AUTO: 0.4 % — SIGNIFICANT CHANGE UP (ref 0–2)
BUN SERPL-MCNC: 15 MG/DL — SIGNIFICANT CHANGE UP (ref 7–23)
CALCIUM SERPL-MCNC: 9.4 MG/DL — SIGNIFICANT CHANGE UP (ref 8.4–10.5)
CHLORIDE SERPL-SCNC: 104 MMOL/L — SIGNIFICANT CHANGE UP (ref 96–108)
CO2 SERPL-SCNC: 20 MMOL/L — LOW (ref 22–31)
CREAT SERPL-MCNC: 0.55 MG/DL — SIGNIFICANT CHANGE UP (ref 0.5–1.3)
CULTURE RESULTS: SIGNIFICANT CHANGE UP
EOSINOPHIL # BLD AUTO: 0.03 K/UL — SIGNIFICANT CHANGE UP (ref 0–0.5)
EOSINOPHIL NFR BLD AUTO: 0.3 % — SIGNIFICANT CHANGE UP (ref 0–6)
GLUCOSE SERPL-MCNC: 150 MG/DL — HIGH (ref 70–99)
HCT VFR BLD CALC: 38 % — SIGNIFICANT CHANGE UP (ref 34.5–45)
HGB BLD-MCNC: 11.5 G/DL — SIGNIFICANT CHANGE UP (ref 11.5–15.5)
IMM GRANULOCYTES NFR BLD AUTO: 0.9 % — SIGNIFICANT CHANGE UP (ref 0–1.5)
LYMPHOCYTES # BLD AUTO: 1.2 K/UL — SIGNIFICANT CHANGE UP (ref 1–3.3)
LYMPHOCYTES # BLD AUTO: 11.3 % — LOW (ref 13–44)
MAGNESIUM SERPL-MCNC: 2.2 MG/DL — SIGNIFICANT CHANGE UP (ref 1.6–2.6)
MCHC RBC-ENTMCNC: 24.6 PG — LOW (ref 27–34)
MCHC RBC-ENTMCNC: 30.3 GM/DL — LOW (ref 32–36)
MCV RBC AUTO: 81.2 FL — SIGNIFICANT CHANGE UP (ref 80–100)
METHOD TYPE: SIGNIFICANT CHANGE UP
MONOCYTES # BLD AUTO: 0.97 K/UL — HIGH (ref 0–0.9)
MONOCYTES NFR BLD AUTO: 9.2 % — SIGNIFICANT CHANGE UP (ref 2–14)
NEUTROPHILS # BLD AUTO: 8.25 K/UL — HIGH (ref 1.8–7.4)
NEUTROPHILS NFR BLD AUTO: 77.9 % — HIGH (ref 43–77)
NRBC # BLD: 0 /100 WBCS — SIGNIFICANT CHANGE UP (ref 0–0)
ORGANISM # SPEC MICROSCOPIC CNT: SIGNIFICANT CHANGE UP
ORGANISM # SPEC MICROSCOPIC CNT: SIGNIFICANT CHANGE UP
PHOSPHATE SERPL-MCNC: 3.1 MG/DL — SIGNIFICANT CHANGE UP (ref 2.5–4.5)
PLATELET # BLD AUTO: 294 K/UL — SIGNIFICANT CHANGE UP (ref 150–400)
POTASSIUM SERPL-MCNC: 3.6 MMOL/L — SIGNIFICANT CHANGE UP (ref 3.5–5.3)
POTASSIUM SERPL-SCNC: 3.6 MMOL/L — SIGNIFICANT CHANGE UP (ref 3.5–5.3)
RBC # BLD: 4.68 M/UL — SIGNIFICANT CHANGE UP (ref 3.8–5.2)
RBC # FLD: 16.8 % — HIGH (ref 10.3–14.5)
SARS-COV-2 RNA SPEC QL NAA+PROBE: SIGNIFICANT CHANGE UP
SODIUM SERPL-SCNC: 140 MMOL/L — SIGNIFICANT CHANGE UP (ref 135–145)
SPECIMEN SOURCE: SIGNIFICANT CHANGE UP
WBC # BLD: 10.59 K/UL — HIGH (ref 3.8–10.5)
WBC # FLD AUTO: 10.59 K/UL — HIGH (ref 3.8–10.5)

## 2021-11-05 PROCEDURE — 99233 SBSQ HOSP IP/OBS HIGH 50: CPT | Mod: GC

## 2021-11-05 RX ADMIN — Medication 1 TABLET(S): at 13:03

## 2021-11-05 RX ADMIN — Medication 1 MILLIGRAM(S): at 22:35

## 2021-11-05 RX ADMIN — Medication 650 MILLIGRAM(S): at 01:44

## 2021-11-05 RX ADMIN — Medication 650 MILLIGRAM(S): at 02:16

## 2021-11-05 RX ADMIN — APIXABAN 5 MILLIGRAM(S): 2.5 TABLET, FILM COATED ORAL at 17:06

## 2021-11-05 RX ADMIN — Medication 650 MILLIGRAM(S): at 09:45

## 2021-11-05 RX ADMIN — POLYETHYLENE GLYCOL 3350 17 GRAM(S): 17 POWDER, FOR SOLUTION ORAL at 09:45

## 2021-11-05 RX ADMIN — AMIODARONE HYDROCHLORIDE 100 MILLIGRAM(S): 400 TABLET ORAL at 05:03

## 2021-11-05 RX ADMIN — DORZOLAMIDE HYDROCHLORIDE TIMOLOL MALEATE 1 DROP(S): 20; 5 SOLUTION/ DROPS OPHTHALMIC at 05:03

## 2021-11-05 RX ADMIN — GABAPENTIN 100 MILLIGRAM(S): 400 CAPSULE ORAL at 22:35

## 2021-11-05 RX ADMIN — Medication 100 MILLIGRAM(S): at 17:06

## 2021-11-05 RX ADMIN — DORZOLAMIDE HYDROCHLORIDE TIMOLOL MALEATE 1 DROP(S): 20; 5 SOLUTION/ DROPS OPHTHALMIC at 17:06

## 2021-11-05 RX ADMIN — Medication 100 MILLIGRAM(S): at 05:02

## 2021-11-05 RX ADMIN — Medication 1000 UNIT(S): at 13:04

## 2021-11-05 RX ADMIN — SERTRALINE 125 MILLIGRAM(S): 25 TABLET, FILM COATED ORAL at 13:04

## 2021-11-05 RX ADMIN — APIXABAN 5 MILLIGRAM(S): 2.5 TABLET, FILM COATED ORAL at 05:02

## 2021-11-05 RX ADMIN — LATANOPROST 1 DROP(S): 0.05 SOLUTION/ DROPS OPHTHALMIC; TOPICAL at 22:36

## 2021-11-05 NOTE — PROGRESS NOTE ADULT - PROBLEM SELECTOR PLAN 3
- Pt with known hx of moderate AS, follows with Dr. Shook (last seen on 9/27/21)  - Echo shows interval progression of AS, now with severe aortic stenosis  - Spoke with Dr. Shook's NP, recs FU with cardiology as OP re: TAVR (consider IP consult if patient not DC to [facility] tmrw)

## 2021-11-05 NOTE — PROGRESS NOTE ADULT - SUBJECTIVE AND OBJECTIVE BOX
Internal Medicine   Josefa Montejo | PGY-1    OVERNIGHT EVENTS: Agitated ON, given 0.5 ativan      SUBJECTIVE: Patient was seen and examined at bedside this morning. Reports global HA, and worsening L flank and lumbar pain. States she is sad because she is ill. Denies any nausea/vomiting, abdominal pain or chest pain/palpitations. Patient responding appropriately to questions and able to make needs known.       MEDICATIONS  (STANDING):  aMIOdarone    Tablet 100 milliGRAM(s) Oral daily  apixaban 5 milliGRAM(s) Oral two times a day  calcium carbonate   1250 mG (OsCal) 1 Tablet(s) Oral daily  cholecalciferol 1000 Unit(s) Oral daily  clonazePAM  Tablet 1 milliGRAM(s) Oral at bedtime  coronavirus (EUA) Booster Vaccine (MODERNA) 0.25 milliLiter(s) IntraMuscular once  dorzolamide 2%/timolol 0.5% Ophthalmic Solution 1 Drop(s) Both EYES two times a day  gabapentin 100 milliGRAM(s) Oral at bedtime  latanoprost 0.005% Ophthalmic Solution 1 Drop(s) Both EYES at bedtime  lidocaine   4% Patch 1 Patch Transdermal daily  metoprolol tartrate 100 milliGRAM(s) Oral two times a day  polyethylene glycol 3350 17 Gram(s) Oral daily  senna 2 Tablet(s) Oral at bedtime  sertraline 125 milliGRAM(s) Oral daily    MEDICATIONS  (PRN):  acetaminophen     Tablet .. 650 milliGRAM(s) Oral every 6 hours PRN Mild Pain (1 - 3), Moderate Pain (4 - 6)        T(F): 98.6 (11-05-21 @ 20:47), Max: 98.6 (11-05-21 @ 20:47)  HR: 65 (11-05-21 @ 20:47) (65 - 79)  BP: 104/69 (11-05-21 @ 20:47) (104/65 - 170/85)  BP(mean): --  RR: 18 (11-05-21 @ 20:47) (18 - 18)  SpO2: 94% (11-05-21 @ 20:47) (94% - 95%)    PHYSICAL EXAM:     GENERAL: NAD, lying in bed comfortably.  HEAD:  Atraumatic, normocephalic.  CHEST/LUNG: CTAB. No rales, rhonchi, wheezing, or rubs. Unlabored respirations.  HEART: RRR, crescendo-decrescendo murmur at RUSB, normal S1/S2.  ABDOMEN: Soft, tender RLQ, focal nodularity in RUQ. Normoactive bowel sounds.  EXTREMITIES:  2+ peripheral pulses b/l. Tender BLE. 1+ pitting edema.  PSYCH: Normal mood, affect.      LABS:                        11.5   10.59 )-----------( 294      ( 05 Nov 2021 07:26 )             38.0     11-05    140  |  104  |  15  ----------------------------<  150<H>  3.6   |  20<L>  |  0.55    Ca    9.4      05 Nov 2021 07:15  Phos  3.1     11-05  Mg     2.2     11-05              Creatinine Trend: 0.55<--, 0.56<--, 0.58<--, 0.72<--  I&O's Summary    04 Nov 2021 07:01  -  05 Nov 2021 07:00  --------------------------------------------------------  IN: 240 mL / OUT: 1 mL / NET: 239 mL      BNP    RADIOLOGY & ADDITIONAL STUDIES:

## 2021-11-05 NOTE — PROGRESS NOTE ADULT - PROBLEM SELECTOR PLAN 1
-Obtain TLSO brace (apparently ED called, will f/u)  -PT consulted, recommend [facility]. Pt and family amenable  -Ambulate only with brace on  -Pain control with PRN Tylenol, lidocaine patch. Oxycodone d/luis enrique due to fall risk.   -Supplement with VitD and Calcium  -Ortho recs appreciated: outpatient f/u

## 2021-11-05 NOTE — PROGRESS NOTE ADULT - PROBLEM SELECTOR PLAN 4
- Patient with agitation overnight, oriented only to self, presentation consistent with delirium  - Yesterday decreased Clonazepam from 1.0mg qHS to 0.5mg qHS to mitigate fall risk, may have contributed to delirium so increased back to 1.0mg qHS. Will recommend tapering as OP.   - Seroquel 25mg PO qHS tonight for delirium  - Zyprexa 2.5mg IM q6 hrs PRN for agitation overnight  - Bed alarm placed  - Minimize nighttime disruptions

## 2021-11-05 NOTE — PROGRESS NOTE ADULT - PROBLEM SELECTOR PLAN 11
- DVT: Patient on eliqiuis 5mg BID  - Diet: DASH diet  - Dispo: Pending [facility] servin, accepted at [facility]

## 2021-11-06 LAB
ANION GAP SERPL CALC-SCNC: 13 MMOL/L — SIGNIFICANT CHANGE UP (ref 5–17)
BUN SERPL-MCNC: 24 MG/DL — HIGH (ref 7–23)
CALCIUM SERPL-MCNC: 9.1 MG/DL — SIGNIFICANT CHANGE UP (ref 8.4–10.5)
CHLORIDE SERPL-SCNC: 102 MMOL/L — SIGNIFICANT CHANGE UP (ref 96–108)
CO2 SERPL-SCNC: 24 MMOL/L — SIGNIFICANT CHANGE UP (ref 22–31)
CREAT SERPL-MCNC: 0.82 MG/DL — SIGNIFICANT CHANGE UP (ref 0.5–1.3)
GLUCOSE SERPL-MCNC: 94 MG/DL — SIGNIFICANT CHANGE UP (ref 70–99)
HCT VFR BLD CALC: 37.2 % — SIGNIFICANT CHANGE UP (ref 34.5–45)
HGB BLD-MCNC: 11 G/DL — LOW (ref 11.5–15.5)
MAGNESIUM SERPL-MCNC: 2.4 MG/DL — SIGNIFICANT CHANGE UP (ref 1.6–2.6)
MCHC RBC-ENTMCNC: 24.6 PG — LOW (ref 27–34)
MCHC RBC-ENTMCNC: 29.6 GM/DL — LOW (ref 32–36)
MCV RBC AUTO: 83 FL — SIGNIFICANT CHANGE UP (ref 80–100)
NRBC # BLD: 0 /100 WBCS — SIGNIFICANT CHANGE UP (ref 0–0)
PHOSPHATE SERPL-MCNC: 4.1 MG/DL — SIGNIFICANT CHANGE UP (ref 2.5–4.5)
PLATELET # BLD AUTO: 263 K/UL — SIGNIFICANT CHANGE UP (ref 150–400)
POTASSIUM SERPL-MCNC: 3.7 MMOL/L — SIGNIFICANT CHANGE UP (ref 3.5–5.3)
POTASSIUM SERPL-SCNC: 3.7 MMOL/L — SIGNIFICANT CHANGE UP (ref 3.5–5.3)
RBC # BLD: 4.48 M/UL — SIGNIFICANT CHANGE UP (ref 3.8–5.2)
RBC # FLD: 17.2 % — HIGH (ref 10.3–14.5)
SODIUM SERPL-SCNC: 139 MMOL/L — SIGNIFICANT CHANGE UP (ref 135–145)
WBC # BLD: 8.88 K/UL — SIGNIFICANT CHANGE UP (ref 3.8–10.5)
WBC # FLD AUTO: 8.88 K/UL — SIGNIFICANT CHANGE UP (ref 3.8–10.5)

## 2021-11-06 PROCEDURE — 99233 SBSQ HOSP IP/OBS HIGH 50: CPT | Mod: GC

## 2021-11-06 RX ADMIN — GABAPENTIN 100 MILLIGRAM(S): 400 CAPSULE ORAL at 22:19

## 2021-11-06 RX ADMIN — Medication 1000 UNIT(S): at 13:15

## 2021-11-06 RX ADMIN — LIDOCAINE 1 PATCH: 4 CREAM TOPICAL at 20:25

## 2021-11-06 RX ADMIN — DORZOLAMIDE HYDROCHLORIDE TIMOLOL MALEATE 1 DROP(S): 20; 5 SOLUTION/ DROPS OPHTHALMIC at 18:30

## 2021-11-06 RX ADMIN — LATANOPROST 1 DROP(S): 0.05 SOLUTION/ DROPS OPHTHALMIC; TOPICAL at 22:20

## 2021-11-06 RX ADMIN — APIXABAN 5 MILLIGRAM(S): 2.5 TABLET, FILM COATED ORAL at 06:12

## 2021-11-06 RX ADMIN — Medication 100 MILLIGRAM(S): at 18:48

## 2021-11-06 RX ADMIN — APIXABAN 5 MILLIGRAM(S): 2.5 TABLET, FILM COATED ORAL at 18:48

## 2021-11-06 RX ADMIN — DORZOLAMIDE HYDROCHLORIDE TIMOLOL MALEATE 1 DROP(S): 20; 5 SOLUTION/ DROPS OPHTHALMIC at 06:12

## 2021-11-06 RX ADMIN — Medication 1 TABLET(S): at 13:15

## 2021-11-06 RX ADMIN — Medication 100 MILLIGRAM(S): at 06:12

## 2021-11-06 RX ADMIN — SERTRALINE 125 MILLIGRAM(S): 25 TABLET, FILM COATED ORAL at 13:14

## 2021-11-06 RX ADMIN — Medication 1 MILLIGRAM(S): at 22:20

## 2021-11-06 RX ADMIN — AMIODARONE HYDROCHLORIDE 100 MILLIGRAM(S): 400 TABLET ORAL at 06:12

## 2021-11-06 RX ADMIN — LIDOCAINE 1 PATCH: 4 CREAM TOPICAL at 13:16

## 2021-11-06 RX ADMIN — SENNA PLUS 2 TABLET(S): 8.6 TABLET ORAL at 22:19

## 2021-11-06 NOTE — PROGRESS NOTE ADULT - PROBLEM SELECTOR PLAN 4
- Patient with agitation overnight, oriented only to self, presentation consistent with delirium  - Yesterday decreased Clonazepam from 1.0mg qHS to 0.5mg qHS to mitigate fall risk, may have contributed to delirium so increased back to 1.0mg qHS. Will recommend tapering as OP.   - Seroquel 25mg PO qHS tonight for delirium  - Zyprexa 2.5mg IM q6 hrs PRN for agitation overnight  - Bed alarm placed  - Minimize nighttime disruptions - Patient with agitation overnight, oriented only to self, presentation consistent with delirium  - Yesterday decreased Clonazepam from 1.0mg qHS to 0.5mg qHS to mitigate fall risk, may have contributed to delirium so increased back to 1.0mg qHS. Will recommend tapering as OP.   - Seroquel 12.5mg PO qHS PRN for delirium overnight   - Zyprexa 2.5mg IM q6 hrs PRN for agitation overnight  - Bed alarm placed  - Minimize nighttime disruptions

## 2021-11-06 NOTE — PROGRESS NOTE ADULT - PROBLEM SELECTOR PLAN 2
-Continue with Eliquis, Metoprolol, and Amiodarone  -Outpatient f/u with [facility] -Continue with Eliquis, Metoprolol, and Amiodarone  -Outpatient f/u with Dr Shook

## 2021-11-06 NOTE — PROGRESS NOTE ADULT - PROBLEM SELECTOR PLAN 10
- Pt with no BM for 3-4 days  - Miralax, senna started - Pt with improvement in constipation on miralax, senna   - continue current regimen

## 2021-11-06 NOTE — PROGRESS NOTE ADULT - PROBLEM SELECTOR PLAN 9
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 3 weeks prior  - UA shows many bacteria, large LE, + nitrites  - Patient asymptomatic, denies dysuria, increased frequency, urgency, hematuria (though symptoms may be confounded by effect of recent botox)  - Afebrile with No CVAT on exam  - c/w Ceftriaxone 1g IV daily x3 days (stop 11/4/21) for UTI 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 3 weeks prior   - UA shows many bacteria, large LE, + nitrites   - Ucx with growth of Klebsiella  - Patient asymptomatic, denies dysuria, increased frequency, urgency, hematuria (though symptoms may be confounded by effect of recent botox)   - Afebrile with No CVAT on exam  - c/w Ceftriaxone 1g IV daily x3 days (last day 11/4/21) for UTI

## 2021-11-06 NOTE — PROGRESS NOTE ADULT - SUBJECTIVE AND OBJECTIVE BOX
Internal Medicine Team Progress Note    DEANDRE GREWAL  Patient is a 89y old  Female who presents with a chief complaint of Fall & Back Pain (05 Nov 2021 07:13)      INTERVAL HPI / SUBJECTIVE:  No acute events overnight. Patient did not require PRN medications for agitation 2/2 delirium.     REVIEW OF SYSTEMS:   Constitutional: no fatigue, fever, chills, generalized weakness  HEENT: no eye pain, vision changes, rhinorrhea, sore throat  Respiratory: no cough, wheezing, shortness of breath  CV: no chest pain, palpitations, dyspnea on exertion, orthopnea, PND  GI: no decreased appetite, nausea, vomiting, abdominal pain, diarrhea, constipation, melena  : no dysuria, hematuria, urinary frequency, incontinence, nocturia  MSK: no joint or muscle pain, muscle weakness, joint swelling  Skin: no rashes, bruising, hair loss, color change  Neuro: no headache, dizziness, fainting, paresthesias, memory loss  Endo: no heat or cold intolerance, increased thirst, hot flashes  Psych: no changes in mood      MEDICATIONS:   MEDICATIONS  (STANDING):  aMIOdarone    Tablet 100 milliGRAM(s) Oral daily  apixaban 5 milliGRAM(s) Oral two times a day  calcium carbonate   1250 mG (OsCal) 1 Tablet(s) Oral daily  cholecalciferol 1000 Unit(s) Oral daily  clonazePAM  Tablet 1 milliGRAM(s) Oral at bedtime  coronavirus (EUA) Booster Vaccine (MODERNA) 0.25 milliLiter(s) IntraMuscular once  dorzolamide 2%/timolol 0.5% Ophthalmic Solution 1 Drop(s) Both EYES two times a day  gabapentin 100 milliGRAM(s) Oral at bedtime  latanoprost 0.005% Ophthalmic Solution 1 Drop(s) Both EYES at bedtime  lidocaine   4% Patch 1 Patch Transdermal daily  metoprolol tartrate 100 milliGRAM(s) Oral two times a day  polyethylene glycol 3350 17 Gram(s) Oral daily  senna 2 Tablet(s) Oral at bedtime  sertraline 125 milliGRAM(s) Oral daily    MEDICATIONS  (PRN):  acetaminophen     Tablet .. 650 milliGRAM(s) Oral every 6 hours PRN Mild Pain (1 - 3), Moderate Pain (4 - 6)      ALLERGIES:   Allergies    No Known Allergies    Intolerances        OBJECTIVE:  Vital Signs Last 24 Hrs  T(C): 36.4 (06 Nov 2021 04:30), Max: 37 (05 Nov 2021 20:47)  T(F): 97.6 (06 Nov 2021 04:30), Max: 98.6 (05 Nov 2021 20:47)  HR: 70 (06 Nov 2021 04:30) (65 - 79)  BP: 161/81 (06 Nov 2021 04:30) (104/65 - 164/89)  BP(mean): --  RR: 18 (06 Nov 2021 04:30) (18 - 18)  SpO2: 98% (06 Nov 2021 04:30) (94% - 98%)    I&O's Summary      PHYSICAL EXAM:  General: Well-appearing, NAD  HEENT:  EOMI, PERRL, conjunctiva and sclera clear, normal oropharynx  Neck:  Supple, no JVD, normal thyroid  Chest/Lungs: CTA B/L, no rales, rhonchi, rub or wheezing  Heart: RRR, normal S1, S2, no murmurs or gallops  Abdomen: Soft, nondistended, NTTP, normoactive bowel sounds  Extremities: Peripheral pulses 2+ B/L, no edema, cyanosis or clubbing  Skin: Warm, well-perfused, no rashes or lesions  Neurological: A&Ox3, no focal deficits      LABS:      IMAGING:       Labs and imaging personally reviewed and interpreted [  ]  Consult notes reviewed [  ]  Case discussed with consultants [  ]

## 2021-11-06 NOTE — PROGRESS NOTE ADULT - PROBLEM SELECTOR PLAN 3
- Pt with known hx of moderate AS, follows with Dr. Shook (last seen on 9/27/21)  - Echo shows interval progression of AS, now with severe aortic stenosis  - Spoke with Dr. Shook's NP, recs FU with cardiology as OP re: TAVR (consider IP consult if patient not DC to [facility] tmrw)

## 2021-11-06 NOTE — PROGRESS NOTE ADULT - PROBLEM SELECTOR PLAN 11
- DVT: Patient on eliqiuis 5mg BID  - Diet: DASH diet  - Dispo: Pending [facility] servin, accepted at [facility] - DVT: Patient on eliqiuis 5mg BID  - Diet: DASH diet  - Dispo: [facility], pending spot at [facility] on 11/8. repeat COVID-19 swab on 11/7

## 2021-11-07 ENCOUNTER — TRANSCRIPTION ENCOUNTER (OUTPATIENT)
Age: 86
End: 2021-11-07

## 2021-11-07 VITALS
DIASTOLIC BLOOD PRESSURE: 82 MMHG | SYSTOLIC BLOOD PRESSURE: 147 MMHG | RESPIRATION RATE: 18 BRPM | HEART RATE: 62 BPM | TEMPERATURE: 99 F | OXYGEN SATURATION: 95 %

## 2021-11-07 LAB — SARS-COV-2 RNA SPEC QL NAA+PROBE: SIGNIFICANT CHANGE UP

## 2021-11-07 PROCEDURE — 99232 SBSQ HOSP IP/OBS MODERATE 35: CPT | Mod: GC

## 2021-11-07 RX ORDER — AMLODIPINE BESYLATE 2.5 MG/1
1 TABLET ORAL
Qty: 0 | Refills: 0 | DISCHARGE

## 2021-11-07 RX ORDER — AMIODARONE HYDROCHLORIDE 400 MG/1
0.5 TABLET ORAL
Qty: 0 | Refills: 0 | DISCHARGE
Start: 2021-11-07

## 2021-11-07 RX ORDER — POTASSIUM CHLORIDE 20 MEQ
1 PACKET (EA) ORAL
Qty: 0 | Refills: 0 | DISCHARGE

## 2021-11-07 RX ORDER — METOPROLOL TARTRATE 50 MG
1 TABLET ORAL
Qty: 0 | Refills: 0 | DISCHARGE

## 2021-11-07 RX ORDER — APIXABAN 2.5 MG/1
1 TABLET, FILM COATED ORAL
Qty: 0 | Refills: 0 | DISCHARGE

## 2021-11-07 RX ORDER — APIXABAN 2.5 MG/1
1 TABLET, FILM COATED ORAL
Qty: 0 | Refills: 0 | DISCHARGE
Start: 2021-11-07

## 2021-11-07 RX ORDER — FUROSEMIDE 40 MG
1 TABLET ORAL
Qty: 0 | Refills: 0 | DISCHARGE

## 2021-11-07 RX ORDER — SERTRALINE 25 MG/1
5 TABLET, FILM COATED ORAL
Qty: 0 | Refills: 0 | DISCHARGE

## 2021-11-07 RX ORDER — POLYETHYLENE GLYCOL 3350 17 G/17G
17 POWDER, FOR SOLUTION ORAL
Qty: 0 | Refills: 0 | DISCHARGE
Start: 2021-11-07

## 2021-11-07 RX ORDER — CALCIUM CARBONATE 500(1250)
1 TABLET ORAL
Qty: 0 | Refills: 0 | DISCHARGE
Start: 2021-11-07

## 2021-11-07 RX ORDER — GABAPENTIN 400 MG/1
1 CAPSULE ORAL
Qty: 0 | Refills: 0 | DISCHARGE
Start: 2021-11-07

## 2021-11-07 RX ORDER — GABAPENTIN 400 MG/1
1 CAPSULE ORAL
Qty: 0 | Refills: 0 | DISCHARGE

## 2021-11-07 RX ORDER — ACETAMINOPHEN 500 MG
2 TABLET ORAL
Qty: 0 | Refills: 0 | DISCHARGE
Start: 2021-11-07

## 2021-11-07 RX ORDER — SENNA PLUS 8.6 MG/1
2 TABLET ORAL
Qty: 0 | Refills: 0 | DISCHARGE
Start: 2021-11-07

## 2021-11-07 RX ORDER — CHOLECALCIFEROL (VITAMIN D3) 125 MCG
1000 CAPSULE ORAL
Qty: 0 | Refills: 0 | DISCHARGE
Start: 2021-11-07

## 2021-11-07 RX ORDER — CLONAZEPAM 1 MG
1 TABLET ORAL
Qty: 0 | Refills: 0 | DISCHARGE
Start: 2021-11-07

## 2021-11-07 RX ORDER — AMIODARONE HYDROCHLORIDE 400 MG/1
1 TABLET ORAL
Qty: 0 | Refills: 0 | DISCHARGE

## 2021-11-07 RX ORDER — SERTRALINE 25 MG/1
5 TABLET, FILM COATED ORAL
Qty: 0 | Refills: 0 | DISCHARGE
Start: 2021-11-07

## 2021-11-07 RX ORDER — METOPROLOL TARTRATE 50 MG
1 TABLET ORAL
Qty: 0 | Refills: 0 | DISCHARGE
Start: 2021-11-07

## 2021-11-07 RX ADMIN — LATANOPROST 1 DROP(S): 0.05 SOLUTION/ DROPS OPHTHALMIC; TOPICAL at 22:14

## 2021-11-07 RX ADMIN — GABAPENTIN 100 MILLIGRAM(S): 400 CAPSULE ORAL at 22:15

## 2021-11-07 RX ADMIN — AMIODARONE HYDROCHLORIDE 100 MILLIGRAM(S): 400 TABLET ORAL at 05:51

## 2021-11-07 RX ADMIN — Medication 100 MILLIGRAM(S): at 05:05

## 2021-11-07 RX ADMIN — APIXABAN 5 MILLIGRAM(S): 2.5 TABLET, FILM COATED ORAL at 05:05

## 2021-11-07 RX ADMIN — Medication 1 MILLIGRAM(S): at 22:14

## 2021-11-07 RX ADMIN — SERTRALINE 125 MILLIGRAM(S): 25 TABLET, FILM COATED ORAL at 12:33

## 2021-11-07 RX ADMIN — APIXABAN 5 MILLIGRAM(S): 2.5 TABLET, FILM COATED ORAL at 17:48

## 2021-11-07 RX ADMIN — CX-024414 0.25 MILLILITER(S): 0.2 INJECTION, SUSPENSION INTRAMUSCULAR at 14:18

## 2021-11-07 RX ADMIN — Medication 1 TABLET(S): at 12:33

## 2021-11-07 RX ADMIN — DORZOLAMIDE HYDROCHLORIDE TIMOLOL MALEATE 1 DROP(S): 20; 5 SOLUTION/ DROPS OPHTHALMIC at 17:49

## 2021-11-07 RX ADMIN — POLYETHYLENE GLYCOL 3350 17 GRAM(S): 17 POWDER, FOR SOLUTION ORAL at 08:49

## 2021-11-07 RX ADMIN — Medication 100 MILLIGRAM(S): at 17:49

## 2021-11-07 RX ADMIN — Medication 1000 UNIT(S): at 12:33

## 2021-11-07 RX ADMIN — LIDOCAINE 1 PATCH: 4 CREAM TOPICAL at 12:34

## 2021-11-07 RX ADMIN — LIDOCAINE 1 PATCH: 4 CREAM TOPICAL at 18:11

## 2021-11-07 RX ADMIN — Medication 650 MILLIGRAM(S): at 06:15

## 2021-11-07 RX ADMIN — DORZOLAMIDE HYDROCHLORIDE TIMOLOL MALEATE 1 DROP(S): 20; 5 SOLUTION/ DROPS OPHTHALMIC at 05:06

## 2021-11-07 RX ADMIN — LIDOCAINE 1 PATCH: 4 CREAM TOPICAL at 01:47

## 2021-11-07 RX ADMIN — Medication 650 MILLIGRAM(S): at 05:05

## 2021-11-07 NOTE — PROGRESS NOTE ADULT - PROBLEM SELECTOR PLAN 3
- Pt with known hx of moderate AS, follows with Dr. Shook (last seen on 9/27/21)  - Echo shows interval progression of AS, now with severe aortic stenosis  - Spoke with Dr. Shook's NP, recs FU with cardiology as OP re: TAVR (consider IP consult if patient not DC to [facility] tmrw)

## 2021-11-07 NOTE — DISCHARGE NOTE PROVIDER - NSDCMRMEDTOKEN_GEN_ALL_CORE_FT
amiodarone 100 mg oral tablet: 1 tab(s) orally once a day  clonazePAM 1 mg oral tablet: 1 tab(s) orally once a day (at bedtime), As needed, anxiety  Cosopt 2.23%-0.68% ophthalmic solution: 1 drop(s) to each affected eye 2 times a day  Eliquis 5 mg oral tablet: 1 tab(s) orally 2 times a day  gabapentin 100 mg oral capsule: 1 cap(s) orally once a day (at bedtime)  Klor-Con M20 oral tablet, extended release: 1 tab(s) orally once a day  Lasix 40 mg oral tablet: 1 tab(s) orally once a day  latanoprost 0.005% ophthalmic solution: 1 drop(s) to each affected eye once a day (in the evening)  Metoprolol Tartrate 100 mg oral tablet: 1 tab(s) orally 2 times a day  sertraline 25 mg oral tablet: 5 tab(s) orally once a day   amiodarone 100 mg oral tablet: 1 tab(s) orally once a day  amLODIPine 10 mg oral tablet: 1 tab(s) orally once a day  clonazePAM 1 mg oral tablet: 1 tab(s) orally once a day (at bedtime), As needed, anxiety  Cosopt 2.23%-0.68% ophthalmic solution: 1 drop(s) to each affected eye 2 times a day  Eliquis 5 mg oral tablet: 1 tab(s) orally 2 times a day  gabapentin 100 mg oral capsule: 1 cap(s) orally once a day (at bedtime)  Klor-Con M20 oral tablet, extended release: 1 tab(s) orally once a day  Lasix 40 mg oral tablet: 1 tab(s) orally once a day  latanoprost 0.005% ophthalmic solution: 1 drop(s) to each affected eye once a day (in the evening)  Metoprolol Tartrate 100 mg oral tablet: 1 tab(s) orally 2 times a day  sertraline 25 mg oral tablet: 5 tab(s) orally once a day   acetaminophen 325 mg oral tablet: 2 tab(s) orally every 6 hours, As needed, Mild Pain (1 - 3), Moderate Pain (4 - 6)  amiodarone 200 mg oral tablet: 0.5 tab(s) orally once a day  apixaban 5 mg oral tablet: 1 tab(s) orally 2 times a day  calcium carbonate 1250 mg (500 mg elemental calcium) oral tablet: 1 tab(s) orally once a day  cholecalciferol oral tablet: 1000 unit(s) orally once a day  clonazePAM 1 mg oral tablet: 1 tab(s) orally once a day (at bedtime)  Cosopt 2.23%-0.68% ophthalmic solution: 1 drop(s) to each affected eye 2 times a day  gabapentin 100 mg oral capsule: 1 cap(s) orally once a day (at bedtime)  latanoprost 0.005% ophthalmic solution: 1 drop(s) to each affected eye once a day (in the evening)  metoprolol tartrate 100 mg oral tablet: 1 tab(s) orally 2 times a day  polyethylene glycol 3350 oral powder for reconstitution: 17 gram(s) orally once a day  senna oral tablet: 2 tab(s) orally once a day (at bedtime)  sertraline 25 mg oral tablet: 5 tab(s) orally once a day

## 2021-11-07 NOTE — DISCHARGE NOTE PROVIDER - NSDCCPCAREPLAN_GEN_ALL_CORE_FT
PRINCIPAL DISCHARGE DIAGNOSIS  Diagnosis: L1 vertebral fracture  Assessment and Plan of Treatment: During your hospitalization, you were found to have a fracture of one of the bones in your lower back. This fracture was caused by your recent fall at home. The neurosurgery specialist did not recommend any surgery at this time. The fracture of your bone will heal with time. You were started on Vitamin D and calcium supplements to help your bones heal. You were also given a special brace which should be worn during physical activity to help keep your back safe and reduce pain. You can take Tylenol 650mg every 6 hours for pain as needed. You should make an appointment to follow up with the neurosurgery doctor (Dr. Karthikeyan Davis) in 1-2 weeks. Please come to the emergency department if you experience worsening pain, numbness between your thighs, bowel or bladder incontinence or retention, numbness in your legs, fever/chills, or have another fall.      SECONDARY DISCHARGE DIAGNOSES  Diagnosis: Aortic stenosis  Assessment and Plan of Treatment: During this admission, an echocardiogram was performed to look at your heart. It showed that your condition called aortic stenosis, which is a narrowing of the big artery in your heart that brings blood to the rest of your body, has worsened since your last echocardiogram was performed. Your cardiologist Dr. Shook was notified and recommends following up in his clinic as an outpatient. Please make an appointment to see him in 1-2 weeks. Please come to the emergency department if you experience shortness of breath, chest pain, lightheadedness, loss of consciousness.    Diagnosis: Essential hypertension  Assessment and Plan of Treatment: During your hospital stay, your home blood pressure medications were held due to concern that low blood pressure might have contributed to your fall at home. You should ask your regular cardiologist which medications to resume when you have your follow up appointment. Please return to the emergency room if you experience severe headache, confusion, shortness of breath, chest pain.    Diagnosis: Renal lesion  Assessment and Plan of Treatment: During this hospitalization, you had a CT scan performed which showed there is a lesion in your kidney of unknown origin. Please follow up with your primary care doctor about this finding.    Diagnosis: Pancreatic lesion  Assessment and Plan of Treatment: During this hospitalization, you had a CT scan performed which showed there is a lesion in your pancreas of unknown origin. Please follow up with your primary care doctor about this finding.

## 2021-11-07 NOTE — PROGRESS NOTE ADULT - PROBLEM SELECTOR PLAN 11
- DVT: Patient on eliqiuis 5mg BID  - Diet: DASH diet  - Dispo: [facility] martinez, pending spot at [facility]. repeat COVID-19 swab (-) on 11/7 - DVT: Patient on eliqiuis 5mg BID  - Diet: DASH diet  - Dispo: DAVI authorized, accepted at [facility] on 111/7. repeat COVID-19 swab (-) on 11/7

## 2021-11-07 NOTE — PROGRESS NOTE ADULT - PROBLEM SELECTOR PROBLEM 1
L1 vertebral fracture

## 2021-11-07 NOTE — PROGRESS NOTE ADULT - PROBLEM SELECTOR PLAN 1
-Obtain TLSO brace (apparently ED called, will f/u)  -PT consulted, recommend DAVI. Pt and family amenable  -Ambulate only with brace on  -Pain control with PRN Tylenol, lidocaine patch. Oxycodone d/luis enrique due to fall risk.   -Supplement with VitD and Calcium  -Ortho recs appreciated: outpatient f/u -Obtain TLSO brace (apparently ED called, will f/u)  -PT consulted, recommend DAVI. Pt and family amenable  -Ambulate only with brace on  -Pain control with PRN Tylenol, lidocaine patch. Oxycodone d/luis enrique due to fall risk.   -Supplement with VitD and Calcium  -Neurosurg recs appreciated  - outpatient f/u with Dr. Dwaine Davis in 1-2 wks

## 2021-11-07 NOTE — DISCHARGE NOTE PROVIDER - CARE PROVIDER_API CALL
Luca Davis)  Neurosurgery  [address] [facility] [address], [address]  [place] [address]  Phone: [phone]  Fax: [phone]  Follow Up Time:     Willie Shook)  Cardiovascular Disease; Nuclear Cardiology  [address] [place] [address], [address]  [place] [address]  Phone: [phone]  Fax: [phone]  Established Patient  Follow Up Time:

## 2021-11-07 NOTE — PROGRESS NOTE ADULT - PROVIDER SPECIALTY LIST ADULT
Internal Medicine
Neurosurgery
Internal Medicine
Internal Medicine

## 2021-11-07 NOTE — PROGRESS NOTE ADULT - REASON FOR ADMISSION
Fall & Back Pain

## 2021-11-07 NOTE — PROGRESS NOTE ADULT - PROBLEM SELECTOR PLAN 7
Patient's and son explicitly told of this incidental finding. He will follow up with [facility] for further work up if in line with GOC Patient's and son explicitly told of this incidental finding. He will follow up with PCP for further work up if in line with [facility]OC

## 2021-11-07 NOTE — PROGRESS NOTE ADULT - PROBLEM SELECTOR PLAN 4
- Patient with agitation overnight, oriented only to self, presentation consistent with delirium  - Yesterday decreased Clonazepam from 1.0mg qHS to 0.5mg qHS to mitigate fall risk, may have contributed to delirium so increased back to 1.0mg qHS. Will recommend tapering as OP.   - Seroquel 12.5mg PO qHS PRN for delirium overnight   - Zyprexa 2.5mg IM q6 hrs PRN for agitation overnight  - Bed alarm placed  - Minimize nighttime disruptions Resolved  - Patient with agitation overnight on hospital day 1, oriented only to self, presentation consistent with delirium  - Yesterday decreased Clonazepam from 1.0mg qHS to 0.5mg qHS to mitigate fall risk, may have contributed to delirium so increased back to 1.0mg qHS. Will recommend tapering as OP.   - Seroquel 12.5mg PO qHS PRN for delirium overnight   - Zyprexa 2.5mg IM q6 hrs PRN for agitation overnight  - Bed alarm placed  - Minimize nighttime disruptions

## 2021-11-07 NOTE — PROGRESS NOTE ADULT - PROBLEM SELECTOR PLAN 2
-Continue with Eliquis, Metoprolol, and Amiodarone  -Outpatient f/u with Dr Shook -Continue with Eliquis, Metoprolol, and Amiodarone  -Outpatient f/u with Dr Shook in 1-2 wks

## 2021-11-07 NOTE — DISCHARGE NOTE NURSING/CASE MANAGEMENT/SOCIAL WORK - PATIENT PORTAL LINK FT
You can access the FollowMyHealth Patient Portal offered by [facility] by registering at the following website: [email or web address]. By joining [employer]’s FollowMyHealth portal, you will also be able to view your health information using other applications (apps) compatible with our system.

## 2021-11-07 NOTE — PROGRESS NOTE ADULT - SUBJECTIVE AND OBJECTIVE BOX
Internal Medicine Team Progress Note    DEANDRE GREWAL  Patient is a 89y old  Female who presents with a chief complaint of Fall & Back Pain (05 Nov 2021 07:13)      INTERVAL HPI / SUBJECTIVE:  No acute events overnight. Patient did not require PRN medications for agitation overnight. Interview conducted with use of  [facility], ID #[id number]. Patient oriented x3 this morning (self, location at [facility], day of week Sunday in November). She reports improvement in pain today, encouraged to request Tylenol PRN if pain worsens. Patient made aware that insurance has authorized DAVI and transfer is now pending confirmation of bed availability. Patient denies f/c, n/v, SOB, chest pain, diarrhea, constipation, dysuria.      REVIEW OF SYSTEMS:   Constitutional: no fatigue, fever, chills, generalized weakness  HEENT: no eye pain, vision changes, rhinorrhea, sore throat  Respiratory: no cough, wheezing, shortness of breath  CV: no chest pain, palpitations, dyspnea on exertion, orthopnea, PND  GI: no decreased appetite, nausea, vomiting, abdominal pain, diarrhea, constipation, melena  : no dysuria, hematuria  MSK: + back pain, + leg pain L > R, no muscle pain, muscle weakness, joint swelling  Skin: no rashes, bruising, hair loss, color change  Neuro: no headache, dizziness, fainting, paresthesias, memory loss  Endo: no heat or cold intolerance, increased thirst, hot flashes  Psych: no changes in mood      MEDICATIONS:   MEDICATIONS  (STANDING):  aMIOdarone    Tablet 100 milliGRAM(s) Oral daily  apixaban 5 milliGRAM(s) Oral two times a day  calcium carbonate   1250 mG (OsCal) 1 Tablet(s) Oral daily  cholecalciferol 1000 Unit(s) Oral daily  clonazePAM  Tablet 1 milliGRAM(s) Oral at bedtime  coronavirus (EUA) Booster Vaccine (MODERNA) 0.25 milliLiter(s) IntraMuscular once  dorzolamide 2%/timolol 0.5% Ophthalmic Solution 1 Drop(s) Both EYES two times a day  gabapentin 100 milliGRAM(s) Oral at bedtime  latanoprost 0.005% Ophthalmic Solution 1 Drop(s) Both EYES at bedtime  lidocaine   4% Patch 1 Patch Transdermal daily  metoprolol tartrate 100 milliGRAM(s) Oral two times a day  polyethylene glycol 3350 17 Gram(s) Oral daily  senna 2 Tablet(s) Oral at bedtime  sertraline 125 milliGRAM(s) Oral daily    MEDICATIONS  (PRN):  acetaminophen     Tablet .. 650 milliGRAM(s) Oral every 6 hours PRN Mild Pain (1 - 3), Moderate Pain (4 - 6)      ALLERGIES:   Allergies: No Known Allergies  Intolerances      OBJECTIVE:  Vital Signs Last 24 Hrs  T(C): 36.4 (06 Nov 2021 04:30), Max: 37 (05 Nov 2021 20:47)  T(F): 97.6 (06 Nov 2021 04:30), Max: 98.6 (05 Nov 2021 20:47)  HR: 70 (06 Nov 2021 04:30) (65 - 79)  BP: 161/81 (06 Nov 2021 04:30) (104/65 - 164/89)  RR: 18 (06 Nov 2021 04:30) (18 - 18)  SpO2: 98% (06 Nov 2021 04:30) (94% - 98%)      PHYSICAL EXAM:  General: Well-appearing, NAD  HEENT:  EOMI, PERRL, conjunctiva and sclera clear, normal oropharynx  Neck:  Supple, no JVD, normal thyroid  Chest/Lungs: CTA B/L, no rales, rhonchi, rub or wheezing  Heart: RRR, normal S1, S2, no murmurs or gallops  Abdomen: Soft, nondistended, NTTP, normoactive bowel sounds  Extremities: Peripheral pulses 2+ B/L, no edema, cyanosis or clubbing  Skin: Warm, well-perfused, no rashes or lesions  Neurological: A&Ox3, no focal deficits      LABS:           No labs drawn today      IMAGING:   No new imaging    Labs and imaging personally reviewed and interpreted [ x ]  Consult notes reviewed [ x ]  Case discussed with consultants [  ]   Internal Medicine Team Progress Note    DEANDRE GREWAL  Patient is a 89y old  Female who presents with a chief complaint of Fall & Back Pain (05 Nov 2021 07:13)      INTERVAL HPI / SUBJECTIVE:  No acute events overnight. Patient did not require PRN medications for agitation overnight. Interview conducted with use of  [facility], ID #[id number]. Patient oriented x3 this morning (self, location at [facility], day of week Sunday in November). She reports improvement in pain today, encouraged to request Tylenol PRN if pain worsens. Patient made aware that insurance has authorized [facility] and transfer is now pending confirmation of bed availability. Patient denies f/c, n/v, SOB, chest pain, diarrhea, constipation, dysuria.      REVIEW OF SYSTEMS:   Constitutional: no fatigue, fever, chills, generalized weakness  HEENT: no eye pain, vision changes, rhinorrhea, sore throat  Respiratory: no cough, wheezing, shortness of breath  CV: no chest pain, palpitations, dyspnea on exertion, orthopnea, PND  GI: no decreased appetite, nausea, vomiting, abdominal pain, diarrhea, constipation, melena  : no dysuria, hematuria  MSK: + back pain, + leg pain L > R, no muscle pain, muscle weakness, joint swelling  Skin: no rashes, bruising, hair loss, color change  Neuro: no headache, dizziness, fainting, paresthesias, memory loss  Endo: no heat or cold intolerance, increased thirst, hot flashes  Psych: no changes in mood      MEDICATIONS:   MEDICATIONS  (STANDING):  aMIOdarone    Tablet 100 milliGRAM(s) Oral daily  apixaban 5 milliGRAM(s) Oral two times a day  calcium carbonate   1250 mG (OsCal) 1 Tablet(s) Oral daily  cholecalciferol 1000 Unit(s) Oral daily  clonazePAM  Tablet 1 milliGRAM(s) Oral at bedtime  coronavirus (EUA) Booster Vaccine (MODERNA) 0.25 milliLiter(s) IntraMuscular once  dorzolamide 2%/timolol 0.5% Ophthalmic Solution 1 Drop(s) Both EYES two times a day  gabapentin 100 milliGRAM(s) Oral at bedtime  latanoprost 0.005% Ophthalmic Solution 1 Drop(s) Both EYES at bedtime  lidocaine   4% Patch 1 Patch Transdermal daily  metoprolol tartrate 100 milliGRAM(s) Oral two times a day  polyethylene glycol 3350 17 Gram(s) Oral daily  senna 2 Tablet(s) Oral at bedtime  sertraline 125 milliGRAM(s) Oral daily    MEDICATIONS  (PRN):  acetaminophen     Tablet .. 650 milliGRAM(s) Oral every 6 hours PRN Mild Pain (1 - 3), Moderate Pain (4 - 6)      ALLERGIES:   Allergies: No Known Allergies  Intolerances      OBJECTIVE:  Vital Signs Last 24 Hrs  T(C): 36.4 (06 Nov 2021 04:30), Max: 37 (05 Nov 2021 20:47)  T(F): 97.6 (06 Nov 2021 04:30), Max: 98.6 (05 Nov 2021 20:47)  HR: 70 (06 Nov 2021 04:30) (65 - 79)  BP: 161/81 (06 Nov 2021 04:30) (104/65 - 164/89)  RR: 18 (06 Nov 2021 04:30) (18 - 18)  SpO2: 98% (06 Nov 2021 04:30) (94% - 98%)      PHYSICAL EXAM:  General: Well-appearing, NAD  HEENT:  EOMI, PERRL, conjunctiva and sclera clear, normal oropharynx  Neck:  Supple, no JVD, normal thyroid  Chest/Lungs: CTA B/L, no rales, rhonchi, rub or wheezing  Heart: irregular rate and rhythm. normal S1, S2, crescendo/descrescendo murmur best appreciated at MARINO border.   Abdomen: Soft, nondistended, NTTP, normoactive bowel sounds  Extremities: Peripheral pulses 2+ B/L, trace peripheral edema b/l, no cyanosis or clubbing  Skin: Warm, well-perfused, no rashes or lesions  Neurological: A&Ox3 (location at [facility], day of week Sunday in Nov, self). Motor exam limited by pain 2/2 fracture. With hyperesthesia to light palpation of b/l LE, L > R.       LABS:           No labs drawn today      IMAGING:   No new imaging    Labs and imaging personally reviewed and interpreted [ x ]  Consult notes reviewed [ x ]  Case discussed with consultants [  ]

## 2021-11-07 NOTE — DISCHARGE NOTE PROVIDER - NSDCFUSCHEDAPPT_GEN_ALL_CORE_FT
[facility] ; NPP IntMed [address]  [facility] ; [facility] ; NPP Cardio [address]

## 2021-11-07 NOTE — PROGRESS NOTE ADULT - PROBLEM SELECTOR PLAN 9
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

## 2021-11-07 NOTE — PROGRESS NOTE ADULT - ASSESSMENT
89F with PMHx of chronic Afib on Eliqiuis, aortic stenosis, HTN and glaucoma presents with back pain 2/2 unwitnessed fall two days prior to admission. CT head negative, CT lumbar region showing acute horizontal fracture of L1. Patient incidentally found to have right kidney lesion and pancreatic lesion, patient and son notified of finding. 
89F with PMHx of chronic atrial fibrillation, HTN and glaucoma presents with back pain (lumbar) and fall two days prior to admission. CT lumbar region showing acute horizontal fracture of L1. Patient incidentally found to have right kidney lesion and pancreatic lesion, patient and son informed of finding. 
89F with PMHx of chronic Afib on Eliqiuis, aortic stenosis, HTN and glaucoma presents with back pain 2/2 unwitnessed fall two days prior to admission. CT head negative, CT lumbar region showing acute horizontal fracture of L1. Patient incidentally found to have right kidney lesion and pancreatic lesion, patient and son notified of finding. 
89F with PMHx of chronic Afib on Eliqiuis, aortic stenosis, HTN and glaucoma presents with back pain 2/2 unwitnessed fall two days prior to admission. CT head negative, CT lumbar region showing acute horizontal fracture of L1. Patient incidentally found to have right kidney lesion and pancreatic lesion, patient and son notified of finding.

## 2021-11-07 NOTE — DISCHARGE NOTE PROVIDER - HOSPITAL COURSE
For the patient's L1 vertebral fracture, orthopedics was consulted and TLSO brace provided for patient. Physical therapy was also consulted and performed rehabilitation exercises with patient pending discharge to subacute rehab. Pain was treated with acetaminophen PRN. and lidocaine patch. Patient also started on vitamin D and calcium supplements. Echocardiogram was performed which revealed progression to severe aortic stenosis. Patient's outpatient cardiologist was contacted who recommended outpatient follow up. Incidental findings of right kidney and pancreatic lesions were detected. Hospital course complicated by delirium with secondary agitation, treated with PRN medications of Seroquel, Ativan and Zyprexa. Patient's delirium improved significantly over the course of the hospitalization.      Dates of Admission:    HPI:        For the patient's L1 vertebral fracture, orthopedics was consulted and TLSO brace provided for patient. Physical therapy was also consulted and performed rehabilitation exercises with patient pending discharge to subacute rehab. Pain was treated with acetaminophen PRN. and lidocaine patch. Patient also started on vitamin D and calcium supplements. Echocardiogram was performed which revealed progression to severe aortic stenosis. Patient's outpatient cardiologist was contacted who recommended outpatient follow up. Incidental findings of right kidney and pancreatic lesions were detected. Hospital course complicated by delirium with secondary agitation, treated with PRN medications of Seroquel, Ativan and Zyprexa. Patient's delirium improved significantly over the course of the hospitalization.      Dates of Admission: 11/2/21 - 11/7/21    HPI:          89F with PMHx of chronic atrial fibrillation, HTN and glaucoma presents with back pain (lumbar) and fall two days prior to admission. Fall was unwitnessed, but per son at bedside was mechanical as he believed she slipped. She may have hit the back of her head. She denies syncope. Has been having 10/10 lower lumbar back pain. Spoke with her PMD Karsten who advised to take standing Tylenol and sent Baclofen. Tylenol helped slightly with pain, patient did not start baclofen yet. In the ED patient with negative CT head and CT CSpine. CT lumbar region showing acute horizontal fracture of L1. Patient incidentally found to have right kidney lesion and pancreatic lesion for which son was told. Patient with no neurological alarm symptoms such as saddle anesthesia, incontinence or weakness. Patient seen by neurosurgery, but no intervention.    Hospital Course:   For the patient's L1 vertebral fracture, neurosurgery was consulted and did not recommend acute neurosurgical intervention. TLSO brace provided for patient comfort. Physical therapy was also consulted  and recommended discharge to subacute rehab. Pain was treated with acetaminophen PRN. and lidocaine patch. Patient also started on vitamin D and calcium supplements. Echocardiogram was performed to assess possible cardiac contributor to fall, and revealed progression to severe aortic stenosis. Patient's outpatient cardiologist was contacted with finding, recommended outpatient follow up and determined no need for inpatient TAVR. Incidental findings of right kidney and pancreatic lesions were detected on CT, with patient and son notified. Hospital course complicated by delirium with secondary agitation, treated with PRN medications of Seroquel, Ativan and Zyprexa. Patient's delirium resolved over the course of the hospitalization. Patient's home hypertensive medications were held during hospitalization due to concern for hypotension as contributor to recurrent falls.

## 2021-11-07 NOTE — PROGRESS NOTE ADULT - PROBLEM SELECTOR PLAN 6
-Continue with Lasix - Amlodipine, Lasix held due to concern for hypotension as contribution to recurrent falls  - FU with OP provider to assess medication regimen

## 2021-11-07 NOTE — PROGRESS NOTE ADULT - ATTENDING COMMENTS
89F with PMHx of chronic atrial fibrillation, HTN and glaucoma presents with back pain (lumbar) and fall two days prior to admission. CT lumbar region showing acute horizontal fracture of L1. Patient incidentally found to have right kidney lesion and pancreatic lesion for which son was told.    Delirium  Better with family. She was given Ativan by overnight resident and is sleepy today. Monitor closely.    L1 Fracture  TLSO brace on  F/u ortho outpt  Pain control    Chronic AF  continue meds as above    HTN  c/w Lasix for now    UTI  completed abx    Renal and Pancreatic lesion - f/u outpt with Dr. Shelley per pt's son    Disposition: [facility] - [facility] awaiting auth, likely dc tomorrow AM    Discussed with son in detail at the bedside.
89F with PMHx of chronic atrial fibrillation, HTN and glaucoma presents with back pain (lumbar) and fall two days prior to admission. CT lumbar region showing acute horizontal fracture of L1. Patient incidentally found to have right kidney lesion and pancreatic lesion for which son was told.    NAD. Seen walking with PT.     L1 Fracture  TLSO brace on  F/u ortho outpt  Pain control    Chronic AF  continue meds as above    HTN  c/w Lasix for now    UTI  continue Abx for 3 days total    Renal and Pancreatic lesion - f/u outpt with Dr. Shelley per pt's son    Disposition: [facility]
89F with PMHx of chronic atrial fibrillation, HTN and glaucoma presents with back pain (lumbar) and fall two days prior to admission. CT lumbar region showing acute horizontal fracture of L1. Patient incidentally found to have right kidney lesion and pancreatic lesion for which son was told.    Agitated today, Bedside sitter. Bed alarm in place. Fall precautions. Klonopin was decreased yesterday. May be experiencing BZD withdrawal. Will return back to home dose.     L1 Fracture  TLSO brace on  F/u ortho outpt  Pain control    Chronic AF  continue meds as above    HTN  c/w Lasix for now    UTI  continue Abx for 3 days total    Renal and Pancreatic lesion - f/u outpt with Dr. Shelley per pt's son    Disposition: [facility]
Hospitalist- Jose Koo MD  Pager: [phone]  If no response or off-hours, page [phone]  -------------------------------------  I personally performed the E/M service provided and supervised key portions of the service furnished by the resident/fellow. I agree with the history, physical and assessment/plan as stated above, with the following additional remarks:  - pt s/p unwitnessed fall which she recalls was no syncopal but feels most likely due to gait instability/imbalance. Found to have L1 fracture, now requiring TLSO brace and clinically stable. CCB UTI s/p 3 day course of ceftriaxone.  - planning for dc to rehab pending authorization.
Hospitalist- Jose Koo MD  Pager: [phone]  If no response or off-hours, page [phone]  -------------------------------------  Pt feeling better, no uncontrolled pain, feels ready for discharge to rehab. Bed availability pending per CM.  Discussed with son, who would like to keep current pain regimen where it is and now add any additional medications due to risk of delirium.

## 2021-11-07 NOTE — DISCHARGE NOTE PROVIDER - NSDCCPTREATMENT_GEN_ALL_CORE_FT
PRINCIPAL PROCEDURE  Procedure: Abdomen CT  Findings and Treatment: IMPRESSION:  Limited study without intravenous contrast.  Acute horizontal fracture of the superior endplate of the L1 vertebral body. Please refer to dedicated CT of the thoracic and lumbar spine for further details.  A 3.2 cm soft tissue density lesion projecting from the lower pole of the right kidney has increased in size since 1/5/2017, concerning for neoplasm. Ultrasound or dedicated renal MRI with and without contrast is recommended for further evaluation.  A 1.1 cm cystic lesion is noted in the pancreatic head. This could be further evaluated with MRI as indicated.  Findings were discussed with MARY JANE LUNDBERG on  11/2/2021 at 1:58 PM by Dr. Whitaker with read back confirmation.      SECONDARY PROCEDURE  Procedure: CT lumbar spine  Findings and Treatment: IMPRESSION:  Thoracic spine CT: No acute fracture traumatic subluxation. Multilevel degenerative changes.  Lumbar spine CT: Acute horizontal fracture extending from the L1 superior endplate and anterior cortex to the posterior cortex without significant retropulsion.  Please see abdominal pelvic CT of the same day for detailed information on the intrathoracic and pelvic organs.  There is a of this examination were discussed with MARY JANE Montano in the emergency department at 1:48 PM on 11/2/2021

## 2021-11-11 ENCOUNTER — NON-APPOINTMENT (OUTPATIENT)
Age: 86
End: 2021-11-11

## 2021-11-17 ENCOUNTER — APPOINTMENT (OUTPATIENT)
Dept: INTERNAL MEDICINE | Facility: CLINIC | Age: 86
End: 2021-11-17

## 2021-11-19 NOTE — PATIENT PROFILE ADULT - DO YOU FEEL THREATENED BY OTHERS?
-----------------------------------------------------------------------------------------  Supervising Physician Statement    Pertinent Problems, Narrative:  --Large Sacral Wound, Chronic (Related to immobility; s/p debridement Sept 1 2021)  --Bilateral Lower Extremity Wounds (Outpatient wound care notes reviewed, these wounds are new pressure injuries)  --Multiple Sclerosis  --ESRD on HD (on HD since August 2021)  --Chronic Hypoxemic Respiratory Failure (3 L of oxygen home during the night)  --Increased Nutrient Needs (Dietician consult 11/19; oral intake inadequate to meet needs; s/p PEG tube).   --AMS (with baseline activated POA since Sept 2021)    Assessment and Plan:  Yaquelin is a 77yoF admitted on 11/17 directly from wound care clinic with a large coccygeal wound and bilateral lower extremity wounds.  Palliative is consulted to help clarify goals of care in the setting of worsening wounds and multiple medical teams recommending our involvement (wound care, pulm, and primary).  Dr. Figueroa (palliative fellow) and I sat down with Yaquelin (and later via phone with daughter) and \"planted some seeds\" for them all to consider over the weekend: we covered fears, hospice, quality of life, CPR, support, etc.  No changes were made today, as family will continue to see what we and other teams are sharing with them.  We will follow up with family on Monday.     Attestation Statement:  I was involved in directly supervising the care provided by Dr. Figueroa for this patient.  The key elements that I was involved in include the following: review of the medical chart, history taking from Yaquelin, physical examination, formulating a treatment plan, and discussing the case with the bedside RN.  Palliative Medicine is consulted to help with goals.  Key elements of the physical exam are a leonardo woman laying in bed, she answers questions appropriately but there is delay in her response, she appears comfortable when at rest and there is  grimacing with movements, she really struggles to have any movement and even though she can shake my hand I have to bring my hand to hers.      Billing:    Total time that I was involved was: 75 minutes (Times: 2878-8022, 9633-5318), with greater than 50% of that time spent face to face with Yaquelin in a prolonged conversation in coordination of care and reflecting on her goals.  Billing type: time-based.     Rivera Sherwood MD  Palliative Medicine Physician  [facility]    -----------------------------------------------------------------------------------------       no

## 2021-12-02 ENCOUNTER — RX RENEWAL (OUTPATIENT)
Age: 86
End: 2021-12-02

## 2021-12-02 NOTE — PHYSICAL THERAPY INITIAL EVALUATION ADULT - PERTINENT HX OF CURRENT PROBLEM, REHAB EVAL
89F with PMHx of chronic atrial fibrillation, HTN and glaucoma p/w back pain (lumbar) and fall two days prior to admission. Fall was unwitnessed, but per son at bedside was mechanical as he believed she slipped. She may have hit the back of her head. She denies syncope. Has been having 10/10 lower lumbar back pain. no

## 2021-12-08 ENCOUNTER — RX RENEWAL (OUTPATIENT)
Age: 86
End: 2021-12-08

## 2021-12-08 RX ORDER — AMIODARONE HYDROCHLORIDE 100 MG/1
100 TABLET ORAL
Qty: 90 | Refills: 0 | Status: ACTIVE | COMMUNITY
Start: 2018-04-19 | End: 1900-01-01

## 2021-12-10 ENCOUNTER — APPOINTMENT (OUTPATIENT)
Dept: INTERNAL MEDICINE | Facility: CLINIC | Age: 86
End: 2021-12-10

## 2021-12-10 ENCOUNTER — APPOINTMENT (OUTPATIENT)
Dept: INTERNAL MEDICINE | Facility: CLINIC | Age: 86
End: 2021-12-10
Payer: MEDICARE

## 2021-12-10 VITALS
HEART RATE: 65 BPM | OXYGEN SATURATION: 96 % | DIASTOLIC BLOOD PRESSURE: 93 MMHG | HEIGHT: 60 IN | SYSTOLIC BLOOD PRESSURE: 180 MMHG | WEIGHT: 141.09 LBS | BODY MASS INDEX: 27.7 KG/M2 | TEMPERATURE: 98.2 F

## 2021-12-10 VITALS — SYSTOLIC BLOOD PRESSURE: 142 MMHG | DIASTOLIC BLOOD PRESSURE: 76 MMHG

## 2021-12-10 DIAGNOSIS — I10 ESSENTIAL (PRIMARY) HYPERTENSION: ICD-10-CM

## 2021-12-10 DIAGNOSIS — Z79.899 OTHER LONG TERM (CURRENT) DRUG THERAPY: ICD-10-CM

## 2021-12-10 DIAGNOSIS — I50.9 HEART FAILURE, UNSPECIFIED: ICD-10-CM

## 2021-12-10 DIAGNOSIS — N28.1 CYST OF KIDNEY, ACQUIRED: ICD-10-CM

## 2021-12-10 DIAGNOSIS — K86.2 CYST OF PANCREAS: ICD-10-CM

## 2021-12-10 DIAGNOSIS — Z00.00 ENCOUNTER FOR GENERAL ADULT MEDICAL EXAMINATION W/OUT ABNORMAL FINDINGS: ICD-10-CM

## 2021-12-10 DIAGNOSIS — I35.0 NONRHEUMATIC AORTIC (VALVE) STENOSIS: ICD-10-CM

## 2021-12-10 PROCEDURE — G0439: CPT

## 2021-12-10 PROCEDURE — 99214 OFFICE O/P EST MOD 30 MIN: CPT | Mod: 25

## 2021-12-10 PROCEDURE — 36415 COLL VENOUS BLD VENIPUNCTURE: CPT

## 2021-12-10 RX ORDER — AMLODIPINE BESYLATE 5 MG/1
5 TABLET ORAL
Qty: 90 | Refills: 3 | Status: ACTIVE | COMMUNITY
Start: 2018-11-09 | End: 1900-01-01

## 2021-12-13 NOTE — PHYSICAL EXAM
[Normal Appearance] : normal in appearance [No Masses] : no palpable masses [No Nipple Discharge] : no nipple discharge [No Axillary Lymphadenopathy] : no axillary lymphadenopathy [Normal] : affect was normal and insight and judgment were intact [de-identified] : Grade 4-6 systolic murmur left upper sternal border and right upper sternal border [de-identified] : Strength 4 out of 5 in both lower extremities

## 2021-12-13 NOTE — HEALTH RISK ASSESSMENT
[Good] : ~his/her~  mood as  good [FreeTextEntry1] : health maintenance, deconditioning [Intercurrent hospitalizations] : was admitted to the hospital  [No] : No [No falls in past year] : Patient reported no falls in the past year [0] : 2) Feeling down, depressed, or hopeless: Not at all (0) [PHQ-2 Negative - No further assessment needed] : PHQ-2 Negative - No further assessment needed [de-identified] : multiple falls [de-identified] : cardiology [[id number]] : 0 [Change in mental status noted] : No change in mental status noted [Language] : denies difficulty with language [Behavior] : denies difficulty with behavior [Learning/Retaining New Information] : denies difficulty learning/retaining new information [Handling Complex Tasks] : denies difficulty handling complex tasks [Reasoning] : denies difficulty with reasoning [Spatial Ability and Orientation] : denies difficulty with spatial ability and orientation [Transportation] : transportation [With Family] : lives with family [] :  [Some assistance needed] : managing finances [Full assistance needed] : using transportation [Reports changes in hearing] : Reports no changes in hearing [Reports changes in vision] : Reports no changes in vision [Reports changes in dental health] : Reports no changes in dental health [Smoke Detector] : smoke detector [Carbon Monoxide Detector] : carbon monoxide detector [Guns at Home] : no guns at home [Safety elements used in home] : safety elements used in home [Seat Belt] :  uses seat belt [Sunscreen] : uses sunscreen [Travel to Developing Areas] : does not  travel to developing areas [TB Exposure] : is not being exposed to tuberculosis [Caregiver Concerns] : does not have caregiver concerns [Reviewed no changes] : Reviewed, no changes [AdvancecareDate] : 12/21

## 2021-12-13 NOTE — HISTORY OF PRESENT ILLNESS
[FreeTextEntry1] : Patient presents for subsequent Medicare annual wellness visit and chronic disease management [de-identified] : Had a fall, got up from the bed and fell\par Denies any preceding lightheadedness dizziness chest pain palpitations\par Was doing physical therapy at rehab\par Has not been checking blood pressure at home\par Urination has improved\par Denies any orthopnea paroxysmal nocturnal dyspnea\par Concerned about chronic hemorrhoids

## 2021-12-13 NOTE — ASSESSMENT
[FreeTextEntry1] : Clinically you have been advised to follow-up with audiology\par Advised to restart amlodipine check thyroid function test\par MRI given for the history of pancreatic and renal cysts\par We will start V[facility] services for deconditioning home care

## 2021-12-14 LAB
25(OH)D3 SERPL-MCNC: 40.7 NG/ML
ALBUMIN SERPL ELPH-MCNC: 4.3 G/DL
ALP BLD-CCNC: 117 U/L
ALT SERPL-CCNC: 8 U/L
ANION GAP SERPL CALC-SCNC: 13 MMOL/L
AST SERPL-CCNC: 19 U/L
BASOPHILS # BLD AUTO: 0.04 K/UL
BASOPHILS NFR BLD AUTO: 0.5 %
BILIRUB SERPL-MCNC: 0.2 MG/DL
BUN SERPL-MCNC: 12 MG/DL
CALCIUM SERPL-MCNC: 9.6 MG/DL
CHLORIDE SERPL-SCNC: 101 MMOL/L
CHOLEST SERPL-MCNC: 199 MG/DL
CO2 SERPL-SCNC: 26 MMOL/L
CREAT SERPL-MCNC: 0.77 MG/DL
EOSINOPHIL # BLD AUTO: 0.04 K/UL
EOSINOPHIL NFR BLD AUTO: 0.5 %
ESTIMATED AVERAGE GLUCOSE: 126 MG/DL
GLUCOSE SERPL-MCNC: 95 MG/DL
HBA1C MFR BLD HPLC: 6 %
HCT VFR BLD CALC: 34.3 %
HDLC SERPL-MCNC: 51 MG/DL
HGB BLD-MCNC: 9.9 G/DL
IMM GRANULOCYTES NFR BLD AUTO: 0.1 %
LDLC SERPL CALC-MCNC: 128 MG/DL
LYMPHOCYTES # BLD AUTO: 1.62 K/UL
LYMPHOCYTES NFR BLD AUTO: 21.1 %
MAN DIFF?: NORMAL
MCHC RBC-ENTMCNC: 23.8 PG
MCHC RBC-ENTMCNC: 28.9 GM/DL
MCV RBC AUTO: 82.5 FL
MONOCYTES # BLD AUTO: 0.87 K/UL
MONOCYTES NFR BLD AUTO: 11.3 %
NEUTROPHILS # BLD AUTO: 5.1 K/UL
NEUTROPHILS NFR BLD AUTO: 66.5 %
NONHDLC SERPL-MCNC: 148 MG/DL
PLATELET # BLD AUTO: 311 K/UL
POTASSIUM SERPL-SCNC: 4.1 MMOL/L
PROT SERPL-MCNC: 7.1 G/DL
RBC # BLD: 4.16 M/UL
RBC # FLD: 16.8 %
SODIUM SERPL-SCNC: 140 MMOL/L
TRIGL SERPL-MCNC: 98 MG/DL
TSH SERPL-ACNC: 0.9 UIU/ML
VIT B12 SERPL-MCNC: 1200 PG/ML
WBC # FLD AUTO: 7.68 K/UL

## 2021-12-17 NOTE — PROGRESS NOTE ADULT - SUBJECTIVE AND OBJECTIVE BOX
This visit is being performed virtually. Clinician Location: [facility]  Arm[place] Location: [place] [address] [address] [place] OFFICE VISIT     ASSESSMENT/PLAN:    Rigoberto Abebe was seen today for telephonic visit and back pain. Diagnoses and all orders for this visit:    Encounter for drug screening  -     PAIN MANAGEMENT, PROFILE 1; Future  -     SERVICE TO PAIN MANAGEMENT  -     SERVICE TO PAIN MANAGEMENT    Lumbar disc herniation with radiculopathy  -     PAIN MANAGEMENT, PROFILE 1; Future  -     SERVICE TO PAIN MANAGEMENT  -     SERVICE TO PAIN MANAGEMENT  -     HYDROcodone-acetaminophen (NORCO)  MG per tablet; Take 1 tablet by mouth 3 times daily as needed for Pain. Try to take only at night. Avoid driving while taking medication. Lumbar spine pain  -     PAIN MANAGEMENT, PROFILE 1; Future  -     SERVICE TO PAIN MANAGEMENT  -     SERVICE TO PAIN MANAGEMENT  -     HYDROcodone-acetaminophen (NORCO)  MG per tablet; Take 1 tablet by mouth 3 times daily as needed for Pain. Try to take only at night. Avoid driving while taking medication. Lumbar radiculitis  -     HYDROcodone-acetaminophen (NORCO)  MG per tablet; Take 1 tablet by mouth 3 times daily as needed for Pain. Try to take only at night. Avoid driving while taking medication. Rigoberto Abebe is a 75-year-old woman with a past medical history of depression and pelvic floor dysfunction who had a lumbar herniation around 2011. Starting 6-8 weeks ago (8/2021) she has had continued right-sided low back pain with radicular symptoms and about 1 month of weakness and numbness in the right lower leg. MRI ordered for right-sided lumbar nerve roots mostly for L4 and L5 to help with decisions regarding potential surgical options.     We had a good discussion about treatment options and I will refill her muscle relaxant and Norco until her appointment with Dr. Flonnie Jeans.  I did encourage her to try and move her MRI up to prior to spine evaluation. I also encouraged her to connect with a PCP or someone who can help with acute COVID symptoms. 1. Continue Gabapentin 300mg add up to 600mg at night then add during the AM and midday. Tylenol scheduled 1000mg BID. Continue methocarbamol 500mg TID PRN   2. Avoid NSAIDs for 1 week if considering JACINTO. PA for caudal JACINTO. 3. Urine drug screen order placed and she will be obtaining this. Once this is completed and appears to be appropriate will send in a refill for hydrocodone. 4. Hydrocodone 10/325mg TID PRN, pain #21. 0RF. Try only at night if needed. PDMP reviewed; no aberrant behavior identified, prescription authorized. 5. Schedule JACINTO for Tue of or after Dr. Mihai Randolph. Otherwise depending upon results of MRI, procedure may need to be scheduled with pain management, and hopefully this can be coordinated quickly. Order was placed today. 6. RTC for procedure, call with questions. 7. Will wait to refill imaging lorazepam until the day prior to imaging. 2 tabs of 0.5mg  8. In regards to COVID symptoms, she is asymptomatic currently and has been for few days, expect that due to her vaccination status being complete and timing of resolution of symptoms she will not need to cancel her imaging. No follow-ups on file. Instructions provided as documented in the after visit summary. The patient indicated understanding of the diagnosis and agreed with the plan of care. The entirety of this visit with orders and reviewing of labs and narcotic prescription lasted 30 minutes. CHIEF COMPLAINT/HISTORY OF PRESENT ILLNESS:    Nathaniel Bravo is a pleasant 36year old female who presents with low back pain. 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 Patient reports she does exercise.  Cardio, stretching, yoga  Â   Delinda Frankel has a PMH of depression and pelvic floor dysfunction who presents with lumbar spine Patient is a 86y old  Female who presents with a chief complaint of new atrial fibrillation with RVR, shortness of breath (10 Apr 2018 19:39)       INTERVAL HPI/OVERNIGHT EVENTS: Yesterday evening diltiazem drip was weaned from 10 cc/hr to 5 cc/hr. Overnight pt's HR remained in 110's-130's on telemetry, occasionally increasing to . Remains on diltiazem drip for now at 5 cc/hr. Pt states she was barely able to sleep, but denies shortness of breath, chest pain or palpitations. States she had a bowel movement yesterday and a smaller one today, but would like to have another BM because she feels some abdominal discomfort and belching today.     MEDICATIONS  (STANDING):  apixaban 5 milliGRAM(s) Oral every 12 hours  diltiazem Infusion 5 mG/Hr (5 mL/Hr) IV Continuous <Continuous>  docusate sodium 100 milliGRAM(s) Oral three times a day  dorzolamide 2%/timolol 0.5% Ophthalmic Solution 1 Drop(s) Both EYES two times a day  furosemide   Injectable 40 milliGRAM(s) IV Push daily  latanoprost 0.005% Ophthalmic Solution 1 Drop(s) Both EYES at bedtime  metoprolol tartrate 50 milliGRAM(s) Oral two times a day  multivitamin 1 Tablet(s) Oral daily  pantoprazole    Tablet 40 milliGRAM(s) Oral before breakfast  polyethylene glycol 3350 17 Gram(s) Oral daily  senna 2 Tablet(s) Oral at bedtime    MEDICATIONS  (PRN):  acetaminophen   Tablet. 650 milliGRAM(s) Oral every 6 hours PRN mild and moderate pain  metoprolol tartrate Injectable 5 milliGRAM(s) IV Push every 6 hours PRN A. fib with RVR with HR>120      Allergies    No Known Allergies    Intolerances          Vital Signs Last 24 Hrs  T(C): 37.2 (13 Apr 2018 04:20), Max: 37.2 (13 Apr 2018 04:20)  T(F): 99 (13 Apr 2018 04:20), Max: 99 (13 Apr 2018 04:20)  HR: 82 (13 Apr 2018 05:36) (82 - 126)  BP: 114/74 (13 Apr 2018 05:36) (95/63 - 118/70)  BP(mean): --  RR: 18 (13 Apr 2018 05:06) (18 - 19)  SpO2: 95% (13 Apr 2018 05:06) (91% - 95%)    PHYSICAL EXAM:  GENERAL: NAD, well-groomed, well-developed  HEAD:  Atraumatic, Normocephalic  EYES: EOMI, PERRLA, conjunctiva and sclera clear  ENMT: No tonsillar erythema, exudates, or enlargement; Moist mucous membranes  NERVOUS SYSTEM: AOX3  PSYCHIATRIC: Appropriate affect and mood  CHEST/LUNG: Inspiratory crackles at bilateral lung bases; No wheezing.   HEART: Tachycardic, irregular rhythm; No murmurs, rubs, or gallops. Trace b/l LE edema  ABDOMEN: Soft, Nontender, Nondistended; Bowel sounds present  EXTREMITIES:  2+ Peripheral Pulses, No clubbing, cyanosis  SKIN: No rashes or lesions    LABS:                        12.6   14.57 )-----------( 234      ( 12 Apr 2018 09:30 )             40.1     13 Apr 2018 05:51    136    |  96     |  25     ----------------------------<  120    3.4     |  28     |  1.05     Ca    9.4        13 Apr 2018 05:51  Phos  2.5       13 Apr 2018 05:51  Mg     2.3       13 Apr 2018 05:51        CAPILLARY BLOOD GLUCOSE        BLOOD CULTURE    RADIOLOGY & ADDITIONAL TESTS:    Imaging Personally Reviewed:  [ ] YES     Consultant(s) Notes Reviewed:      Care Discussed with Consultants/Other Providers: pain which started about 1.5 month ago. 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 A  month and a half ago, she woke up with an intense right sided back pain. Her right leg is painful. She has cramping in her leg as well. She has a warm sensation on the medial side of her leg. She can't bend over. She wakes up in the middle of the night in pain. She can't lay on her back. She does have numbness and tingling on her right foot starting around the middle to end of September. Referred/seen by Wallace Ho on 10/12/21. Pain is currently located on right side of lumbar region and down right leg. It's described as sharp , cramping and jolting pain. It lasts pretty constant and happens in the middle of the night and mornings. With more movements, it aggravates her back. Severity is about 10/10. Exacerbated by laying flat and movements. Alleviated by cortisone injections in the past for back issues. No XRs. Patient has been prescribed muscle relaxer, prednisone, and Tramadol with no relief from any. Patient does wear a back brace at work as well. Today:  She is continuing to have pain, but seems to be more stable. Overall her covid symptoms have improved and last symptomatic around Wednesday. Low back is painful, and having lower right leg pain numbness. Level is 5/10 in severity. Taking 2 tabs per day and has two tablets of hydrocodone left. MEDICATIONS:    has a current medication list which includes the following prescription(s): hydrocodone-acetaminophen, methocarbamol, meloxicam, methylprednisolone, oxycodone-acetaminophen, tizanidine, gabapentin, lorazepam, ibuprofen, levonorgestrel, and cyclobenzaprine. ALLERGIES:    is allergic to baclofen. SOCIAL HISTORY:     reports that she has never smoked. She has never used smokeless tobacco. She reports previous alcohol use.  She reports that she does not use drugs. REVIEW OF SYSTEMS:    All other systems are reviewed and are negative except as documented in the History of Present Illness. OBJECTIVE:  PHYSICAL EXAM:    Vitals: There were no vitals taken for this visit. Virtual    LABS/DIAGNOSTICS/IMAGING STUDIES:    I ordered and reviewed xrays of L spine, 10/21/21, showing no bony abnormalities. Mild degen disc at L5-S1 noted. MRI L and T spine 2/15/21:   MRI thoracic spine:   1. Multilevel degenerative disc disease as detailed above   causing mild multilevel ventral cord deformation without   abnormal cord signal.   2. No spinal canal/neural foraminal narrowing. MRI lumber spine:   1. Left-sided intraforaminal annular fissure and disc   protrusion contacts and mildly deforms the left L4 nerve   root. 2. Small right intraforaminal annular fissure and mild disc   bulging contacts the right L5 nerve root without definite   impingement. 3. No significant spinal canal narrowing. Mild bilateral   L5-S1 foraminal narrowing.  Â

## 2021-12-20 ENCOUNTER — APPOINTMENT (OUTPATIENT)
Dept: CARDIOLOGY | Facility: CLINIC | Age: 86
End: 2021-12-20

## 2021-12-22 PROCEDURE — 70450 CT HEAD/BRAIN W/O DYE: CPT | Mod: MA

## 2021-12-22 PROCEDURE — 99285 EMERGENCY DEPT VISIT HI MDM: CPT | Mod: 25

## 2021-12-22 PROCEDURE — 83735 ASSAY OF MAGNESIUM: CPT

## 2021-12-22 PROCEDURE — 84100 ASSAY OF PHOSPHORUS: CPT

## 2021-12-22 PROCEDURE — 85610 PROTHROMBIN TIME: CPT

## 2021-12-22 PROCEDURE — 87086 URINE CULTURE/COLONY COUNT: CPT

## 2021-12-22 PROCEDURE — 85730 THROMBOPLASTIN TIME PARTIAL: CPT

## 2021-12-22 PROCEDURE — 85025 COMPLETE CBC W/AUTO DIFF WBC: CPT

## 2021-12-22 PROCEDURE — 74176 CT ABD & PELVIS W/O CONTRAST: CPT | Mod: MA

## 2021-12-22 PROCEDURE — 96374 THER/PROPH/DIAG INJ IV PUSH: CPT

## 2021-12-22 PROCEDURE — 87186 SC STD MICRODIL/AGAR DIL: CPT

## 2021-12-22 PROCEDURE — 86901 BLOOD TYPING SEROLOGIC RH(D): CPT

## 2021-12-22 PROCEDURE — 85027 COMPLETE CBC AUTOMATED: CPT

## 2021-12-22 PROCEDURE — 80048 BASIC METABOLIC PNL TOTAL CA: CPT

## 2021-12-22 PROCEDURE — 97162 PT EVAL MOD COMPLEX 30 MIN: CPT

## 2021-12-22 PROCEDURE — 80053 COMPREHEN METABOLIC PANEL: CPT

## 2021-12-22 PROCEDURE — 96375 TX/PRO/DX INJ NEW DRUG ADDON: CPT

## 2021-12-22 PROCEDURE — 86769 SARS-COV-2 COVID-19 ANTIBODY: CPT

## 2021-12-22 PROCEDURE — 71250 CT THORAX DX C-: CPT | Mod: MA

## 2021-12-22 PROCEDURE — U0003: CPT

## 2021-12-22 PROCEDURE — 87077 CULTURE AEROBIC IDENTIFY: CPT

## 2021-12-22 PROCEDURE — 72125 CT NECK SPINE W/O DYE: CPT | Mod: MA

## 2021-12-22 PROCEDURE — U0005: CPT

## 2021-12-22 PROCEDURE — 93306 TTE W/DOPPLER COMPLETE: CPT

## 2021-12-22 PROCEDURE — 86900 BLOOD TYPING SEROLOGIC ABO: CPT

## 2021-12-22 PROCEDURE — 81001 URINALYSIS AUTO W/SCOPE: CPT

## 2021-12-22 PROCEDURE — 86850 RBC ANTIBODY SCREEN: CPT

## 2021-12-22 PROCEDURE — 97166 OT EVAL MOD COMPLEX 45 MIN: CPT

## 2021-12-28 ENCOUNTER — NON-APPOINTMENT (OUTPATIENT)
Age: 86
End: 2021-12-28

## 2022-01-01 ENCOUNTER — RX RENEWAL (OUTPATIENT)
Age: 87
End: 2022-01-01

## 2022-01-01 ENCOUNTER — NON-APPOINTMENT (OUTPATIENT)
Age: 87
End: 2022-01-01

## 2022-01-01 ENCOUNTER — OUTPATIENT (OUTPATIENT)
Dept: OUTPATIENT SERVICES | Facility: HOSPITAL | Age: 87
LOS: 1 days | End: 2022-01-01
Payer: MEDICARE

## 2022-01-01 ENCOUNTER — APPOINTMENT (OUTPATIENT)
Dept: CARDIOLOGY | Facility: CLINIC | Age: 87
End: 2022-01-01

## 2022-01-01 ENCOUNTER — APPOINTMENT (OUTPATIENT)
Dept: NEUROSURGERY | Facility: CLINIC | Age: 87
End: 2022-01-01

## 2022-01-01 ENCOUNTER — APPOINTMENT (OUTPATIENT)
Dept: OTOLARYNGOLOGY | Facility: CLINIC | Age: 87
End: 2022-01-01
Payer: MEDICARE

## 2022-01-01 ENCOUNTER — APPOINTMENT (OUTPATIENT)
Dept: RADIOLOGY | Facility: IMAGING CENTER | Age: 87
End: 2022-01-01
Payer: MEDICARE

## 2022-01-01 ENCOUNTER — APPOINTMENT (OUTPATIENT)
Dept: SURGERY | Facility: CLINIC | Age: 87
End: 2022-01-01

## 2022-01-01 VITALS
WEIGHT: 140 LBS | BODY MASS INDEX: 25.76 KG/M2 | SYSTOLIC BLOOD PRESSURE: 113 MMHG | HEIGHT: 62 IN | DIASTOLIC BLOOD PRESSURE: 71 MMHG | HEART RATE: 55 BPM

## 2022-01-01 DIAGNOSIS — H61.23 IMPACTED CERUMEN, BILATERAL: ICD-10-CM

## 2022-01-01 DIAGNOSIS — I48.91 UNSPECIFIED ATRIAL FIBRILLATION: ICD-10-CM

## 2022-01-01 DIAGNOSIS — Z98.890 OTHER SPECIFIED POSTPROCEDURAL STATES: Chronic | ICD-10-CM

## 2022-01-01 DIAGNOSIS — S32.009A UNSPECIFIED FRACTURE OF UNSPECIFIED LUMBAR VERTEBRA, INITIAL ENCOUNTER FOR CLOSED FRACTURE: ICD-10-CM

## 2022-01-01 DIAGNOSIS — M47.816 SPONDYLOSIS WITHOUT MYELOPATHY OR RADICULOPATHY, LUMBAR REGION: ICD-10-CM

## 2022-01-01 DIAGNOSIS — H90.3 SENSORINEURAL HEARING LOSS, BILATERAL: ICD-10-CM

## 2022-01-01 DIAGNOSIS — M54.16 RADICULOPATHY, LUMBAR REGION: ICD-10-CM

## 2022-01-01 DIAGNOSIS — S32.000D WEDGE COMPRESSION FRACTURE OF UNSPECIFIED LUMBAR VERTEBRA, SUBSEQUENT ENCOUNTER FOR FRACTURE WITH ROUTINE HEALING: ICD-10-CM

## 2022-01-01 PROCEDURE — 99203 OFFICE O/P NEW LOW 30 MIN: CPT | Mod: 25

## 2022-01-01 PROCEDURE — 72148 MRI LUMBAR SPINE W/O DYE: CPT | Mod: 26,MH

## 2022-01-01 PROCEDURE — 92567 TYMPANOMETRY: CPT

## 2022-01-01 PROCEDURE — G0268 REMOVAL OF IMPACTED WAX MD: CPT

## 2022-01-01 PROCEDURE — 72148 MRI LUMBAR SPINE W/O DYE: CPT

## 2022-01-01 PROCEDURE — 92557 COMPREHENSIVE HEARING TEST: CPT

## 2022-01-01 PROCEDURE — 72082 X-RAY EXAM ENTIRE SPI 2/3 VW: CPT | Mod: 26

## 2022-01-01 PROCEDURE — 72082 X-RAY EXAM ENTIRE SPI 2/3 VW: CPT

## 2022-01-01 RX ORDER — FUROSEMIDE 40 MG/1
40 TABLET ORAL
Qty: 45 | Refills: 1 | Status: ACTIVE | COMMUNITY
Start: 2018-11-29 | End: 1900-01-01

## 2022-01-01 RX ORDER — OMEPRAZOLE 40 MG/1
40 CAPSULE, DELAYED RELEASE ORAL
Refills: 0 | Status: ACTIVE | COMMUNITY

## 2022-01-01 RX ORDER — ACETAMINOPHEN 325 MG/1
325 TABLET ORAL
Refills: 0 | Status: ACTIVE | COMMUNITY

## 2022-01-01 RX ORDER — METHYLPREDNISOLONE 4 MG/1
4 TABLET ORAL
Qty: 21 | Refills: 0 | Status: DISCONTINUED | COMMUNITY
Start: 2021-09-30 | End: 2022-01-01

## 2022-01-01 RX ORDER — METOPROLOL TARTRATE 100 MG/1
100 TABLET, FILM COATED ORAL TWICE DAILY
Qty: 180 | Refills: 3 | Status: ACTIVE | COMMUNITY
Start: 2018-04-19 | End: 1900-01-01

## 2022-01-01 RX ORDER — PANTOPRAZOLE 40 MG/1
40 TABLET, DELAYED RELEASE ORAL
Qty: 90 | Refills: 3 | Status: ACTIVE | COMMUNITY
Start: 2018-04-19 | End: 1900-01-01

## 2022-01-01 RX ORDER — DONEPEZIL HYDROCHLORIDE 5 MG/1
5 TABLET ORAL
Refills: 0 | Status: ACTIVE | COMMUNITY

## 2022-01-01 RX ORDER — POTASSIUM CHLORIDE 1500 MG/1
20 TABLET, FILM COATED, EXTENDED RELEASE ORAL
Refills: 0 | Status: ACTIVE | COMMUNITY

## 2022-01-01 RX ORDER — LACTULOSE 10 G/10G
10 SOLUTION ORAL
Refills: 0 | Status: ACTIVE | COMMUNITY

## 2022-01-01 RX ORDER — FERROUS GLUCONATE 324(37.5)
324 (37.5 FE) TABLET ORAL
Refills: 0 | Status: ACTIVE | COMMUNITY

## 2022-01-01 RX ORDER — MIRABEGRON 25 MG/1
25 TABLET, FILM COATED, EXTENDED RELEASE ORAL
Qty: 30 | Refills: 3 | Status: DISCONTINUED | COMMUNITY
Start: 2021-04-29 | End: 2022-01-01

## 2022-01-01 RX ORDER — LIDOCAINE HCL 4 %
4 LIQUID ROLL-ON (ML) TOPICAL
Refills: 0 | Status: ACTIVE | COMMUNITY

## 2022-01-01 RX ORDER — TUBERCULIN PURIFIED PROTEIN DERIVATIVE 5 [IU]/.1ML
INJECTION, SOLUTION INTRADERMAL
Refills: 0 | Status: ACTIVE | COMMUNITY

## 2022-01-01 RX ORDER — METHYLPREDNISOLONE 4 MG/1
4 TABLET ORAL
Qty: 21 | Refills: 0 | Status: DISCONTINUED | COMMUNITY
Start: 2021-09-28 | End: 2022-01-01

## 2022-01-01 RX ORDER — MAGNESIUM HYDROXIDE 400 MG/5ML
400 SUSPENSION ORAL
Refills: 0 | Status: ACTIVE | COMMUNITY

## 2022-01-01 RX ORDER — SERTRALINE 25 MG/1
25 TABLET, FILM COATED ORAL
Qty: 90 | Refills: 0 | Status: COMPLETED | COMMUNITY
Start: 2021-04-01 | End: 2022-01-01

## 2022-01-01 RX ORDER — ALENDRONATE SODIUM 70 MG/1
70 TABLET ORAL
Refills: 0 | Status: ACTIVE | COMMUNITY

## 2022-01-01 RX ORDER — ENEMA 19; 7 G/133ML; G/133ML
7-19 ENEMA RECTAL
Refills: 0 | Status: ACTIVE | COMMUNITY

## 2022-01-01 RX ORDER — DORZOLAMIDE HYDROCHLORIDE TIMOLOL MALEATE 20; 5 MG/ML; MG/ML
22.3-6.8 SOLUTION/ DROPS OPHTHALMIC
Refills: 0 | Status: ACTIVE | COMMUNITY

## 2022-01-03 ENCOUNTER — RX RENEWAL (OUTPATIENT)
Age: 87
End: 2022-01-03

## 2022-01-03 RX ORDER — BACLOFEN 10 MG/1
10 TABLET ORAL
Qty: 30 | Refills: 3 | Status: ACTIVE | COMMUNITY
Start: 2021-11-01 | End: 1900-01-01

## 2022-01-05 ENCOUNTER — RX CHANGE (OUTPATIENT)
Age: 87
End: 2022-01-05

## 2022-01-05 ENCOUNTER — NON-APPOINTMENT (OUTPATIENT)
Age: 87
End: 2022-01-05

## 2022-01-05 ENCOUNTER — RX RENEWAL (OUTPATIENT)
Age: 87
End: 2022-01-05

## 2022-01-05 DIAGNOSIS — S32.009A UNSPECIFIED FRACTURE OF UNSPECIFIED LUMBAR VERTEBRA, INITIAL ENCOUNTER FOR CLOSED FRACTURE: ICD-10-CM

## 2022-05-12 PROBLEM — H90.3 SENSORY HEARING LOSS, BILATERAL: Status: ACTIVE | Noted: 2022-01-01

## 2022-05-12 PROBLEM — H61.23 BILATERAL IMPACTED CERUMEN: Status: ACTIVE | Noted: 2022-01-01

## 2022-05-12 NOTE — HISTORY OF PRESENT ILLNESS
[de-identified] : 89 yr old female seen with her son and aide. Aided AU ,very old aids, from outside HA dealer\par +difficulty hearing\par c/p tinnitus\par +hx wax\par -hx otitis, noise exp, head trauma\par +FH son, sister

## 2022-05-12 NOTE — REVIEW OF SYSTEMS
[Hearing Loss] : hearing loss [Ear Noises] : ear noises [Problem Snoring] : problem snoring [Snoring With Pauses] : snoring with pauses [Heartburn] : heartburn [Anxiety] : anxiety [Depression] : depression [Negative] : Heme/Lymph

## 2022-05-12 NOTE — PHYSICAL EXAM
[Normal] : mucosa is normal [Midline] : trachea located in midline position [de-identified] : CI AU

## 2022-05-12 NOTE — ASSESSMENT
[FreeTextEntry1] : CI removed\par  mod-severe to severe SNHL w type A AU\par continued use of HA\par annual audio

## 2022-05-12 NOTE — DATA REVIEWED
[de-identified] : type A tymps AU\par moderately severe to severe SNHL 250-8000 Hz AU\par 1) ent f/u 2) re-eval as per MD 3) continued use of hearing aids / consider updated technology

## 2022-06-21 NOTE — PHYSICAL THERAPY INITIAL EVALUATION ADULT - LEVEL OF INDEPENDENCE: STAND/SIT, REHAB EVAL
80 y/o F with PMHx of HTN, DM, Dementia, recent lt posterior Cerebral Artery Stroke (Diagnosed early 05/2022 outside hospital, now on Eliquis) and CVA (x7 days ago) presents to the ED BIBEMS for unresponsive episode witnessed by daughter lasting x8 minutes. Pt's daughter describes pt becoming transiently rigid in appearance, not responding to verbal cues while daughter was bathing the pt. when daughter became nervous due to sx appear similar to recent stroke. Denies fever/chills, visual changes, palpitations, SOB, CP or N/V/D. minimum assist (75% patients effort)

## 2022-07-11 NOTE — ED ADULT TRIAGE NOTE - BSA (M2)
1.69 Non-Graft Cartilage Fenestration Text: The cartilage was fenestrated with a 2mm punch biopsy to help facilitate healing.

## 2023-01-01 ENCOUNTER — INPATIENT (INPATIENT)
Facility: HOSPITAL | Age: 88
LOS: 1 days | DRG: 951 | End: 2023-01-06
Attending: HOSPITALIST | Admitting: HOSPITALIST
Payer: MEDICARE

## 2023-01-01 VITALS
DIASTOLIC BLOOD PRESSURE: 55 MMHG | RESPIRATION RATE: 20 BRPM | SYSTOLIC BLOOD PRESSURE: 105 MMHG | HEART RATE: 83 BPM | OXYGEN SATURATION: 95 % | TEMPERATURE: 103 F | WEIGHT: 139.99 LBS

## 2023-01-01 VITALS
SYSTOLIC BLOOD PRESSURE: 139 MMHG | HEART RATE: 64 BPM | DIASTOLIC BLOOD PRESSURE: 71 MMHG | TEMPERATURE: 98 F | RESPIRATION RATE: 18 BRPM | OXYGEN SATURATION: 99 %

## 2023-01-01 DIAGNOSIS — E87.5 HYPERKALEMIA: ICD-10-CM

## 2023-01-01 DIAGNOSIS — J96.01 ACUTE RESPIRATORY FAILURE WITH HYPOXIA: ICD-10-CM

## 2023-01-01 DIAGNOSIS — Z98.890 OTHER SPECIFIED POSTPROCEDURAL STATES: Chronic | ICD-10-CM

## 2023-01-01 DIAGNOSIS — J15.6 PNEUMONIA DUE TO OTHER GRAM-NEGATIVE BACTERIA: ICD-10-CM

## 2023-01-01 DIAGNOSIS — Z29.9 ENCOUNTER FOR PROPHYLACTIC MEASURES, UNSPECIFIED: ICD-10-CM

## 2023-01-01 DIAGNOSIS — N18.9 CHRONIC KIDNEY DISEASE, UNSPECIFIED: ICD-10-CM

## 2023-01-01 DIAGNOSIS — D64.9 ANEMIA, UNSPECIFIED: ICD-10-CM

## 2023-01-01 DIAGNOSIS — Z71.89 OTHER SPECIFIED COUNSELING: ICD-10-CM

## 2023-01-01 DIAGNOSIS — R79.89 OTHER SPECIFIED ABNORMAL FINDINGS OF BLOOD CHEMISTRY: ICD-10-CM

## 2023-01-01 LAB
-  AMIKACIN: SIGNIFICANT CHANGE UP
-  AMIKACIN: SIGNIFICANT CHANGE UP
-  AMOXICILLIN/CLAVULANIC ACID: SIGNIFICANT CHANGE UP
-  AMOXICILLIN/CLAVULANIC ACID: SIGNIFICANT CHANGE UP
-  AMPICILLIN/SULBACTAM: SIGNIFICANT CHANGE UP
-  AMPICILLIN/SULBACTAM: SIGNIFICANT CHANGE UP
-  AMPICILLIN: SIGNIFICANT CHANGE UP
-  AMPICILLIN: SIGNIFICANT CHANGE UP
-  AZTREONAM: SIGNIFICANT CHANGE UP
-  AZTREONAM: SIGNIFICANT CHANGE UP
-  CEFAZOLIN: SIGNIFICANT CHANGE UP
-  CEFAZOLIN: SIGNIFICANT CHANGE UP
-  CEFEPIME: SIGNIFICANT CHANGE UP
-  CEFEPIME: SIGNIFICANT CHANGE UP
-  CEFOXITIN: SIGNIFICANT CHANGE UP
-  CEFOXITIN: SIGNIFICANT CHANGE UP
-  CEFTRIAXONE: SIGNIFICANT CHANGE UP
-  CEFTRIAXONE: SIGNIFICANT CHANGE UP
-  CEFUROXIME: SIGNIFICANT CHANGE UP
-  CEFUROXIME: SIGNIFICANT CHANGE UP
-  CIPROFLOXACIN: SIGNIFICANT CHANGE UP
-  CIPROFLOXACIN: SIGNIFICANT CHANGE UP
-  ERTAPENEM: SIGNIFICANT CHANGE UP
-  ERTAPENEM: SIGNIFICANT CHANGE UP
-  GENTAMICIN: SIGNIFICANT CHANGE UP
-  GENTAMICIN: SIGNIFICANT CHANGE UP
-  IMIPENEM: SIGNIFICANT CHANGE UP
-  IMIPENEM: SIGNIFICANT CHANGE UP
-  LEVOFLOXACIN: SIGNIFICANT CHANGE UP
-  LEVOFLOXACIN: SIGNIFICANT CHANGE UP
-  MEROPENEM: SIGNIFICANT CHANGE UP
-  MEROPENEM: SIGNIFICANT CHANGE UP
-  NITROFURANTOIN: SIGNIFICANT CHANGE UP
-  NITROFURANTOIN: SIGNIFICANT CHANGE UP
-  PIPERACILLIN/TAZOBACTAM: SIGNIFICANT CHANGE UP
-  PIPERACILLIN/TAZOBACTAM: SIGNIFICANT CHANGE UP
-  TOBRAMYCIN: SIGNIFICANT CHANGE UP
-  TOBRAMYCIN: SIGNIFICANT CHANGE UP
-  TRIMETHOPRIM/SULFAMETHOXAZOLE: SIGNIFICANT CHANGE UP
-  TRIMETHOPRIM/SULFAMETHOXAZOLE: SIGNIFICANT CHANGE UP
A1C WITH ESTIMATED AVERAGE GLUCOSE RESULT: 6 % — HIGH (ref 4–5.6)
ALBUMIN SERPL ELPH-MCNC: 3 G/DL — LOW (ref 3.3–5)
ALP SERPL-CCNC: 64 U/L — SIGNIFICANT CHANGE UP (ref 40–120)
ALT FLD-CCNC: 449 U/L — HIGH (ref 12–78)
ANION GAP SERPL CALC-SCNC: 12 MMOL/L — SIGNIFICANT CHANGE UP (ref 5–17)
ANION GAP SERPL CALC-SCNC: 13 MMOL/L — SIGNIFICANT CHANGE UP (ref 5–17)
ANION GAP SERPL CALC-SCNC: 18 MMOL/L — HIGH (ref 5–17)
ANISOCYTOSIS BLD QL: SLIGHT — SIGNIFICANT CHANGE UP
APPEARANCE UR: ABNORMAL
APTT BLD: 29.4 SEC — SIGNIFICANT CHANGE UP (ref 27.5–35.5)
AST SERPL-CCNC: 751 U/L — HIGH (ref 15–37)
BASE EXCESS BLDV CALC-SCNC: -3.8 MMOL/L — LOW (ref -2–3)
BASOPHILS # BLD AUTO: 0 K/UL — SIGNIFICANT CHANGE UP (ref 0–0.2)
BASOPHILS # BLD AUTO: 0.02 K/UL — SIGNIFICANT CHANGE UP (ref 0–0.2)
BASOPHILS NFR BLD AUTO: 0 % — SIGNIFICANT CHANGE UP (ref 0–2)
BASOPHILS NFR BLD AUTO: 0.1 % — SIGNIFICANT CHANGE UP (ref 0–2)
BILIRUB SERPL-MCNC: 0.5 MG/DL — SIGNIFICANT CHANGE UP (ref 0.2–1.2)
BILIRUB UR-MCNC: NEGATIVE — SIGNIFICANT CHANGE UP
BLOOD GAS COMMENTS, VENOUS: SIGNIFICANT CHANGE UP
BUN SERPL-MCNC: 53 MG/DL — HIGH (ref 7–23)
BUN SERPL-MCNC: 53 MG/DL — HIGH (ref 7–23)
BUN SERPL-MCNC: 67 MG/DL — HIGH (ref 7–23)
CALCIUM SERPL-MCNC: 7.4 MG/DL — LOW (ref 8.5–10.1)
CALCIUM SERPL-MCNC: 7.5 MG/DL — LOW (ref 8.5–10.1)
CALCIUM SERPL-MCNC: 8.3 MG/DL — LOW (ref 8.5–10.1)
CHLORIDE SERPL-SCNC: 103 MMOL/L — SIGNIFICANT CHANGE UP (ref 96–108)
CHLORIDE SERPL-SCNC: 107 MMOL/L — SIGNIFICANT CHANGE UP (ref 96–108)
CHLORIDE SERPL-SCNC: 110 MMOL/L — HIGH (ref 96–108)
CHOLEST SERPL-MCNC: 94 MG/DL — SIGNIFICANT CHANGE UP
CO2 SERPL-SCNC: 15 MMOL/L — LOW (ref 22–31)
CO2 SERPL-SCNC: 21 MMOL/L — LOW (ref 22–31)
CO2 SERPL-SCNC: 22 MMOL/L — SIGNIFICANT CHANGE UP (ref 22–31)
COLOR SPEC: YELLOW — SIGNIFICANT CHANGE UP
CREAT SERPL-MCNC: 1.9 MG/DL — HIGH (ref 0.5–1.3)
CREAT SERPL-MCNC: 2.1 MG/DL — HIGH (ref 0.5–1.3)
CREAT SERPL-MCNC: 3.1 MG/DL — HIGH (ref 0.5–1.3)
CRP SERPL-MCNC: 76 MG/L — HIGH
CULTURE RESULTS: SIGNIFICANT CHANGE UP
DACRYOCYTES BLD QL SMEAR: SLIGHT — SIGNIFICANT CHANGE UP
DIFF PNL FLD: ABNORMAL
EGFR: 14 ML/MIN/1.73M2 — LOW
EGFR: 22 ML/MIN/1.73M2 — LOW
EGFR: 25 ML/MIN/1.73M2 — LOW
ELLIPTOCYTES BLD QL SMEAR: SLIGHT — SIGNIFICANT CHANGE UP
EOSINOPHIL # BLD AUTO: 0 K/UL — SIGNIFICANT CHANGE UP (ref 0–0.5)
EOSINOPHIL # BLD AUTO: 0 K/UL — SIGNIFICANT CHANGE UP (ref 0–0.5)
EOSINOPHIL NFR BLD AUTO: 0 % — SIGNIFICANT CHANGE UP (ref 0–6)
EOSINOPHIL NFR BLD AUTO: 0 % — SIGNIFICANT CHANGE UP (ref 0–6)
ESTIMATED AVERAGE GLUCOSE: 126 MG/DL — HIGH (ref 68–114)
FLUAV AG NPH QL: SIGNIFICANT CHANGE UP
FLUBV AG NPH QL: SIGNIFICANT CHANGE UP
GAS PNL BLDV: SIGNIFICANT CHANGE UP
GLUCOSE SERPL-MCNC: 114 MG/DL — HIGH (ref 70–99)
GLUCOSE SERPL-MCNC: 143 MG/DL — HIGH (ref 70–99)
GLUCOSE SERPL-MCNC: 166 MG/DL — HIGH (ref 70–99)
GLUCOSE UR QL: NEGATIVE — SIGNIFICANT CHANGE UP
HCO3 BLDV-SCNC: 21 MMOL/L — LOW (ref 22–29)
HCT VFR BLD CALC: 19.2 % — CRITICAL LOW (ref 34.5–45)
HCT VFR BLD CALC: 19.6 % — CRITICAL LOW (ref 34.5–45)
HCT VFR BLD CALC: 21.6 % — LOW (ref 34.5–45)
HDLC SERPL-MCNC: 24 MG/DL — LOW
HGB BLD-MCNC: 5.2 G/DL — CRITICAL LOW (ref 11.5–15.5)
HGB BLD-MCNC: 5.6 G/DL — CRITICAL LOW (ref 11.5–15.5)
HGB BLD-MCNC: 6 G/DL — CRITICAL LOW (ref 11.5–15.5)
HOROWITZ INDEX BLDV+IHG-RTO: 60 — SIGNIFICANT CHANGE UP
HYPOCHROMIA BLD QL: SLIGHT — SIGNIFICANT CHANGE UP
IMM GRANULOCYTES NFR BLD AUTO: 0.7 % — SIGNIFICANT CHANGE UP (ref 0–0.9)
INR BLD: 2.99 RATIO — HIGH (ref 0.88–1.16)
INR BLD: 4.31 RATIO — HIGH (ref 0.88–1.16)
KETONES UR-MCNC: ABNORMAL
LACTATE SERPL-SCNC: 1.5 MMOL/L — SIGNIFICANT CHANGE UP (ref 0.7–2)
LACTATE SERPL-SCNC: 4.5 MMOL/L — CRITICAL HIGH (ref 0.7–2)
LEUKOCYTE ESTERASE UR-ACNC: ABNORMAL
LIPID PNL WITH DIRECT LDL SERPL: 51 MG/DL — SIGNIFICANT CHANGE UP
LYMPHOCYTES # BLD AUTO: 0.74 K/UL — LOW (ref 1–3.3)
LYMPHOCYTES # BLD AUTO: 1 K/UL — SIGNIFICANT CHANGE UP (ref 1–3.3)
LYMPHOCYTES # BLD AUTO: 10 % — LOW (ref 13–44)
LYMPHOCYTES # BLD AUTO: 4.4 % — LOW (ref 13–44)
MACROCYTES BLD QL: SLIGHT — SIGNIFICANT CHANGE UP
MANUAL SMEAR VERIFICATION: YES — SIGNIFICANT CHANGE UP
MCHC RBC-ENTMCNC: 19.6 PG — LOW (ref 27–34)
MCHC RBC-ENTMCNC: 19.7 PG — LOW (ref 27–34)
MCHC RBC-ENTMCNC: 20.3 PG — LOW (ref 27–34)
MCHC RBC-ENTMCNC: 27.1 GM/DL — LOW (ref 32–36)
MCHC RBC-ENTMCNC: 27.8 GM/DL — LOW (ref 32–36)
MCHC RBC-ENTMCNC: 28.6 GM/DL — LOW (ref 32–36)
MCV RBC AUTO: 71 FL — LOW (ref 80–100)
MCV RBC AUTO: 71.1 FL — LOW (ref 80–100)
MCV RBC AUTO: 72.5 FL — LOW (ref 80–100)
METHOD TYPE: SIGNIFICANT CHANGE UP
METHOD TYPE: SIGNIFICANT CHANGE UP
MICROCYTES BLD QL: SLIGHT — SIGNIFICANT CHANGE UP
MONOCYTES # BLD AUTO: 0.79 K/UL — SIGNIFICANT CHANGE UP (ref 0–0.9)
MONOCYTES # BLD AUTO: 0.9 K/UL — SIGNIFICANT CHANGE UP (ref 0–0.9)
MONOCYTES NFR BLD AUTO: 4.7 % — SIGNIFICANT CHANGE UP (ref 2–14)
MONOCYTES NFR BLD AUTO: 9 % — SIGNIFICANT CHANGE UP (ref 2–14)
NEUTROPHILS # BLD AUTO: 15.07 K/UL — HIGH (ref 1.8–7.4)
NEUTROPHILS # BLD AUTO: 7.8 K/UL — HIGH (ref 1.8–7.4)
NEUTROPHILS NFR BLD AUTO: 75 % — SIGNIFICANT CHANGE UP (ref 43–77)
NEUTROPHILS NFR BLD AUTO: 90.1 % — HIGH (ref 43–77)
NITRITE UR-MCNC: NEGATIVE — SIGNIFICANT CHANGE UP
NON HDL CHOLESTEROL: 69 MG/DL — SIGNIFICANT CHANGE UP
NRBC # BLD: 1 /100 WBCS — HIGH (ref 0–0)
NRBC # BLD: 3 /100 WBCS — HIGH (ref 0–0)
NRBC # BLD: SIGNIFICANT CHANGE UP /100 WBCS (ref 0–0)
ORGANISM # SPEC MICROSCOPIC CNT: SIGNIFICANT CHANGE UP
PCO2 BLDV: 36 MMHG — LOW (ref 39–42)
PH BLDV: 7.38 — SIGNIFICANT CHANGE UP (ref 7.32–7.43)
PH UR: 5 — SIGNIFICANT CHANGE UP (ref 5–8)
PLAT MORPH BLD: NORMAL — SIGNIFICANT CHANGE UP
PLATELET # BLD AUTO: 260 K/UL — SIGNIFICANT CHANGE UP (ref 150–400)
PLATELET # BLD AUTO: 279 K/UL — SIGNIFICANT CHANGE UP (ref 150–400)
PLATELET # BLD AUTO: 327 K/UL — SIGNIFICANT CHANGE UP (ref 150–400)
PO2 BLDV: 83 MMHG — HIGH (ref 25–45)
POIKILOCYTOSIS BLD QL AUTO: SLIGHT — SIGNIFICANT CHANGE UP
POLYCHROMASIA BLD QL SMEAR: SLIGHT — SIGNIFICANT CHANGE UP
POTASSIUM SERPL-MCNC: 4 MMOL/L — SIGNIFICANT CHANGE UP (ref 3.5–5.3)
POTASSIUM SERPL-MCNC: 4.8 MMOL/L — SIGNIFICANT CHANGE UP (ref 3.5–5.3)
POTASSIUM SERPL-MCNC: 5.8 MMOL/L — HIGH (ref 3.5–5.3)
POTASSIUM SERPL-SCNC: 4 MMOL/L — SIGNIFICANT CHANGE UP (ref 3.5–5.3)
POTASSIUM SERPL-SCNC: 4.8 MMOL/L — SIGNIFICANT CHANGE UP (ref 3.5–5.3)
POTASSIUM SERPL-SCNC: 5.8 MMOL/L — HIGH (ref 3.5–5.3)
PROT SERPL-MCNC: 6.9 G/DL — SIGNIFICANT CHANGE UP (ref 6–8.3)
PROT UR-MCNC: 30 MG/DL
PROTHROM AB SERPL-ACNC: 35.4 SEC — HIGH (ref 10.5–13.4)
PROTHROM AB SERPL-ACNC: 51.2 SEC — HIGH (ref 10.5–13.4)
RBC # BLD: 2.65 M/UL — LOW (ref 3.8–5.2)
RBC # BLD: 2.76 M/UL — LOW (ref 3.8–5.2)
RBC # BLD: 3.04 M/UL — LOW (ref 3.8–5.2)
RBC # FLD: 20.8 % — HIGH (ref 10.3–14.5)
RBC # FLD: 21 % — HIGH (ref 10.3–14.5)
RBC # FLD: 21.1 % — HIGH (ref 10.3–14.5)
RBC BLD AUTO: ABNORMAL
RSV RNA NPH QL NAA+NON-PROBE: SIGNIFICANT CHANGE UP
SAO2 % BLDV: 98.1 % — HIGH (ref 67–88)
SARS-COV-2 RNA SPEC QL NAA+PROBE: SIGNIFICANT CHANGE UP
SODIUM SERPL-SCNC: 138 MMOL/L — SIGNIFICANT CHANGE UP (ref 135–145)
SODIUM SERPL-SCNC: 140 MMOL/L — SIGNIFICANT CHANGE UP (ref 135–145)
SODIUM SERPL-SCNC: 143 MMOL/L — SIGNIFICANT CHANGE UP (ref 135–145)
SP GR SPEC: 1.02 — SIGNIFICANT CHANGE UP (ref 1.01–1.02)
SPECIMEN SOURCE: SIGNIFICANT CHANGE UP
TRIGL SERPL-MCNC: 93 MG/DL — SIGNIFICANT CHANGE UP
UROBILINOGEN FLD QL: NEGATIVE — SIGNIFICANT CHANGE UP
WBC # BLD: 10 K/UL — SIGNIFICANT CHANGE UP (ref 3.8–10.5)
WBC # BLD: 16.74 K/UL — HIGH (ref 3.8–10.5)
WBC # BLD: 16.81 K/UL — HIGH (ref 3.8–10.5)
WBC # FLD AUTO: 10 K/UL — SIGNIFICANT CHANGE UP (ref 3.8–10.5)
WBC # FLD AUTO: 16.74 K/UL — HIGH (ref 3.8–10.5)
WBC # FLD AUTO: 16.81 K/UL — HIGH (ref 3.8–10.5)

## 2023-01-01 PROCEDURE — 83036 HEMOGLOBIN GLYCOSYLATED A1C: CPT

## 2023-01-01 PROCEDURE — 71045 X-RAY EXAM CHEST 1 VIEW: CPT

## 2023-01-01 PROCEDURE — 87637 SARSCOV2&INF A&B&RSV AMP PRB: CPT

## 2023-01-01 PROCEDURE — 81001 URINALYSIS AUTO W/SCOPE: CPT

## 2023-01-01 PROCEDURE — 99233 SBSQ HOSP IP/OBS HIGH 50: CPT

## 2023-01-01 PROCEDURE — 76705 ECHO EXAM OF ABDOMEN: CPT

## 2023-01-01 PROCEDURE — 99232 SBSQ HOSP IP/OBS MODERATE 35: CPT

## 2023-01-01 PROCEDURE — 93005 ELECTROCARDIOGRAM TRACING: CPT

## 2023-01-01 PROCEDURE — 94760 N-INVAS EAR/PLS OXIMETRY 1: CPT

## 2023-01-01 PROCEDURE — 87077 CULTURE AEROBIC IDENTIFY: CPT

## 2023-01-01 PROCEDURE — 87040 BLOOD CULTURE FOR BACTERIA: CPT

## 2023-01-01 PROCEDURE — 99222 1ST HOSP IP/OBS MODERATE 55: CPT

## 2023-01-01 PROCEDURE — 36415 COLL VENOUS BLD VENIPUNCTURE: CPT

## 2023-01-01 PROCEDURE — 99291 CRITICAL CARE FIRST HOUR: CPT

## 2023-01-01 PROCEDURE — 76705 ECHO EXAM OF ABDOMEN: CPT | Mod: 26

## 2023-01-01 PROCEDURE — 87086 URINE CULTURE/COLONY COUNT: CPT

## 2023-01-01 PROCEDURE — 87186 SC STD MICRODIL/AGAR DIL: CPT

## 2023-01-01 PROCEDURE — 99223 1ST HOSP IP/OBS HIGH 75: CPT | Mod: AI

## 2023-01-01 PROCEDURE — 83605 ASSAY OF LACTIC ACID: CPT

## 2023-01-01 PROCEDURE — 80048 BASIC METABOLIC PNL TOTAL CA: CPT

## 2023-01-01 PROCEDURE — 71045 X-RAY EXAM CHEST 1 VIEW: CPT | Mod: 26

## 2023-01-01 PROCEDURE — 85610 PROTHROMBIN TIME: CPT

## 2023-01-01 PROCEDURE — 82803 BLOOD GASES ANY COMBINATION: CPT

## 2023-01-01 PROCEDURE — 86140 C-REACTIVE PROTEIN: CPT

## 2023-01-01 PROCEDURE — 85730 THROMBOPLASTIN TIME PARTIAL: CPT

## 2023-01-01 PROCEDURE — 85025 COMPLETE CBC W/AUTO DIFF WBC: CPT

## 2023-01-01 PROCEDURE — 80061 LIPID PANEL: CPT

## 2023-01-01 PROCEDURE — 99291 CRITICAL CARE FIRST HOUR: CPT | Mod: 25

## 2023-01-01 PROCEDURE — 96374 THER/PROPH/DIAG INJ IV PUSH: CPT

## 2023-01-01 PROCEDURE — 80053 COMPREHEN METABOLIC PANEL: CPT

## 2023-01-01 PROCEDURE — 93010 ELECTROCARDIOGRAM REPORT: CPT

## 2023-01-01 PROCEDURE — 85027 COMPLETE CBC AUTOMATED: CPT

## 2023-01-01 PROCEDURE — 84145 PROCALCITONIN (PCT): CPT

## 2023-01-01 PROCEDURE — 94660 CPAP INITIATION&MGMT: CPT

## 2023-01-01 RX ORDER — ACETAMINOPHEN 500 MG
2 TABLET ORAL
Qty: 0 | Refills: 0 | DISCHARGE

## 2023-01-01 RX ORDER — OMEPRAZOLE 10 MG/1
1 CAPSULE, DELAYED RELEASE ORAL
Qty: 0 | Refills: 0 | DISCHARGE

## 2023-01-01 RX ORDER — ZINC SULFATE TAB 220 MG (50 MG ZINC EQUIVALENT) 220 (50 ZN) MG
1 TAB ORAL
Qty: 0 | Refills: 0 | DISCHARGE

## 2023-01-01 RX ORDER — HYDROMORPHONE HYDROCHLORIDE 2 MG/ML
0.25 INJECTION INTRAMUSCULAR; INTRAVENOUS; SUBCUTANEOUS
Refills: 0 | Status: DISCONTINUED | OUTPATIENT
Start: 2023-01-01 | End: 2023-01-01

## 2023-01-01 RX ORDER — SODIUM CHLORIDE 9 MG/ML
2200 INJECTION INTRAMUSCULAR; INTRAVENOUS; SUBCUTANEOUS ONCE
Refills: 0 | Status: COMPLETED | OUTPATIENT
Start: 2023-01-01 | End: 2023-01-01

## 2023-01-01 RX ORDER — AZITHROMYCIN 500 MG/1
500 TABLET, FILM COATED ORAL ONCE
Refills: 0 | Status: COMPLETED | OUTPATIENT
Start: 2023-01-01 | End: 2023-01-01

## 2023-01-01 RX ORDER — HYDROMORPHONE HYDROCHLORIDE 2 MG/ML
0.2 INJECTION INTRAMUSCULAR; INTRAVENOUS; SUBCUTANEOUS
Qty: 100 | Refills: 0 | Status: DISCONTINUED | OUTPATIENT
Start: 2023-01-01 | End: 2023-01-01

## 2023-01-01 RX ORDER — LANOLIN ALCOHOL/MO/W.PET/CERES
3 CREAM (GRAM) TOPICAL AT BEDTIME
Refills: 0 | Status: DISCONTINUED | OUTPATIENT
Start: 2023-01-01 | End: 2023-01-01

## 2023-01-01 RX ORDER — DONEPEZIL HYDROCHLORIDE 10 MG/1
1 TABLET, FILM COATED ORAL
Qty: 0 | Refills: 0 | DISCHARGE

## 2023-01-01 RX ORDER — SERTRALINE 25 MG/1
1 TABLET, FILM COATED ORAL
Qty: 0 | Refills: 0 | DISCHARGE

## 2023-01-01 RX ORDER — ACETAMINOPHEN 500 MG
650 TABLET ORAL EVERY 6 HOURS
Refills: 0 | Status: DISCONTINUED | OUTPATIENT
Start: 2023-01-01 | End: 2023-01-01

## 2023-01-01 RX ORDER — LIDOCAINE 4 G/100G
1 CREAM TOPICAL
Qty: 0 | Refills: 0 | DISCHARGE

## 2023-01-01 RX ORDER — METOPROLOL TARTRATE 50 MG
1 TABLET ORAL
Qty: 0 | Refills: 0 | DISCHARGE

## 2023-01-01 RX ORDER — AZITHROMYCIN 500 MG/1
TABLET, FILM COATED ORAL
Refills: 0 | Status: DISCONTINUED | OUTPATIENT
Start: 2023-01-01 | End: 2023-01-01

## 2023-01-01 RX ORDER — MAGNESIUM HYDROXIDE 400 MG/1
30 TABLET, CHEWABLE ORAL
Qty: 0 | Refills: 0 | DISCHARGE

## 2023-01-01 RX ORDER — LATANOPROST 0.05 MG/ML
1 SOLUTION/ DROPS OPHTHALMIC; TOPICAL
Qty: 0 | Refills: 0 | DISCHARGE

## 2023-01-01 RX ORDER — POTASSIUM CHLORIDE 20 MEQ
1 PACKET (EA) ORAL
Qty: 0 | Refills: 0 | DISCHARGE

## 2023-01-01 RX ORDER — DORZOLAMIDE HYDROCHLORIDE TIMOLOL MALEATE 20; 5 MG/ML; MG/ML
1 SOLUTION/ DROPS OPHTHALMIC
Qty: 0 | Refills: 0 | DISCHARGE

## 2023-01-01 RX ORDER — PIPERACILLIN AND TAZOBACTAM 4; .5 G/20ML; G/20ML
3.38 INJECTION, POWDER, LYOPHILIZED, FOR SOLUTION INTRAVENOUS ONCE
Refills: 0 | Status: COMPLETED | OUTPATIENT
Start: 2023-01-01 | End: 2023-01-01

## 2023-01-01 RX ORDER — BACLOFEN 100 %
1 POWDER (GRAM) MISCELLANEOUS
Qty: 0 | Refills: 0 | DISCHARGE

## 2023-01-01 RX ORDER — MULTIVIT-MIN/FERROUS GLUCONATE 9 MG/15 ML
1 LIQUID (ML) ORAL
Qty: 0 | Refills: 0 | DISCHARGE

## 2023-01-01 RX ORDER — AMIODARONE HYDROCHLORIDE 400 MG/1
1 TABLET ORAL
Qty: 0 | Refills: 0 | DISCHARGE

## 2023-01-01 RX ORDER — PANTOPRAZOLE SODIUM 20 MG/1
40 TABLET, DELAYED RELEASE ORAL DAILY
Refills: 0 | Status: COMPLETED | OUTPATIENT
Start: 2023-01-01 | End: 2023-01-01

## 2023-01-01 RX ORDER — ACETAMINOPHEN 500 MG
650 TABLET ORAL ONCE
Refills: 0 | Status: COMPLETED | OUTPATIENT
Start: 2023-01-01 | End: 2023-01-01

## 2023-01-01 RX ORDER — CLONAZEPAM 1 MG
1 TABLET ORAL
Qty: 0 | Refills: 0 | DISCHARGE

## 2023-01-01 RX ORDER — ONDANSETRON 8 MG/1
1 TABLET, FILM COATED ORAL
Qty: 0 | Refills: 0 | DISCHARGE

## 2023-01-01 RX ORDER — METOCLOPRAMIDE HCL 10 MG
1 TABLET ORAL
Qty: 0 | Refills: 0 | DISCHARGE

## 2023-01-01 RX ORDER — APIXABAN 2.5 MG/1
1 TABLET, FILM COATED ORAL
Qty: 0 | Refills: 0 | DISCHARGE

## 2023-01-01 RX ORDER — ALENDRONATE SODIUM 70 MG/1
1 TABLET ORAL
Qty: 0 | Refills: 0 | DISCHARGE

## 2023-01-01 RX ORDER — ASCORBIC ACID 60 MG
1 TABLET,CHEWABLE ORAL
Qty: 0 | Refills: 0 | DISCHARGE

## 2023-01-01 RX ORDER — GABAPENTIN 400 MG/1
2 CAPSULE ORAL
Qty: 0 | Refills: 0 | DISCHARGE

## 2023-01-01 RX ORDER — FUROSEMIDE 40 MG
1 TABLET ORAL
Qty: 0 | Refills: 0 | DISCHARGE

## 2023-01-01 RX ORDER — AMLODIPINE BESYLATE 2.5 MG/1
1 TABLET ORAL
Qty: 0 | Refills: 0 | DISCHARGE

## 2023-01-01 RX ORDER — HYDROMORPHONE HYDROCHLORIDE 2 MG/ML
0.25 INJECTION INTRAMUSCULAR; INTRAVENOUS; SUBCUTANEOUS EVERY 6 HOURS
Refills: 0 | Status: DISCONTINUED | OUTPATIENT
Start: 2023-01-01 | End: 2023-01-01

## 2023-01-01 RX ORDER — ONDANSETRON 8 MG/1
4 TABLET, FILM COATED ORAL EVERY 8 HOURS
Refills: 0 | Status: DISCONTINUED | OUTPATIENT
Start: 2023-01-01 | End: 2023-01-01

## 2023-01-01 RX ORDER — LACTULOSE 10 G/15ML
30 SOLUTION ORAL
Qty: 0 | Refills: 0 | DISCHARGE

## 2023-01-01 RX ORDER — HYDROMORPHONE HYDROCHLORIDE 2 MG/ML
0.5 INJECTION INTRAMUSCULAR; INTRAVENOUS; SUBCUTANEOUS
Refills: 0 | Status: DISCONTINUED | OUTPATIENT
Start: 2023-01-01 | End: 2023-01-01

## 2023-01-01 RX ORDER — ROBINUL 0.2 MG/ML
0.2 INJECTION INTRAMUSCULAR; INTRAVENOUS EVERY 8 HOURS
Refills: 0 | Status: DISCONTINUED | OUTPATIENT
Start: 2023-01-01 | End: 2023-01-01

## 2023-01-01 RX ORDER — AZITHROMYCIN 500 MG/1
500 TABLET, FILM COATED ORAL EVERY 24 HOURS
Refills: 0 | Status: DISCONTINUED | OUTPATIENT
Start: 2023-01-01 | End: 2023-01-01

## 2023-01-01 RX ADMIN — HYDROMORPHONE HYDROCHLORIDE 0.25 MILLIGRAM(S): 2 INJECTION INTRAMUSCULAR; INTRAVENOUS; SUBCUTANEOUS at 00:48

## 2023-01-01 RX ADMIN — SODIUM CHLORIDE 2200 MILLILITER(S): 9 INJECTION INTRAMUSCULAR; INTRAVENOUS; SUBCUTANEOUS at 08:23

## 2023-01-01 RX ADMIN — PIPERACILLIN AND TAZOBACTAM 25 GRAM(S): 4; .5 INJECTION, POWDER, LYOPHILIZED, FOR SOLUTION INTRAVENOUS at 15:47

## 2023-01-01 RX ADMIN — AZITHROMYCIN 255 MILLIGRAM(S): 500 TABLET, FILM COATED ORAL at 11:30

## 2023-01-01 RX ADMIN — HYDROMORPHONE HYDROCHLORIDE 0.25 MILLIGRAM(S): 2 INJECTION INTRAMUSCULAR; INTRAVENOUS; SUBCUTANEOUS at 11:57

## 2023-01-01 RX ADMIN — HYDROMORPHONE HYDROCHLORIDE 0.25 MILLIGRAM(S): 2 INJECTION INTRAMUSCULAR; INTRAVENOUS; SUBCUTANEOUS at 11:05

## 2023-01-01 RX ADMIN — HYDROMORPHONE HYDROCHLORIDE 0.25 MILLIGRAM(S): 2 INJECTION INTRAMUSCULAR; INTRAVENOUS; SUBCUTANEOUS at 06:54

## 2023-01-01 RX ADMIN — Medication 1 MILLIGRAM(S): at 17:46

## 2023-01-01 RX ADMIN — HYDROMORPHONE HYDROCHLORIDE 0.25 MILLIGRAM(S): 2 INJECTION INTRAMUSCULAR; INTRAVENOUS; SUBCUTANEOUS at 10:50

## 2023-01-01 RX ADMIN — ROBINUL 0.2 MILLIGRAM(S): 0.2 INJECTION INTRAMUSCULAR; INTRAVENOUS at 15:28

## 2023-01-01 RX ADMIN — HYDROMORPHONE HYDROCHLORIDE 0.25 MILLIGRAM(S): 2 INJECTION INTRAMUSCULAR; INTRAVENOUS; SUBCUTANEOUS at 23:40

## 2023-01-01 RX ADMIN — HYDROMORPHONE HYDROCHLORIDE 0.5 MILLIGRAM(S): 2 INJECTION INTRAMUSCULAR; INTRAVENOUS; SUBCUTANEOUS at 13:02

## 2023-01-01 RX ADMIN — HYDROMORPHONE HYDROCHLORIDE 0.25 MILLIGRAM(S): 2 INJECTION INTRAMUSCULAR; INTRAVENOUS; SUBCUTANEOUS at 05:54

## 2023-01-01 RX ADMIN — Medication 650 MILLIGRAM(S): at 08:23

## 2023-01-01 RX ADMIN — PANTOPRAZOLE SODIUM 40 MILLIGRAM(S): 20 TABLET, DELAYED RELEASE ORAL at 11:56

## 2023-01-01 RX ADMIN — HYDROMORPHONE HYDROCHLORIDE 0.5 MILLIGRAM(S): 2 INJECTION INTRAMUSCULAR; INTRAVENOUS; SUBCUTANEOUS at 13:15

## 2023-01-01 RX ADMIN — HYDROMORPHONE HYDROCHLORIDE 0.2 MG/HR: 2 INJECTION INTRAMUSCULAR; INTRAVENOUS; SUBCUTANEOUS at 15:30

## 2023-01-01 RX ADMIN — AZITHROMYCIN 255 MILLIGRAM(S): 500 TABLET, FILM COATED ORAL at 11:20

## 2023-01-01 RX ADMIN — HYDROMORPHONE HYDROCHLORIDE 0.25 MILLIGRAM(S): 2 INJECTION INTRAMUSCULAR; INTRAVENOUS; SUBCUTANEOUS at 18:12

## 2023-01-01 RX ADMIN — PIPERACILLIN AND TAZOBACTAM 200 GRAM(S): 4; .5 INJECTION, POWDER, LYOPHILIZED, FOR SOLUTION INTRAVENOUS at 08:10

## 2023-01-01 RX ADMIN — Medication 650 MILLIGRAM(S): at 08:40

## 2023-01-01 RX ADMIN — HYDROMORPHONE HYDROCHLORIDE 0.25 MILLIGRAM(S): 2 INJECTION INTRAMUSCULAR; INTRAVENOUS; SUBCUTANEOUS at 12:15

## 2023-01-04 NOTE — ED PROVIDER NOTE - CLINICAL SUMMARY MEDICAL DECISION MAKING FREE TEXT BOX
pt with Acute respiratory failure currently on BiPAP with 100% oxygenation.  Patient is DNR/DNI, will pursue palliative/comfort care.

## 2023-01-04 NOTE — ED ADULT NURSE REASSESSMENT NOTE - NS ED NURSE REASSESS COMMENT FT1
1924:  awaiting for other son to arrive before d/c the bipap, at the request of the family.  monitor has been shut off, family at bedside.  patient appears to have dentures (possibly lower) in mouth, not fitting well.  once bipap is removed, family can determine if they want in / out.  denture case at bedside.  family also aware that medication is available as needed for comfort, consisting of Ativan and Dilaudid.  Family was asking what the chances were of the patient going up to the unit.  They were informed, that honestly, she may be down in the ed for the evening, depending upon how she handles coming off the bipap.  the family understand and comply with the treatment plan.  jayson jeong.

## 2023-01-04 NOTE — CHART NOTE - NSCHARTNOTEFT_GEN_A_CORE
Patient admitted earlier today  Repeat labs noted- hb continues to downtrend.  Based on GOC, family declining any aggressive or heroic interventions including transfusions and wish to pursue palliative measures.  Will limit blood draw for comfort.  GOC - full comfort measures.  Overall prognosis poor.  Will benefit form hospice care.

## 2023-01-04 NOTE — ED ADULT NURSE NOTE - SEPSIS REFERENCE DATA CRITERIA 2
Occupational Therapy   Treatment    Name: Reji Guerra  MRN: [id number]  Admitting Diagnosis:  Cellulitis of left lower extremity without foot       Recommendations:     Discharge Recommendations: home, home with home health  Discharge Equipment Recommendations:  bath bench, bedside commode  Barriers to discharge:  Inaccessible home environment    Subjective     Communicated with: RN prior to session.  Pain/Comfort:  · Pain Rating 1: 10/10  · Location - Side 1: Left  · Location - Orientation 1: lower  · Location 1: leg  · Pain Addressed 1: Other (see comments), Pre-medicate for activity (Bed activity only 2/2 pain)  · Pain Rating Post-Intervention 1: 10/10    Objective:     Patient found with: telemetry, peripheral IV    General Precautions: Standard, fall   Orthopedic Precautions:Full weight bearing   Braces: N/A     Occupational Performance:    Bed Mobility:    · Declined secondary to pain      Patient left HOB elevated with all lines intact, call button in reach and setup with lunch tray    [facility] 6 Click:  [facility] Total Score: 18    Treatment & Education:  Pt declined OOB secondary to pain 10/10 in LLE.  He was agreeable to bed therex.  Pt performed BUE AROM exercises 2 x 10 reps all jts/planes.  Pt tolerated well with no complaints.  Education:    Assessment:     Reji Guerra is a 64 y.o. male with a medical diagnosis of Cellulitis of left lower extremity without foot.  He presents with increased pain in LLE 10/10.  Performance deficits affecting function are weakness, impaired endurance, impaired self care skills, impaired functional mobilty, gait instability, impaired balance, decreased lower extremity function, decreased safety awareness, pain, impaired skin.  Pt with increased pain and anxiety with activity which hinders participation/tolerance of therapy. Continue OT services to address functional goals, progressing as able.      Rehab Prognosis:  good; patient would benefit from acute skilled OT services  to address these deficits and reach maximum level of function.       Plan:     Patient to be seen 5 x/week to address the above listed problems via self-care/home management, therapeutic exercises, therapeutic activities  · Plan of Care Expires: 01/15/18  · Plan of Care Reviewed with: patient    This Plan of care has been discussed with the patient who was involved in its development and understands and is in agreement with the identified goals and treatment plan    GOALS:    Occupational Therapy Goals        Problem: Occupational Therapy Goal    Goal Priority Disciplines Outcome Interventions   Occupational Therapy Goal     OT, PT/OT Ongoing (interventions implemented as appropriate)    Description:  Goals to be met by: 12/23/2017    Patient will increase functional independence with ADLs by performing:    LE Dressing with Supervision.  Grooming while standing at sink with Supervision.  Toileting from bedside commode with Supervision for hygiene and clothing management.   Supine to sit with Modified Dunn.  Toilet transfer to bedside commode with Supervision.                      Time Tracking:     OT Date of Treatment: 12/21/17  OT Start Time: 1205  OT Stop Time: 1224  OT Total Time (min): 19 min    Billable Minutes:Therapeutic Exercise 19    CARLO Akbar  12/21/2017     Abnormal Lactate: > 2

## 2023-01-04 NOTE — CONSULT NOTE ADULT - SUBJECTIVE AND OBJECTIVE BOX
[facility]  INFECTIOUS DISEASES   [address] [place] [address]  Tel: [phone]     Fax: [phone]  ========================================================  MD Hayes Valdez Kaushal, MD Cho, Michelle, MD Sunjit, Jaspal, MD  ========================================================    MRN-[id number]  DEANDRE GREWAL     CC: Shortness of breath   HPI:  89y/o woman with PMH of HTN, AS, history cardioversion, dementia with recent fall s/p L1 fracture, was admitted from Nursing home with fever and respiratory distress. On arrival her SPO2 was in 80s while on NRB so she was placed   on BiPAP with improvement in SPo2 to low 90s. She is awake and alert but unable to give history. She also had fever 102.7 in ED.  CXR showed RML opacity. With possible sepsis 2' to pneumonia zosyn was started and ID was called for further recommendations.     PAST MEDICAL & SURGICAL HISTORY:  Hypertension  Leg pain, bilateral  Hyperlipidemia  Hypovitaminosis D  Colonic polyp  Hiatal hernia  Aortic valve stenosis, etiology of cardiac valve disease unspecified  Hypertension  Glaucoma  Anxiety  GERD (gastroesophageal reflux disease)  Neuropathy  History of cardioversion    Social Hx: no current smoking, ETOH or drugs     FAMILY HISTORY:  Family history of diabetes mellitus (Child)    Allergies  No Known Allergies    Antibiotics:  MEDICATIONS  (STANDING):  piperacillin/tazobactam IVPB.. 3.375 Gram(s) IV Intermittent once     REVIEW OF SYSTEMS:  Unable to answer     Physical Exam:  Vital Signs Last 24 Hrs  T(C): 38.4 (2023 09:37), Max: 39.3 (2023 07:55)  T(F): 101.2 (2023 09:37), Max: 102.7 (2023 07:55)  HR: 71 (2023 09:37) (71 - 83)  BP: 94/51 (2023 09:37) (94/51 - 105/55)  RR: 20 (2023 09:37) (20 - 20)  SpO2: 94% (2023 09:37) (94% - 95%)  Parameters below as of 2023 09:37  Patient On (Oxygen Delivery Method): BiPAP/CPAP  Weight (kg): 63.5 ( @ 07:55)  GEN: NAD  HEENT: normocephalic and atraumatic. EOMI. PERRL.    NECK: Supple.  No lymphadenopathy   LUNGS: Coarse breath sounds bilaterally from anterior chest  HEART: Regular rate and rhythm   ABDOMEN: Soft, nontender, and nondistended.  Positive bowel sounds.    : No CVA tenderness  EXTREMITIES: Without edema.  NEUROLOGIC: unable   PSYCHIATRIC: Awake and alert  SKIN: No rash     Labs:      138  |  103  |  53<H>  ----------------------------<  143<H>  5.8<H>   |  22  |  2.10<H>    Ca    8.3<L>      2023 07:55    TPro  6.9  /  Alb  3.0<L>  /  TBili  0.5  /  DBili  x   /  AST  751<H>  /  ALT  449<H>  /  AlkPhos  64  04                        6.0    10.00 )-----------( 327      ( 2023 07:55 )             21.6     PT/INR - ( 2023 07:55 )   PT: 35.4 sec;   INR: 2.99 ratio    PTT - ( 2023 07:55 )  PTT:29.4 sec  Urinalysis Basic - ( 2023 07:55 )    Color: Yellow / Appearance: Slightly Turbid / S.025 / pH: x  Gluc: x / Ketone: Trace  / Bili: Negative / Urobili: Negative   Blood: x / Protein: 30 mg/dL / Nitrite: Negative   Leuk Esterase: Trace / RBC: 0-2 /HPF / WBC 3-5   Sq Epi: x / Non Sq Epi: Few / Bacteria: Moderate    LIVER FUNCTIONS - ( 2023 07:55 )  Alb: 3.0 g/dL / Pro: 6.9 g/dL / ALK PHOS: 64 U/L / ALT: 449 U/L / AST: 751 U/L / GGT: x           SARS-CoV-2 Result: NotDetec (23 @ 07:55)    All imaging and other data have been reviewed.  CXR is done, result pending, looks like RML opacity    Assessment and Plan:   89y/o woman with PMH of HTN, AS, history cardioversion, dementia with recent fall s/p L1 fracture, was admitted from Nursing home with fever and respiratory distress. On arrival her SPO2 was in 80s while on NRB so she was placed   on BiPAP with improvement in SPo2 to low 90s. She is awake and alert but unable to give history. She also had fever 102.7 in ED.  CXR showed RML opacity. With possible sepsis 2' to pneumonia zosyn was started and ID was called for further recommendations.     Recommendations:  - Blood culture x2  - MRSA PCR   - Legionella and Strep u ags  - Will monitor WBC and Tmax  - Will monitor LFTs, could be from sepsis, RUQ Ultrasound  - Will follow CXR result  - Can continue zosyn 3.375gm q8  - Will add azithromycin 500mg daily     Thank you for courtesy of this consult.     Will follow.  Discussed with the primary team.     Gina Bennett MD  Division of Infectious Diseases   Please call ID service at [phone] with any question.      55 minutes spent on total encounter assessing patient, examination, chart review, counseling and coordinating care by the attending physician/nurse/care manager.   [facility]  INFECTIOUS DISEASES   [address] [place] [address]  Tel: [phone]     Fax: [phone]  ========================================================  MD Hayes Valdez Kaushal, MD Cho, Michelle, MD Sunjit, Jaspal, MD  ========================================================    MRN-[id number]  DEANDRE GREWAL     CC: Shortness of breath   HPI:  89y/o woman with PMH of HTN, AS, history cardioversion, dementia with recent fall s/p L1 fracture, was admitted from Nursing home with fever and respiratory distress. On arrival her SPO2 was in 80s while on NRB so she was placed   on BiPAP with improvement in SPo2 to low 90s. She is awake and alert but unable to give history. She also had fever 102.7 in ED.  CXR showed RML opacity. With possible sepsis 2' to pneumonia zosyn was started and ID was called for further recommendations.     PAST MEDICAL & SURGICAL HISTORY:  Hypertension  Leg pain, bilateral  Hyperlipidemia  Hypovitaminosis D  Colonic polyp  Hiatal hernia  Aortic valve stenosis, etiology of cardiac valve disease unspecified  Hypertension  Glaucoma  Anxiety  GERD (gastroesophageal reflux disease)  Neuropathy  History of cardioversion    Social Hx: no current smoking, ETOH or drugs     FAMILY HISTORY:  Family history of diabetes mellitus (Child)    Allergies  No Known Allergies    Antibiotics:  MEDICATIONS  (STANDING):  piperacillin/tazobactam IVPB.. 3.375 Gram(s) IV Intermittent once     REVIEW OF SYSTEMS:  Unable to answer     Physical Exam:  Vital Signs Last 24 Hrs  T(C): 38.4 (2023 09:37), Max: 39.3 (2023 07:55)  T(F): 101.2 (2023 09:37), Max: 102.7 (2023 07:55)  HR: 71 (2023 09:37) (71 - 83)  BP: 94/51 (2023 09:37) (94/51 - 105/55)  RR: 20 (2023 09:37) (20 - 20)  SpO2: 94% (2023 09:37) (94% - 95%)  Parameters below as of 2023 09:37  Patient On (Oxygen Delivery Method): BiPAP/CPAP  Weight (kg): 63.5 ( @ 07:55)  GEN: NAD  HEENT: normocephalic and atraumatic. EOMI. PERRL.    NECK: Supple.  No lymphadenopathy   LUNGS: Coarse breath sounds bilaterally from anterior chest  HEART: Regular rate and rhythm   ABDOMEN: Soft, nontender, and nondistended.  Positive bowel sounds.    : No CVA tenderness  EXTREMITIES: Without edema.  NEUROLOGIC: unable   PSYCHIATRIC: Awake and alert  SKIN: No rash     Labs:      138  |  103  |  53<H>  ----------------------------<  143<H>  5.8<H>   |  22  |  2.10<H>    Ca    8.3<L>      2023 07:55    TPro  6.9  /  Alb  3.0<L>  /  TBili  0.5  /  DBili  x   /  AST  751<H>  /  ALT  449<H>  /  AlkPhos  64  04                        6.0    10.00 )-----------( 327      ( 2023 07:55 )             21.6     PT/INR - ( 2023 07:55 )   PT: 35.4 sec;   INR: 2.99 ratio    PTT - ( 2023 07:55 )  PTT:29.4 sec  Urinalysis Basic - ( 2023 07:55 )    Color: Yellow / Appearance: Slightly Turbid / S.025 / pH: x  Gluc: x / Ketone: Trace  / Bili: Negative / Urobili: Negative   Blood: x / Protein: 30 mg/dL / Nitrite: Negative   Leuk Esterase: Trace / RBC: 0-2 /HPF / WBC 3-5   Sq Epi: x / Non Sq Epi: Few / Bacteria: Moderate    LIVER FUNCTIONS - ( 2023 07:55 )  Alb: 3.0 g/dL / Pro: 6.9 g/dL / ALK PHOS: 64 U/L / ALT: 449 U/L / AST: 751 U/L / GGT: x           SARS-CoV-2 Result: NotDetec (23 @ 07:55)    All imaging and other data have been reviewed.  CXR is done, result pending, looks like RML opacity    Assessment and Plan:   89y/o woman with PMH of HTN, AS, history cardioversion, dementia with recent fall s/p L1 fracture, was admitted from Nursing home with fever and respiratory distress. On arrival her SPO2 was in 80s while on NRB so she was placed   on BiPAP with improvement in SPo2 to low 90s. She is awake and alert but unable to give history. She also had fever 102.7 in ED.  CXR showed RML opacity. With possible sepsis 2' to pneumonia zosyn was started and ID was called for further recommendations.   Aspiration is a possibility as well.     Recommendations:  - Blood culture x2  - MRSA PCR   - Legionella and Strep u ags  - Will monitor WBC and Tmax  - Will monitor LFTs, could be from sepsis, RUQ Ultrasound  - Will follow CXR result  - Aspiration precautions and S&S eval when stable   - Can continue zosyn 3.375gm q8  - Will add azithromycin 500mg daily     Thank you for courtesy of this consult.     Will follow.  Discussed with the primary team.     Gina Bennett MD  Division of Infectious Diseases   Please call ID service at [phone] with any question.      55 minutes spent on total encounter assessing patient, examination, chart review, counseling and coordinating care by the attending physician/nurse/care manager.   [facility]  INFECTIOUS DISEASES   [address] [place] [address]  Tel: [phone]     Fax: [phone]  ========================================================  MD Hayes Valdez Kaushal, MD Cho, Michelle, MD Sunjit, Jaspal, MD  ========================================================    MRN-[id number]  DEANDRE GREWAL     CC: Shortness of breath   HPI:  89y/o woman with PMH of HTN, AS, history cardioversion, dementia with recent fall s/p L1 fracture, was admitted from Nursing home with fever and respiratory distress. On arrival her SPO2 was in 80s while on NRB so she was placed   on BiPAP with improvement in SPo2 to low 90s. She is awake and alert but unable to give history. She also had fever 102.7 in ED.  CXR showed RML opacity. With possible sepsis 2' to pneumonia zosyn was started and ID was called for further recommendations.     PAST MEDICAL & SURGICAL HISTORY:  Hypertension  Leg pain, bilateral  Hyperlipidemia  Hypovitaminosis D  Colonic polyp  Hiatal hernia  Aortic valve stenosis, etiology of cardiac valve disease unspecified  Hypertension  Glaucoma  Anxiety  GERD (gastroesophageal reflux disease)  Neuropathy  History of cardioversion    Social Hx: no current smoking, ETOH or drugs     FAMILY HISTORY:  Family history of diabetes mellitus (Child)    Allergies  No Known Allergies    Antibiotics:  MEDICATIONS  (STANDING):  piperacillin/tazobactam IVPB.. 3.375 Gram(s) IV Intermittent once     REVIEW OF SYSTEMS:  Unable to answer     Physical Exam:  Vital Signs Last 24 Hrs  T(C): 38.4 (2023 09:37), Max: 39.3 (2023 07:55)  T(F): 101.2 (2023 09:37), Max: 102.7 (2023 07:55)  HR: 71 (2023 09:37) (71 - 83)  BP: 94/51 (2023 09:37) (94/51 - 105/55)  RR: 20 (2023 09:37) (20 - 20)  SpO2: 94% (2023 09:37) (94% - 95%)  Parameters below as of 2023 09:37  Patient On (Oxygen Delivery Method): BiPAP/CPAP  Weight (kg): 63.5 ( @ 07:55)  GEN: NAD  HEENT: normocephalic and atraumatic. EOMI. PERRL.    NECK: Supple.  No lymphadenopathy   LUNGS: Coarse breath sounds bilaterally from anterior chest  HEART: Regular rate and rhythm   ABDOMEN: Soft, nontender, and nondistended.  Positive bowel sounds.    : No CVA tenderness  EXTREMITIES: Without edema.  NEUROLOGIC: unable   PSYCHIATRIC: Awake and alert  SKIN: No rash     Labs:      138  |  103  |  53<H>  ----------------------------<  143<H>  5.8<H>   |  22  |  2.10<H>    Ca    8.3<L>      2023 07:55    TPro  6.9  /  Alb  3.0<L>  /  TBili  0.5  /  DBili  x   /  AST  751<H>  /  ALT  449<H>  /  AlkPhos  64  04                        6.0    10.00 )-----------( 327      ( 2023 07:55 )             21.6     PT/INR - ( 2023 07:55 )   PT: 35.4 sec;   INR: 2.99 ratio    PTT - ( 2023 07:55 )  PTT:29.4 sec  Urinalysis Basic - ( 2023 07:55 )    Color: Yellow / Appearance: Slightly Turbid / S.025 / pH: x  Gluc: x / Ketone: Trace  / Bili: Negative / Urobili: Negative   Blood: x / Protein: 30 mg/dL / Nitrite: Negative   Leuk Esterase: Trace / RBC: 0-2 /HPF / WBC 3-5   Sq Epi: x / Non Sq Epi: Few / Bacteria: Moderate    LIVER FUNCTIONS - ( 2023 07:55 )  Alb: 3.0 g/dL / Pro: 6.9 g/dL / ALK PHOS: 64 U/L / ALT: 449 U/L / AST: 751 U/L / GGT: x           SARS-CoV-2 Result: NotDetec (23 @ 07:55)    All imaging and other data have been reviewed.  CXR is done, result pending, looks like RML opacity    Assessment and Plan:   89y/o woman with PMH of HTN, AS, history cardioversion, dementia with recent fall s/p L1 fracture, was admitted from Nursing home with fever and respiratory distress. On arrival her SPO2 was in 80s while on NRB so she was placed   on BiPAP with improvement in SPo2 to low 90s. She is awake and alert but unable to give history. She also had fever 102.7 in ED.  CXR showed RML opacity. With possible sepsis 2' to pneumonia zosyn was started and ID was called for further recommendations.   Aspiration is a possibility as well. Clinically not suspecting cholecystitis or cholangitis but due to high LFTs, will cover abdominal beena as well, until work up is done.      Recommendations:  - Blood culture x2  - MRSA PCR   - Legionella and Strep u ags  - Will monitor WBC and Tmax  - Will monitor LFTs, could be from sepsis, RUQ Ultrasound  - Will follow CXR result  - Aspiration precautions and S&S eval when stable   - Can continue zosyn 3.375gm q8  - Will add azithromycin 500mg daily     Thank you for courtesy of this consult.     Will follow.  Discussed with the primary team.     Gina Bennett MD  Division of Infectious Diseases   Please call ID service at [phone] with any question.      55 minutes spent on total encounter assessing patient, examination, chart review, counseling and coordinating care by the attending physician/nurse/care manager.

## 2023-01-04 NOTE — ED ADULT NURSE REASSESSMENT NOTE - NS ED NURSE REASSESS COMMENT FT1
1741:  there is an order to d/c bipap, comfort measures only.  I explained this to the patients family, and the dil would like to speak to spouse (patients son and his brother) before stopping the bipap.  We did, however, turn off the monitor as it was consistently going off for low oxygenation.  DIL and granddaughter at bedside.  safety maintained.  will continue to monitor.  jayson jeong.

## 2023-01-04 NOTE — H&P ADULT - PROBLEM SELECTOR PLAN 3
DVT prophylaxis with Suspected JASON on CKD likely ATN   Monitor renal functions.   Avoid nephrotoxic agents as much as possible including  NSAIDs, diuretics, ACE inhibitors, ARBs and IV contrast.

## 2023-01-04 NOTE — CONSULT NOTE ADULT - ASSESSMENT
focus on quality of life & reduce suffering
89y Female with resp distress - from SNF -     resp distress  Valv heart disease  CHF  OP  OA  Neuropathy  AS  AFIB  JASON -   Anemia -     on NIV for resp distress - eval for CHF - PNA, ASP PNA -   emp ABX - cx - biomarkers - MRSA screen -   pt has known AF and Severe AS, see TTE from prior records  new Anemia - Hgb 6 on admission, new JASON - Cr 2.1 on admission  PRBC  pulse ox - card tele monitoring  I and O  CXR noted - may need CT chest  MOLST signed - updated - pt is DNR DNI - limited interventions - no PEG -

## 2023-01-04 NOTE — CONSULT NOTE ADULT - SUBJECTIVE AND OBJECTIVE BOX
HPI:  This is a 89y/o woman with PMH of HTN, AS, history cardioversion, dementia with recent fall s/p L1 fracture, who presented to the ED from [facility] with fever and respiratory distress. On arrival to the ED,she was noted with fever 102.7 and also her SPO2 was in 80s despite NRM. She was placed on BiPAP with improvement in SPO2 to low 90s. She is awake and follows minimal commands but unable to give history due to dementia.   CXR showed RML opacity concerning for PNA. She was started on broad spectrum antibiotics with Zosyn.        (2023 10:28)    PERTINENT PM/SXH:   Hypertension    Glaucoma    Leg pain, bilateral    Hyperlipidemia    Hypovitaminosis D    Colonic polyp    Hiatal hernia    GERD (gastroesophageal reflux disease)    Atrial fibrillation, unspecified type    Aortic valve stenosis, etiology of cardiac valve disease unspecified    Atrial fibrillation    Hypertension    Glaucoma    Anxiety    GERD (gastroesophageal reflux disease)    Neuropathy      Abnormal colonoscopy    Abnormal endoscopy finding    History of cardioversion    No significant past surgical history      FAMILY HISTORY:  Family history of diabetes mellitus (Child)      ITEMS NOT CHECKED ARE NOT PRESENT    SOCIAL HISTORY:   Significant other/partner[ ]  Children[ ]  [employer]/Spirituality:  Substance hx:  [ ]   Tobacco hx:  [ ]   Alcohol hx: [ ]   Home Opioid hx:  [ ] I-Stop Reference No:  Living Situation: [ ]Home  [ ]Long term care  [ ]Rehab [ ]Other    ADVANCE DIRECTIVES:    DNR  MOLST  [ ]  Living Will  [ ]   DECISION MAKER(s):  [ ] Health Care Proxy(s)  [ ] Surrogate(s)  [ ] Guardian           Name(s): Phone Number(s):    BASELINE (I)ADL(s) (prior to admission):  [place]: [ ]Total  [ ] Moderate [ ]Dependent    Allergies    No Known Allergies    Intolerances    MEDICATIONS  (STANDING):  azithromycin  IVPB      piperacillin/tazobactam IVPB.. 3.375 Gram(s) IV Intermittent once    MEDICATIONS  (PRN):  acetaminophen     Tablet .. 650 milliGRAM(s) Oral every 6 hours PRN Temp greater or equal to 38C (100.4F), Mild Pain (1 - 3)  aluminum hydroxide/magnesium hydroxide/simethicone Suspension 30 milliLiter(s) Oral every 4 hours PRN Dyspepsia  melatonin 3 milliGRAM(s) Oral at bedtime PRN Insomnia  ondansetron Injectable 4 milliGRAM(s) IV Push every 8 hours PRN Nausea and/or Vomiting    PRESENT SYMPTOMS: [ ]Unable to obtain due to poor mentation   Source if other than patient:  [ ]Family   [ ]Team     Pain: [ ]yes [ ]no  QOL impact -   Location -                    Aggravating factors -  Quality -  Radiation -  Timing-  Severity (0-10 scale):  Minimal acceptable level (0-10 scale):     CPOT:    https://www.[facility].org/getattachment/[email or web address]/Critical-Care-Pain-Observation-Tool-(CPOT)      PAIN AD Score:     http://geriatrictoolkit.[facility]/cog/painad.pdf (press ctrl +  left click to view)    Dyspnea:                           [ ]Mild [ ]Moderate [ ]Severe  Anxiety:                             [ ]Mild [ ]Moderate [ ]Severe  Fatigue:                             [ ]Mild [ ]Moderate [ ]Severe  Nausea:                             [ ]Mild [ ]Moderate [ ]Severe  Loss of appetite:              [ ]Mild [ ]Moderate [ ]Severe  Constipation:                    [ ]Mild [ ]Moderate [ ]Severe    Other Symptoms:  [ ]All other review of systems negative     Palliative Performance Status Version 2:         %    http://npcrc.org/files/news/palliative_performance_scale_ppsv2.pdf  PHYSICAL EXAM:  Vital Signs Last 24 Hrs  T(C): 37.6 (2023 12:57), Max: 39.3 (2023 07:55)  T(F): 99.6 (2023 12:57), Max: 102.7 (2023 07:55)  HR: 120 (2023 14:30) (71 - 120)  BP: 98/51 (2023 12:57) (94/51 - 117/58)  BP(mean): --  RR: 26 (2023 14:30) (20 - 26)  SpO2: 86% (2023 14:30) (68% - 99%)    Parameters below as of 2023 14:30  Patient On (Oxygen Delivery Method): BiPAP/CPAP     I&O's Summary    GENERAL:  [ ]Alert  [ ]Oriented x   [ ]Lethargic  [ ]Cachexia  [ ]Unarousable  [ ]Verbal  [ ]Non-Verbal  Behavioral:   [ ] Anxiety  [ ] Delirium [ ] Agitation [ ] Other  HEENT:  [ ]Normal   [ ]Dry mouth   [ ]ET Tube/Trach  [ ]Oral lesions  PULMONARY:   [ ]Clear [ ]Tachypnea  [ ]Audible excessive secretions   [ ]Rhonchi        [ ]Right [ ]Left [ ]Bilateral  [ ]Crackles        [ ]Right [ ]Left [ ]Bilateral  [ ]Wheezing     [ ]Right [ ]Left [ ]Bilateral  [ ]Diminished breath sounds [ ]right [ ]left [ ]bilateral  CARDIOVASCULAR:    [ ]Regular [ ]Irregular [ ]Tachy  [ ]Cristian [ ]Murmur [ ]Other  GASTROINTESTINAL:  [ ]Soft  [ ]Distended   [ ]+BS  [ ]Non tender [ ]Tender  [ ]PEG [ ]OGT/ NGT  Last BM:   GENITOURINARY:  [ ]Normal [ ] Incontinent   [ ]Oliguria/Anuria   [ ]Palma  MUSCULOSKELETAL:   [ ]Normal   [ ]Weakness  [ ]Bed/Wheelchair bound [ ]Edema  NEUROLOGIC:   [ ]No focal deficits  [ ]Cognitive impairment  [ ]Dysphagia [ ]Dysarthria [ ]Paresis [ ]Other   SKIN:   [ ]Normal    [ ]Rash  [ ]Pressure ulcer(s)       Present on admission [ ]y [ ]n    CRITICAL CARE:  [ ] Shock Present  [ ]Septic [ ]Cardiogenic [ ]Neurologic [ ]Hypovolemic  [ ]  Vasopressors [ ]  Inotropes   [ ]Respiratory failure present [ ]Mechanical ventilation [ ]Non-invasive ventilatory support [ ]High flow    [ ]Acute  [ ]Chronic [ ]Hypoxic  [ ]Hypercarbic [ ]Other  [ ]Other organ failure     LABS:                        6.0    10.00 )-----------( 327      ( 2023 07:55 )             21.6   01-04    138  |  103  |  53<H>  ----------------------------<  143<H>  5.8<H>   |  22  |  2.10<H>    Ca    8.3<L>      2023 07:55    TPro  6.9  /  Alb  3.0<L>  /  TBili  0.5  /  DBili  x   /  AST  751<H>  /  ALT  449<H>  /  AlkPhos  64  01-04  PT/INR - ( 2023 07:55 )   PT: 35.4 sec;   INR: 2.99 ratio         PTT - ( 2023 07:55 )  PTT:29.4 sec    Urinalysis Basic - ( 2023 07:55 )    Color: Yellow / Appearance: Slightly Turbid / S.025 / pH: x  Gluc: x / Ketone: Trace  / Bili: Negative / Urobili: Negative   Blood: x / Protein: 30 mg/dL / Nitrite: Negative   Leuk Esterase: Trace / RBC: 0-2 /HPF / WBC 3-5   Sq Epi: x / Non Sq Epi: Few / Bacteria: Moderate      RADIOLOGY & ADDITIONAL STUDIES:    PROTEIN CALORIE MALNUTRITION PRESENT: [ ]mild [ ]moderate [ ]severe [ ]underweight [ ]morbid obesity  https://www.andeal.org/vault/2440/web/files/ONC/Table_Clinical%20Characteristics%20to%20Document%20Malnutrition-White%20JV%20et%20al%2020.pdf      Weight (kg): 63.5 (23 @ 07:55)    [ ]PPSV2 < or = to 30% [ ]significant weight loss  [ ]poor nutritional intake  [ ]anasarca      [ ]Artificial Nutrition      REFERRALS:   [ ]Chaplaincy  [ ]Hospice  [ ]Child Life  [ ]Social Work  [ ]Case management [ ]Holistic Therapy     Goals of Care Document:

## 2023-01-04 NOTE — ED ADULT NURSE REASSESSMENT NOTE - NS ED NURSE REASSESS COMMENT FT1
1310:  called Betzy SALDANA and left message regarding oxygenation.  Called respiratory as well.  Spoke to son Yonathan at length regarding DNR / DNI and her poor oxygenation level.  Per son, patient definitely does not want to be intubated.  comfort measures provided.  will continue to monitor.  jayson jeong.

## 2023-01-04 NOTE — H&P ADULT - PROBLEM SELECTOR PLAN 1
Requiring non invasive ventilation due to acute hypoxic respiratory failure  Check ABG  Bronchodilators as needed  Pulm consult

## 2023-01-04 NOTE — CONSULT NOTE ADULT - SUBJECTIVE AND OBJECTIVE BOX
Date/Time Patient Seen:  		  Referring MD:   Data Reviewed	       Patient is a 89y old  Female who presents with a chief complaint of     Subjective/HPI   · Chief Complaint: The patient is a 89y Female complaining of  · HPI Objective Statement: Patient brought in from acute rehab facility for fever weakness and respiratory distress.  As per EMS patient was on a nonrebreather upon arrival and was saturating at mid 80s.  Patient was converted to EMS CPAP and sats improved to low 90s.  Patient found to have a fever of 102 rectal here in the emergency department.  Patient unable to give any history.  Patient with history of dementia and recently had a fall sustaining L1 vertebral body fracture which is why she was at acute rehab.  Patient has advanced directives stating DNR/DNI.    PAST MEDICAL & SURGICAL HISTORY:  Hypertension    Glaucoma    Leg pain, bilateral    Hyperlipidemia    Hypovitaminosis D    Colonic polyp    Hiatal hernia    GERD (gastroesophageal reflux disease)    Atrial fibrillation, unspecified type    Aortic valve stenosis, etiology of cardiac valve disease unspecified    Atrial fibrillation    Hypertension    Glaucoma    Anxiety    GERD (gastroesophageal reflux disease)    Neuropathy    Abnormal colonoscopy    Abnormal endoscopy finding    History of cardioversion    No significant past surgical history    PAST SURGICAL HISTORY:  History of cardioversion.     FAMILY HISTORY:  Child  Still living? Unknown  Family history of diabetes mellitus, Age at diagnosis: Age Unknown.     Tobacco Usage:  · Tobacco Usage	Unknown if ever smoked    ALLERGIES AND HOME MEDICATIONS:   Allergies:        Allergies:  	No Known Allergies:     Home Medications:   * Patient Currently Takes Medications as of 07-Nov-2021 13:22 documented in Structured Notes  · 	cholecalciferol oral tablet: 1000 unit(s) orally once a day  · 	calcium carbonate 1250 mg (500 mg elemental calcium) oral tablet: 1 tab(s) orally once a day  · 	senna oral tablet: 2 tab(s) orally once a day (at bedtime)  · 	polyethylene glycol 3350 oral powder for reconstitution: 17 gram(s) orally once a day  · 	metoprolol tartrate 100 mg oral tablet: 1 tab(s) orally 2 times a day  · 	clonazePAM 1 mg oral tablet: 1 tab(s) orally once a day (at bedtime)  · 	sertraline 25 mg oral tablet: 5 tab(s) orally once a day  · 	gabapentin 100 mg oral capsule: 1 cap(s) orally once a day (at bedtime)  · 	apixaban 5 mg oral tablet: 1 tab(s) orally 2 times a day  · 	amiodarone 200 mg oral tablet: 0.5 tab(s) orally once a day  · 	acetaminophen 325 mg oral tablet: 2 tab(s) orally every 6 hours, As needed, Mild Pain (1 - 3), Moderate Pain (4 - 6)  · 	Cosopt 2.23%-0.68% ophthalmic solution: 1 drop(s) to each affected eye 2 times a day  · 	latanoprost 0.005% ophthalmic solution: 1 drop(s) to each affected eye once a day (in the evening)    REVIEW OF SYSTEMS:    Review of Systems:  · Review of Systems: Except as otherwise indicated in HPI:  	CONSTITUTIONAL: Neg  	HEENT: neg  	CV: neg  	Resp: neg  	GI: Neg  	: Neg  	SKIN: Neg  	NEURO: Neg  	PSYCHIATRIC: Neg  Heme/Onc: Neg        Medication list         MEDICATIONS  (STANDING):  piperacillin/tazobactam IVPB.. 3.375 Gram(s) IV Intermittent once    MEDICATIONS  (PRN):         Vitals log        ICU Vital Signs Last 24 Hrs  T(C): 39.3 (04 Jan 2023 07:55), Max: 39.3 (04 Jan 2023 07:55)  T(F): 102.7 (04 Jan 2023 07:55), Max: 102.7 (04 Jan 2023 07:55)  HR: 79 (04 Jan 2023 08:31) (79 - 83)  BP: 105/55 (04 Jan 2023 08:31) (105/55 - 105/55)  BP(mean): --  ABP: --  ABP(mean): --  RR: 20 (04 Jan 2023 08:31) (20 - 20)  SpO2: 95% (04 Jan 2023 08:31) (95% - 95%)    O2 Parameters below as of 04 Jan 2023 08:31  Patient On (Oxygen Delivery Method): BiPAP/CPAP  O2 Flow (L/min): 10               Input and Output:  I&O's Detail      Lab Data                        6.0    10.00 )-----------( 327      ( 04 Jan 2023 07:55 )             21.6     01-04    138  |  103  |  53<H>  ----------------------------<  143<H>  5.8<H>   |  22  |  2.10<H>    Ca    8.3<L>      04 Jan 2023 07:55    TPro  6.9  /  Alb  3.0<L>  /  TBili  0.5  /  DBili  x   /  AST  751<H>  /  ALT  449<H>  /  AlkPhos  64  01-04            Review of Systems	  frail  weak  resp distress      Objective     Physical Examination    heart s1s2  lung dec BS  head nc  on NIV    Pertinent Lab findings & Imaging      Palma:  NO   Adequate UO     I&O's Detail           Discussed with:     Cultures:	        Radiology      EXAM:  CT CHEST                          EXAM:  CT ABDOMEN AND PELVIS                            PROCEDURE DATE:  11/02/2021            INTERPRETATION:  CLINICAL INFORMATION: Status post fall on Eliquis. Lower back pain. Evaluate for retroperitoneal bleed.    COMPARISON: Prior chest CT dated 4/10/2018 and prior chest, abdomen, and pelvis CT dated 1/5/2017.    CONTRAST/COMPLICATIONS:  IV Contrast: NONE  Oral Contrast: None  Complications: None reported at time of study completion    PROCEDURE:  CT of the Chest, Abdomen and Pelvis was performed.  Sagittal and coronal reformats were performed.    Evaluation of the solid visceral organs is limited without intravenous contrast and secondary to streak artifact.    FINDINGS:  CHEST:  LUNGS AND LARGE AIRWAYS: Patent central airways. Patchy nonspecific groundglass opacities bilaterally.  PLEURA: No pleural effusion.  VESSELS: Atherosclerotic changes of the aorta and coronary arteries.  HEART: Cardiomegaly. No pericardial effusion.  MEDIASTINUM AND LA: No lymphadenopathy.  CHEST WALL AND LOWER NECK: Within normal limits.    ABDOMEN AND PELVIS:  LIVER: Within normal limits.  BILE DUCTS: Normal caliber.  GALLBLADDER: Within normal limits.  SPLEEN: Within normal limits.  PANCREAS: A 1.1 cm cystic lesion is noted in the pancreatic head (2:96).  ADRENALS: Within normal limits.  KIDNEYS/URETERS: No hydronephrosis. Right renal cysts. A 3.2 cm soft tissue density lesion projects from the lower pole of the right kidney, increased in size since 1/5/2017 when it measured 2.2 cm.    BLADDER: Within normal limits.  REPRODUCTIVE ORGANS: Uterus and adnexa are within normal limits.    BOWEL: No bowel obstruction. Colonic diverticulosis. A small posterior gastric diverticulum is present.  PERITONEUM: No ascites.  VESSELS: Atherosclerotic changes.  RETROPERITONEUM/LYMPH NODES: No lymphadenopathy. No retroperitoneal hematoma.  ABDOMINAL WALL: Diastases of the rectus muscles.  BONES: Degenerative changes. Acute horizontal fracture of the superior endplate of the L1 vertebral body (602:83). Please refer to dedicated CT of the lumbar and thoracic spine.    IMPRESSION:  Limited study without intravenous contrast.    Acute horizontal fracture of the superior endplate of the L1 vertebral body. Please refer to dedicated CT of the thoracic and lumbar spine for further details.    A 3.2 cm soft tissue density lesion projecting from the lower pole of the right kidney has increased in size since 1/5/2017, concerning for neoplasm. Ultrasound or dedicated renal MRI with and without contrast is recommended for further evaluation.    A 1.1 cm cystic lesion is noted in the pancreatic head. This could be further evaluated with MRI as indicated.    Findings were discussed with MARY JANE LUNDBERG on  11/2/2021 at 1:58 PM by Dr. Márquez with read back confirmation.    --- End of Report ---                ENRIQUE MÁRQUEZ MD; Attending Radiologist  This document has been electronically signed. Nov 2 2021  2:01PM      Patient name: DEANDRE GREWAL  YOB: 1931   Age: 89 (F)   MR#: [id number]  Study Date: 11/3/2021  [facility]  Study quality: Technically good  Referring Physician: Preethi Wall MD  Blood Pressure: 148/76 mmHg  Height: 160 cm  Weight: 67 kg  BSA: 1.7 m2  Heart Rate: 67 mmHg  ------------------------------------------------------------------------  PROCEDURE: Transthoracic echocardiogram with 2-D, M-Mode  and complete spectral and color flow Doppler.  INDICATION: Nonrheumatic aortic (valve) stenosis (I35.0)  ------------------------------------------------------------------------  Dimensions:    Normal Values:  LA:     5.3    2.0 - 4.0 cm  Ao:     3.1    2.0 - 3.8 cm  SEPTUM: 1.5    0.6 - 1.2 cm  PWT:    1.2    0.6 - 1.1 cm  LVIDd:  5.4    3.0 - 5.6 cm  LVIDs:  3.2    1.8 - 4.0 cm  Derived variables:  LVMI: 183 g/m2  RWT: 0.44  Fractional short: 41 %  EF (Visual Estimate): 65-70 %  Doppler Peak Velocity (m/sec): AoV=3.5  ------------------------------------------------------------------------  Observations:  Mitral Valve: Mitral annular calcification and calcified  mitral leaflets with normal diastolic opening.  Mild-moderate mitral regurgitation.  Aortic Valve/Aorta: Calcified aortic valve with decreased  opening. Peak transaortic valve gradient equals 50 mm Hg,  mean transaortic valve gradient equals 31 mm Hg, estimated  aortic valve area equals 0.9 sqcm (by continuity equation),  aortic valve velocity time integral equals 87 cm,  consistent with severe aortic stenosis. Minimal aortic  regurgitation.  Peak left ventricular outflow tract  gradient equals 4 mm Hg, mean gradient is equal to 2 mm Hg,  LVOT velocity time integral equals 23 cm.  Aortic Root: 3.1 cm.  LVOT diameter: 2.1 cm.  Left Atrium: Normal left atrium.  Left Ventricle: Endocardium not well visualized; grossly  normal left ventricular systolic function. Moderate  concentric left ventricular hypertrophy. Mild diastolic  dysfunction (Stage I).  Right Heart: Normal right atrium. Normal right ventricular  size and function. Normal tricuspid valve. Mild tricuspid  regurgitation. Normal pulmonic valve. No pulmonic  regurgitation.  Pericardium/Pleura: Normal pericardium with no pericardial  effusion.  Hemodynamic: Estimated right atrial pressure is 8 mm Hg.  Estimated right ventricular systolic pressure equals 34 mm  Hg, assuming right atrial pressure equals 8 mm Hg,  consistent with normal pulmonary pressures.  ------------------------------------------------------------------------  Conclusions:  1. Mitral annular calcification and calcified mitral  leaflets with normal diastolic opening. Mild-moderate  mitral regurgitation.  2. Calcified aortic valve with decreased opening. Peak  transaortic valve gradient equals 50 mm Hg, mean  transaortic valve gradient equals 31 mm Hg, estimated  aortic valve area equals 0.9 sqcm (by continuity equation),  aortic valve velocity time integral equals 87 cm,  consistent with severe aortic stenosis. Minimal aortic  regurgitation.  3. Endocardium not well visualized; grossly normal left  ventricular systolic function.  4. Normal right ventricular size and function.  Exam terminated prior to the subcostal images being  obtained at the request of the patient.  *** Compared with echocardiogram of 4/17/2018, the  gradients measured across the aortic valve have increased.  The LV has increased in size.  ------------------------------------------------------------------------  Confirmed on  11/3/2021 - 16:56:27 by Oleg Fernández M.D.

## 2023-01-04 NOTE — ED PROVIDER NOTE - ADMIT DISPOSITION PRESENT ON ADMISSION SEPSIS Q1 - RE-EVALUATED PATIENT FLUID AND VITAL SIGNS
Subjective:      Patient ID: Royer Shepard is a 79 y.o. female. HPI pt is here for a 1 year follow up    Review of Systems  Patient Active Problem List   Diagnosis    Migraine headache with aura    Age-related osteoporosis without current pathological fracture    Allergic rhinitis    Osteoporosis, postmenopausal       Outpatient Medications Marked as Taking for the 5/5/21 encounter (Office Visit) with Cristóbal Richardson MD   Medication Sig Dispense Refill    calcium carbonate 600 MG TABS tablet Take 1 tablet by mouth daily      alendronate (FOSAMAX) 35 MG tablet Take 1 tablet by mouth every 7 days 4 tablet 11    risedronate (ACTONEL) 35 MG tablet Take 1 tablet by mouth once a week 4 tablet 11    albuterol sulfate HFA (VENTOLIN HFA) 108 (90 Base) MCG/ACT inhaler Inhale 2 puffs into the lungs 4 times daily as needed for Wheezing 3 Inhaler 1    conjugated estrogens (PREMARIN) vaginal cream Place  vaginally daily. Place vaginally daily.  Triamcinolone Acetonide (NASACORT AQ NA) by Nasal route as needed.  fexofenadine (ALLEGRA) 180 MG tablet Take 180 mg by mouth daily.          Allergies   Allergen Reactions    Acjeoceq-Tgvvqgl-Xpwkda [Fluocinolone] Itching    Ibuprofen     Nitrofuran Derivatives Itching    Sulfa Antibiotics        Social History     Tobacco Use    Smoking status: Never Smoker    Smokeless tobacco: Never Used   Substance Use Topics    Alcohol use: Yes     Comment: occasional alcohol use       BP (!) 170/68 (Site: Right Upper Arm, Position: Sitting, Cuff Size: Medium Adult)   Pulse 107   Temp 97.2 °F (36.2 °C) (Infrared)   Resp 12   Ht 5' 2.5\" (1.588 m)   Wt 131 lb 8 oz (59.6 kg)   SpO2 97%   BMI 23.67 kg/m²     Objective:   Physical Exam    Assessment:      ***      Plan:      *** Fluid bolus in progress

## 2023-01-04 NOTE — H&P ADULT - HISTORY OF PRESENT ILLNESS
This is a 89y/o F with [facility]  This is a 89y/o woman with PMH of HTN, AS, history cardioversion, dementia with recent fall s/p L1 fracture, who presented to the ED from [facility] with fever and respiratory distress. On arrival to the ED,she was noted with fever 102.7 and also her SPO2 was in 80s despite NRM. She was placed on BiPAP with improvement in SPO2 to low 90s. She is awake and follows minimal commands but unable to give history due to dementia.   CXR showed RML opacity concerning for PNA. She was started on broad spectrum antibiotics with Zosyn.        This is a 89y/o woman with PMH of HTN, AS, history cardioversion, dementia with recent fall s/p L1 fracture, who presented to the ED from [facility] with fever and respiratory distress. On arrival to the ED,she was noted with fever 102.7 and also her SPO2 was in 80s despite NRM. She was placed on BiPAP with improvement in SPO2 to low 90s. She is awake and follows minimal commands but unable to give history due to dementia.   CXR showed RML opacity concerning for PNA. She was started on broad spectrum antibiotics with Zosyn.     Additional history from son - patient has been in a SNF for several months with progressive decline.

## 2023-01-04 NOTE — ED ADULT NURSE REASSESSMENT NOTE - NS ED NURSE REASSESS COMMENT FT1
1040:  repeat lactate collected upon completion of fluids.  Per Dr. Raza, hang 500ml NS maintenance.  respiratory called, patient was at 60% and went down to 86%.  Put up to 90% and oxygenation is now at 98%.  will continue to monitor.  jayson jeong.

## 2023-01-04 NOTE — ED ADULT NURSE NOTE - ED STAT RN HANDOFF DETAILS
pt awake, no sob, no n/v, tolerating BIPa well, IV antibiotics infusing well, report given to KRISTIAN Hawk

## 2023-01-04 NOTE — H&P ADULT - NSHPADDITIONALINFOADULT_GEN_ALL_CORE
# Valve heart disease - monitor  # Chronic CHF- stable; monitor  # Neuropathy- stable  # AFIB  #  JASON # Valve heart disease - stable;  monitor  # Chronic CHF- stable; monitor  # Neuropathy- stable  # AFIB- monitor  # dementia with [place]- supportive measures; use hearing aids when family brings

## 2023-01-04 NOTE — ED PROVIDER NOTE - OBJECTIVE STATEMENT
Patient brought in from acute rehab facility for fever weakness and respiratory distress.  As per EMS patient was on a nonrebreather upon arrival and was saturating at mid 80s.  Patient was converted to EMS CPAP and sats improved to low 90s.  Patient found to have a fever of 102 rectal here in the emergency department.  Patient unable to give any history.  Patient with history of dementia and recently had a fall sustaining L1 vertebral body fracture which is why she was at acute rehab.  Patient has advanced directives stating DNR/DNI.

## 2023-01-04 NOTE — ED ADULT NURSE REASSESSMENT NOTE - NS ED NURSE REASSESS COMMENT FT1
0935:  son came by, hearing aid returned to son (she only wears one).  he was given an update on her condition, informed that she will be admitted, and he will return later.  jayson jeong.

## 2023-01-04 NOTE — H&P ADULT - NSHPPHYSICALEXAM_GEN_ALL_CORE
NAD; Normocephalic;   LUNGS - no wheezing; fair bilateral air entry  HEART: S1 S2+   ABDOMEN: Soft, Nontender, non distended, BS+  EXTREMITIES: no cyanosis; no edema, no ramo tenderness  NERVOUS SYSTEM:  Awake and alert; moves extremities; limited neuro exam due to dementia  PSYCH: flat affect  SKIN: warm and dry  VASCULAR: distal peripheral pulses+

## 2023-01-04 NOTE — H&P ADULT - PROBLEM SELECTOR PLAN 2
Broad spectrum antibiotics  Follow up cultures  ID consulted- discussed with ID attending Suspected gram negative bacterial PNA with sepsis on admission  Broad spectrum antibiotics started  Follow up cultures  ID consulted- discussed with ID attending

## 2023-01-04 NOTE — ED ADULT NURSE REASSESSMENT NOTE - NS ED NURSE REASSESS COMMENT FT1
0820:  received patient.   she comes from across the street for sob and fever.  Placed into stretcher, placed on monitor, rectal temp 102.8.  bibap placed, IV access obtained, labs collected, swab collected, venous blood gas obtained.   lopez placed, urine sent.  Medicated as ordered with suppository, fluids and iv abx infusing.  patient responds to name, unsure of language spoken.  portable xray performed.  will continue to monitor.  vitals recorded.  jayson jeong.

## 2023-01-04 NOTE — H&P ADULT - PROBLEM SELECTOR PLAN 4
Etiology unclear  Await FOBT  No signs to suggest acute bleeding  Repeat CBC   Check type and screen  PPI  May need PRBC transfusion

## 2023-01-04 NOTE — H&P ADULT - PROBLEM SELECTOR PLAN 6
Possibly related to acute infection  unclear if any biliary source of infection as GI exam benign  Monitor  Check abd ultrasound  Prognosis is poor

## 2023-01-04 NOTE — H&P ADULT - PROBLEM SELECTOR PLAN 8
Patient has a DNR/DNI order.  Son aware of poor prognosis however is unable to decide regarding hospice at this time.  Palliative consult requested

## 2023-01-04 NOTE — ED ADULT NURSE NOTE - OBJECTIVE STATEMENT
the patient comes to the er from [facility] for sob and fever.  Placed on bipap upon arrival, rectal temp.  iv access obtained, labs collected, swab collected, awaiting urine.  the patient is alert but confused at baseline.

## 2023-01-04 NOTE — H&P ADULT - TIME BILLING
direct patient care including but not limited to reviewing chart, medications ,laboratory data, imaging reports, coordination of care with multidisciplinary team involved in the case including consultants, and discussion of plan with patient's family. direct patient care including but not limited to reviewing chart, medications ,laboratory data, imaging reports, coordination of care with multidisciplinary team involved in the case including consultants, and discussion of plan with patient's family.    GOC/ Advanced directives- patient has DNR/DNI. Son is aware that the overall prognosis is poor and the risk of cardiopulmonary decompensation is  high. He stated that she (patient) would never want to be intubated and wants her to be comfortable. He is undecided about hospice at this time.   Palliative consult requested.

## 2023-01-04 NOTE — ED PROVIDER NOTE - PHYSICAL EXAMINATION
Gen: alert, moderate respiratory distress  HEENT:  NC/AT, PERR, no exophthalmos  CV:  well perfused, tachy  Pulm:  increased RR  Abd: s/nt/nd  MSK: moving all extremities  Neuro:  non-focal  Skin:  visualized areas intact  Psych: AOx1

## 2023-01-04 NOTE — H&P ADULT - NSHPLABSRESULTS_GEN_ALL_CORE
6.0    10.00 )-----------( 327      ( 04 Jan 2023 07:55 )             21.6       01-04    138  |  103  |  53<H>  ----------------------------<  143<H>  5.8<H>   |  22  |  2.10<H>    Ca    8.3<L>      04 Jan 2023 07:55    TPro  6.9  /  Alb  3.0<L>  /  TBili  0.5  /  DBili  x   /  AST  751<H>  /  ALT  449<H>  /  AlkPhos  64  01-04      PT/INR - ( 04 Jan 2023 07:55 )   PT: 35.4 sec;   INR: 2.99 ratio         PTT - ( 04 Jan 2023 07:55 )  PTT:29.4 sec      < from: Xray Chest 1 View-PORTABLE IMMEDIATE (01.04.23 @ 08:37) >      INTERPRETATION:  AP chest on January 4, 2023 at 8:25 AM. Patient has   sepsis. Heart magnified by technique.    There are significant infiltrate emanating off both meera right greater   than left which are more pronounced on CAT scan of November 2, 2021.   Right hilar adenopathy is also possible. There is also a left base   process which is increased.    IMPRESSION: Increasing bilateral lung and pleural findings.    < end of copied text >

## 2023-01-04 NOTE — ED ADULT NURSE REASSESSMENT NOTE - NS ED NURSE REASSESS COMMENT FT1
9852:  called and left message with Dr. Bo.  As per her request, I did repeat her labs and sent them.  Received call from Mariaa in lab with critical, Hgb / Hct is 5.6/19.6.  jayson jeong.

## 2023-01-04 NOTE — ED PROVIDER NOTE - CARE PLAN
1 Principal Discharge DX:	Acute respiratory failure with hypoxia   Principal Discharge DX:	Acute respiratory failure with hypoxia  Secondary Diagnosis:	PNA (pneumonia)  Secondary Diagnosis:	ARF (acute renal failure)  Secondary Diagnosis:	Acute sepsis

## 2023-01-05 NOTE — PROGRESS NOTE ADULT - SUBJECTIVE AND OBJECTIVE BOX
Date/Time Patient Seen:  		  Referring MD:   Data Reviewed	       Patient is a 89y old  Female who presents with a chief complaint of Shortness of breath (04 Jan 2023 10:28)      Subjective/HPI     PAST MEDICAL & SURGICAL HISTORY:  Hypertension    Glaucoma    Leg pain, bilateral    Hyperlipidemia    Hypovitaminosis D    Colonic polyp    Hiatal hernia    GERD (gastroesophageal reflux disease)    Atrial fibrillation, unspecified type    Aortic valve stenosis, etiology of cardiac valve disease unspecified    Atrial fibrillation    Hypertension    Glaucoma    Anxiety    GERD (gastroesophageal reflux disease)    Neuropathy    Prophylactic measure    Abnormal colonoscopy    Abnormal endoscopy finding    History of cardioversion    No significant past surgical history          Medication list         MEDICATIONS  (STANDING):  azithromycin  IVPB      azithromycin  IVPB 500 milliGRAM(s) IV Intermittent every 24 hours  pantoprazole  Injectable 40 milliGRAM(s) IV Push daily    MEDICATIONS  (PRN):  acetaminophen     Tablet .. 650 milliGRAM(s) Oral every 6 hours PRN Temp greater or equal to 38C (100.4F), Mild Pain (1 - 3)  aluminum hydroxide/magnesium hydroxide/simethicone Suspension 30 milliLiter(s) Oral every 4 hours PRN Dyspepsia  HYDROmorphone  Injectable 0.25 milliGRAM(s) IV Push every 1 hour PRN Moderate Pain (4 - 6)  HYDROmorphone  Injectable 0.5 milliGRAM(s) IV Push every 1 hour PRN Severe Pain (7 - 10)  HYDROmorphone  Injectable 0.25 milliGRAM(s) IV Push every 1 hour PRN shortness of breath  LORazepam   Injectable 0.5 milliGRAM(s) IV Push every 4 hours PRN Anxiety  melatonin 3 milliGRAM(s) Oral at bedtime PRN Insomnia  ondansetron Injectable 4 milliGRAM(s) IV Push every 8 hours PRN Nausea and/or Vomiting         Vitals log        ICU Vital Signs Last 24 Hrs  T(C): 37.6 (04 Jan 2023 12:57), Max: 39.3 (04 Jan 2023 07:55)  T(F): 99.6 (04 Jan 2023 12:57), Max: 102.7 (04 Jan 2023 07:55)  HR: 121 (04 Jan 2023 16:33) (71 - 121)  BP: 98/51 (04 Jan 2023 12:57) (94/51 - 117/58)  BP(mean): --  ABP: --  ABP(mean): --  RR: 26 (04 Jan 2023 14:30) (20 - 26)  SpO2: 87% (04 Jan 2023 16:33) (68% - 99%)    O2 Parameters below as of 04 Jan 2023 14:30  Patient On (Oxygen Delivery Method): BiPAP/CPAP                 Input and Output:  I&O's Detail      Lab Data                        5.6    16.81 )-----------( 279      ( 04 Jan 2023 16:00 )             19.6     01-04    140  |  107  |  53<H>  ----------------------------<  166<H>  4.0   |  21<L>  |  1.90<H>    Ca    7.4<L>      04 Jan 2023 16:00    TPro  6.9  /  Alb  3.0<L>  /  TBili  0.5  /  DBili  x   /  AST  751<H>  /  ALT  449<H>  /  AlkPhos  64  01-04            Review of Systems	      Objective     Physical Examination  heart 1s2  lung dec bS  head nc        Pertinent Lab findings & Imaging      Louie:  NO   Adequate UO     I&O's Detail           Discussed with:     Cultures:	        Radiology

## 2023-01-05 NOTE — PROGRESS NOTE ADULT - SUBJECTIVE AND OBJECTIVE BOX
[facility]  INFECTIOUS DISEASES   [address] [place] [address]  Tel: [phone]     Fax: [phone]  ========================================================  MD Hayes Valdez Kaushal, MD Cho, Michelle, MD Sunjit, Jaspal, MD  ========================================================    [id number]  DEANDRE NGGNCHIDI     Follow up: Pneumonia?, Cholecystitis?, multiorgan failure     Patient is now on BiPAP, not responding, son at the bedside.   No fever but now has renal, liver and respiratory failure with a very low H/H.    PAST MEDICAL & SURGICAL HISTORY:  Hypertension  Leg pain, bilateral  Hyperlipidemia  Hypovitaminosis D  Colonic polyp  Hiatal hernia  Aortic valve stenosis, etiology of cardiac valve disease unspecified  Hypertension  Glaucoma  Anxiety  GERD (gastroesophageal reflux disease)  Neuropathy  History of cardioversion    Social Hx: no current smoking, ETOH or drugs     FAMILY HISTORY:  Family history of diabetes mellitus (Child)    Allergies  No Known Allergies    Antibiotics:  MEDICATIONS  (STANDING):  piperacillin/tazobactam IVPB.. 3.375 Gram(s) IV Intermittent once     REVIEW OF SYSTEMS:  Unable to answer     Physical Exam:  Vital Signs Last 24 Hrs  T(C): 37.7 (05 Jan 2023 07:30), Max: 37.7 (05 Jan 2023 07:30)  T(F): 99.8 (05 Jan 2023 07:30), Max: 99.8 (05 Jan 2023 07:30)  HR: 118 (05 Jan 2023 07:30) (73 - 121)  BP: 94/58 (05 Jan 2023 07:30) (94/58 - 98/51)  BP(mean): --  RR: 20 (05 Jan 2023 07:30) (20 - 26)  SpO2: 93% (05 Jan 2023 07:30) (68% - 97%)  Parameters below as of 05 Jan 2023 07:30  Patient On (Oxygen Delivery Method): BiPAP/CPAP  GEN: on BiPAP  HEENT: normocephalic and atraumatic. EOMI. PERRL.    NECK: Supple.  No lymphadenopathy   LUNGS: Coarse breath sounds bilaterally from anterior chest  HEART: Regular rate and rhythm   ABDOMEN: Soft, nontender, and nondistended.  Positive bowel sounds.    : No CVA tenderness  EXTREMITIES: Without edema.  NEUROLOGIC: unable   PSYCHIATRIC: lethargic   SKIN: No rash     Labs:                        5.2    16.74 )-----------( 260      ( 05 Jan 2023 06:41 )             19.2     01-05    143  |  110<H>  |  67<H>  ----------------------------<  114<H>  4.8   |  15<L>  |  3.10<H>    Ca    7.5<L>      05 Jan 2023 06:41    TPro  6.9  /  Alb  3.0<L>  /  TBili  0.5  /  DBili  x   /  AST  751<H>  /  ALT  449<H>  /  AlkPhos  64  01-04    WBC Count: 16.74 K/uL (01-05-23 @ 06:41)  WBC Count: 16.81 K/uL (01-04-23 @ 16:00)  WBC Count: 10.00 K/uL (01-04-23 @ 07:55)    Creatinine, Serum: 3.10 mg/dL (01-05-23 @ 06:41)  Creatinine, Serum: 1.90 mg/dL (01-04-23 @ 16:00)  Creatinine, Serum: 2.10 mg/dL (01-04-23 @ 07:55)    C-Reactive Protein, Serum: 76 mg/L (01-04-23 @ 10:00)    Procalcitonin, Serum: 0.55 ng/mL (01-04-23 @ 07:55)     SARS-CoV-2 Result: NotDetec (01-04-23 @ 07:55)    All imaging and other data have been reviewed.  < from: Xray Chest 1 View-PORTABLE IMMEDIATE (01.04.23 @ 08:37) >  ACC: [id number] EXAM:  XR CHEST PORTABLE IMMED 1V                        PROCEDURE DATE:  01/04/2023    INTERPRETATION:  AP chest on January 4, 2023 at 8:25 AM. Patient has   sepsis. Heart magnified by technique.  There are significant infiltrate emanating off both meera right greater   than left which are more pronounced on CAT scan of November 2, 2021.   Right hilar adenopathy is also possible. There is also a left base   process which is increased.  IMPRESSION: Increasing bilateral lung and pleural findings.    < from: US Abdomen Upper Quadrant Right (01.04.23 @ 12:32) >  IMPRESSION:  Cholelithiasis. Imaging findings are equivocal for acute cholecystitis.   If clinically indicated, a HIDA scan can be obtained.  Possible cirrhosis.    Assessment and Plan:   89y/o woman with PMH of HTN, AS, history cardioversion, dementia with recent fall s/p L1 fracture, was admitted from Nursing home with fever and respiratory distress. On arrival her SPO2 was in 80s while on NRB so she was placed   on BiPAP with improvement in SPo2 to low 90s. She is awake and alert but unable to give history. She also had fever 102.7 in ED.    Possible pneumonia (aspiration?) and cholecystitis causing high LFTs. Now with respiratory, renal and liver failure.     Recommendations:  - Will follow cultures, MRSA PCR , Legionella and Strep u ags  - Will monitor WBC and Tmax  - Will monitor LFTs, could be from sepsis, RUQ Ultrasound with questionable cholecystitis   - Based on GOC can proceed with HIDA  - Can continue zosyn 3.375gm q8 and azithromycin 500mg daily until family decision for possible comfort care     Will follow.  Discussed with son at the bedside.     Gina Bennett MD  Division of Infectious Diseases   Please call ID service at [phone] with any question.      35 minutes spent on total encounter assessing patient, examination, chart review, counseling and coordinating care by the attending physician/nurse/care manager.

## 2023-01-05 NOTE — PROGRESS NOTE ADULT - NS ATTEST RISK PROBLEM GEN_ALL_CORE FT
Multiorgan system failure (involving Pulm, hepatic, brain, renal) with poor prognosis and impending death. Palliative measures in place.

## 2023-01-05 NOTE — CHART NOTE - NSCHARTNOTEFT_GEN_A_CORE
Called by RN as pt's family requesting to continue BIBAP overnight and to have it discontinued tomorrow morning instead. Patient is on comfort measures.     Rn to call with further changes.     Discussed w/ Dr. Cain

## 2023-01-05 NOTE — PROGRESS NOTE ADULT - SUBJECTIVE AND OBJECTIVE BOX
SUBJECTIVE AND OBJECTIVE:  INTERVAL HPI/OVERNIGHT EVENTS:    DNR on chart:   Allergies    No Known Allergies    Intolerances    MEDICATIONS  (STANDING):  HYDROmorphone  Injectable 0.25 milliGRAM(s) IV Push every 6 hours  pantoprazole  Injectable 40 milliGRAM(s) IV Push daily    MEDICATIONS  (PRN):  acetaminophen     Tablet .. 650 milliGRAM(s) Oral every 6 hours PRN Temp greater or equal to 38C (100.4F), Mild Pain (1 - 3)  aluminum hydroxide/magnesium hydroxide/simethicone Suspension 30 milliLiter(s) Oral every 4 hours PRN Dyspepsia  HYDROmorphone  Injectable 0.25 milliGRAM(s) IV Push every 1 hour PRN Moderate Pain (4 - 6)  HYDROmorphone  Injectable 0.5 milliGRAM(s) IV Push every 1 hour PRN Severe Pain (7 - 10)  HYDROmorphone  Injectable 0.25 milliGRAM(s) IV Push every 1 hour PRN shortness of breath  LORazepam   Injectable 0.5 milliGRAM(s) IV Push every 4 hours PRN Anxiety  ondansetron Injectable 4 milliGRAM(s) IV Push every 8 hours PRN Nausea and/or Vomiting      ITEMS UNCHECKED ARE NOT PRESENT    PRESENT SYMPTOMS: [ ]Unable to obtain due to poor mentation   Source if other than patient:  [ ]Family   [ ]Team     Pain:  [ ]yes [ ]no  QOL impact -   Location -                    Aggravating factors -  Quality -  Radiation -  Timing-  Severity (0-10 scale):  Minimal acceptable level (0-10 scale):     Dyspnea:                           [ ]Mild [ ]Moderate [ ]Severe  Anxiety:                             [ ]Mild [ ]Moderate [ ]Severe  Fatigue:                             [ ]Mild [ ]Moderate [ ]Severe  Nausea:                             [ ]Mild [ ]Moderate [ ]Severe  Loss of appetite:              [ ]Mild [ ]Moderate [ ]Severe  Constipation:                    [ ]Mild [ ]Moderate [ ]Severe    CPOT:    https://www.[facility].org/getattachment/zce80w41-0b1o-[email or web address]-9e3v-[email or web address]/Critical-Care-Pain-Observation-Tool-(CPOT)    PAIN AD Score:	  http://geriatrictoolkit.[facility]/cog/painad.pdf (Ctrl + left click to view)    Other Symptoms:  [ ]All other review of systems negative     Palliative Performance Status Version 2:         %      http://[email or web address].org/files/news/palliative_performance_scale_ppsv2.pdf  PHYSICAL EXAM:  Vital Signs Last 24 Hrs  T(C): 37.7 (2023 07:30), Max: 37.7 (2023 07:30)  T(F): 99.8 (2023 07:30), Max: 99.8 (2023 07:30)  HR: 118 (2023 07:30) (118 - 121)  BP: 94/58 (2023 07:30) (94/58 - 94/58)  BP(mean): --  RR: 20 (2023 07:30) (20 - 20)  SpO2: 93% (2023 07:30) (87% - 93%)    Parameters below as of 2023 07:30  Patient On (Oxygen Delivery Method): BiPAP/CPAP     I&O's Summary     GENERAL:  [ ]Alert  [ ]Oriented x   [ ]Lethargic  [ ]Cachexia  [ ]Unarousable  [ ]Verbal  [ ]Non-Verbal  Behavioral:   [ ]Anxiety  [ ]Delirium [ ]Agitation [ ]Other  HEENT:  [ ]Normal   [ ]Dry mouth   [ ]ET Tube/Trach  [ ]Oral lesions  PULMONARY:   [ ]Clear [ ]Tachypnea  [ ]Audible excessive secretions   [ ]Rhonchi        [ ]Right [ ]Left [ ]Bilateral  [ ]Crackles        [ ]Right [ ]Left [ ]Bilateral  [ ]Wheezing     [ ]Right [ ]Left [ ]Bilateral  [ ]Diminished BS [ ] Right [ ]Left [ ]Bilateral  CARDIOVASCULAR:    [ ]Regular [ ]Irregular [ ]Tachy  [ ]Cristian [ ]Murmur [ ]Other  GASTROINTESTINAL:  [ ]Soft  [ ]Distended   [ ]+BS  [ ]Non tender [ ]Tender  [ ]PEG [ ]OGT/ NGT   Last BM:    GENITOURINARY:  [ ]Normal [ ]Incontinent   [ ]Oliguria/Anuria   [ ][place]  MUSCULOSKELETAL:   [ ]Normal   [ ]Weakness  [ ]Bed/Wheelchair bound [ ]Edema  NEUROLOGIC:   [ ]No focal deficits  [ ] Cognitive impairment  [ ] Dysphagia [ ]Dysarthria [ ] Paresis [ ]Other   SKIN:   [ ]Normal  [ ]Rash   [ ]Pressure ulcer(s) [ ]y [ ]n present on admission    CRITICAL CARE:  [ ]Shock Present  [ ]Septic [ ]Cardiogenic [ ]Neurologic [ ]Hypovolemic  [ ]Vasopressors [ ]Inotropes  [ ]Respiratory failure present [ ]Mechanical Ventilation [ ]Non-invasive ventilatory support [ ]High-Flow   [ ]Acute  [ ]Chronic [ ]Hypoxic  [ ]Hypercarbic [ ]Other  [ ]Other organ failure     LABS:                        5.2    16.74 )-----------( 260      ( 2023 06:41 )             19.2       143  |  110<H>  |  67<H>  ----------------------------<  114<H>  4.8   |  15<L>  |  3.10<H>    Ca    7.5<L>      2023 06:41    TPro  6.9  /  Alb  3.0<L>  /  TBili  0.5  /  DBili  x   /  AST  751<H>  /  ALT  449<H>  /  AlkPhos  64  -  PT/INR - ( 2023 06:41 )   PT: 51.2 sec;   INR: 4.31 ratio         PTT - ( 2023 07:55 )  PTT:29.4 sec    Urinalysis Basic - ( 2023 07:55 )    Color: Yellow / Appearance: Slightly Turbid / S.025 / pH: x  Gluc: x / Ketone: Trace  / Bili: Negative / Urobili: Negative   Blood: x / Protein: 30 mg/dL / Nitrite: Negative   Leuk Esterase: Trace / RBC: 0-2 /HPF / WBC 3-5   Sq Epi: x / Non Sq Epi: Few / Bacteria: Moderate      RADIOLOGY & ADDITIONAL STUDIES:    Protein Calorie Malnutrition Present: [ ]mild [ ]moderate [ ]severe [ ]underweight [ ]morbid obesity  https://www.[employer].org/vault/2440/web/files/ONC/Table_Clinical%20Characteristics%20to%20Document%20Malnutrition-White%20JV%20et%20al%2020.pdf      Weight (kg): 63.5 (23 @ 07:55)    [ ]PPSV2 < or = 30%  [ ]significant weight loss [ ]poor nutritional intake [ ]anasarca    [ ]Artificial Nutrition    REFERRALS:   [ ]Chaplaincy  [ ]Hospice  [ ]Child Life  [ ]Social Work  [ ]Case management [ ]Holistic Therapy     Goals of Care Document:

## 2023-01-05 NOTE — ED ADULT NURSE REASSESSMENT NOTE - NS ED NURSE REASSESS COMMENT FT1
As per family, request to remain on bipap overnight until they are able to speak with admitting doctor to discuss future options concerning patient's dispo As per family, request to remain on bipap overnight and continue comfort care measures until they are able to speak with admitting doctor to discuss future options concerning patient's dispo

## 2023-01-05 NOTE — PROGRESS NOTE ADULT - SUBJECTIVE AND OBJECTIVE BOX
MEDICAL ATTENDING NOTE    Patient is a 89y old  Female who presents with a chief complaint of Shortness of breath (2023 12:01)      INTERVAL HPI/OVERNIGHT EVENTS: no acute issues reported    MEDICATIONS  (STANDING):  azithromycin  IVPB      azithromycin  IVPB 500 milliGRAM(s) IV Intermittent every 24 hours  pantoprazole  Injectable 40 milliGRAM(s) IV Push daily    MEDICATIONS  (PRN):  acetaminophen     Tablet .. 650 milliGRAM(s) Oral every 6 hours PRN Temp greater or equal to 38C (100.4F), Mild Pain (1 - 3)  aluminum hydroxide/magnesium hydroxide/simethicone Suspension 30 milliLiter(s) Oral every 4 hours PRN Dyspepsia  HYDROmorphone  Injectable 0.25 milliGRAM(s) IV Push every 1 hour PRN Moderate Pain (4 - 6)  HYDROmorphone  Injectable 0.5 milliGRAM(s) IV Push every 1 hour PRN Severe Pain (7 - 10)  HYDROmorphone  Injectable 0.25 milliGRAM(s) IV Push every 1 hour PRN shortness of breath  LORazepam   Injectable 0.5 milliGRAM(s) IV Push every 4 hours PRN Anxiety  ondansetron Injectable 4 milliGRAM(s) IV Push every 8 hours PRN Nausea and/or Vomiting      __________________________________________________  ----------------------------------------------------------------------------------  REVIEW OF SYSTEMS: no chest pain, no SOB, No fever, no HA      Vital Signs Last 24 Hrs  T(C): 37.7 (2023 07:30), Max: 37.7 (2023 07:30)  T(F): 99.8 (2023 07:30), Max: 99.8 (2023 07:30)  HR: 118 (2023 07:30) (118 - 121)  BP: 94/58 (2023 07:30) (94/58 - 94/58)  RR: 20 (2023 07:30) (20 - 26)  SpO2: 93% (2023 07:30) (86% - 93%)    Parameters below as of 2023 07:30  Patient On (Oxygen Delivery Method): BiPAP/CPAP        _________________  PHYSICAL EXAM:  ---------------------------  Normocephalic;   LUNGS - no wheezing  HEART: S1 S2+   ABDOMEN: Soft, Nontender, non distended  EXTREMITIES: no cyanosis; no edema  NERVOUS SYSTEM:      _________________________________________________  LABS:                        5.2    16.74 )-----------( 260      ( 2023 06:41 )             19.2     01-05    143  |  110<H>  |  67<H>  ----------------------------<  114<H>  4.8   |  15<L>  |  3.10<H>    Ca    7.5<L>      2023 06:41    TPro  6.9  /  Alb  3.0<L>  /  TBili  0.5  /  DBili  x   /  AST  751<H>  /  ALT  449<H>  /  AlkPhos  64  01-04    PT/INR - ( 2023 06:41 )   PT: 51.2 sec;   INR: 4.31 ratio         PTT - ( 2023 07:55 )  PTT:29.4 sec  Urinalysis Basic - ( 2023 07:55 )    Color: Yellow / Appearance: Slightly Turbid / S.025 / pH: x  Gluc: x / Ketone: Trace  / Bili: Negative / Urobili: Negative   Blood: x / Protein: 30 mg/dL / Nitrite: Negative   Leuk Esterase: Trace / RBC: 0-2 /HPF / WBC 3-5   Sq Epi: x / Non Sq Epi: Few / Bacteria: Moderate      CAPILLARY BLOOD GLUCOSE                    Plan of care was discussed with patient;  care was aligned with patient's wishes.             MEDICAL ATTENDING NOTE    Patient is a 89y old  Female who presents with a chief complaint of Shortness of breath (2023 12:01)      INTERVAL HPI/OVERNIGHT EVENTS: no acute issues reported; family at bedside    MEDICATIONS  (STANDING):  azithromycin  IVPB      azithromycin  IVPB 500 milliGRAM(s) IV Intermittent every 24 hours  pantoprazole  Injectable 40 milliGRAM(s) IV Push daily    MEDICATIONS  (PRN):  acetaminophen     Tablet .. 650 milliGRAM(s) Oral every 6 hours PRN Temp greater or equal to 38C (100.4F), Mild Pain (1 - 3)  aluminum hydroxide/magnesium hydroxide/simethicone Suspension 30 milliLiter(s) Oral every 4 hours PRN Dyspepsia  HYDROmorphone  Injectable 0.25 milliGRAM(s) IV Push every 1 hour PRN Moderate Pain (4 - 6)  HYDROmorphone  Injectable 0.5 milliGRAM(s) IV Push every 1 hour PRN Severe Pain (7 - 10)  HYDROmorphone  Injectable 0.25 milliGRAM(s) IV Push every 1 hour PRN shortness of breath  LORazepam   Injectable 0.5 milliGRAM(s) IV Push every 4 hours PRN Anxiety  ondansetron Injectable 4 milliGRAM(s) IV Push every 8 hours PRN Nausea and/or Vomiting      __________________________________________________  ----------------------------------------------------------------------------------  REVIEW OF SYSTEMS: patient unable to participate in ROS due to encephalopathy      Vital Signs Last 24 Hrs  T(C): 37.7 (2023 07:30), Max: 37.7 (2023 07:30)  T(F): 99.8 (2023 07:30), Max: 99.8 (2023 07:30)  HR: 118 (2023 07:30) (118 - 121)  BP: 94/58 (2023 07:30) (94/58 - 94/58)  RR: 20 (2023 07:30) (20 - 26)  SpO2: 93% (2023 07:30) (86% - 93%)    Parameters below as of 2023 07:30  Patient On (Oxygen Delivery Method): BiPAP/CPAP        _________________  PHYSICAL EXAM:  ---------------------------  Normocephalic;   LUNGS - no wheezing  HEART: S1 S2+   ABDOMEN: Soft, Nontender, non distended  EXTREMITIES: no cyanosis; no edema  NERVOUS SYSTEM:  encephalopathic    _________________________________________________  LABS:                        5.2    16.74 )-----------( 260      ( 2023 06:41 )             19.2     01-05    143  |  110<H>  |  67<H>  ----------------------------<  114<H>  4.8   |  15<L>  |  3.10<H>    Ca    7.5<L>      2023 06:41    TPro  6.9  /  Alb  3.0<L>  /  TBili  0.5  /  DBili  x   /  AST  751<H>  /  ALT  449<H>  /  AlkPhos  64  01-04    PT/INR - ( 2023 06:41 )   PT: 51.2 sec;   INR: 4.31 ratio         PTT - ( 2023 07:55 )  PTT:29.4 sec  Urinalysis Basic - ( 2023 07:55 )    Color: Yellow / Appearance: Slightly Turbid / S.025 / pH: x  Gluc: x / Ketone: Trace  / Bili: Negative / Urobili: Negative   Blood: x / Protein: 30 mg/dL / Nitrite: Negative   Leuk Esterase: Trace / RBC: 0-2 /HPF / WBC 3-5   Sq Epi: x / Non Sq Epi: Few / Bacteria: Moderate      CAPILLARY BLOOD GLUCOSE                    Plan of care was discussed with patient's family;  care was aligned with patient's known wishes for comfort at end of life.             MEDICAL ATTENDING NOTE    Patient is a 89y old  Female who presents with a chief complaint of Shortness of breath (2023 12:01)      INTERVAL HPI/OVERNIGHT EVENTS: no acute issues reported per nursing staff; family at bedside    MEDICATIONS  (STANDING):  azithromycin  IVPB      azithromycin  IVPB 500 milliGRAM(s) IV Intermittent every 24 hours  pantoprazole  Injectable 40 milliGRAM(s) IV Push daily    MEDICATIONS  (PRN):  acetaminophen     Tablet .. 650 milliGRAM(s) Oral every 6 hours PRN Temp greater or equal to 38C (100.4F), Mild Pain (1 - 3)  aluminum hydroxide/magnesium hydroxide/simethicone Suspension 30 milliLiter(s) Oral every 4 hours PRN Dyspepsia  HYDROmorphone  Injectable 0.25 milliGRAM(s) IV Push every 1 hour PRN Moderate Pain (4 - 6)  HYDROmorphone  Injectable 0.5 milliGRAM(s) IV Push every 1 hour PRN Severe Pain (7 - 10)  HYDROmorphone  Injectable 0.25 milliGRAM(s) IV Push every 1 hour PRN shortness of breath  LORazepam   Injectable 0.5 milliGRAM(s) IV Push every 4 hours PRN Anxiety  ondansetron Injectable 4 milliGRAM(s) IV Push every 8 hours PRN Nausea and/or Vomiting      __________________________________________________  ----------------------------------------------------------------------------------  REVIEW OF SYSTEMS: patient unable to participate in ROS due to encephalopathy      Vital Signs Last 24 Hrs  T(C): 37.7 (2023 07:30), Max: 37.7 (2023 07:30)  T(F): 99.8 (2023 07:30), Max: 99.8 (2023 07:30)  HR: 118 (2023 07:30) (118 - 121)  BP: 94/58 (2023 07:30) (94/58 - 94/58)  RR: 20 (2023 07:30) (20 - 26)  SpO2: 93% (2023 07:30) (86% - 93%)    Parameters below as of 2023 07:30  Patient On (Oxygen Delivery Method): BiPAP/CPAP        _________________  PHYSICAL EXAM:  ---------------------------  Normocephalic;   LUNGS - no wheezing  HEART: S1 S2+   ABDOMEN: Soft, Nontender, non distended  EXTREMITIES: no cyanosis; no edema  NERVOUS SYSTEM:  encephalopathic    _________________________________________________  LABS:                        5.2    16.74 )-----------( 260      ( 2023 06:41 )             19.2     01-05    143  |  110<H>  |  67<H>  ----------------------------<  114<H>  4.8   |  15<L>  |  3.10<H>    Ca    7.5<L>      2023 06:41    TPro  6.9  /  Alb  3.0<L>  /  TBili  0.5  /  DBili  x   /  AST  751<H>  /  ALT  449<H>  /  AlkPhos  64  01-04    PT/INR - ( 2023 06:41 )   PT: 51.2 sec;   INR: 4.31 ratio         PTT - ( 2023 07:55 )  PTT:29.4 sec  Urinalysis Basic - ( 2023 07:55 )    Color: Yellow / Appearance: Slightly Turbid / S.025 / pH: x  Gluc: x / Ketone: Trace  / Bili: Negative / Urobili: Negative   Blood: x / Protein: 30 mg/dL / Nitrite: Negative   Leuk Esterase: Trace / RBC: 0-2 /HPF / WBC 3-5   Sq Epi: x / Non Sq Epi: Few / Bacteria: Moderate      CAPILLARY BLOOD GLUCOSE                    Plan of care was discussed with patient's family;  care was aligned with patient's known wishes for comfort at end of life.

## 2023-01-05 NOTE — PROVIDER CONTACT NOTE (CRITICAL VALUE NOTIFICATION) - TEST AND RESULT REPORTED:
Hospitalist Progress Note    NAME: Marimar Rodriguez   :  1939   MRN:  [id number]       Assessment / Plan:    Acute respiratory failure with hypoxia most likely secondary to acute on chronic systolic CHF POA  Hypertensive emergency  Covid ruled out   Elevated troponin:? NSTEMI   admited to PCU,  2D echo   · LV: Estimated LVEF is 25 - 30%. Moderately dilated left ventricle. Mild concentric hypertrophy. Moderate-to-severely reduced systolic function. · Pericardium: Trivial-to-small pericardial effusion. · LA: Moderately dilated left atrium. · MV: Mild to moderate mitral valve regurgitation is present. Patient received IV Lasix at \[facility]\""  Due to the elevated troponin, patient was given heparin 4000 unit and started on heparin drip then changed to Lovenox on   Troponin <0.05-->2.04-->1.20  Cardiology input appreciated. For now clears Lexiscan stress test   Continue with beta-blocker, aspirin and statin  off nitro drip  COVID test negative  Chest pain free  Stress test  On   · FINDINGS: The rest and stress perfusion images a fixed defect in the inferior wall compatible with infarct. No reversible abnormality is seen to suggest myocardium at ischemic risk. The gated images demonstrate global hypokinesia and inferior akinesia. Left ventricular ejection fraction is 31 %. Impression: Fixed defect in the inferior wall is compatible with infarct. No evidence of myocardium at ischemic risk. Left ventricular ejection fraction is 31 %. Global hypokinesia and inferior akinesia.        Leucocytosis POA.  Seems to be chronic  Wbc 23k on admission , seems to be chronic after review of previous cbc.trending down   Pt has no fever , no signs of infection  Chest xray clear   Pending UA blood cx   procalcitonin 0.10  Pt reported that she always have elevated wbc and work up has been done in the past and nothing was found   Cont holding  off on abx for now  Still not showing any signs of active infection other than leukocytosis which might be reactive plus chronic     Hyperglycemia in setting of diabetes mellitus  Diabetic neuropathy  BS in the 300's  Continue with home dose Lantus 45 units with correction scale, adjust prn   Continue with Neurontin  Hold metformin , hold glyburide     Worsening CKD stage III  DC ACE inhibitor, hold Lasix  Monitor BMP     Depression  Continue with Effexor    25.0 - 29.9 Overweight / Body mass index is 29.15 kg/m². Code status: DNR  Prophylaxis: Heparin  Recommended Disposition: Home w/Family     Subjective:     Chief Complaint / Reason for Physician Visit  Discussed with RN events overnight. Patient was seen and examined. No acute events overnight. \" I am feeling great\"    Review of Systems:  Symptom Y/N Comments  Symptom Y/N Comments   Fever/Chills n   Chest Pain n    Poor Appetite    Edema     Cough n   Abdominal Pain n    Sputum    Joint Pain     SOB/STANTON n   Pruritis/Rash     Nausea/vomit n   Tolerating PT/OT     Diarrhea    Tolerating Diet y    Constipation    Other       Could NOT obtain due to:          Objective:     VITALS:   Last 24hrs VS reviewed since prior progress note.  Most recent are:  Patient Vitals for the past 24 hrs:   Temp Pulse Resp BP SpO2   09/21/20 1038 97.8 °F (36.6 °C) 69 16 (!) 114/92 97 %   09/21/20 1000  68 14 (!) 122/52 97 %   09/21/20 0900  66 18 (!) 108/54 97 %   09/21/20 0800  69 15  98 %   09/21/20 0400  70      09/21/20 0337 97.9 °F (36.6 °C) 72 17 (!) 142/64 99 %   09/21/20 0008 97.9 °F (36.6 °C)       09/21/20 0004  89  (!) 116/52    09/21/20 0000  85      09/20/20 2356 98 °F (36.7 °C) 89 16 (!) 149/64 95 %   09/20/20 2228     96 %   09/20/20 2000  (!) 108      09/20/20 1954 98.9 °F (37.2 °C) 99 22 (!) 157/73 92 %   09/20/20 1532 97.4 °F (36.3 °C) 85 19 (!) 126/54 94 %       Intake/Output Summary (Last 24 hours) at 9/21/2020 1426  Last data filed at 9/21/2020 0800  Gross per 24 hour   Intake 310.31 ml Output 1690 ml   Net -1379.69 ml        PHYSICAL EXAM:  General: WD, WN. Alert, cooperative, no acute distress    EENT:  EOMI. Anicteric sclerae. MMM  Resp:  CTA bilaterally, no wheezing or rales. No accessory muscle use  CV:  Regular  rhythm,  No edema  GI:  Soft, Non distended, Non tender.  +Bowel sounds  Neurologic:  Alert and oriented X 3, normal speech,   Psych:   Good insight. Not anxious nor agitated  Skin:  No rashes. No jaundice    Reviewed most current lab test results and cultures  YES  Reviewed most current radiology test results   YES  Review and summation of old records today    NO  Reviewed patient's current orders and MAR    YES  PMH/SH reviewed - no change compared to H&P  ________________________________________________________________________  Care Plan discussed with:    Comments   Patient x    Family      RN x    Care Manager x    Consultant                       x Multidiciplinary team rounds were held today with , nursing, pharmacist and clinical coordinator. Patient's plan of care was discussed; medications were reviewed and discharge planning was addressed. ________________________________________________________________________        Comments   >50% of visit spent in counseling and coordination of care     ________________________________________________________________________  Aryan Lang MD     Procedures: see electronic medical records for all procedures/Xrays and details which were not copied into this note but were reviewed prior to creation of Plan. LABS:  I reviewed today's most current labs and imaging studies.   Pertinent labs include:  Recent Labs     09/21/20  0043 09/20/20  1104 09/19/20  0202   WBC 18.6* 17.0* 24.4*   HGB 11.1* 11.2* 10.3*   HCT 34.2* 35.1 31.9*    344 322     Recent Labs     09/21/20  0043 09/20/20  1104 09/19/20  0202   * 136 139   K 5.1 5.0 3.9    106 107   CO2 25 30 26   * 178* 127*   BUN 48* 42* 49* CREA 1.59* 1.52* 1.84*   CA 9.4 9.6 9.4   MG  --   --  2.4   ALB  --   --  2.9*   TBILI  --   --  0.3   ALT  --   --  39       Signed: Rosalio Stewart MD h/h 5/19

## 2023-01-06 NOTE — PROGRESS NOTE ADULT - PROVIDER SPECIALTY LIST ADULT
Infectious Disease
Infectious Disease
Internal Medicine
Palliative Care
Pulmonology
Pulmonology
Palliative Care
Internal Medicine

## 2023-01-06 NOTE — PROGRESS NOTE ADULT - PROBLEM SELECTOR PLAN 8
Patient has a DNR/DNI order.  Family aware of poor prognosis and Comfort measures per GOC  Patient is actively dying with multiorgan failure
Patient has a DNR/DNI order.  Family aware of poor prognosis and Comfort measures per GOC  Patient is actively dying with multiorgan failure

## 2023-01-06 NOTE — CHART NOTE - NSCHARTNOTEFT_GEN_A_CORE
Called to see the patient for unresponsiveness. Patient with multisystem organ failure, made comfort care. Patient seen and examined at bedside. Family present at bedside. On the exam, did not respond to verbal or physical stimuli. Absent heart and breath sounds. Absent peripheral pulses. Pupils are fixed and dilated. Patient pronounced death at 1851. Autopsy declined. Called to see the patient for unresponsiveness. Patient with multisystem organ failure, made comfort care. RN noted patient to have absent pulses and no respirations around 1830. Patient seen and examined at bedside. Family present at bedside. On the exam, did not respond to verbal or physical stimuli. Absent heart and breath sounds. Absent peripheral pulses. Pupils are fixed and dilated. Patient pronounced death at 1851. Autopsy declined.

## 2023-01-06 NOTE — PATIENT PROFILE ADULT - FUNCTIONAL ASSESSMENT - BASIC MOBILITY 6.
1-calculated by average/Not able to assess (calculate score using [facility] averaging method)

## 2023-01-06 NOTE — PATIENT PROFILE ADULT - NSPROMEDSBROUGHTTOHOSP_GEN_A_NUR
[address] [facility] ED  eMERGENCY dEPARTMENT eNCOUnter      Pt Name: Lina Vizcarra  MRN: [id number]  Armsvaleriegfuzma 2014  Date of evaluation: 7/4/2018  Provider: Leanne Kerr MD    CHIEF COMPLAINT       Chief Complaint   Patient presents with    Facial Swelling     per mom \"looks like she got bit by something on the right side of her face yesterday and now it is swelling\"         HISTORY OF PRESENT ILLNESS   (Location/Symptom, Timing/Onset, Context/Setting, Quality, Duration, Modifying Factors, Severity)  Note limiting factors. Lina Vizcarra is a 3 y.o. female who presents to the emergency department With left-sided facial swelling after a bug bite on the left side of the face. This happened yesterday and now it is a little swollen without redness. Parents have not noted decrease in appetite, decrease in activity level, decrease in urinary output, fever, or evidence of severe pain. HPI    Nursing Notes were reviewed. REVIEW OF SYSTEMS    (2-9 systems for level 4, 10 or more for level 5)     Review of Systems     Constitutional symptoms:  no Fatigue, no fever, no chills. Skin symptoms:  Negative except as documented in HPI. Facial swelling as above from a bite  ENMT symptoms:  Negative except as documented in HPI. Respiratory symptoms:  Negative except as documented in HPI. Cardiovascular symptoms:  Negative except as documented in HPI. Gastrointestinal symptoms: Negative except for documented as above in the HPI   Genitourinary symptoms:  Negative except as documented in HPI. Musculoskeletal symptoms:  Negative except as documented in HPI. Neurologic symptoms:  Negative except as documented in HPI. Remainder of 10 systems, all negative except for mentioned above      Except as noted above the remainder of the review of systems was reviewed and negative.        PAST MEDICAL HISTORY     Past Medical History:   Diagnosis Date    Asthma          SURGICAL HISTORY       Past Surgical History: Unable to get information.

## 2023-01-06 NOTE — CHART NOTE - NSCHARTNOTEFT_GEN_A_CORE
Notified by nursing staff that patient had no spontaneous breath and no heart sounds. Patient has been n comfort measures.   Family was present at bedside at time of death.  I spoke with son Yonathan;  condolences offered and emotional support provided to the family.   Approximate time of death is about 6:31pm per nursing staff present at bedside.

## 2023-01-06 NOTE — PROGRESS NOTE ADULT - PROBLEM SELECTOR PROBLEM 8
Counseling regarding advanced directives and goals of care
Counseling regarding advanced directives and goals of care

## 2023-01-06 NOTE — PROGRESS NOTE ADULT - SUBJECTIVE AND OBJECTIVE BOX
Date/Time Patient Seen:  		  Referring MD:   Data Reviewed	       Patient is a 89y old  Female who presents with a chief complaint of Shortness of breath (05 Jan 2023 16:24)      Subjective/HPI     PAST MEDICAL & SURGICAL HISTORY:  Hypertension    Glaucoma    Leg pain, bilateral    Hyperlipidemia    Hypovitaminosis D    Colonic polyp    Hiatal hernia    GERD (gastroesophageal reflux disease)    Atrial fibrillation, unspecified type    Aortic valve stenosis, etiology of cardiac valve disease unspecified    Atrial fibrillation    Hypertension    Glaucoma    Anxiety    GERD (gastroesophageal reflux disease)    Neuropathy    Prophylactic measure    Abnormal colonoscopy    Abnormal endoscopy finding    History of cardioversion    No significant past surgical history          Medication list         MEDICATIONS  (STANDING):  HYDROmorphone  Injectable 0.25 milliGRAM(s) IV Push every 6 hours  pantoprazole  Injectable 40 milliGRAM(s) IV Push daily    MEDICATIONS  (PRN):  HYDROmorphone  Injectable 0.25 milliGRAM(s) IV Push every 1 hour PRN Moderate Pain (4 - 6)  HYDROmorphone  Injectable 0.5 milliGRAM(s) IV Push every 1 hour PRN Severe Pain (7 - 10)  HYDROmorphone  Injectable 0.25 milliGRAM(s) IV Push every 1 hour PRN shortness of breath  LORazepam   Injectable 0.5 milliGRAM(s) IV Push every 4 hours PRN Anxiety         Vitals log        ICU Vital Signs Last 24 Hrs  T(C): 37.7 (05 Jan 2023 07:30), Max: 37.7 (05 Jan 2023 07:30)  T(F): 99.8 (05 Jan 2023 07:30), Max: 99.8 (05 Jan 2023 07:30)  HR: 116 (06 Jan 2023 00:01) (112 - 118)  BP: 94/58 (05 Jan 2023 07:30) (94/58 - 94/58)  BP(mean): --  ABP: --  ABP(mean): --  RR: 20 (05 Jan 2023 07:30) (20 - 20)  SpO2: 99% (06 Jan 2023 00:01) (93% - 99%)    O2 Parameters below as of 05 Jan 2023 07:30  Patient On (Oxygen Delivery Method): BiPAP/CPAP                 Input and Output:  I&O's Detail      Lab Data                        5.2    16.74 )-----------( 260      ( 05 Jan 2023 06:41 )             19.2     01-05    143  |  110<H>  |  67<H>  ----------------------------<  114<H>  4.8   |  15<L>  |  3.10<H>    Ca    7.5<L>      05 Jan 2023 06:41    TPro  6.9  /  Alb  3.0<L>  /  TBili  0.5  /  DBili  x   /  AST  751<H>  /  ALT  449<H>  /  AlkPhos  64  01-04            Review of Systems	      Objective     Physical Examination    heart s1s2  lung dec BS      Pertinent Lab findings & Imaging      Louie:  NO   Adequate UO     I&O's Detail           Discussed with:     Cultures:	        Radiology

## 2023-01-06 NOTE — PROGRESS NOTE ADULT - SUBJECTIVE AND OBJECTIVE BOX
MEDICAL ATTENDING NOTE    Patient is a 89y old  Female who presents with a chief complaint of Shortness of breath (06 Jan 2023 16:02)      INTERVAL HPI/OVERNIGHT EVENTS: encephalopathy/ dementia+    MEDICATIONS  (STANDING):  glycopyrrolate Injectable 0.2 milliGRAM(s) IV Push every 8 hours  HYDROmorphone Infusion 0.2 mG/Hr (0.2 mL/Hr) IV Continuous <Continuous>  LORazepam   Injectable 1 milliGRAM(s) IV Push every 12 hours    MEDICATIONS  (PRN):  HYDROmorphone  Injectable 0.25 milliGRAM(s) IV Push every 1 hour PRN Moderate Pain (4 - 6)  HYDROmorphone  Injectable 0.5 milliGRAM(s) IV Push every 1 hour PRN Severe Pain (7 - 10)  HYDROmorphone  Injectable 0.25 milliGRAM(s) IV Push every 1 hour PRN shortness of breath  LORazepam   Injectable 0.5 milliGRAM(s) IV Push every 4 hours PRN Anxiety      __________________________________________________  ----------------------------------------------------------------------------------  REVIEW OF SYSTEMS: unable to participate in ROS due to  encephalopathy      Vital Signs Last 24 Hrs  T(C): 36.4 (06 Jan 2023 13:06), Max: 36.4 (06 Jan 2023 13:06)  T(F): 97.5 (06 Jan 2023 13:06), Max: 97.5 (06 Jan 2023 13:06)  HR: 64 (06 Jan 2023 13:06) (64 - 116)  BP: 139/71 (06 Jan 2023 13:06) (139/71 - 139/71)  BP(mean): --  RR: 18 (06 Jan 2023 13:06) (18 - 18)  SpO2: 99% (06 Jan 2023 13:06) (98% - 100%)    Parameters below as of 06 Jan 2023 13:06  Patient On (Oxygen Delivery Method): room air        _________________  PHYSICAL EXAM:  ---------------------------  Normocephalic;   LUNGS - bilateral air entry, no wheezing  HEART: S1 S2+   ABDOMEN: Soft, Nontender, non distended, BS+  EXTREMITIES: no cyanosis; no edema  NERVOUS SYSTEM: limited neuro eval as  unable to follow commands due to encephalopathy    _________________________________________________  LABS:                        5.2    16.74 )-----------( 260      ( 05 Jan 2023 06:41 )             19.2     01-05    143  |  110<H>  |  67<H>  ----------------------------<  114<H>  4.8   |  15<L>  |  3.10<H>    Ca    7.5<L>      05 Jan 2023 06:41      PT/INR - ( 05 Jan 2023 06:41 )   PT: 51.2 sec;   INR: 4.31 ratio             CAPILLARY BLOOD GLUCOSE                    Plan of care aligned with patient's GOC.             MEDICAL ATTENDING NOTE    Patient is a 89y old  Female who presents with a chief complaint of Shortness of breath (06 Jan 2023 16:02)      INTERVAL HPI/OVERNIGHT EVENTS: encephalopathy/ dementia+    MEDICATIONS  (STANDING):  glycopyrrolate Injectable 0.2 milliGRAM(s) IV Push every 8 hours  HYDROmorphone Infusion 0.2 mG/Hr (0.2 mL/Hr) IV Continuous <Continuous>  LORazepam   Injectable 1 milliGRAM(s) IV Push every 12 hours    MEDICATIONS  (PRN):  HYDROmorphone  Injectable 0.25 milliGRAM(s) IV Push every 1 hour PRN Moderate Pain (4 - 6)  HYDROmorphone  Injectable 0.5 milliGRAM(s) IV Push every 1 hour PRN Severe Pain (7 - 10)  HYDROmorphone  Injectable 0.25 milliGRAM(s) IV Push every 1 hour PRN shortness of breath  LORazepam   Injectable 0.5 milliGRAM(s) IV Push every 4 hours PRN Anxiety      __________________________________________________  ----------------------------------------------------------------------------------  REVIEW OF SYSTEMS: unable to participate in ROS due to  encephalopathy      Vital Signs Last 24 Hrs  T(C): 36.4 (06 Jan 2023 13:06), Max: 36.4 (06 Jan 2023 13:06)  T(F): 97.5 (06 Jan 2023 13:06), Max: 97.5 (06 Jan 2023 13:06)  HR: 64 (06 Jan 2023 13:06) (64 - 116)  BP: 139/71 (06 Jan 2023 13:06) (139/71 - 139/71)  RR: 18 (06 Jan 2023 13:06) (18 - 18)  SpO2: 99% (06 Jan 2023 13:06) (98% - 100%)    Parameters below as of 06 Jan 2023 13:06  Patient On (Oxygen Delivery Method): room air        _________________  PHYSICAL EXAM:  ---------------------------  LUNGS - fair air entry  HEART: S1 S2+   ABDOMEN: Soft, Nontender, non distended, BS+  EXTREMITIES: no cyanosis; no edema  NERVOUS SYSTEM: limited neuro eval as  unable to follow commands due to encephalopathy    _________________________________________________  LABS:                        5.2    16.74 )-----------( 260      ( 05 Jan 2023 06:41 )             19.2     01-05    143  |  110<H>  |  67<H>  ----------------------------<  114<H>  4.8   |  15<L>  |  3.10<H>    Ca    7.5<L>      05 Jan 2023 06:41      PT/INR - ( 05 Jan 2023 06:41 )   PT: 51.2 sec;   INR: 4.31 ratio             CAPILLARY BLOOD GLUCOSE                    Plan of care aligned with patient's GOC.

## 2023-01-06 NOTE — PROGRESS NOTE ADULT - SUBJECTIVE AND OBJECTIVE BOX
[facility]  INFECTIOUS DISEASES   [address] [place] [address]  Tel: [phone]     Fax: [phone]  ========================================================  MD Hayes Valdez Kaushal, MD Cho, Michelle, MD Sunjit, Jaspal, MD  ========================================================    [id number]  DEANDRE GREWAL     Follow up: Pneumonia?, Cholecystitis?, multiorgan failure     Now she is comfort care, in RA. Family wishes to stop aggressive measures.     PAST MEDICAL & SURGICAL HISTORY:  Hypertension  Leg pain, bilateral  Hyperlipidemia  Hypovitaminosis D  Colonic polyp  Hiatal hernia  Aortic valve stenosis, etiology of cardiac valve disease unspecified  Hypertension  Glaucoma  Anxiety  GERD (gastroesophageal reflux disease)  Neuropathy  History of cardioversion    Social Hx: no current smoking, ETOH or drugs     FAMILY HISTORY:  Family history of diabetes mellitus (Child)    Allergies  No Known Allergies    Antibiotics:  MEDICATIONS  (STANDING):  piperacillin/tazobactam IVPB.. 3.375 Gram(s) IV Intermittent once     REVIEW OF SYSTEMS:  Unable to answer     Physical Exam:  Vital Signs Last 24 Hrs  T(C): 36.4 (06 Jan 2023 13:06), Max: 36.4 (06 Jan 2023 13:06)  T(F): 97.5 (06 Jan 2023 13:06), Max: 97.5 (06 Jan 2023 13:06)  HR: 64 (06 Jan 2023 13:06) (64 - 116)  BP: 139/71 (06 Jan 2023 13:06) (139/71 - 139/71)  BP(mean): --  RR: 18 (06 Jan 2023 13:06) (18 - 18)  SpO2: 99% (06 Jan 2023 13:06) (98% - 100%)  Parameters below as of 06 Jan 2023 13:06  Patient On (Oxygen Delivery Method): room air  GEN: NAD  HEENT: normocephalic and atraumatic.   NECK: Supple.  No lymphadenopathy   LUNGS: Coarse breath sounds bilaterally from anterior chest  HEART: Regular rate and rhythm   ABDOMEN: Soft, nontender, and nondistended.  Positive bowel sounds.    : No CVA tenderness  EXTREMITIES: Without edema.  NEUROLOGIC: unable   PSYCHIATRIC: lethargic   SKIN: No rash     Labs:                        5.2    16.74 )-----------( 260      ( 05 Jan 2023 06:41 )             19.2     01-05    143  |  110<H>  |  67<H>  ----------------------------<  114<H>  4.8   |  15<L>  |  3.10<H>    Ca    7.5<L>      05 Jan 2023 06:41    Culture - Urine (collected 01-04-23 @ 07:55)  Source: Clean Catch Clean Catch (Midstream)    Culture - Blood (collected 01-04-23 @ 07:55)  Source: .Blood Blood-Peripheral    Culture - Blood (collected 01-04-23 @ 07:55)  Source: .Blood Blood-Peripheral    WBC Count: 16.74 K/uL (01-05-23 @ 06:41)  WBC Count: 16.81 K/uL (01-04-23 @ 16:00)  WBC Count: 10.00 K/uL (01-04-23 @ 07:55)    Creatinine, Serum: 3.10 mg/dL (01-05-23 @ 06:41)  Creatinine, Serum: 1.90 mg/dL (01-04-23 @ 16:00)  Creatinine, Serum: 2.10 mg/dL (01-04-23 @ 07:55)    C-Reactive Protein, Serum: 76 mg/L (01-04-23 @ 10:00)    Procalcitonin, Serum: 0.55 ng/mL (01-04-23 @ 07:55)     SARS-CoV-2 Result: NotDetec (01-04-23 @ 07:55)      All imaging and other data have been reviewed.  < from: Xray Chest 1 View-PORTABLE IMMEDIATE (01.04.23 @ 08:37) >  ACC: [id number] EXAM:  XR CHEST PORTABLE IMMED 1V                        PROCEDURE DATE:  01/04/2023    INTERPRETATION:  AP chest on January 4, 2023 at 8:25 AM. Patient has   sepsis. Heart magnified by technique.  There are significant infiltrate emanating off both meera right greater   than left which are more pronounced on CAT scan of November 2, 2021.   Right hilar adenopathy is also possible. There is also a left base   process which is increased.  IMPRESSION: Increasing bilateral lung and pleural findings.    < from: US Abdomen Upper Quadrant Right (01.04.23 @ 12:32) >  IMPRESSION:  Cholelithiasis. Imaging findings are equivocal for acute cholecystitis.   If clinically indicated, a HIDA scan can be obtained.  Possible cirrhosis.    Assessment and Plan:   89y/o woman with PMH of HTN, AS, history cardioversion, dementia with recent fall s/p L1 fracture, was admitted from Nursing home with fever and respiratory distress. On arrival her SPO2 was in 80s while on NRB so she was placed   on BiPAP with improvement in SPo2 to low 90s. She is awake and alert but unable to give history. She also had fever 102.7 in ED.    Possible pneumonia (aspiration?) and cholecystitis causing high LFTs. Now with respiratory, renal and liver failure.   As per family wish has been started on comfort measures only.   Antibiotics have been stopped.     Will sign off please call with any question.     Gina Bennett MD  Division of Infectious Diseases   Please call ID service at [phone] with any question.      35 minutes spent on total encounter assessing patient, examination, chart review, counseling and coordinating care by the attending physician/nurse/care manager.

## 2023-01-06 NOTE — PROGRESS NOTE ADULT - PROBLEM SELECTOR PLAN 2
Prognosis poor  Full comfort measures  Patient is actively dying
Prognosis poor  Full comfort measures  Patient is actively dying

## 2023-01-06 NOTE — PROGRESS NOTE ADULT - ASSESSMENT
89y Female with resp distress - from SNF -     resp distress  Valv heart disease  CHF  OP  OA  Neuropathy  AS  AFIB  JASON -   Anemia -     opioids on order  CMO  palliative measures  s/p AVAPS  DNR DNI
start dilaudid drip & continue orn for pain/SOB  off bipap --> on nrbm  start ativan for myoclonic jerks  glycopyrrolate for secretions  
start dilaudid rtc   continue dilaudid prn   plan to switch off bipap once up on floor   d/c antibiotics  comfort care  
89y Female with resp distress - from SNF -     resp distress  Valv heart disease  CHF  OP  OA  Neuropathy  AS  AFIB  JASON -   Anemia -     cmo in progress  on NIV as per family wishes  would stop BIPAP and use Opioids - for Dyspnea - Resp Distress - NC would suffice in terms of o2 support  palliative cx noted 
89y/o woman with PMH of HTN, AS, history cardioversion, dementia with recent fall s/p L1 fracture, who presented to the ED from [facility] with fever and respiratory distress found to have fever 102+ and hypoxia requiring noninvasive ventilation with BiPAP admitted for Acute hypoxic respiratory failure due to suspected bacterial PNA with sepsis on admission.           
89y/o woman with PMH of HTN, AS, history cardioversion, dementia with recent fall s/p L1 fracture, who presented to the ED from [facility] with fever and respiratory distress found to have fever 102+ and hypoxia requiring noninvasive ventilation with BiPAP admitted for Acute hypoxic respiratory failure due to suspected bacterial PNA with sepsis on admission.

## 2023-01-06 NOTE — PROGRESS NOTE ADULT - PROBLEM SELECTOR PLAN 4
Etiology unclear  family decline work up  Comfort measures per GOC
Etiology unclear  family decline work up  Comfort measures per GOC

## 2023-01-06 NOTE — PROGRESS NOTE ADULT - PROBLEM SELECTOR PLAN 1
Multiorgan system failure including hypoxic respiratory failure requiring non invasive ventilation due to acute hypoxic respiratory failure  Supplemental oxygen  Based on GOC- full comfort measures; Patient is actively dying from multiorgan system failure (involving Pulm, hepatic, brain, renal)  Transition to oxygen via NRM for comfort
Multiorgan system failure including hypoxic respiratory failure requiring non invasive ventilation due to acute hypoxic respiratory failure  Supplemental oxygen  Based on GOC- full comfort measures; Patient is actively dying from multiorgan system failure (involving Pulm, hepatic, brain, renal)  Transition to oxygen via NRM for comfort

## 2023-01-06 NOTE — PATIENT PROFILE ADULT - FALL HARM RISK - HARM RISK INTERVENTIONS
Assistance with ambulation/Assistance OOB with selected safe patient handling equipment/Communicate Risk of Fall with Harm to all staff/Tailored Fall Risk Interventions/Visual Cue: Yellow wristband and red socks/Bed in lowest position, wheels locked, appropriate side rails in place/Purposeful Proactive Rounding/Unable to comprehend

## 2023-01-07 DIAGNOSIS — N17.9 ACUTE KIDNEY FAILURE, UNSPECIFIED: ICD-10-CM

## 2023-01-07 DIAGNOSIS — A41.9 SEPSIS, UNSPECIFIED ORGANISM: ICD-10-CM

## 2023-01-07 DIAGNOSIS — J18.9 PNEUMONIA, UNSPECIFIED ORGANISM: ICD-10-CM

## 2023-01-07 NOTE — DISCHARGE NOTE FOR THE EXPIRED PATIENT - HOSPITAL COURSE
HPI:  This is a 89y/o woman with PMH of HTN, AS, history cardioversion, dementia with recent fall s/p L1 fracture, who presented to the ED from [facility] with fever and respiratory distress. On arrival to the ED,she was noted with fever 102.7 and also her SPO2 was in 80s despite NRM. She was placed on BiPAP with improvement in SPO2 to low 90s. She is awake and follows minimal commands but unable to give history due to dementia.   CXR showed RML opacity concerning for PNA. She was started on broad spectrum antibiotics with Zosyn.     Additional history from son - patient has been in a SNF for several months with progressive decline.      (04 Jan 2023 10:28)      Patient was hospitalized due to hypoxic respiratory failure. She developed multiorgan failure due to sepsis form PNA and was also noted with anemia. Based on her GOC and prior known end of life wishes, family opted for palliative measures.   Patient was noted without spontaneous breath or heart sounds at about 6:31pm and thereafter was pronounced dead.  Condolences offered to family.

## 2023-01-09 LAB
CULTURE RESULTS: SIGNIFICANT CHANGE UP
CULTURE RESULTS: SIGNIFICANT CHANGE UP
SPECIMEN SOURCE: SIGNIFICANT CHANGE UP
SPECIMEN SOURCE: SIGNIFICANT CHANGE UP

## 2023-01-30 NOTE — ED ADULT TRIAGE NOTE - MODE OF ARRIVAL
Walk in Methotrexate Pregnancy And Lactation Text: This medication is Pregnancy Category X and is known to cause fetal harm. This medication is excreted in breast milk.

## 2023-02-27 NOTE — PATIENT PROFILE ADULT - NSTRANSFERBELONGINGSDISPO_GEN_A_NUR
patient called and stated she needs januvia 100 mg we have listed 50 mg called into [facility] on [address] [facility]    , she stated she has bottle for 100 mg but they       Last office visit 2022   Next ovs 2023 with patient

## 2023-05-01 NOTE — PROGRESS NOTE ADULT - ATTENDING COMMENTS
1 or 2 Patient seen and examined.   Patient with dyspnea from A.fib with rvr and pulmonary edema, continue with Digoxin, Metoprolol, Lasix.   Echo show dilated Lt atrium, moderate AS, cards consult called, follow up recs.

## 2023-07-09 NOTE — DIETITIAN INITIAL EVALUATION ADULT. - PROBLEM SELECTOR PLAN 3
Pt denies being on anti-hypertensive medications at this time. She was most recently prescribed triamterine-HCTZ and amlodipine, though pt does not report being on either of these. Contacting pharmacy.  -Pharmacy called to confirm prescribed medications last filled in March or April.  -hold home amlodipine at this time given 's.  -BP well-controlled at this time after diltiazem po and IVP in ED. not applicable

## 2023-12-31 PROBLEM — Z23 NEED FOR 23-POLYVALENT PNEUMOCOCCAL POLYSACCHARIDE VACCINE: Status: ACTIVE | Noted: 2018-11-10

## 2024-01-31 NOTE — CONSULT NOTE ADULT - TIME BILLING
Time spent on total encounter assessing patient, examination, chart review, counseling and coordinating care by the attending physician/nurse/care manager as well as GOC discussion. SBO

## 2024-03-24 NOTE — PHYSICAL THERAPY INITIAL EVALUATION ADULT - PHYSICAL ASSIST/NONPHYSICAL ASSIST: SCOOT/BRIDGE, REHAB EVAL
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

## 2024-04-19 NOTE — H&P ADULT - NEGATIVE OPHTHALMOLOGIC SYMPTOMS
Discharge Instructions Following Vascular Surgery for  [facility] Cardioth[facility]acic [facility]ascular Surgery  Pt Name: Jean Deluna  Medical Record Number: [id number]  Today's Date: 4/19/2024    ACTIVITY/EXERCISE   ? Slowly increase your activity after you go home.  Pace yourself, listen to your body.  If it hurts, stop.   ? It will take 3 to 4 weeks to feel like you did before surgery in terms of energy and strength.   ? No constricting items on arm that fistula procedure was performed.    ? Elevate arm to heart height while at rest to prevent swelling. Use stress ball while at rest to increase blood flow.      APPETITE   ? Your appetite might be decreased at first.  It should slowly improve.    TEMPERATURE   ?      Take your temperature at the same time each day.  Take your temperature at 8 a.m. (morning) and 8 p.m. (evening).     PAIN   ? You may have pain at the incision. Take your pain medications as ordered.    BOWEL HABITS   ? Constipation is common due to Pain medications and inactivity.   ? Try drinking prune juice, eating a well balanced diet including fruits, vegetables and whole grains.   ? If constipation persists, you may use any over-the-counter laxative, suppository or enema.    DRIVING AND RIDING IN A CAR INSTRUCTIONS  ?         No DRIVING while on prescription pain medication! If driving prior to surgery, may resume driving as soon as prescription pain medication is discontinued.     INCISIONS  ? Warning signs of infection: redness, warmth to touch, green colored pus, tender to touch.  ? Clean your incisions twice daily with soap. Ok to shower the day of procedure but do not bathe until 4 days post procedure.   ? Be sure to rinse the incision with water and pat dry with clean towels.  ?          If you have Steri strips, leave them in place until they fall off.  ?         Staples and stitches are normally removed in 10-14 days post op.     SMOKING   If you smoke, STOP.  Smoking will cause early  no photophobia

## 2024-05-02 NOTE — PHYSICAL THERAPY INITIAL EVALUATION ADULT - WEIGHT-BEARING RESTRICTIONS: GAIT, REHAB EVAL
[facility] PRE-OPERATIVE INSTRUCTIONS       DOS: __5/17/24__        Pre-Op Instructions     [x]  A History and Physical will be required within 30 days prior to surgery date. Some patients may require cardiac or pulmonary clearance. H&P will be completed DOS for Endo/colonoscopy patients.     [x]  Reviewed Medical and Surgical history, medication list, confirmed with patient any implants, allergies, bleeding disorders, JOMAR and reactions to Anesthesia.    [x]  If there is a change in physical condition between now and the day of surgery, please notify your surgeon. This includes a cough, cold, fever, sore throat, nausea, vomiting and diarrhea. Also notify your surgeon if you experience dizziness, shortness of breath or blurred vision.    [x] Reviewed hx of C-Diff, MRSA, VRE and/or recent use of Antibiotics     [x]  All patients having a procedure must have a ride home by a responsible person that is over the age of 18 and ensure it is someone that we can share medical information with. After discharge, a responsible adult needs to stay with you for 24 hours. There is a limit of 2 adult visitors per room.     If unable to secure ride and/or care taker, please contact surgeon's office.      [x]  No alcohol, smoking or marijuana use 24 hours prior to surgery. Any use of recreational drugs must be stopped 5 days prior to surgery.     [x]  NPO after midnight (Any heart, BP, seizure, thyroid and breathing medications are okay to take the morning of surgery with a small sip of water).    The morning of surgery, you may brush your teeth, just no swallowing water. Also, NO gum, candy, mints or ice chips.    [x]  For Colonoscopy's, follow prep-instructions as indicated by physician.     []  Patients with a insulin pump, keep set on basal rate on day of surgery. Long-acting insulin should be administered the evening before, take only half of usual dose. Always check with prescribing doctor if there are any  weight-bearing as tolerated

## 2024-05-08 NOTE — PROGRESS NOTE ADULT - REASON FOR ADMISSION
Fall and lower back pain
Fall and lower back pain
[FreeTextEntry1] : Patient is a 86 year old woman with a history of hyperlipidemia, elevated blood pressure, uterine cancer, newly diagnosed breast cancer, and melanoma who presents today for evaluation of HTN and risk stratification prior to right breast lumpectomy.  She states that she has been having difficulty with her sinuses.  She mentions that she can go up 3-4 flights of stairs but needs to slow down secondary to knee pain.  She mentions episodes of dizziness at times.  She also states that she has not been taking her Zetia. She otherwise denies any dyspnea, palpitations, chest pain, and headaches. 
Fall and lower back pain

## 2024-09-12 NOTE — PATIENT PROFILE ADULT. - CURRENT SWALLOWING
This was a shared visit with the AIDEN. I reviewed and verified the documentation.
(0) swallows foods and liquids w/o difficulty

## 2024-10-11 NOTE — ED ADULT TRIAGE NOTE - RESPIRATORY RATE (BREATHS/MIN)
Acute on Chronic Sinusitis-   Start Augmentin twice a day x 10 days, take with food and probiotic. Recommend saline nasal spray every 2 hr as needed. Increase fluids water or Pedialyte 1 L/day.    Acute Bronchitis with wheeze in morning-   Albuterol nebulizer trialed. Preneb and Postneb O2 unchanged. Start Albuterol inhaler with spacer 2 puffs every 6h as needed cough or wheeze.  Recommend Cool Mist Humidifier, Vicks Chest RUB, OTC expectorant with plenty of fluids, Trial of Flonase nasal spray 2 sprays each nostril daily or 1 spray twice a day x 3-7 days. Continue Cetrizine 10mg daily.    Follow up with PCP or ENT on Monday. Go to ER if worsening breathing or fever >103 or dizziness     18

## 2025-01-07 NOTE — PATIENT PROFILE ADULT. - NS PRO MODE OF ARRIVAL
Case was discussed with lola morse  and the patient's admission status was agreed to be Admission Status: observation status to the service of Dr Barger Reap   
Patient comes in with abdominal pain and diarrhea for 3 days.  
stretcher